# Patient Record
Sex: FEMALE | Race: WHITE | NOT HISPANIC OR LATINO
[De-identification: names, ages, dates, MRNs, and addresses within clinical notes are randomized per-mention and may not be internally consistent; named-entity substitution may affect disease eponyms.]

---

## 2017-07-24 ENCOUNTER — APPOINTMENT (OUTPATIENT)
Dept: ULTRASOUND IMAGING | Facility: IMAGING CENTER | Age: 67
End: 2017-07-24

## 2017-07-24 ENCOUNTER — OUTPATIENT (OUTPATIENT)
Dept: OUTPATIENT SERVICES | Facility: HOSPITAL | Age: 67
LOS: 1 days | End: 2017-07-24
Payer: MEDICARE

## 2017-07-24 ENCOUNTER — APPOINTMENT (OUTPATIENT)
Dept: MAMMOGRAPHY | Facility: IMAGING CENTER | Age: 67
End: 2017-07-24

## 2017-07-24 DIAGNOSIS — C50.919 MALIGNANT NEOPLASM OF UNSPECIFIED SITE OF UNSPECIFIED FEMALE BREAST: ICD-10-CM

## 2017-07-24 PROCEDURE — 77066 DX MAMMO INCL CAD BI: CPT

## 2017-07-24 PROCEDURE — G0279: CPT

## 2017-08-16 ENCOUNTER — APPOINTMENT (OUTPATIENT)
Dept: SURGICAL ONCOLOGY | Facility: CLINIC | Age: 67
End: 2017-08-16
Payer: MEDICARE

## 2017-08-16 VITALS
SYSTOLIC BLOOD PRESSURE: 143 MMHG | DIASTOLIC BLOOD PRESSURE: 88 MMHG | RESPIRATION RATE: 16 BRPM | HEART RATE: 106 BPM | HEIGHT: 67 IN | OXYGEN SATURATION: 96 %

## 2017-08-16 PROCEDURE — 99214 OFFICE O/P EST MOD 30 MIN: CPT

## 2017-08-16 RX ORDER — ACYCLOVIR 50 MG/G
5 CREAM TOPICAL
Qty: 5 | Refills: 0 | Status: ACTIVE | COMMUNITY
Start: 2016-12-15

## 2018-03-07 ENCOUNTER — RX RENEWAL (OUTPATIENT)
Age: 68
End: 2018-03-07

## 2018-03-11 ENCOUNTER — RX RENEWAL (OUTPATIENT)
Age: 68
End: 2018-03-11

## 2018-04-02 ENCOUNTER — APPOINTMENT (OUTPATIENT)
Dept: SURGICAL ONCOLOGY | Facility: CLINIC | Age: 68
End: 2018-04-02
Payer: MEDICARE

## 2018-04-02 ENCOUNTER — RX RENEWAL (OUTPATIENT)
Age: 68
End: 2018-04-02

## 2018-05-07 ENCOUNTER — APPOINTMENT (OUTPATIENT)
Dept: SURGICAL ONCOLOGY | Facility: CLINIC | Age: 68
End: 2018-05-07
Payer: MEDICARE

## 2018-05-07 VITALS
WEIGHT: 155 LBS | DIASTOLIC BLOOD PRESSURE: 95 MMHG | HEIGHT: 67 IN | BODY MASS INDEX: 24.33 KG/M2 | HEART RATE: 104 BPM | RESPIRATION RATE: 16 BRPM | SYSTOLIC BLOOD PRESSURE: 167 MMHG

## 2018-05-07 PROCEDURE — 99214 OFFICE O/P EST MOD 30 MIN: CPT

## 2018-05-08 ENCOUNTER — RX RENEWAL (OUTPATIENT)
Age: 68
End: 2018-05-08

## 2018-07-03 ENCOUNTER — APPOINTMENT (OUTPATIENT)
Dept: ORTHOPEDIC SURGERY | Facility: CLINIC | Age: 68
End: 2018-07-03
Payer: MEDICARE

## 2018-07-03 VITALS
HEIGHT: 67 IN | WEIGHT: 152 LBS | HEART RATE: 103 BPM | SYSTOLIC BLOOD PRESSURE: 152 MMHG | RESPIRATION RATE: 16 BRPM | BODY MASS INDEX: 23.86 KG/M2 | DIASTOLIC BLOOD PRESSURE: 119 MMHG

## 2018-07-03 DIAGNOSIS — M47.812 SPONDYLOSIS W/OUT MYELOPATHY OR RADICULOPATHY, CERVICAL REGION: ICD-10-CM

## 2018-07-03 PROCEDURE — 72040 X-RAY EXAM NECK SPINE 2-3 VW: CPT

## 2018-07-03 PROCEDURE — 99203 OFFICE O/P NEW LOW 30 MIN: CPT

## 2018-07-03 PROCEDURE — 73000 X-RAY EXAM OF COLLAR BONE: CPT | Mod: LT

## 2018-07-09 ENCOUNTER — RX RENEWAL (OUTPATIENT)
Age: 68
End: 2018-07-09

## 2018-07-30 ENCOUNTER — APPOINTMENT (OUTPATIENT)
Dept: ORTHOPEDIC SURGERY | Facility: CLINIC | Age: 68
End: 2018-07-30
Payer: MEDICARE

## 2018-07-30 VITALS — HEIGHT: 67 IN | WEIGHT: 152 LBS | BODY MASS INDEX: 23.86 KG/M2

## 2018-07-30 PROCEDURE — 99214 OFFICE O/P EST MOD 30 MIN: CPT

## 2018-07-30 PROCEDURE — 73000 X-RAY EXAM OF COLLAR BONE: CPT | Mod: LT

## 2018-08-31 ENCOUNTER — APPOINTMENT (OUTPATIENT)
Dept: MAMMOGRAPHY | Facility: CLINIC | Age: 68
End: 2018-08-31
Payer: MEDICARE

## 2018-08-31 ENCOUNTER — OUTPATIENT (OUTPATIENT)
Dept: OUTPATIENT SERVICES | Facility: HOSPITAL | Age: 68
LOS: 1 days | End: 2018-08-31
Payer: MEDICARE

## 2018-08-31 ENCOUNTER — APPOINTMENT (OUTPATIENT)
Dept: ULTRASOUND IMAGING | Facility: CLINIC | Age: 68
End: 2018-08-31
Payer: MEDICARE

## 2018-08-31 DIAGNOSIS — Z00.8 ENCOUNTER FOR OTHER GENERAL EXAMINATION: ICD-10-CM

## 2018-08-31 DIAGNOSIS — Z08 ENCOUNTER FOR FOLLOW-UP EXAMINATION AFTER COMPLETED TREATMENT FOR MALIGNANT NEOPLASM: ICD-10-CM

## 2018-08-31 PROCEDURE — G0279: CPT

## 2018-08-31 PROCEDURE — 76641 ULTRASOUND BREAST COMPLETE: CPT | Mod: 26,50

## 2018-08-31 PROCEDURE — G0279: CPT | Mod: 26

## 2018-08-31 PROCEDURE — 77066 DX MAMMO INCL CAD BI: CPT | Mod: 26

## 2018-08-31 PROCEDURE — 76641 ULTRASOUND BREAST COMPLETE: CPT

## 2018-08-31 PROCEDURE — 77066 DX MAMMO INCL CAD BI: CPT

## 2018-09-06 ENCOUNTER — APPOINTMENT (OUTPATIENT)
Dept: ORTHOPEDIC SURGERY | Facility: CLINIC | Age: 68
End: 2018-09-06
Payer: MEDICARE

## 2018-09-06 PROCEDURE — 73000 X-RAY EXAM OF COLLAR BONE: CPT | Mod: LT

## 2018-09-06 PROCEDURE — 99214 OFFICE O/P EST MOD 30 MIN: CPT

## 2018-09-12 ENCOUNTER — RX RENEWAL (OUTPATIENT)
Age: 68
End: 2018-09-12

## 2018-09-20 ENCOUNTER — EMERGENCY (EMERGENCY)
Facility: HOSPITAL | Age: 68
LOS: 1 days | Discharge: HOME | End: 2018-09-20
Attending: EMERGENCY MEDICINE | Admitting: EMERGENCY MEDICINE
Payer: MEDICARE

## 2018-09-20 ENCOUNTER — RX CHANGE (OUTPATIENT)
Age: 68
End: 2018-09-20

## 2018-09-20 VITALS
WEIGHT: 151.9 LBS | RESPIRATION RATE: 16 BRPM | SYSTOLIC BLOOD PRESSURE: 160 MMHG | HEART RATE: 109 BPM | TEMPERATURE: 98 F | HEIGHT: 67 IN | OXYGEN SATURATION: 95 % | DIASTOLIC BLOOD PRESSURE: 91 MMHG

## 2018-09-20 DIAGNOSIS — Z98.890 OTHER SPECIFIED POSTPROCEDURAL STATES: Chronic | ICD-10-CM

## 2018-09-20 DIAGNOSIS — Z90.49 ACQUIRED ABSENCE OF OTHER SPECIFIED PARTS OF DIGESTIVE TRACT: Chronic | ICD-10-CM

## 2018-09-20 DIAGNOSIS — M89.9 DISORDER OF BONE, UNSPECIFIED: ICD-10-CM

## 2018-09-20 PROCEDURE — 99283 EMERGENCY DEPT VISIT LOW MDM: CPT

## 2018-09-20 PROCEDURE — 73502 X-RAY EXAM HIP UNI 2-3 VIEWS: CPT | Mod: 26,RT

## 2018-09-20 PROCEDURE — 73502 X-RAY EXAM HIP UNI 2-3 VIEWS: CPT

## 2018-09-20 RX ORDER — ACETAMINOPHEN 500 MG
650 TABLET ORAL ONCE
Qty: 0 | Refills: 0 | Status: COMPLETED | OUTPATIENT
Start: 2018-09-20 | End: 2018-09-20

## 2018-09-20 RX ADMIN — Medication 650 MILLIGRAM(S): at 14:30

## 2018-09-20 RX ADMIN — Medication 650 MILLIGRAM(S): at 15:30

## 2018-09-20 NOTE — ED PROVIDER NOTE - ATTENDING CONTRIBUTION TO CARE
[place]icruz with ROLY Silva. Patient with tenderness to the right inner groin. Bearing weight but with pain. Sent by Dr Walker for xray and assessment. Xray neg. Stable for d/c with OTC pain control. No fracture. May f/u outpt. I performed a face to face bedside interview with patient regarding history of present illness, review of symptoms and past medical history. I completed an independent physical exam.  I have discussed the patient's plan of care with Physician Assistant (PA). I agree with note as stated above, having amended the EMR as needed to reflect my findings.   This includes History of Present Illness, HIV, Past Medical/Surgical/Family/Social History, Allergies and Home Medications, Review of Systems, Physical Exam, and any Progress Notes during the time I functioned as the attending physician for this patient.

## 2018-09-20 NOTE — ED PROVIDER NOTE - PROGRESS NOTE DETAILS
ROLY Villaseñor:  Dr Babcock evaluated pt,  pt can d/c archana, no hip fx, pt and pt's  informed of results, pt has ortho md to f/u,    will refer to neuro for chronic  numbness of extremity either Dr Davis or Dr Kuldip Mendoza, copy of results given, d/c instructions given to pt,   pain improved with tylenol

## 2018-09-20 NOTE — ED PROVIDER NOTE - MEDICAL DECISION MAKING DETAILS
67 yo F with  h/o  cancer,  "brittle bones"  with rt groin pain worse with movement,   + FROM of rt hip, pt able to stand and bear weight, ambulate but with pain, concern for hip fx  NV intact of lower extremities,  ordered for tylenol for pain and  rt hip xray/pelvis  will re-eval,  d/w Dr Babcock , who spoke with  ortho md.

## 2018-09-20 NOTE — ED ADULT NURSE NOTE - NSIMPLEMENTINTERV_GEN_ALL_ED
Implemented All Universal Safety Interventions:  [place] to call system. Call bell, personal items and telephone within reach. Instruct patient to call for assistance. Room bathroom lighting operational. Non-slip footwear when patient is off stretcher. Physically safe environment: no spills, clutter or unnecessary equipment. Stretcher in lowest position, wheels locked, appropriate side rails in place.

## 2018-09-20 NOTE — ED PROVIDER NOTE - PHYSICAL EXAMINATION
head : atraumatic  neck:  supple, no c-spine tenderness  chest:  S1,S2  CTAB  back: no midline tenderness  lower extremities: + distal pulses,   no calf tenderness,  FROM ,   rt inner thigh: groin:  + tenderness, no swelling,   neuro: motor and sensory intact,  ambulatory

## 2018-09-20 NOTE — ED ADULT NURSE NOTE - OBJECTIVE STATEMENT
Patient presents to ED s/p fall from home complaining of right sided groin pain only when walking. Patient reports falling in her kitchen yesterday morning. Patient denies LOC/vision changes/head trauma. Spouse at bedside.

## 2018-09-20 NOTE — ED PROVIDER NOTE - OBJECTIVE STATEMENT
67 yo F from community accompanied her  presents with rt groin pain s/p minor mechanical fall yesterday  while in her kitchen.  tripped over carpet and  fell backward,   no LOC, no  head injury, no neck pain,  no back pain,  no urinary or bowel incontinence  no  SOB or CP  pt took  ibuprofen 800 mg today about 8 am for  pain with some relief,    pt spoke with orthopedic doctor that she called and recommend  rt hip xray.  pt has left collar fx from 2 months that still not healed and being f/u with her orthopedic doctor    pt's  requesting neuro referral for  left arm numbness and imbalance X 2 months s/p fall , + left collar fx  pt doesn't have a pcp, as well.

## 2018-09-27 ENCOUNTER — RX RENEWAL (OUTPATIENT)
Age: 68
End: 2018-09-27

## 2018-10-18 LAB
ALBUMIN SERPL ELPH-MCNC: 4.6 G/DL
ALP BLD-CCNC: 74 U/L
ALT SERPL-CCNC: 19 U/L
AST SERPL-CCNC: 19 U/L
BILIRUB DIRECT SERPL-MCNC: 0.2 MG/DL
BILIRUB INDIRECT SERPL-MCNC: 0.8 MG/DL
BILIRUB SERPL-MCNC: 1 MG/DL
CANCER AG27-29 SERPL-ACNC: 20.3 U/ML
CEA SERPL-MCNC: 4.9 NG/ML
PROT SERPL-MCNC: 7.1 G/DL

## 2018-10-31 ENCOUNTER — MOBILE ON CALL (OUTPATIENT)
Age: 68
End: 2018-10-31

## 2018-11-01 ENCOUNTER — RX CHANGE (OUTPATIENT)
Age: 68
End: 2018-11-01

## 2018-11-02 ENCOUNTER — APPOINTMENT (OUTPATIENT)
Dept: ORTHOPEDIC SURGERY | Facility: CLINIC | Age: 68
End: 2018-11-02
Payer: MEDICARE

## 2018-11-02 VITALS
WEIGHT: 145 LBS | BODY MASS INDEX: 22.76 KG/M2 | DIASTOLIC BLOOD PRESSURE: 98 MMHG | SYSTOLIC BLOOD PRESSURE: 154 MMHG | HEART RATE: 101 BPM | HEIGHT: 67 IN

## 2018-11-02 PROCEDURE — 99214 OFFICE O/P EST MOD 30 MIN: CPT

## 2018-11-02 PROCEDURE — 99204 OFFICE O/P NEW MOD 45 MIN: CPT

## 2018-11-07 ENCOUNTER — APPOINTMENT (OUTPATIENT)
Dept: SURGICAL ONCOLOGY | Facility: CLINIC | Age: 68
End: 2018-11-07
Payer: MEDICARE

## 2018-11-07 VITALS
OXYGEN SATURATION: 96 % | HEIGHT: 67 IN | WEIGHT: 145 LBS | DIASTOLIC BLOOD PRESSURE: 128 MMHG | SYSTOLIC BLOOD PRESSURE: 183 MMHG | HEART RATE: 118 BPM | BODY MASS INDEX: 22.76 KG/M2

## 2018-11-07 PROCEDURE — 99214 OFFICE O/P EST MOD 30 MIN: CPT

## 2018-11-08 ENCOUNTER — LABORATORY RESULT (OUTPATIENT)
Age: 68
End: 2018-11-08

## 2018-11-09 ENCOUNTER — APPOINTMENT (OUTPATIENT)
Dept: ORTHOPEDIC SURGERY | Facility: CLINIC | Age: 68
End: 2018-11-09

## 2018-11-16 ENCOUNTER — APPOINTMENT (OUTPATIENT)
Dept: ORTHOPEDIC SURGERY | Facility: CLINIC | Age: 68
End: 2018-11-16
Payer: MEDICARE

## 2018-11-16 VITALS
BODY MASS INDEX: 22.76 KG/M2 | DIASTOLIC BLOOD PRESSURE: 80 MMHG | SYSTOLIC BLOOD PRESSURE: 128 MMHG | HEART RATE: 101 BPM | WEIGHT: 145 LBS | HEIGHT: 67 IN

## 2018-11-16 PROCEDURE — 99214 OFFICE O/P EST MOD 30 MIN: CPT

## 2018-11-18 ENCOUNTER — RX CHANGE (OUTPATIENT)
Age: 68
End: 2018-11-18

## 2018-11-18 ENCOUNTER — INPATIENT (INPATIENT)
Facility: HOSPITAL | Age: 68
LOS: 1 days | Discharge: HOME | End: 2018-11-20
Attending: HOSPITALIST | Admitting: HOSPITALIST
Payer: MEDICARE

## 2018-11-18 VITALS
DIASTOLIC BLOOD PRESSURE: 95 MMHG | OXYGEN SATURATION: 97 % | HEART RATE: 110 BPM | RESPIRATION RATE: 18 BRPM | SYSTOLIC BLOOD PRESSURE: 148 MMHG | TEMPERATURE: 98 F

## 2018-11-18 DIAGNOSIS — Z90.49 ACQUIRED ABSENCE OF OTHER SPECIFIED PARTS OF DIGESTIVE TRACT: Chronic | ICD-10-CM

## 2018-11-18 DIAGNOSIS — G95.9 DISEASE OF SPINAL CORD, UNSPECIFIED: ICD-10-CM

## 2018-11-18 DIAGNOSIS — Z98.890 OTHER SPECIFIED POSTPROCEDURAL STATES: Chronic | ICD-10-CM

## 2018-11-18 LAB
ALBUMIN SERPL ELPH-MCNC: 4.3 G/DL — SIGNIFICANT CHANGE UP (ref 3.3–5)
ALP SERPL-CCNC: 84 U/L — SIGNIFICANT CHANGE UP (ref 40–120)
ALT FLD-CCNC: 13 U/L — SIGNIFICANT CHANGE UP (ref 4–33)
APTT BLD: 26.5 SEC — LOW (ref 27.5–36.3)
AST SERPL-CCNC: 21 U/L — SIGNIFICANT CHANGE UP (ref 4–32)
BILIRUB SERPL-MCNC: 0.8 MG/DL — SIGNIFICANT CHANGE UP (ref 0.2–1.2)
BLD GP AB SCN SERPL QL: NEGATIVE — SIGNIFICANT CHANGE UP
BUN SERPL-MCNC: 18 MG/DL — SIGNIFICANT CHANGE UP (ref 7–23)
CALCIUM SERPL-MCNC: 9.3 MG/DL — SIGNIFICANT CHANGE UP (ref 8.4–10.5)
CHLORIDE SERPL-SCNC: 103 MMOL/L — SIGNIFICANT CHANGE UP (ref 98–107)
CO2 SERPL-SCNC: 21 MMOL/L — LOW (ref 22–31)
CREAT SERPL-MCNC: 0.53 MG/DL — SIGNIFICANT CHANGE UP (ref 0.5–1.3)
GLUCOSE SERPL-MCNC: 105 MG/DL — HIGH (ref 70–99)
HCT VFR BLD CALC: 37.6 % — SIGNIFICANT CHANGE UP (ref 34.5–45)
HGB BLD-MCNC: 12.1 G/DL — SIGNIFICANT CHANGE UP (ref 11.5–15.5)
INR BLD: 0.96 — SIGNIFICANT CHANGE UP (ref 0.88–1.17)
MCHC RBC-ENTMCNC: 30.9 PG — SIGNIFICANT CHANGE UP (ref 27–34)
MCHC RBC-ENTMCNC: 32.2 % — SIGNIFICANT CHANGE UP (ref 32–36)
MCV RBC AUTO: 96.2 FL — SIGNIFICANT CHANGE UP (ref 80–100)
NRBC # FLD: 0 — SIGNIFICANT CHANGE UP
PLATELET # BLD AUTO: 325 K/UL — SIGNIFICANT CHANGE UP (ref 150–400)
PMV BLD: 9.9 FL — SIGNIFICANT CHANGE UP (ref 7–13)
POTASSIUM SERPL-MCNC: 4.1 MMOL/L — SIGNIFICANT CHANGE UP (ref 3.5–5.3)
POTASSIUM SERPL-SCNC: 4.1 MMOL/L — SIGNIFICANT CHANGE UP (ref 3.5–5.3)
PROT SERPL-MCNC: 7.1 G/DL — SIGNIFICANT CHANGE UP (ref 6–8.3)
PROTHROM AB SERPL-ACNC: 10.6 SEC — SIGNIFICANT CHANGE UP (ref 9.8–13.1)
RBC # BLD: 3.91 M/UL — SIGNIFICANT CHANGE UP (ref 3.8–5.2)
RBC # FLD: 13.5 % — SIGNIFICANT CHANGE UP (ref 10.3–14.5)
RH IG SCN BLD-IMP: POSITIVE — SIGNIFICANT CHANGE UP
SODIUM SERPL-SCNC: 139 MMOL/L — SIGNIFICANT CHANGE UP (ref 135–145)
WBC # BLD: 9.66 K/UL — SIGNIFICANT CHANGE UP (ref 3.8–10.5)
WBC # FLD AUTO: 9.66 K/UL — SIGNIFICANT CHANGE UP (ref 3.8–10.5)

## 2018-11-18 PROCEDURE — 72170 X-RAY EXAM OF PELVIS: CPT | Mod: 26

## 2018-11-18 PROCEDURE — 72040 X-RAY EXAM NECK SPINE 2-3 VW: CPT | Mod: 26

## 2018-11-18 RX ORDER — DEXAMETHASONE 0.5 MG/5ML
10 ELIXIR ORAL ONCE
Qty: 0 | Refills: 0 | Status: COMPLETED | OUTPATIENT
Start: 2018-11-18 | End: 2018-11-18

## 2018-11-18 RX ORDER — MORPHINE SULFATE 50 MG/1
4 CAPSULE, EXTENDED RELEASE ORAL ONCE
Qty: 0 | Refills: 0 | Status: DISCONTINUED | OUTPATIENT
Start: 2018-11-18 | End: 2018-11-18

## 2018-11-18 RX ORDER — DIAZEPAM 5 MG
2 TABLET ORAL ONCE
Qty: 0 | Refills: 0 | Status: DISCONTINUED | OUTPATIENT
Start: 2018-11-18 | End: 2018-11-18

## 2018-11-18 RX ADMIN — MORPHINE SULFATE 4 MILLIGRAM(S): 50 CAPSULE, EXTENDED RELEASE ORAL at 23:40

## 2018-11-18 RX ADMIN — MORPHINE SULFATE 4 MILLIGRAM(S): 50 CAPSULE, EXTENDED RELEASE ORAL at 21:50

## 2018-11-18 RX ADMIN — Medication 2 MILLIGRAM(S): at 22:15

## 2018-11-18 RX ADMIN — Medication 102 MILLIGRAM(S): at 21:55

## 2018-11-18 NOTE — H&P ADULT - NSHPLABSRESULTS_GEN_ALL_CORE
LABS:                        12.1   9.66  )-----------( 325      ( 18 Nov 2018 21:50 )             37.6     18 Nov 2018 21:50    139    |  103    |  18     ----------------------------<  105    4.1     |  21     |  0.53     Ca    9.3        18 Nov 2018 21:50    TPro  7.1    /  Alb  4.3    /  TBili  0.8    /  DBili  x      /  AST  21     /  ALT  13     /  AlkPhos  84     18 Nov 2018 21:50    PT/INR - ( 18 Nov 2018 21:50 )   PT: 10.6 SEC;   INR: 0.96          PTT - ( 18 Nov 2018 21:50 )  PTT:26.5 SEC  CAPILLARY BLOOD GLUCOSE        BLOOD CULTURE    RADIOLOGY & ADDITIONAL TESTS:    Imaging Personally Reviewed:  [ ] YES LABS:                        12.1   9.66  )-----------( 325      ( 18 Nov 2018 21:50 )             37.6     18 Nov 2018 21:50    139    |  103    |  18     ----------------------------<  105    4.1     |  21     |  0.53     Ca    9.3        18 Nov 2018 21:50    TPro  7.1    /  Alb  4.3    /  TBili  0.8    /  DBili  x      /  AST  21     /  ALT  13     /  AlkPhos  84     18 Nov 2018 21:50    PT/INR - ( 18 Nov 2018 21:50 )   PT: 10.6 SEC;   INR: 0.96          PTT - ( 18 Nov 2018 21:50 )  PTT:26.5 SEC  CAPILLARY BLOOD GLUCOSE        BLOOD CULTURE    RADIOLOGY & ADDITIONAL TESTS:    Imaging Personally Reviewed:  [X] YES XRay C spine - degenerative changes   X ray pelvis - no masses or lesions 11-18    139  |  103  |  18  ----------------------------<  105<H>  4.1   |  21<L>  |  0.53    Ca    9.3      18 Nov 2018 21:50    TPro  7.1  /  Alb  4.3  /  TBili  0.8  /  DBili  x   /  AST  21  /  ALT  13  /  AlkPhos  84  11-18                        12.1   9.66  )-----------( 325      ( 18 Nov 2018 21:50 )             37.6     PT/INR - ( 18 Nov 2018 21:50 )   PT: 10.6 SEC;   INR: 0.96     PTT - ( 18 Nov 2018 21:50 )  PTT:26.5 SEC    RADIOLOGY & ADDITIONAL TESTS:    < from: Xray Pelvis AP only (11.18.18 @ 23:04) >  ******PRELIMINARY REPORT******  INTERPRETATION:  No discrete sclerotic or lytic lesions are visualized.  < end of copied text >    < from: Xray Hip w/ Pelvis 2 or 3 Views, Right (09.20.18 @ 15:11) >  The hips appear free of degeneration and are symmetric. Dystrophic soft tissue calcification is seen superior to the right greater trochanter.  No bone destruction or fracture is evident.   IMPRESSION: No acute finding.  < end of copied text >

## 2018-11-18 NOTE — H&P ADULT - ASSESSMENT
68F PM[facility] Breast cancer, Hodgkin's lymphoma, cervical spinal stenosis with C4-C5 disk herniation and cord compression, L sided carpal tunnel syndrome s/p surgery, presented with 68F [facility] Breast cancer, Hodgkin's lymphoma, cervical spinal stenosis with C4-C5 disk herniation and cord compression, L sided carpal tunnel syndrome s/p surgery, presents with bilateral upper extremity and neck pain likely 2/2 C4-C5 disk herniation.

## 2018-11-18 NOTE — H&P ADULT - PROBLEM SELECTOR PLAN 4
- c/w anastrazole 1 mg daily - patient w/ chronic gait instability, has had extensive outpatient workup done per patient including MRI brain, MR spine    - no cerebellar abnormalities on neuro exam   - f/u with outpatient neurologist (Dr. Bell) regarding w/u that has been done, no need for additional imaging while inpatient   - PT   - given decreased sensation of feet will check B12, A1c - patient w/ chronic gait instability, has had extensive outpatient workup done per patient including MRI brain, MR spine    - no cerebellar abnormalities on neuro exam   - f/u with outpatient neurologist (Dr. Bell) regarding w/u that has been done  - PT   - given decreased sensation of feet will check B12, A1c

## 2018-11-18 NOTE — H&P ADULT - PROBLEM SELECTOR PLAN 3
- patient w/ recent bone scan w/ R acetabular lesion and L clavicular/scapular lesion   - plan for IR guided biopsy of left hip, IR consult in the AM

## 2018-11-18 NOTE — H&P ADULT - NSHPPHYSICALEXAM_GEN_ALL_CORE
Vital Signs Last 24 Hrs  T(C): 36.7 (18 Nov 2018 21:50), Max: 36.7 (18 Nov 2018 21:50)  T(F): 98.1 (18 Nov 2018 21:50), Max: 98.1 (18 Nov 2018 21:50)  HR: 96 (18 Nov 2018 21:50) (96 - 110)  BP: 142/90 (18 Nov 2018 21:50) (142/90 - 148/95)  BP(mean): --  RR: 16 (18 Nov 2018 21:50) (16 - 18)  SpO2: 97% (18 Nov 2018 21:50) (97% - 97%)    PHYSICAL EXAM:  GENERAL: NAD, well-groomed, well-developed  HEAD:  Atraumatic, Normocephalic  EYES: EOMI, PERRLA, conjunctiva and sclera clear  ENMT: No tonsillar erythema, exudates, or enlargement; Moist mucous membranes, Good dentition  NECK: Supple, No JVD  NERVOUS SYSTEM: AOX3, motor and sensation grossly intact in b/l UE and b/l LE  PSYCHIATRIC: Appropriate affect and mood  CHEST/LUNG: Clear to auscultation bilaterally; No rales, rhonchi, wheezing, or rubs  HEART: Regular rate and rhythm; No murmurs, rubs, or gallops. No LE edema  ABDOMEN: Soft, Nontender, Nondistended; Bowel sounds present  EXTREMITIES:  2+ Peripheral Pulses, No clubbing, cyanosis  SKIN: No rashes or lesions Vital Signs Last 24 Hrs  T(C): 36.7 (18 Nov 2018 21:50), Max: 36.7 (18 Nov 2018 21:50)  T(F): 98.1 (18 Nov 2018 21:50), Max: 98.1 (18 Nov 2018 21:50)  HR: 96 (18 Nov 2018 21:50) (96 - 110)  BP: 142/90 (18 Nov 2018 21:50) (142/90 - 148/95)  BP(mean): --  RR: 16 (18 Nov 2018 21:50) (16 - 18)  SpO2: 97% (18 Nov 2018 21:50) (97% - 97%)    PHYSICAL EXAM:  GENERAL: NAD  HEAD:  Atraumatic, Normocephalic  EYES: EOMI, PERRLA, conjunctiva and sclera clear  ENMT: No tonsillar erythema, exudates, or enlargement; Moist mucous membranes, Good dentition  NECK: Supple, No JVD  NERVOUS SYSTEM: AOX3, motor and sensation grossly intact in b/l UE and b/l LE  PSYCHIATRIC: Appropriate affect and mood  CHEST/LUNG: Clear to auscultation bilaterally; No rales, rhonchi, wheezing, or rubs  HEART: Regular rate and rhythm; No murmurs, rubs, or gallops. No LE edema  ABDOMEN: Soft, Nontender, Nondistended; Bowel sounds present  EXTREMITIES:  2+ Peripheral Pulses, No clubbing, cyanosis  SKIN: No rashes or lesions Vital Signs Last 24 Hrs  T(C): 36.7 (18 Nov 2018 21:50), Max: 36.7 (18 Nov 2018 21:50)  T(F): 98.1 (18 Nov 2018 21:50), Max: 98.1 (18 Nov 2018 21:50)  HR: 96 (18 Nov 2018 21:50) (96 - 110)  BP: 142/90 (18 Nov 2018 21:50) (142/90 - 148/95)  BP(mean): --  RR: 16 (18 Nov 2018 21:50) (16 - 18)  SpO2: 97% (18 Nov 2018 21:50) (97% - 97%)    PHYSICAL EXAM:  GENERAL: NAD  HEAD:  Atraumatic, Normocephalic  EYES: EOMI, PERRLA, conjunctiva and sclera clear  ENMT: No tonsillar erythema, exudates, or enlargement; Moist mucous membranes, Good dentition  NECK: Supple, No JVD  NERVOUS SYSTEM: AOX3, 5/5 strength in bilateral lower extremities.  +5/5 strength in RUE.  +3/5 strength in LUE.  Decreased sensation in bilateral feet.    PSYCHIATRIC: Appropriate affect and mood  CHEST/LUNG: Clear to auscultation bilaterally; No rales, rhonchi, wheezing, or rubs  HEART: Regular rate and rhythm; No murmurs, rubs, or gallops. No LE edema  ABDOMEN: Soft, Nontender, Nondistended; Bowel sounds present  EXTREMITIES:  2+ Peripheral Pulses, No clubbing, cyanosis  SKIN: No rashes or lesions Vital Signs Last 24 Hrs  T(C): 36.7 (18 Nov 2018 21:50), Max: 36.7 (18 Nov 2018 21:50)  T(F): 98.1 (18 Nov 2018 21:50), Max: 98.1 (18 Nov 2018 21:50)  HR: 96 (18 Nov 2018 21:50) (96 - 110)  BP: 142/90 (18 Nov 2018 21:50) (142/90 - 148/95)  BP(mean): --  RR: 16 (18 Nov 2018 21:50) (16 - 18)  SpO2: 97% (18 Nov 2018 21:50) (97% - 97%)    PHYSICAL EXAM:  GENERAL: NAD  HEAD:  Atraumatic, Normocephalic  EYES: EOMI, PERRLA, conjunctiva and sclera clear  ENMT: No tonsillar erythema, exudates, or enlargement; Moist mucous membranes, Good dentition  NECK: Supple, No JVD  NERVOUS SYSTEM: AOX3, 5/5 strength in bilateral lower extremities.  +5/5 strength in RUE.  +3/5 strength in LUE.  Decreased sensation in bilateral soles of feet.    PSYCHIATRIC: Appropriate affect and mood  CHEST/LUNG: Clear to auscultation bilaterally; No rales, rhonchi, wheezing, or rubs  HEART: Regular rate and rhythm; No murmurs, rubs, or gallops. No LE edema  ABDOMEN: Soft, Nontender, Nondistended; Bowel sounds present  EXTREMITIES:  2+ Peripheral Pulses, No clubbing, cyanosis  SKIN: No rashes or lesions

## 2018-11-18 NOTE — H&P ADULT - PROBLEM SELECTOR PLAN 1
- appreciate ortho recs - c/w steroids (dexamethasone 10mg q 6 hours), NSAIDs, muscle relaxants, PT/OT, no urgent orthopedic intervention, will likely eventually need surgery however holding off until hip lesion evaluated    - Will monitor glucose while on steroids - herniated disk with cord compression - appreciate ortho recs - c/w steroids (dexamethasone 10mg q 6 hours), NSAIDs, muscle relaxants if needed, PT/OT, no urgent orthopedic intervention, will likely eventually need cervical spine surgery however holding off until hip lesion evaluated    - Will monitor glucose while on steroids - herniated disk with cord compression - appreciate ortho recs - c/w steroids (dexamethasone 10mg q 6 hours), NSAIDs, muscle relaxants if needed, PT/OT, no urgent orthopedic intervention, will likely eventually need cervical spine surgery however holding off until hip lesion evaluated    - Will monitor glucose while on steroids, protonix while on NSAIDs - herniated disk with cord compression - appreciate ortho recs - c/w steroids (dexamethasone 10mg q 6 hours), NSAIDs, muscle relaxants if needed, PT/OT, no urgent orthopedic intervention, will likely eventually need cervical spine surgery however holding off until hip lesion evaluated    - Will monitor glucose while on steroids, protonix while on high dose steroids

## 2018-11-18 NOTE — H&P ADULT - HISTORY OF PRESENT ILLNESS
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  She is on anastrazole 1 mg daily.  She also received adjuvant XRT.  Had normal mammogram 8/2018.  She had persistent pain, underwent bone scan 11/2/18 which showed abnormal radiotracer accumulation in the pelvis and L scapula.    She had colonoscopy 2 years ago which was normal, although bloodwork did show and elevated CEA 4.7, repeat 3.9 this month. 68F PMH Breast cancer (dx 2013, s/p R lumpectomy and XRT, on anastrazole), Hodgkin's lymphoma (s/p XRT 1981), cervical spinal stenosis with C4-C5 disk herniation and cord compression, L sided carpal tunnel syndrome s/p surgery, presented with bilateral upper extremity radicular pain, difficulty with coordination of her arms, and frequent falls.  Her family notes that she has had issues dropping things and gait instability for years, but they believe it is getting worse.  Patient fell in 6/18 and had a left clavicle fracture that was treated non-operatively, she was also found to incidentally have a R acetabular lesion that she has been planning on getting a CT guided biopsy of, however has not been able to get it yet as an outpatient.  She also had a recent bone scan that showed increased uptake in her R acetabulum and L clavicle/scapula.  She had EMGs done on her LUE that showed significant pathology and she underwent L carpal tunnel release and ulnar nerve transposition with Dr. Reddy approximately 1 month ago, these operations however did not help with her LUE symptoms. Denies fevers, chills, bowel/bladder incontinence, weight loss.  She has a known history of C4-C5 disk herneation with cord compression, was offered surgery by her orthopedist but only after she gets her biopsy of the acetabular lesion.  She denies other recent trauma.      In regards to her breast cancer, she was diagnosed in 2013, s/p R lumpectomy and sentinel LN biopsy, pathology w/ moderately differentiated ductal carcinoma ER/CA +, HER2 -.  She is on anastrazole 1 mg daily.  She also received adjuvant XRT.  Had normal mammogram 8/2018.  She has not noticed any new breast lumps.  She had colonoscopy 2 years ago which was normal, although bloodwork did show and elevated CEA 4.7, however repeat was 3.9 this month.      In the ED, given morphine 4 mg, dexamethasone 10 mg, and diazepam 2 mg. 68F [facility] Breast cancer (dx 2013, s/p R lumpectomy and XRT, on anastrazole), Hodgkin's lymphoma (s/p XRT 1981), cervical spinal stenosis with C5-C6 disk herniation and cord compression, L sided carpal tunnel syndrome s/p surgery, presented with severe bilateral upper extremity radicular pain, difficulty with coordination of her arms, and unsteady gait.  Patient has had issues dropping things and had gait instability for years, but it is getting worse, today had severe neck pain so came to the ED.  Patient fell in 6/18 and had a left clavicle fracture that was treated non-operatively, she was also found to incidentally have a R acetabular lesion that she has been planning on getting a CT guided biopsy of, however has not been able to get it yet as an outpatient.  She also had a recent bone scan that showed increased uptake in her R acetabulum and L clavicle/scapula.  She had EMGs done on her LUE that showed significant pathology and she underwent L carpal tunnel release and ulnar nerve transposition with Dr. Reddy approximately 1 month ago, these operations however did not help with her LUE symptoms. Denies fevers, chills, bowel/bladder incontinence, melena, hematochezia.  She does have intentional weight loss of a few pounds after joining [employer].  She has a known history of C4-C5 disk herneation with cord compression, was offered surgery by her orthopedist but only after she gets her biopsy of the acetabular lesion.  She did have a recent fall where she slipped in the kitchen, hit the right side of her hip.  She has been ambulating with a walker since the fall.    In regards to her breast cancer, she was diagnosed in 2013, s/p R lumpectomy and sentinel LN biopsy, pathology w/ moderately differentiated ductal carcinoma ER/DC +, HER2 -.  She is on anastrazole 1 mg daily.  She also received adjuvant XRT.  Had normal mammogram 8/2018.  She has not noticed any new breast lumps.  She had colonoscopy 2 years ago which was normal, although bloodwork did show and elevated CEA 4.7, however repeat was 3.9 this month.  She has had regular pap smears and they have always been normal per patient.    In the ED, given morphine 4 mg, dexamethasone 10 mg, and diazepam 2 mg. 68F [facility] Breast cancer (dx 2013, s/p R lumpectomy and XRT, on anastrazole), Hodgkin's lymphoma (s/p XRT 1981), cervical spinal stenosis with C5-C6 disk herniation and cord compression, L sided carpal tunnel syndrome s/p surgery, presented with severe bilateral upper extremity radicular pain, difficulty with coordination of her arms, and unsteady gait.  Patient has had issues dropping things and had gait instability for years, but it is getting worse, today had severe neck pain so came to the ED.  Patient fell in 6/18 and had a left clavicle fracture that was treated non-operatively, she was also found to incidentally have a R acetabular lesion that she has been planning on getting a CT guided biopsy of, however has not been able to get it yet as an outpatient.  She also had a recent bone scan that showed increased uptake in her R acetabulum and L clavicle/scapula.  She had EMGs done on her LUE that showed significant pathology and she underwent L carpal tunnel release and ulnar nerve transposition with Dr. Reddy approximately 1 month ago, these operations however did not help with her LUE symptoms. Denies fevers, chills, bowel/bladder incontinence, melena, hematochezia.  She does have intentional weight loss of a few pounds after joining [employer].  She has a known history of C4-C5 disk herneation with cord compression, was offered surgery by her orthopedist but only after she gets her biopsy of the acetabular lesion.  She did have a recent fall where she slipped in the kitchen, hit the right side of her hip.  She has been ambulating with a walker since the fall.    In regards to her breast cancer, she was diagnosed in 2013, s/p R lumpectomy and sentinel LN biopsy, pathology w/ moderately differentiated ductal carcinoma ER/W[place] +, HER2 -.  She is on anastrazole 1 mg daily.  She also received adjuvant XRT.  Had normal mammogram 8/2018.  She has not noticed any new breast lumps.  She had colonoscopy 2 years ago which was normal, although bloodwork did show and elevated CEA 4.7, however repeat was 3.9 this month.  She has had regular pap smears and they have always been normal per patient.    In the ED, given morphine 4 mg, dexamethasone 10 mg, and diazepam 2 mg 68F [facility] Breast cancer (dx 2013, s/p R lumpectomy and XRT, on anastrazole), Hodgkin's lymphoma (s/p XRT 1981), cervical spinal stenosis with C5-C6 disk herniation and cord compression, L sided carpal tunnel syndrome s/p surgery, presented with severe bilateral upper extremity radicular pain, difficulty with coordination of her arms, and unsteady gait.  Patient has had issues dropping things and had gait instability for years, but it is getting worse, today had severe neck pain so came to the ED.  Patient fell in 6/18 and had a left clavicle fracture that was treated non-operatively, she was also found to incidentally have a R acetabular lesion that she has been planning on getting a CT guided biopsy of, however has not been able to get it yet as an outpatient.  She also had a recent bone scan that showed increased uptake in her R acetabulum and L clavicle/scapula.  She had EMGs done on her LUE that showed significant pathology and she underwent L carpal tunnel release and ulnar nerve transposition with Dr. Reddy approximately 1 month ago, these operations however did not help with her LUE symptoms. Denies fevers, chills, bowel/bladder incontinence, saddle anesthesia, melena, hematochezia.  She does have intentional weight loss of a few pounds after joining [employer].  She has a known history of C4-C5 disk herniation with cord compression, was offered surgery by her orthopedist but only after she gets her biopsy of the acetabular lesion.  She did have a recent fall where she slipped in the kitchen, hit the right side of her hip.  She has been ambulating with a walker since the fall.    In regards to her breast cancer, she was diagnosed in 2013, s/p R lumpectomy and sentinel LN biopsy, pathology w/ moderately differentiated ductal carcinoma ER/GA +, HER2 -.  She is on anastrazole 1 mg daily.  She also received adjuvant XRT.  Had normal mammogram 8/2018.  She has not noticed any new breast lumps.  She had colonoscopy 2 years ago which was normal, although bloodwork did show and elevated CEA 4.7, however repeat was 3.9 this month.  She has had regular pap smears and they have always been normal per patient.    In the ED, given morphine 4 mg, dexamethasone 10 mg, and diazepam 2 mg

## 2018-11-18 NOTE — H&P ADULT - NSHPSOCIALHISTORY_GEN_ALL_CORE
lives with , smoked occasionally in college, occasional etoh use, no drug use.  Retired teacher.  Ambulates w/ walker.

## 2018-11-18 NOTE — ED ADULT TRIAGE NOTE - CHIEF COMPLAINT QUOTE
Pt arrives with c/o body pain that has been present for some time, progressing over the last few months. Pt states, "I'm just a mess and have stiffness all over, pt appears in mendoza cute distress and is ambulatory in triage. Pt vs as noted, will monitor.

## 2018-11-18 NOTE — H&P ADULT - NSHPREVIEWOFSYSTEMS_GEN_ALL_CORE
CONSTITUTIONAL: No fever or fatigue. +intentional weight loss   EYES: No eye pain, visual disturbances, or discharge  ENMT:  No difficulty hearing, tinnitus, vertigo; No sinus or throat pain  RESPIRATORY: No cough, wheezing, chills or hemoptysis; No shortness of breath  CARDIOVASCULAR: No chest pain, palpitations, dizziness, or leg swelling  GASTROINTESTINAL: No abdominal or epigastric pain. No nausea, vomiting, or hematemesis; No diarrhea or constipation. No melena or hematochezia.  GENITOURINARY: No dysuria, frequency, hematuria, or incontinence  NEUROLOGICAL: No headaches. +neck pain, +bilateral upper extremity pain. +numbness of feet.  +LUE weakness.  SKIN: No itching, burning, rashes, or lesions   MUSCULOSKELETAL: +neck pain. +bilateral arm pain.   PSYCHIATRIC: +anxiety CONSTITUTIONAL: No fever or fatigue. +intentional weight loss   EYES: No eye pain, visual disturbances, or discharge  ENMT:  No difficulty hearing, tinnitus, vertigo; No sinus or throat pain  RESPIRATORY: No cough, wheezing, chills or hemoptysis; No shortness of breath  CARDIOVASCULAR: No chest pain, palpitations, dizziness, or leg swelling  GASTROINTESTINAL: No abdominal or epigastric pain. No nausea, vomiting, or hematemesis; No diarrhea or constipation. No melena or hematochezia.  GENITOURINARY: No dysuria, frequency, hematuria, or incontinence  NEUROLOGICAL: No headaches. +neck pain, +bilateral upper extremity pain w/paresthesias in hands R>L. +numbness of feet.  +LUE weakness.  SKIN: No itching, burning, rashes, or lesions   MUSCULOSKELETAL: +neck pain. +bilateral arm pain.   PSYCHIATRIC: +anxiety

## 2018-11-18 NOTE — H&P ADULT - PROBLEM SELECTOR PLAN 6
- ISTOP reviewed reference # [id number], patient rx'd diazepam 5 mg 6 tablets 11/2  - hold off on further diazepam given not on chronically at home - c/w metoprolol, rosuvastatin Essential hypertension  - c/w metoprolol, rosuvastatin Essential hypertension  - c/w metoprolol

## 2018-11-18 NOTE — H&P ADULT - PROBLEM SELECTOR PLAN 7
DVT ppx: will need lovenox but will hold off until after speaking with IR for biopsy as may go for IR guided biopsy later today - ISTOP reviewed reference # [id number], patient rx'd diazepam 5 mg 6 tablets 11/2  - hold off on further diazepam given not on chronically at home, patient currently not asking for it  - likely in the setting of pain, suspect will improve once pain better controlled.

## 2018-11-18 NOTE — ED ADULT NURSE NOTE - NSIMPLEMENTINTERV_GEN_ALL_ED
Implemented All Fall Risk Interventions:  [place] to call system. Call bell, personal items and telephone within reach. Instruct patient to call for assistance. Room bathroom lighting operational. Non-slip footwear when patient is off stretcher. Physically safe environment: no spills, clutter or unnecessary equipment. Stretcher in lowest position, wheels locked, appropriate side rails in place. Provide visual cue, wrist band, yellow gown, etc. Monitor gait and stability. Monitor for mental status changes and reorient to person, place, and time. Review medications for side effects contributing to fall risk. Reinforce activity limits and safety measures with patient and family.

## 2018-11-18 NOTE — H&P ADULT - PROBLEM SELECTOR PLAN 8
DVT ppx: will need lovenox but will hold off until after speaking with IR for biopsy as may go for IR guided biopsy later today

## 2018-11-18 NOTE — H&P ADULT - PMH
Breast cancer  right side  Hodgkin disease Breast cancer  right side  Carpal tunnel syndrome    Cervical disc disease    Hodgkin disease

## 2018-11-18 NOTE — ED PROVIDER NOTE - MEDICAL DECISION MAKING DETAILS
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 Admit to Med for onc w/u of PET scan lesions; Ortho to follow (recommended muscle relaxants and steroids).

## 2018-11-18 NOTE — ED ADULT NURSE NOTE - OBJECTIVE STATEMENT
pt a&ox4, amb, arrived to ED room 3. Pt states arrives to ED c/o progressively worsening pain. Pt states pain in e pt a&ox4, amb, arrived to ED room 3. Pt states arrives to ED c/o progressively worsening pain. Pt states pain in hands and neck, history of herniated disks and previous breast ca and hodgkin's lymphoma. VSS. Pt in NAD. Pt states 2 falls in last 6 months, difficulty walking long distances, numbness to bilateral feet for several weeks, and difficulty sleeping. Pt is being worked up for possible bone ca, no biopsy done. MD evaluated patient. IV placed. Labs drawn and sent to lab. Safety and comfort maintained.

## 2018-11-18 NOTE — ED PROVIDER NOTE - OBJECTIVE STATEMENT
Suraj: 68 F, remote breast cancer and Hodgkin's lymphoma, has MR-proven C4-5 and C5-6 disc herniation w/ cord compression. Now w/ severe pain in arms and legs. Weakness L hand. Unsteady gait. Had recent PET scan showing lesions in R clavicle and hip bones and was informed those should be evaluated prior to neck surgery.

## 2018-11-18 NOTE — H&P ADULT - PROBLEM SELECTOR PLAN 2
- patient w/ recent bone scan w/ R acetabular lesion and L clavicular/scapular lesion   - f/u CT of hip to further evaluate lesion   - plan for IR guided biopsy, IR consult in the AM (after CT hip)  - possible metastatic breast cancer, had mammogram in August which was normal, last colonsocopy 2 years ago normal per patient - R acetabular lesion found incidentally on MRI, bone scan w/ increased uptake at R acetabulum and L clavicle  - ?reactive from recent fall vs. metastatic cancer given history of malignancy   - cancer screening up to date, had recent mammogram, no unintentional weight loss, night sweats, fevers   - f/u CT of hip to further evaluate lesion   - plan for IR guided biopsy, IR consult in the AM (after CT hip)  - possible metastatic breast cancer, had mammogram in August which was normal, last colonsocopy 2 years ago normal per patient - R acetabular lesion found incidentally on MRI, bone scan w/ increased uptake at R acetabulum and L clavicle  - ?reactive from recent fall vs. metastatic cancer given history of malignancy   - cancer screening up to date, had recent mammogram, no unintentional weight loss, night sweats, fevers   - f/u CT of hip to further evaluate lesion   - plan for IR guided biopsy, IR consult in the AM (after CT hip)  - possible metastatic breast cancer, had mammogram in August which was normal, last colonoscopy 2 years ago normal per patient

## 2018-11-19 DIAGNOSIS — F41.9 ANXIETY DISORDER, UNSPECIFIED: ICD-10-CM

## 2018-11-19 DIAGNOSIS — C50.919 MALIGNANT NEOPLASM OF UNSPECIFIED SITE OF UNSPECIFIED FEMALE BREAST: ICD-10-CM

## 2018-11-19 DIAGNOSIS — R22.41 LOCALIZED SWELLING, MASS AND LUMP, RIGHT LOWER LIMB: ICD-10-CM

## 2018-11-19 DIAGNOSIS — Z29.9 ENCOUNTER FOR PROPHYLACTIC MEASURES, UNSPECIFIED: ICD-10-CM

## 2018-11-19 DIAGNOSIS — M25.812 OTHER SPECIFIED JOINT DISORDERS, LEFT SHOULDER: ICD-10-CM

## 2018-11-19 DIAGNOSIS — R26.81 UNSTEADINESS ON FEET: ICD-10-CM

## 2018-11-19 DIAGNOSIS — I10 ESSENTIAL (PRIMARY) HYPERTENSION: ICD-10-CM

## 2018-11-19 DIAGNOSIS — M50.20 OTHER CERVICAL DISC DISPLACEMENT, UNSPECIFIED CERVICAL REGION: ICD-10-CM

## 2018-11-19 LAB
ALBUMIN SERPL ELPH-MCNC: 4.6 G/DL — SIGNIFICANT CHANGE UP (ref 3.3–5)
ALP SERPL-CCNC: 84 U/L — SIGNIFICANT CHANGE UP (ref 40–120)
ALT FLD-CCNC: 11 U/L — SIGNIFICANT CHANGE UP (ref 4–33)
APTT BLD: 25.2 SEC — LOW (ref 27.5–36.3)
AST SERPL-CCNC: 16 U/L — SIGNIFICANT CHANGE UP (ref 4–32)
BILIRUB SERPL-MCNC: 0.9 MG/DL — SIGNIFICANT CHANGE UP (ref 0.2–1.2)
BUN SERPL-MCNC: 15 MG/DL — SIGNIFICANT CHANGE UP (ref 7–23)
CALCIUM SERPL-MCNC: 10.1 MG/DL — SIGNIFICANT CHANGE UP (ref 8.4–10.5)
CHLORIDE SERPL-SCNC: 103 MMOL/L — SIGNIFICANT CHANGE UP (ref 98–107)
CO2 SERPL-SCNC: 22 MMOL/L — SIGNIFICANT CHANGE UP (ref 22–31)
CREAT SERPL-MCNC: 0.49 MG/DL — LOW (ref 0.5–1.3)
GLUCOSE SERPL-MCNC: 142 MG/DL — HIGH (ref 70–99)
HBA1C BLD-MCNC: 5 % — SIGNIFICANT CHANGE UP (ref 4–5.6)
INR BLD: 0.96 — SIGNIFICANT CHANGE UP (ref 0.88–1.17)
MAGNESIUM SERPL-MCNC: 2.5 MG/DL — SIGNIFICANT CHANGE UP (ref 1.6–2.6)
PHOSPHATE SERPL-MCNC: 3.9 MG/DL — SIGNIFICANT CHANGE UP (ref 2.5–4.5)
POTASSIUM SERPL-MCNC: 4.3 MMOL/L — SIGNIFICANT CHANGE UP (ref 3.5–5.3)
POTASSIUM SERPL-SCNC: 4.3 MMOL/L — SIGNIFICANT CHANGE UP (ref 3.5–5.3)
PROT SERPL-MCNC: 7.6 G/DL — SIGNIFICANT CHANGE UP (ref 6–8.3)
PROTHROM AB SERPL-ACNC: 10.9 SEC — SIGNIFICANT CHANGE UP (ref 9.8–13.1)
SODIUM SERPL-SCNC: 138 MMOL/L — SIGNIFICANT CHANGE UP (ref 135–145)
VIT B12 SERPL-MCNC: 393 PG/ML — SIGNIFICANT CHANGE UP (ref 200–900)

## 2018-11-19 PROCEDURE — 76377 3D RENDER W/INTRP POSTPROCES: CPT | Mod: 26

## 2018-11-19 PROCEDURE — 99223 1ST HOSP IP/OBS HIGH 75: CPT | Mod: GC

## 2018-11-19 PROCEDURE — 72192 CT PELVIS W/O DYE: CPT | Mod: 26

## 2018-11-19 PROCEDURE — 12345: CPT | Mod: NC,GC

## 2018-11-19 RX ORDER — IBUPROFEN 200 MG
400 TABLET ORAL EVERY 6 HOURS
Qty: 0 | Refills: 0 | Status: DISCONTINUED | OUTPATIENT
Start: 2018-11-19 | End: 2018-11-20

## 2018-11-19 RX ORDER — ATORVASTATIN CALCIUM 80 MG/1
40 TABLET, FILM COATED ORAL AT BEDTIME
Qty: 0 | Refills: 0 | Status: DISCONTINUED | OUTPATIENT
Start: 2018-11-19 | End: 2018-11-20

## 2018-11-19 RX ORDER — METOPROLOL TARTRATE 50 MG
25 TABLET ORAL DAILY
Qty: 0 | Refills: 0 | Status: DISCONTINUED | OUTPATIENT
Start: 2018-11-19 | End: 2018-11-20

## 2018-11-19 RX ORDER — IBUPROFEN 200 MG
400 TABLET ORAL EVERY 6 HOURS
Qty: 0 | Refills: 0 | Status: DISCONTINUED | OUTPATIENT
Start: 2018-11-19 | End: 2018-11-19

## 2018-11-19 RX ORDER — OXYCODONE HYDROCHLORIDE 5 MG/1
5 TABLET ORAL EVERY 6 HOURS
Qty: 0 | Refills: 0 | Status: DISCONTINUED | OUTPATIENT
Start: 2018-11-19 | End: 2018-11-20

## 2018-11-19 RX ORDER — INFLUENZA VIRUS VACCINE 15; 15; 15; 15 UG/.5ML; UG/.5ML; UG/.5ML; UG/.5ML
0.5 SUSPENSION INTRAMUSCULAR ONCE
Qty: 0 | Refills: 0 | Status: DISCONTINUED | OUTPATIENT
Start: 2018-11-19 | End: 2018-11-20

## 2018-11-19 RX ORDER — ANASTROZOLE 1 MG/1
1 TABLET ORAL DAILY
Qty: 0 | Refills: 0 | Status: DISCONTINUED | OUTPATIENT
Start: 2018-11-19 | End: 2018-11-20

## 2018-11-19 RX ORDER — DEXAMETHASONE 0.5 MG/5ML
10 ELIXIR ORAL EVERY 6 HOURS
Qty: 0 | Refills: 0 | Status: DISCONTINUED | OUTPATIENT
Start: 2018-11-19 | End: 2018-11-20

## 2018-11-19 RX ORDER — PANTOPRAZOLE SODIUM 20 MG/1
40 TABLET, DELAYED RELEASE ORAL
Qty: 0 | Refills: 0 | Status: DISCONTINUED | OUTPATIENT
Start: 2018-11-19 | End: 2018-11-20

## 2018-11-19 RX ADMIN — Medication 102 MILLIGRAM(S): at 06:16

## 2018-11-19 RX ADMIN — ATORVASTATIN CALCIUM 40 MILLIGRAM(S): 80 TABLET, FILM COATED ORAL at 21:39

## 2018-11-19 RX ADMIN — ANASTROZOLE 1 MILLIGRAM(S): 1 TABLET ORAL at 12:11

## 2018-11-19 RX ADMIN — Medication 25 MILLIGRAM(S): at 06:16

## 2018-11-19 RX ADMIN — Medication 102 MILLIGRAM(S): at 17:35

## 2018-11-19 RX ADMIN — Medication 102 MILLIGRAM(S): at 12:11

## 2018-11-19 RX ADMIN — PANTOPRAZOLE SODIUM 40 MILLIGRAM(S): 20 TABLET, DELAYED RELEASE ORAL at 08:08

## 2018-11-19 NOTE — PROGRESS NOTE ADULT - SUBJECTIVE AND OBJECTIVE BOX
S: Patient seen at bedside. No new complaints. Continues to have LUE weakness, unchanged from prior. Patient is aware of plan to further image pelvic lesion.      Spine PE:  No gross deformity  No midline TTP C/T/L/ spine  Kyphotic C spine  No paraspinal muscle ttp      Motor:                   C5                C6              C7               C8           T1   R            5/5                5/5            5/5             5/5          5/5  L             3/5               3/5             3/5             5/5          3/5                L2             L3             L4               L5            S1  R         5/5           5/5          5/5             5/5           5/5  L          5/5          5/5           5/5             5/5           5/5    Sensory:            C5         C6         C7      C8       T1        (0=absent, 1=impaired, 2=normal, NT=not testable)  R         2            2           2        2         2  L          2            2           2        2         2               L2          L3         L4      L5       S1         (0=absent, 1=impaired, 2=normal, NT=not testable)  R         2            2            2        2        2  L          2            2           2        2         2      Vitals 24hrs  Vital Signs Last 24 Hrs  T(C): 36.6 (19 Nov 2018 01:00), Max: 36.7 (18 Nov 2018 21:50)  T(F): 97.8 (19 Nov 2018 01:00), Max: 98.1 (18 Nov 2018 21:50)  HR: 93 (19 Nov 2018 01:00) (93 - 110)  BP: 142/76 (19 Nov 2018 01:00) (142/76 - 148/95)  BP(mean): --  RR: 17 (19 Nov 2018 01:00) (16 - 18)  SpO2: 97% (19 Nov 2018 01:00) (97% - 97%)      Lab Results 24hrs:                        12.1   9.66  )-----------( 325      ( 18 Nov 2018 21:50 )             37.6     11-19    138  |  103  |  15  ----------------------------<  142<H>  4.3   |  22  |  0.49<L>    Ca    10.1      19 Nov 2018 04:10  Phos  3.9     11-19  Mg     2.5     11-19    TPro  7.6  /  Alb  4.6  /  TBili  0.9  /  DBili  x   /  AST  16  /  ALT  11  /  AlkPhos  84  11-19

## 2018-11-19 NOTE — PROGRESS NOTE ADULT - PROBLEM SELECTOR PLAN 4
- patient w/ chronic gait instability, has had extensive outpatient workup done per patient including MRI brain, MR spine    - no cerebellar abnormalities on neuro exam   - f/u with outpatient neurologist (Dr. Bell) regarding w/u that has been done  - PT   - given decreased sensation of feet will check B12, A1c - c/w anastrazole 1 mg daily

## 2018-11-19 NOTE — PROGRESS NOTE ADULT - PROBLEM SELECTOR PLAN 7
- ISTOP reviewed reference # [id number], patient rx'd diazepam 5 mg 6 tablets 11/2  - hold off on further diazepam given not on chronically at home, patient currently not asking for it  - likely in the setting of pain, suspect will improve once pain better controlled. DVT ppx: SCDs

## 2018-11-19 NOTE — PROGRESS NOTE ADULT - PROBLEM SELECTOR PLAN 6
Essential hypertension  - c/w metoprolol - ISTOP reviewed reference # [id number], patient rx'd diazepam 5 mg 6 tablets 11/2  - hold off on further diazepam given not on chronically at home, patient currently not asking for it

## 2018-11-19 NOTE — PROGRESS NOTE ADULT - PROBLEM SELECTOR PLAN 2
- R acetabular lesion found incidentally on MRI, bone scan w/ increased uptake at R acetabulum and L clavicle  - ?reactive from recent fall vs. metastatic cancer given history of malignancy   - cancer screening up to date, had recent mammogram, no unintentional weight loss, night sweats, fevers   - f/u CT of hip to further evaluate lesion   - plan for IR guided biopsy, IR consult in the AM (after CT hip)  - possible metastatic breast cancer, had mammogram in August which was normal, last colonoscopy 2 years ago normal per patient Hx R acetabular lesion found incidentally on NM bone scan in setting of recent fall. CT pelvis showed  subacute nondisplaced fractures of the right superior and inferior pubic rami, subacute fracture the right sacral ala. Fat-containing mass within the right gluteus medius muscle.    - no acetabular lesion visualized on CT pelvis today, no indication for IR procedure  - cancer screening up to date, had recent mammogram, no unintentional weight loss, night sweats, fevers

## 2018-11-19 NOTE — CHART NOTE - NSCHARTNOTEFT_GEN_A_CORE
Orthopedics PA Addendum    Spoke with Dr Alvarez- at this time, recommending conservative management with decadron for cervical spine pain.  Patient has underlying osteoporosis with poor bone quality and would require an anterior and posterior approach for her cervical pathology.  At this time, patient not a surgical candidate for immediate intervention.  Dr Alvarez will speak with patient and family members tomorrow, 11/20, to discuss both surgical and non-surgical intervention options, as previously discussed at recent office visit on 11/16.  In addition, Dr Alvarez recommends a CT of the c-spine to evaluate further.  Spoke with patient,  and daughter at length bedside today to relay conversation I had with Dr Alvarez and they had all of their questions answered.     Please notify ortho with any further questions or concerns. Orthopedics PA Addendum    Spoke with Dr Alvarez- at this time, recommending conservative management with decadron for cervical spine pain.  Patient has underlying osteoporosis with poor bone quality and would require an anterior and posterior approach for her cervical pathology.  At this time, patient not a surgical candidate for immediate intervention.  Dr Alvarez will speak with patient and family members tomorrow, 11/20, to discuss both surgical and non-surgical intervention options, as previously discussed at recent office visit on 11/16.  In addition, Dr Alvarez recommends a CT of the c-spine to evaluate further.  Spoke with patient,  and daughter at length bedside today to relay conversation I had with Dr Alvarez and they had all of their questions answered.     Please notify ortho with any further questions or concerns.  I spoke to the patient on November 20 at 1:30 PM. She stated she was feeling better. She was very relieved that the lesions in her pelvis were diagnosed as insufficiency fractures. She stated that her hands and feet were feeling better with medication. I discussed her cervical spine MRI in her symptoms and surgical and nonsurgical intervention. All the risks and benefits were discussed including anterior and posterior procedures which he may need to do her osteopenia. At this point she realizes that she does have spinal cord compression in her cervical spine and may have a weak left shoulder whether she undergo surgery or not. She also has carpal tunnel syndrome and her neurologist feels that she need surgery in her right hand and that's why the right hand has been hurting. She also just underwent carpal tunnel surgery for the left hand and is recovering. At this point after all risks and benefits of surgery and no surgery options were discussed she would like to hold off on surgical intervention and she states that she can live with her left shoulder weakness. She will follow up with me in one week and she is in complete agreement with the plan and completely understood the conversation. Again we did discuss anterior and posterior surgical risks. Surgery will involve an anterior cervical discectomy and fusion using an interbody device local autograft bone and demineralized bone matrix. Spinal cord monitoring will be utilized.  Risks of surgery include infection, dural tear, nerve root injury, spinal cord injury, adjacent level breakdown, future arm pain, future neck pain, difficulty swallowing, difficulties breathing, recurrent laryngeal nerve injury, esophageal in the injury, tracheal injury, hematoma, hardware failure, anesthetic risk, positioning pain, blood transfusion risk, nonunion requiring additional surgery, deep vein thrombosis, pulmonary embolus, and death. Somatosensory evoke potentials and motor evoked potentials and EMG monitoring will be used. Patient will need spinal orthosis pre and post operatively. All risks were explained not exclusive to the ones mentioned alternatives were discussed and all questions were answered the patient agrees and understands the above and is in complete agreement with the plan. Posterior cervical fusion risks.  Dr. Braulio Alvarez.

## 2018-11-19 NOTE — OCCUPATIONAL THERAPY INITIAL EVALUATION ADULT - PERTINENT HX OF CURRENT PROBLEM, REHAB EVAL
68F PMH Breast cancer, Hodgkin's lymphoma, cervical spinal stenosis with C4-C5 disk herniation and cord compression, L sided carpal tunnel syndrome s/p surgery, presents with bilateral upper extremity and neck pain likely 2/2 C4-C5 disk herniation.

## 2018-11-19 NOTE — PROGRESS NOTE ADULT - ASSESSMENT
A/P  68y Female with C4-6 HNP with associated LUE>RUE radiculopathy, decreased LUE sensation, and years of frequent falls and dropping objects. Patient known to Dr. Alvarez, most recently seen in office 11/16/18 where surgery was tentatively offered pending completion of workup for R acetabular mass found incidentally on imaging for a prior fall.    WBAT with assistive devices as needed  Will plan for further imaging of R pelvic lesion, IR biopsy to be scheduled by primary team  Recommend spine dosed steroids, muscle relaxers, NSAIDs  PT/OT/OOB  No urgent orthopaedic intervention at this time A/P  68y Female with C4-6 HNP with associated LUE>RUE radiculopathy, decreased LUE sensation, and years of frequent falls and dropping objects. Patient known to Dr. Alvarez, most recently seen in office 11/16/18 where surgery was tentatively offered pending completion of workup for R acetabular mass found incidentally on imaging for a prior fall.    WBAT with assistive devices as needed  Will plan for further CT imaging of R pelvic lesion, IR biopsy to be scheduled by primary team  Recommend spine dosed steroids, muscle relaxers, NSAIDs  PT/OT/OOB  No urgent orthopaedic intervention at this time

## 2018-11-19 NOTE — OCCUPATIONAL THERAPY INITIAL EVALUATION ADULT - PLANNED THERAPY INTERVENTIONS, OT EVAL
joint mobilization/neuromuscular re-education/ROM/ADL retraining/fine motor coordination training/strengthening

## 2018-11-19 NOTE — PROGRESS NOTE ADULT - ASSESSMENT
68F PMH Breast cancer, Hodgkin's lymphoma, cervical spinal stenosis with C4-C5 disk herniation and cord compression, L sided carpal tunnel syndrome s/p surgery, presents with bilateral upper extremity and neck pain likely 2/2 C4-C5 disk herniation. 68F PMH Breast cancer, Hodgkin's lymphoma, cervical spinal stenosis with C4-C5 disk herniation and cord compression, L sided carpal tunnel syndrome s/p surgery, presents with bilateral upper extremity and neck pain likely 2/2 C4-C5 disk herniation. Pt also concerned about R-sided lesion visualized 11/2 on NM bone scan.

## 2018-11-19 NOTE — OCCUPATIONAL THERAPY INITIAL EVALUATION ADULT - RANGE OF MOTION EXAMINATION, UPPER EXTREMITY
Right UE Active ROM was WFL (within functional limits)/left sh 0-80 flexion passive and 0-10 flex active in sitting and WFL distally

## 2018-11-19 NOTE — PROGRESS NOTE ADULT - PROBLEM SELECTOR PLAN 3
- patient w/ recent bone scan w/ R acetabular lesion and L clavicular/scapular lesion   - plan for IR guided biopsy of left hip, IR consult in the AM - patient w/ chronic gait instability, has had extensive outpatient workup done per patient including MRI brain, MR spine    - no cerebellar abnormalities on neuro exam   - f/u with outpatient neurologist (Dr. Bell) regarding w/u that has been done  - PT   - given decreased sensation of feet will check B12, A1c

## 2018-11-19 NOTE — PROGRESS NOTE ADULT - PROBLEM SELECTOR PLAN 1
- herniated disk with cord compression - appreciate ortho recs - c/w steroids (dexamethasone 10mg q 6 hours), NSAIDs, muscle relaxants if needed, PT/OT, no urgent orthopedic intervention, will likely eventually need cervical spine surgery however holding off until hip lesion evaluated    - Will monitor glucose while on steroids, protonix while on high dose steroids Herniated disk with cord compression   - f/u ortho recs. per ortho, no indication for urgent procedure, c-spine disease will be addressed as outpatient   - c/w steroids (dexamethasone 10mg q 6 hours), will switch to po tomorrow and d/c on taper per ortho recs  - c/w NSAIDs, muscle relaxants if needed  - Will monitor glucose while on steroids, protonix while on high dose steroids

## 2018-11-19 NOTE — PROGRESS NOTE ADULT - SUBJECTIVE AND OBJECTIVE BOX
HILARIA KUMAR  68y  Female    Complaints:  Subjective:                 T(C): 36.6 (11-19-18 @ 13:25), Max: 36.9 (11-19-18 @ 10:12)  HR: 98 (11-19-18 @ 13:25) (93 - 110)  BP: 141/81 (11-19-18 @ 13:25) (133/79 - 148/95)  RR: 16 (11-19-18 @ 13:25) (16 - 18)  SpO2: 95% (11-19-18 @ 13:25) (95% - 97%)       PHYSICAL EXAM:       Consultant(s) Notes Reviewed:  [x ] YES  [ ] NO  Care Discussed with Consultants/Other Providers [ x] YES  [ ] NO    LABS:          RADIOLOGY & ADDITIONAL TESTS:    Imaging Personally Reviewed:  [ ] YES  [ ] NO  MedsMEDICATIONS  (STANDING):  anastrozole 1 milliGRAM(s) Oral daily  atorvastatin 40 milliGRAM(s) Oral at bedtime  dexamethasone  IVPB 10 milliGRAM(s) IV Intermittent every 6 hours  influenza   Vaccine 0.5 milliLiter(s) IntraMuscular once  metoprolol succinate ER 25 milliGRAM(s) Oral daily  pantoprazole    Tablet 40 milliGRAM(s) Oral before breakfast    MEDICATIONS  (PRN):  ibuprofen  Tablet. 400 milliGRAM(s) Oral every 6 hours PRN Mild Pain (1 - 3), Moderate Pain (4 - 6)  oxyCODONE    IR 5 milliGRAM(s) Oral every 6 hours PRN Severe Pain (7 - 10)      HEALTH ISSUES - PROBLEM Dx:  Gait instability: Gait instability  Preventive measure: Preventive measure  Anxiety: Anxiety  Hypertension: Hypertension  Breast cancer: Breast cancer  Mass of joint of left shoulder: Mass of joint of left shoulder  Mass of hip region, right: Mass of hip region, right  Herniated disc, cervical: Herniated disc, cervical          PLAN:      DISPOSITION: HILARIA KUMAR  68y  Female    Subjective:         T(C): 36.6 (11-19-18 @ 13:25), Max: 36.9 (11-19-18 @ 10:12)  HR: 98 (11-19-18 @ 13:25) (93 - 110)  BP: 141/81 (11-19-18 @ 13:25) (133/79 - 148/95)  RR: 16 (11-19-18 @ 13:25) (16 - 18)  SpO2: 95% (11-19-18 @ 13:25) (95% - 97%)       PHYSICAL EXAM:  GENERAL: NAD  HEAD:  Atraumatic, Normocephalic  EYES: EOMI, PERRLA, conjunctiva and sclera clear  ENMT: No tonsillar erythema, exudates, or enlargement; Moist mucous membranes, Good dentition  NECK: Supple, No JVD  NERVOUS SYSTEM: AOX3, 5/5 strength in bilateral lower extremities.  +5/5 strength in RUE.  +3/5 strength in LUE.  Decreased sensation in bilateral soles of feet.    PSYCHIATRIC: Appropriate affect and mood  CHEST/LUNG: Clear to auscultation bilaterally; No rales, rhonchi, wheezing, or rubs  HEART: Regular rate and rhythm; No murmurs, rubs, or gallops. No LE edema  ABDOMEN: Soft, Nontender, Nondistended; Bowel sounds present  EXTREMITIES:  2+ Peripheral Pulses, No clubbing, cyanosis  SKIN: No rashes or lesions    Consultant(s) Notes Reviewed:  [x ] YES  [ ] NO  Care Discussed with Consultants/Other Providers [ x] YES  [ ] NO    LABS:                        12.1   9.66  )-----------( 325      ( 18 Nov 2018 21:50 )             37.6     11-19    138  |  103  |  15  ----------------------------<  142<H>  4.3   |  22  |  0.49<L>    Ca    10.1      19 Nov 2018 04:10  Phos  3.9     11-19  Mg     2.5     11-19    TPro  7.6  /  Alb  4.6  /  TBili  0.9  /  DBili  x   /  AST  16  /  ALT  11  /  AlkPhos  84  11-19    PT/INR - ( 19 Nov 2018 04:10 )   PT: 10.9 SEC;   INR: 0.96          PTT - ( 19 Nov 2018 04:10 )  PTT:25.2 SEC          RADIOLOGY, EKG & ADDITIONAL TESTS: Reviewed.         RADIOLOGY & ADDITIONAL TESTS:    Imaging Personally Reviewed:  [ ] YES  [ ] NO  MedsMEDICATIONS  (STANDING):  anastrozole 1 milliGRAM(s) Oral daily  atorvastatin 40 milliGRAM(s) Oral at bedtime  dexamethasone  IVPB 10 milliGRAM(s) IV Intermittent every 6 hours  influenza   Vaccine 0.5 milliLiter(s) IntraMuscular once  metoprolol succinate ER 25 milliGRAM(s) Oral daily  pantoprazole    Tablet 40 milliGRAM(s) Oral before breakfast    MEDICATIONS  (PRN):  ibuprofen  Tablet. 400 milliGRAM(s) Oral every 6 hours PRN Mild Pain (1 - 3), Moderate Pain (4 - 6)  oxyCODONE    IR 5 milliGRAM(s) Oral every 6 hours PRN Severe Pain (7 - 10) HILARIA KUMAR  68y  Female    Subjective: No events overnight. Pt endorsing weakness in her L arm and bilateral numbness of the soles of her feet. Expresses concern over lesion that was identified on NM bone scan recently.     T(C): 36.6 (11-19-18 @ 13:25), Max: 36.9 (11-19-18 @ 10:12)  HR: 98 (11-19-18 @ 13:25) (93 - 110)  BP: 141/81 (11-19-18 @ 13:25) (133/79 - 148/95)  RR: 16 (11-19-18 @ 13:25) (16 - 18)  SpO2: 95% (11-19-18 @ 13:25) (95% - 97%)     PHYSICAL EXAM:  GENERAL: NAD  HEAD:  Atraumatic, Normocephalic  EYES: EOMI, PERRLA, conjunctiva and sclera clear  ENMT: No tonsillar erythema, exudates, or enlargement; Moist mucous membranes, Good dentition  NECK: Supple, No JVD  NERVOUS SYSTEM: AOX3, 5/5 strength in bilateral lower extremities.  +5/5 strength in RUE.  +3/5 strength in LUE.  Decreased sensation in bilateral soles of feet.    PSYCHIATRIC: Appropriate affect and mood  CHEST/LUNG: Clear to auscultation bilaterally; No rales, rhonchi, wheezing, or rubs  HEART: Regular rate and rhythm; No murmurs, rubs, or gallops. No LE edema  ABDOMEN: Soft, Nontender, Nondistended; Bowel sounds present  EXTREMITIES:  2+ Peripheral Pulses, No clubbing, cyanosis  SKIN: No rashes or lesions    Consultant(s) Notes Reviewed:  [x ] YES  [ ] NO  Care Discussed with Consultants/Other Providers [ x] YES  [ ] NO    LABS:                        12.1   9.66  )-----------( 325      ( 18 Nov 2018 21:50 )             37.6     11-19    138  |  103  |  15  ----------------------------<  142<H>  4.3   |  22  |  0.49<L>    Ca    10.1      19 Nov 2018 04:10  Phos  3.9     11-19  Mg     2.5     11-19    TPro  7.6  /  Alb  4.6  /  TBili  0.9  /  DBili  x   /  AST  16  /  ALT  11  /  AlkPhos  84  11-19    PT/INR - ( 19 Nov 2018 04:10 )   PT: 10.9 SEC;   INR: 0.96          PTT - ( 19 Nov 2018 04:10 )  PTT:25.2 SEC          RADIOLOGY, EKG & ADDITIONAL TESTS: Reviewed.         RADIOLOGY & ADDITIONAL TESTS:    Imaging Personally Reviewed:  [ ] YES  [ ] NO  MedsMEDICATIONS  (STANDING):  anastrozole 1 milliGRAM(s) Oral daily  atorvastatin 40 milliGRAM(s) Oral at bedtime  dexamethasone  IVPB 10 milliGRAM(s) IV Intermittent every 6 hours  influenza   Vaccine 0.5 milliLiter(s) IntraMuscular once  metoprolol succinate ER 25 milliGRAM(s) Oral daily  pantoprazole    Tablet 40 milliGRAM(s) Oral before breakfast    MEDICATIONS  (PRN):  ibuprofen  Tablet. 400 milliGRAM(s) Oral every 6 hours PRN Mild Pain (1 - 3), Moderate Pain (4 - 6)  oxyCODONE    IR 5 milliGRAM(s) Oral every 6 hours PRN Severe Pain (7 - 10)

## 2018-11-20 ENCOUNTER — TRANSCRIPTION ENCOUNTER (OUTPATIENT)
Age: 68
End: 2018-11-20

## 2018-11-20 VITALS
DIASTOLIC BLOOD PRESSURE: 51 MMHG | SYSTOLIC BLOOD PRESSURE: 106 MMHG | HEART RATE: 80 BPM | OXYGEN SATURATION: 99 % | TEMPERATURE: 98 F | RESPIRATION RATE: 17 BRPM

## 2018-11-20 PROCEDURE — 99239 HOSP IP/OBS DSCHRG MGMT >30: CPT

## 2018-11-20 PROCEDURE — 72125 CT NECK SPINE W/O DYE: CPT | Mod: 26

## 2018-11-20 RX ORDER — METOPROLOL TARTRATE 50 MG
1 TABLET ORAL
Qty: 0 | Refills: 0 | DISCHARGE
Start: 2018-11-20

## 2018-11-20 RX ORDER — CALCIUM CARBONATE 500(1250)
1 TABLET ORAL
Qty: 30 | Refills: 0 | OUTPATIENT
Start: 2018-11-20 | End: 2018-12-19

## 2018-11-20 RX ORDER — DEXAMETHASONE 0.5 MG/5ML
1 ELIXIR ORAL
Qty: 30 | Refills: 0
Start: 2018-11-20 | End: 2018-12-01

## 2018-11-20 RX ORDER — DEXAMETHASONE 0.5 MG/5ML
10 ELIXIR ORAL EVERY 6 HOURS
Qty: 0 | Refills: 0 | Status: DISCONTINUED | OUTPATIENT
Start: 2018-11-20 | End: 2018-11-20

## 2018-11-20 RX ORDER — METOPROLOL TARTRATE 50 MG
1 TABLET ORAL
Qty: 0 | Refills: 0 | COMMUNITY

## 2018-11-20 RX ORDER — CHOLECALCIFEROL (VITAMIN D3) 125 MCG
2 CAPSULE ORAL
Qty: 60 | Refills: 0
Start: 2018-11-20 | End: 2018-12-19

## 2018-11-20 RX ORDER — METOPROLOL TARTRATE 50 MG
1 TABLET ORAL
Qty: 0 | Refills: 0 | COMMUNITY
Start: 2018-11-20

## 2018-11-20 RX ORDER — CALCIUM CARBONATE 500(1250)
1 TABLET ORAL
Qty: 30 | Refills: 0
Start: 2018-11-20 | End: 2018-12-19

## 2018-11-20 RX ORDER — CHOLECALCIFEROL (VITAMIN D3) 125 MCG
2 CAPSULE ORAL
Qty: 60 | Refills: 0 | OUTPATIENT
Start: 2018-11-20 | End: 2018-12-19

## 2018-11-20 RX ORDER — PANTOPRAZOLE SODIUM 20 MG/1
1 TABLET, DELAYED RELEASE ORAL
Qty: 30 | Refills: 0
Start: 2018-11-20 | End: 2018-12-19

## 2018-11-20 RX ORDER — PANTOPRAZOLE SODIUM 20 MG/1
1 TABLET, DELAYED RELEASE ORAL
Qty: 30 | Refills: 0 | OUTPATIENT
Start: 2018-11-20 | End: 2018-12-19

## 2018-11-20 RX ADMIN — Medication 10 MILLIGRAM(S): at 17:28

## 2018-11-20 RX ADMIN — ANASTROZOLE 1 MILLIGRAM(S): 1 TABLET ORAL at 12:20

## 2018-11-20 RX ADMIN — Medication 102 MILLIGRAM(S): at 00:55

## 2018-11-20 RX ADMIN — Medication 10 MILLIGRAM(S): at 12:20

## 2018-11-20 RX ADMIN — Medication 25 MILLIGRAM(S): at 06:23

## 2018-11-20 NOTE — PROGRESS NOTE ADULT - PROBLEM SELECTOR PLAN 1
Herniated disk with cord compression   - f/u ortho recs. per ortho, no indication for urgent procedure, c-spine disease will be addressed as outpatient   - c/w steroids (dexamethasone 10mg q 6 hours), will switch to po tomorrow and d/c on taper per ortho recs  - c/w NSAIDs, muscle relaxants if needed  - Will monitor glucose while on steroids, protonix while on high dose steroids

## 2018-11-20 NOTE — PROGRESS NOTE ADULT - PROBLEM SELECTOR PLAN 6
- ISTOP reviewed reference # [id number], patient rx'd diazepam 5 mg 6 tablets 11/2  - hold off on further diazepam given not on chronically at home, patient currently not asking for it

## 2018-11-20 NOTE — DISCHARGE NOTE ADULT - PATIENT PORTAL LINK FT
You can access the [facility] Patient Portal, offered by [facility], by registering with the following website: [email or web address]

## 2018-11-20 NOTE — PROGRESS NOTE ADULT - ATTENDING COMMENTS
Patient seen and examined.  Case discussed with house staff.  Agree with above as edited.  Patient with cervical disc herniation with pain and weakness of UEs and h/o abnormal bone scan.  Patient had been told that she required CT-guided biopsy of hip.  CT results reviewed; bone scan uploaded. CT neck now reviewed. No mass found on any imaging.  Pelvic insufficiency fracture - underlying osteopenia - d/w patient - will require outpatient f/u with PMD for possible bisphosphonates. team d/w ortho - conservative measures given subacute presentation and non-displace fracture.  Cervical disc herniation - team d/w ortho - plan for decadron taper and outpatient eval for potential surgical interventions if symptoms not alleviated with non-surgical interventions.  T1 Compression Fracture - team d/w ortho - TLSO brace. Non-operative management.  D/C time: 40 minutes
Patient seen and examined.  Case discussed with house staff.  Agree with above as edited.  Patient with cervical disc herniation with pain and weakness of UEs and h/o abnormal bone scan.  Patient had been told that she required CT-guided biopsy of hip.  CT results reviewed; bone scan uploaded. Team d/w nuclear medicine. Imaging more consistent with pelvic insufficiency fracture.  Pelvic insufficiency fracture - underlying osteopenia - d/w patient - will require outpatient f/u with PMD for possible bisphosphonates. d/w ortho - conservative measures given subacute presentation and non-displace fracture.  Cervical disc herniation - d/w ortho - plan for IV decadron today and change to PO taper tomorrow.  Plan discussed with patient and family.

## 2018-11-20 NOTE — DISCHARGE NOTE ADULT - CARE PROVIDERS DIRECT ADDRESSES
[email or web address]

## 2018-11-20 NOTE — PROVIDER CONTACT NOTE (OTHER) - ACTION/TREATMENT ORDERED:
MD made aware, MD ok'ed no iv access, pt to be bridged to po steroids today, okay to hold am decadron dose as per MD, RN to continue to monitor.

## 2018-11-20 NOTE — PROGRESS NOTE ADULT - PROBLEM SELECTOR PLAN 3
- patient w/ chronic gait instability, has had extensive outpatient workup done per patient including MRI brain, MR spine    - no cerebellar abnormalities on neuro exam   - f/u with outpatient neurologist (Dr. Bell) regarding w/u that has been done  - PT   - given decreased sensation of feet will check B12, A1c

## 2018-11-20 NOTE — DISCHARGE NOTE ADULT - ADDITIONAL INSTRUCTIONS
Please follow up with your orthopedic surgeon within a year of discharge.  Please follow up with your primary care doctor after discharge.

## 2018-11-20 NOTE — DISCHARGE NOTE ADULT - MEDICATION SUMMARY - MEDICATIONS TO TAKE
I will START or STAY ON the medications listed below when I get home from the hospital:    dexamethasone 4 mg oral tablet  -- 1  tab 4  times a day x 3 days  1  tab 3  times a day x 3 days  1  tab 2  times a day x 3 days  1  tab daily x 3 days  -- It is very important that you take or use this exactly as directed.  Do not skip doses or discontinue unless directed by your doctor.  Obtain medical advice before taking any non-prescription drugs as some may affect the action of this medication.  Take with food or milk.    -- Indication: For Cervical disc disease    calcium carbonate 400 mg oral tablet, chewable  -- 1 tab(s) chewed once a day   -- Chew tablets before swallowing    -- Indication: For Nutritional supplement    rosuvastatin 10 mg oral tablet  -- 1 tab(s) by mouth once a day (at bedtime)  -- Indication: For Hyperlipidemia    anastrozole 1 mg oral tablet  -- 1 tab(s) by mouth once a day  -- Indication: For Breast cancer    metoprolol succinate 25 mg oral tablet, extended release  -- 1 tab(s) by mouth once a day  -- Indication: For Hypertension    pantoprazole 40 mg oral delayed release tablet  -- 1 tab(s) by mouth once a day (before a meal)  -- Indication: For GI prophylaxis    cholecalciferol 400 intl units oral capsule  -- 2 cap(s) by mouth once a day   -- Take with food.    -- Indication: For Nutritional supplement

## 2018-11-20 NOTE — PROGRESS NOTE ADULT - ASSESSMENT
68F PMH Breast cancer, Hodgkin's lymphoma, cervical spinal stenosis with C4-C5 disk herniation and cord compression, L sided carpal tunnel syndrome s/p surgery, presents with bilateral upper extremity and neck pain likely 2/2 C4-C5 disk herniation. Pt also concerned about R-sided lesion visualized 11/2 on NM bone scan.

## 2018-11-20 NOTE — DISCHARGE NOTE ADULT - HOSPITAL COURSE
68F PMH Breast cancer (dx 2013, s/p R lumpectomy and XRT, on anastrazole), Hodgkin's lymphoma (s/p XRT 1981), cervical spinal stenosis with C5-C6 disk herniation and cord compression presented with severe bilateral upper extremity radicular pain and concern for a R acetabular lesion seen on NM bones scan on 11/2/18. CT of the pelvis showed a subacute nondisplaced fractures of the right superior and inferior pubic rami, subacute fracture the right sacral ala, a fat-containing mass within the right gluteus medius muscle, and degenerative changes. No lesion was identified for potential biopsy. Medical team discussed with nuclear medicine and found that NM bone scan findings likely indicate increased radionuclide uptake in setting of fracture. Pt also underwent CT spine which demonstrated a compression fracture involving the T1 vertebral body, as well as a large left parasagittal disc herniation at the C5-6 level. During the hospitalization, the patient was treated with 10 mg decadron q6 with some relief in radicular symptoms. Pt discharged on decadron taper and TLSO brace. Pt hemodynamically stable and medically clear for discharge.

## 2018-11-20 NOTE — PROGRESS NOTE ADULT - SUBJECTIVE AND OBJECTIVE BOX
HILARIA KUMAR  68y  Female    Complaints:  Subjective:                 T(C): 36.7 (11-20-18 @ 06:22), Max: 36.9 (11-19-18 @ 10:12)  HR: 87 (11-20-18 @ 06:22) (87 - 99)  BP: 126/70 (11-20-18 @ 06:22) (108/63 - 144/81)  RR: 17 (11-20-18 @ 06:22) (16 - 18)  SpO2: 96% (11-20-18 @ 06:22) (95% - 99%)       PHYSICAL EXAM:  GENERAL: NAD  HEAD:  Atraumatic, Normocephalic  EYES: EOMI, PERRLA, conjunctiva and sclera clear  ENMT: No tonsillar erythema, exudates, or enlargement; Moist mucous membranes   NECK: Supple, No JVD  NERVOUS SYSTEM: AOX3, 5/5 strength in bilateral lower extremities. +5/5 strength in RUE.  +3/5 strength in LUE.  Decreased sensation in bilateral soles of feet.    PSYCHIATRIC: Appropriate affect and mood  CHEST/LUNG: Clear to auscultation bilaterally; No rales, rhonchi, wheezing, or rubs  HEART: Regular rate and rhythm; No murmurs, rubs, or gallops. No LE edema  ABDOMEN: Soft, Nontender, Nondistended; Bowel sounds present  EXTREMITIES:  2+ Peripheral Pulses, No clubbing, cyanosis  SKIN: No rashes or lesions    Consultant(s) Notes Reviewed:  [x ] YES  [ ] NO  Care Discussed with Consultants/Other Providers [ x] YES  [ ] NO    LABS:                        12.1   9.66  )-----------( 325      ( 18 Nov 2018 21:50 )             37.6     11-19    138  |  103  |  15  ----------------------------<  142<H>  4.3   |  22  |  0.49<L>    Ca    10.1      19 Nov 2018 04:10  Phos  3.9     11-19  Mg     2.5     11-19    TPro  7.6  /  Alb  4.6  /  TBili  0.9  /  DBili  x   /  AST  16  /  ALT  11  /  AlkPhos  84  11-19    PT/INR - ( 19 Nov 2018 04:10 )   PT: 10.9 SEC;   INR: 0.96          PTT - ( 19 Nov 2018 04:10 )  PTT:25.2 SEC          RADIOLOGY, EKG & ADDITIONAL TESTS: Reviewed.       RADIOLOGY & ADDITIONAL TESTS:    Imaging Personally Reviewed:  [ ] YES  [ ] NO  MedsMEDICATIONS  (STANDING):  anastrozole 1 milliGRAM(s) Oral daily  atorvastatin 40 milliGRAM(s) Oral at bedtime  dexamethasone  IVPB 10 milliGRAM(s) IV Intermittent every 6 hours  influenza   Vaccine 0.5 milliLiter(s) IntraMuscular once  metoprolol succinate ER 25 milliGRAM(s) Oral daily  pantoprazole    Tablet 40 milliGRAM(s) Oral before breakfast    MEDICATIONS  (PRN):  ibuprofen  Tablet. 400 milliGRAM(s) Oral every 6 hours PRN Mild Pain (1 - 3), Moderate Pain (4 - 6)  oxyCODONE    IR 5 milliGRAM(s) Oral every 6 hours PRN Severe Pain (7 - 10) HILARIA KUMAR  68y  Female    Complaints:  Subjective:         T(C): 36.7 (11-20-18 @ 06:22), Max: 36.9 (11-19-18 @ 10:12)  HR: 87 (11-20-18 @ 06:22) (87 - 99)  BP: 126/70 (11-20-18 @ 06:22) (108/63 - 144/81)  RR: 17 (11-20-18 @ 06:22) (16 - 18)  SpO2: 96% (11-20-18 @ 06:22) (95% - 99%)       PHYSICAL EXAM:  GENERAL: NAD  HEAD:  Atraumatic, Normocephalic  EYES: EOMI, PERRLA, conjunctiva and sclera clear  ENMT: No tonsillar erythema, exudates, or enlargement; Moist mucous membranes   NECK: Supple, No JVD  NERVOUS SYSTEM: AOX3, 5/5 strength in bilateral lower extremities. +5/5 strength in RUE.  +3/5 strength in LUE.  Decreased sensation in bilateral soles of feet.    PSYCHIATRIC: Appropriate affect and mood  CHEST/LUNG: Clear to auscultation bilaterally; No rales, rhonchi, wheezing, or rubs  HEART: Regular rate and rhythm; No murmurs, rubs, or gallops. No LE edema  ABDOMEN: Soft, Nontender, Nondistended; Bowel sounds present  EXTREMITIES:  2+ Peripheral Pulses, No clubbing, cyanosis  SKIN: No rashes or lesions    Consultant(s) Notes Reviewed:  [x ] YES  [ ] NO  Care Discussed with Consultants/Other Providers [ x] YES  [ ] NO    LABS:                        12.1   9.66  )-----------( 325      ( 18 Nov 2018 21:50 )             37.6     11-19    138  |  103  |  15  ----------------------------<  142<H>  4.3   |  22  |  0.49<L>    Ca    10.1      19 Nov 2018 04:10  Phos  3.9     11-19  Mg     2.5     11-19    TPro  7.6  /  Alb  4.6  /  TBili  0.9  /  DBili  x   /  AST  16  /  ALT  11  /  AlkPhos  84  11-19    PT/INR - ( 19 Nov 2018 04:10 )   PT: 10.9 SEC;   INR: 0.96          PTT - ( 19 Nov 2018 04:10 )  PTT:25.2 SEC          RADIOLOGY, EKG & ADDITIONAL TESTS: Reviewed.       RADIOLOGY & ADDITIONAL TESTS:    Imaging Personally Reviewed:  [ ] YES  [ ] NO  MedsMEDICATIONS  (STANDING):  anastrozole 1 milliGRAM(s) Oral daily  atorvastatin 40 milliGRAM(s) Oral at bedtime  dexamethasone  IVPB 10 milliGRAM(s) IV Intermittent every 6 hours  influenza   Vaccine 0.5 milliLiter(s) IntraMuscular once  metoprolol succinate ER 25 milliGRAM(s) Oral daily  pantoprazole    Tablet 40 milliGRAM(s) Oral before breakfast    MEDICATIONS  (PRN):  ibuprofen  Tablet. 400 milliGRAM(s) Oral every 6 hours PRN Mild Pain (1 - 3), Moderate Pain (4 - 6)  oxyCODONE    IR 5 milliGRAM(s) Oral every 6 hours PRN Severe Pain (7 - 10) HILARIA KUMAR  68y  Female    Complaints:  UE pain and and weakness  Subjective:   Pain slightly improved. Relieved that no biopsy is needed and happy that the testing has been done.      T(C): 36.7 (11-20-18 @ 06:22), Max: 36.9 (11-19-18 @ 10:12)  HR: 87 (11-20-18 @ 06:22) (87 - 99)  BP: 126/70 (11-20-18 @ 06:22) (108/63 - 144/81)  RR: 17 (11-20-18 @ 06:22) (16 - 18)  SpO2: 96% (11-20-18 @ 06:22) (95% - 99%)       PHYSICAL EXAM:  GENERAL: NAD  HEAD:  Atraumatic, Normocephalic  EYES: EOMI, PERRLA, conjunctiva and sclera clear  ENMT: No tonsillar erythema, exudates, or enlargement; Moist mucous membranes   NECK: Supple, No JVD  NERVOUS SYSTEM: AOX3, 5/5 strength in bilateral lower extremities. +5/5 strength in RUE.  +3/5 strength in LUE.  Decreased sensation in bilateral soles of feet.    PSYCHIATRIC: Appropriate affect and mood  CHEST/LUNG: Clear to auscultation bilaterally; No rales, rhonchi, wheezing, or rubs  HEART: Regular rate and rhythm; No murmurs, rubs, or gallops. No LE edema  ABDOMEN: Soft, Nontender, Nondistended; Bowel sounds present  EXTREMITIES:  2+ Peripheral Pulses, No clubbing, cyanosis  SKIN: No rashes or lesions    Consultant(s) Notes Reviewed:  [x ] YES  [ ] NO  Care Discussed with Consultants/Other Providers [ x] YES  [ ] NO    LABS:                        12.1   9.66  )-----------( 325      ( 18 Nov 2018 21:50 )             37.6     11-19    138  |  103  |  15  ----------------------------<  142<H>  4.3   |  22  |  0.49<L>    Ca    10.1      19 Nov 2018 04:10  Phos  3.9     11-19  Mg     2.5     11-19    TPro  7.6  /  Alb  4.6  /  TBili  0.9  /  DBili  x   /  AST  16  /  ALT  11  /  AlkPhos  84  11-19    PT/INR - ( 19 Nov 2018 04:10 )   PT: 10.9 SEC;   INR: 0.96          PTT - ( 19 Nov 2018 04:10 )  PTT:25.2 SEC          RADIOLOGY, EKG & ADDITIONAL TESTS: Reviewed.       RADIOLOGY & ADDITIONAL TESTS:    Imaging Personally Reviewed:  [ ] YES  [ ] NO  MedsMEDICATIONS  (STANDING):  anastrozole 1 milliGRAM(s) Oral daily  atorvastatin 40 milliGRAM(s) Oral at bedtime  dexamethasone  IVPB 10 milliGRAM(s) IV Intermittent every 6 hours  influenza   Vaccine 0.5 milliLiter(s) IntraMuscular once  metoprolol succinate ER 25 milliGRAM(s) Oral daily  pantoprazole    Tablet 40 milliGRAM(s) Oral before breakfast    MEDICATIONS  (PRN):  ibuprofen  Tablet. 400 milliGRAM(s) Oral every 6 hours PRN Mild Pain (1 - 3), Moderate Pain (4 - 6)  oxyCODONE    IR 5 milliGRAM(s) Oral every 6 hours PRN Severe Pain (7 - 10)

## 2018-11-20 NOTE — DISCHARGE NOTE ADULT - CARE PLAN
Principal Discharge DX:	Pelvic fracture  Goal:	Medical management  Assessment and plan of treatment:	You came in with concern for a right hip lesion based on a nuclear medicine bone scan from 11/2/18. You had a CT performed, which did not redemonstrate this lesion. However, you were found to have nondisplaced fractures of the right superior and inferior pubic rami and a fracture the right sacral ala.  You also have a fat-containing mass within the right gluteus medius muscle. The fractures could be responsible for the increased radiouptake on the bone scan. Please take vitamin D and Calcium on discharge, and follow up with your primary medical doctor to discuss initiation of treatment to improve your bone health.  Secondary Diagnosis:	Cervical disc disease  Assessment and plan of treatment:	You have disc herniation at the C5-C6 level in your cervical spine, as visualized on CT imaging. You were treated with steroids. Please continue on a steroid taper after going home. You will also need an MRI after discharge. Please follow up with your orthopedic surgeon regarding a potential surgical intervention.

## 2018-11-20 NOTE — PROVIDER CONTACT NOTE (OTHER) - ASSESSMENT
Patient VSS, no s/s acute distress, due for AM dose decadron, plan to bridge to po steroids today with possible d/c today.

## 2018-11-20 NOTE — DISCHARGE NOTE ADULT - PLAN OF CARE
Medical management You came in with concern for a right hip lesion based on a nuclear medicine bone scan from 11/2/18. You had a CT performed, which did not redemonstrate this lesion. However, you were found to have nondisplaced fractures of the right superior and inferior pubic rami and a fracture the right sacral ala.  You also have a fat-containing mass within the right gluteus medius muscle. The fractures could be responsible for the increased radiouptake on the bone scan. Please take vitamin D and Calcium on discharge, and follow up with your primary medical doctor to discuss initiation of treatment to improve your bone health. You have disc herniation at the C5-C6 level in your cervical spine, as visualized on CT imaging. You were treated with steroids. Please continue on a steroid taper after going home. You will also need an MRI after discharge. Please follow up with your orthopedic surgeon regarding a potential surgical intervention.

## 2018-11-20 NOTE — DISCHARGE NOTE ADULT - INSTRUCTIONS
Eat well balanced diet ,drink 8-10 glasses of water each day .IF  excruciating pain, nausea vomiting notify MD

## 2018-11-20 NOTE — DISCHARGE NOTE ADULT - CARE PROVIDER_API CALL
Braulio Alvarez (MD; DC), Orthopaedic Surgery  [address] [place] [address]  Phone: [phone]  Fax: [phone] Braulio Alvarez (MD; DC), Orthopaedic Surgery  [address]  [address]  [place] [address]  Phone: [phone]  Fax: [phone]    Radu Rhodes (MD), Cardiovascular Disease; Internal Medicine  [address] [place] [address]  Phone: [phone]  Fax: [phone]

## 2018-11-29 LAB
CANCER AG27-29 SERPL-ACNC: 19 U/ML
CEA SERPL-MCNC: 3.9 NG/ML

## 2018-12-03 ENCOUNTER — FORM ENCOUNTER (OUTPATIENT)
Age: 68
End: 2018-12-03

## 2018-12-04 ENCOUNTER — OUTPATIENT (OUTPATIENT)
Dept: OUTPATIENT SERVICES | Facility: HOSPITAL | Age: 68
LOS: 1 days | End: 2018-12-04
Payer: MEDICARE

## 2018-12-04 ENCOUNTER — TRANSCRIPTION ENCOUNTER (OUTPATIENT)
Age: 68
End: 2018-12-04

## 2018-12-04 ENCOUNTER — APPOINTMENT (OUTPATIENT)
Dept: RADIOLOGY | Facility: CLINIC | Age: 68
End: 2018-12-04

## 2018-12-04 ENCOUNTER — APPOINTMENT (OUTPATIENT)
Dept: NEUROSURGERY | Facility: CLINIC | Age: 68
End: 2018-12-04
Payer: MEDICARE

## 2018-12-04 ENCOUNTER — INPATIENT (INPATIENT)
Facility: HOSPITAL | Age: 68
LOS: 7 days | Discharge: HOME | DRG: 472 | End: 2018-12-12
Attending: STUDENT IN AN ORGANIZED HEALTH CARE EDUCATION/TRAINING PROGRAM | Admitting: STUDENT IN AN ORGANIZED HEALTH CARE EDUCATION/TRAINING PROGRAM
Payer: MEDICARE

## 2018-12-04 VITALS
HEIGHT: 67 IN | SYSTOLIC BLOOD PRESSURE: 173 MMHG | BODY MASS INDEX: 23.54 KG/M2 | WEIGHT: 150 LBS | DIASTOLIC BLOOD PRESSURE: 99 MMHG | HEART RATE: 85 BPM

## 2018-12-04 VITALS
SYSTOLIC BLOOD PRESSURE: 126 MMHG | HEIGHT: 67 IN | HEART RATE: 82 BPM | OXYGEN SATURATION: 95 % | WEIGHT: 149.91 LBS | TEMPERATURE: 98 F | RESPIRATION RATE: 16 BRPM | DIASTOLIC BLOOD PRESSURE: 68 MMHG

## 2018-12-04 DIAGNOSIS — Z00.8 ENCOUNTER FOR OTHER GENERAL EXAMINATION: ICD-10-CM

## 2018-12-04 DIAGNOSIS — M50.20 OTHER CERVICAL DISC DISPLACEMENT, UNSPECIFIED CERVICAL REGION: ICD-10-CM

## 2018-12-04 DIAGNOSIS — Z90.49 ACQUIRED ABSENCE OF OTHER SPECIFIED PARTS OF DIGESTIVE TRACT: Chronic | ICD-10-CM

## 2018-12-04 DIAGNOSIS — Z98.890 OTHER SPECIFIED POSTPROCEDURAL STATES: Chronic | ICD-10-CM

## 2018-12-04 PROBLEM — M50.90 CERVICAL DISC DISORDER, UNSPECIFIED, UNSPECIFIED CERVICAL REGION: Chronic | Status: ACTIVE | Noted: 2018-11-19

## 2018-12-04 LAB
ANION GAP SERPL CALC-SCNC: 13 MMOL/L — SIGNIFICANT CHANGE UP (ref 5–17)
APTT BLD: 27.1 SEC — LOW (ref 27.5–36.3)
BLD GP AB SCN SERPL QL: NEGATIVE — SIGNIFICANT CHANGE UP
BUN SERPL-MCNC: 23 MG/DL — SIGNIFICANT CHANGE UP (ref 7–23)
CALCIUM SERPL-MCNC: 9.3 MG/DL — SIGNIFICANT CHANGE UP (ref 8.4–10.5)
CHLORIDE SERPL-SCNC: 100 MMOL/L — SIGNIFICANT CHANGE UP (ref 96–108)
CO2 SERPL-SCNC: 24 MMOL/L — SIGNIFICANT CHANGE UP (ref 22–31)
CREAT SERPL-MCNC: 0.38 MG/DL — LOW (ref 0.5–1.3)
GLUCOSE SERPL-MCNC: 111 MG/DL — HIGH (ref 70–99)
HCT VFR BLD CALC: 38.9 % — SIGNIFICANT CHANGE UP (ref 34.5–45)
HGB BLD-MCNC: 13.2 G/DL — SIGNIFICANT CHANGE UP (ref 11.5–15.5)
INR BLD: 0.96 RATIO — SIGNIFICANT CHANGE UP (ref 0.88–1.16)
MCHC RBC-ENTMCNC: 32.2 PG — SIGNIFICANT CHANGE UP (ref 27–34)
MCHC RBC-ENTMCNC: 34.1 GM/DL — SIGNIFICANT CHANGE UP (ref 32–36)
MCV RBC AUTO: 94.4 FL — SIGNIFICANT CHANGE UP (ref 80–100)
PLATELET # BLD AUTO: 266 K/UL — SIGNIFICANT CHANGE UP (ref 150–400)
POTASSIUM SERPL-MCNC: 4.2 MMOL/L — SIGNIFICANT CHANGE UP (ref 3.5–5.3)
POTASSIUM SERPL-SCNC: 4.2 MMOL/L — SIGNIFICANT CHANGE UP (ref 3.5–5.3)
PROTHROM AB SERPL-ACNC: 11 SEC — SIGNIFICANT CHANGE UP (ref 10–12.9)
RBC # BLD: 4.12 M/UL — SIGNIFICANT CHANGE UP (ref 3.8–5.2)
RBC # FLD: 12.8 % — SIGNIFICANT CHANGE UP (ref 10.3–14.5)
RH IG SCN BLD-IMP: POSITIVE — SIGNIFICANT CHANGE UP
RH IG SCN BLD-IMP: POSITIVE — SIGNIFICANT CHANGE UP
SODIUM SERPL-SCNC: 137 MMOL/L — SIGNIFICANT CHANGE UP (ref 135–145)
WBC # BLD: 14.8 K/UL — HIGH (ref 3.8–10.5)
WBC # FLD AUTO: 14.8 K/UL — HIGH (ref 3.8–10.5)

## 2018-12-04 PROCEDURE — 99205 OFFICE O/P NEW HI 60 MIN: CPT

## 2018-12-04 PROCEDURE — 72040 X-RAY EXAM NECK SPINE 2-3 VW: CPT | Mod: 26

## 2018-12-04 PROCEDURE — 99285 EMERGENCY DEPT VISIT HI MDM: CPT

## 2018-12-04 RX ORDER — DEXTROSE MONOHYDRATE, SODIUM CHLORIDE, AND POTASSIUM CHLORIDE 50; .745; 4.5 G/1000ML; G/1000ML; G/1000ML
1000 INJECTION, SOLUTION INTRAVENOUS
Qty: 0 | Refills: 0 | Status: DISCONTINUED | OUTPATIENT
Start: 2018-12-04 | End: 2018-12-06

## 2018-12-04 RX ORDER — SENNA PLUS 8.6 MG/1
2 TABLET ORAL AT BEDTIME
Qty: 0 | Refills: 0 | Status: DISCONTINUED | OUTPATIENT
Start: 2018-12-04 | End: 2018-12-07

## 2018-12-04 RX ORDER — DIPHENHYDRAMINE HCL 50 MG
25 CAPSULE ORAL EVERY 4 HOURS
Qty: 0 | Refills: 0 | Status: DISCONTINUED | OUTPATIENT
Start: 2018-12-04 | End: 2018-12-12

## 2018-12-04 RX ORDER — GABAPENTIN 400 MG/1
600 CAPSULE ORAL
Qty: 0 | Refills: 0 | Status: DISCONTINUED | OUTPATIENT
Start: 2018-12-04 | End: 2018-12-12

## 2018-12-04 RX ORDER — INFLUENZA VIRUS VACCINE 15; 15; 15; 15 UG/.5ML; UG/.5ML; UG/.5ML; UG/.5ML
0.5 SUSPENSION INTRAMUSCULAR ONCE
Qty: 0 | Refills: 0 | Status: COMPLETED | OUTPATIENT
Start: 2018-12-04 | End: 2018-12-04

## 2018-12-04 RX ORDER — OXYCODONE HYDROCHLORIDE 5 MG/1
10 TABLET ORAL EVERY 4 HOURS
Qty: 0 | Refills: 0 | Status: DISCONTINUED | OUTPATIENT
Start: 2018-12-04 | End: 2018-12-04

## 2018-12-04 RX ORDER — DOCUSATE SODIUM 100 MG
100 CAPSULE ORAL THREE TIMES A DAY
Qty: 0 | Refills: 0 | Status: DISCONTINUED | OUTPATIENT
Start: 2018-12-04 | End: 2018-12-07

## 2018-12-04 RX ORDER — ATORVASTATIN CALCIUM 80 MG/1
40 TABLET, FILM COATED ORAL AT BEDTIME
Qty: 0 | Refills: 0 | Status: DISCONTINUED | OUTPATIENT
Start: 2018-12-04 | End: 2018-12-12

## 2018-12-04 RX ORDER — METOPROLOL TARTRATE 50 MG
25 TABLET ORAL DAILY
Qty: 0 | Refills: 0 | Status: DISCONTINUED | OUTPATIENT
Start: 2018-12-04 | End: 2018-12-12

## 2018-12-04 RX ORDER — OXYCODONE HYDROCHLORIDE 5 MG/1
5 TABLET ORAL EVERY 4 HOURS
Qty: 0 | Refills: 0 | Status: DISCONTINUED | OUTPATIENT
Start: 2018-12-04 | End: 2018-12-04

## 2018-12-04 RX ORDER — ACETAMINOPHEN 500 MG
650 TABLET ORAL EVERY 6 HOURS
Qty: 0 | Refills: 0 | Status: DISCONTINUED | OUTPATIENT
Start: 2018-12-04 | End: 2018-12-07

## 2018-12-04 RX ORDER — ERGOCALCIFEROL 1.25 MG/1
50000 CAPSULE ORAL
Qty: 0 | Refills: 0 | Status: DISCONTINUED | OUTPATIENT
Start: 2018-12-04 | End: 2018-12-12

## 2018-12-04 RX ORDER — ONDANSETRON 8 MG/1
4 TABLET, FILM COATED ORAL EVERY 6 HOURS
Qty: 0 | Refills: 0 | Status: DISCONTINUED | OUTPATIENT
Start: 2018-12-04 | End: 2018-12-12

## 2018-12-04 RX ORDER — TRAMADOL HYDROCHLORIDE 50 MG/1
50 TABLET ORAL
Qty: 0 | Refills: 0 | Status: DISCONTINUED | OUTPATIENT
Start: 2018-12-04 | End: 2018-12-11

## 2018-12-04 RX ADMIN — SENNA PLUS 2 TABLET(S): 8.6 TABLET ORAL at 23:58

## 2018-12-04 RX ADMIN — Medication 25 MILLIGRAM(S): at 23:58

## 2018-12-04 RX ADMIN — Medication 100 MILLIGRAM(S): at 23:59

## 2018-12-04 RX ADMIN — ATORVASTATIN CALCIUM 40 MILLIGRAM(S): 80 TABLET, FILM COATED ORAL at 23:59

## 2018-12-04 RX ADMIN — DEXTROSE MONOHYDRATE, SODIUM CHLORIDE, AND POTASSIUM CHLORIDE 75 MILLILITER(S): 50; .745; 4.5 INJECTION, SOLUTION INTRAVENOUS at 19:59

## 2018-12-04 NOTE — ED ADULT NURSE NOTE - OBJECTIVE STATEMENT
69 y/o female sent in for surgery for herniated cervical disks with Dr. Daley.  Pt reports she has weakness to bilateral upper extremities.   Pt denies chest pain, shortness of breath, n/v/d,  fever, chills.  Pt with good strength to upper and lower extremities.  Respiration easy and non labored.  No acute respiratory distress noted. 67 y/o female sent in for surgery for herniated cervical disks with Dr. Daley.  Pt reports she has weakness to bilateral upper extremities.   Pt denies chest pain, shortness of breath, n/v/d,  fever, chills.  Pt with good strength to lower extremities and good  strength to upper extremities.  Respiration easy and non labored.  No acute respiratory distress noted.

## 2018-12-04 NOTE — H&P ADULT - ASSESSMENT
68F preop for C3-6 ACDF possible posterior lami fusion with Dr. Daley tomorrow for cervical stenosis  - Preop for tomorrow  - Admit to 7T  - ENT contact for approach

## 2018-12-04 NOTE — ED PROVIDER NOTE - PMH
Breast CA  Rt Sp lumpectomy and RT  Cervical disc herniation  Planning surg  HLD (hyperlipidemia)    Hodgkin disease  1981  HTN (hypertension)

## 2018-12-04 NOTE — H&P ADULT - HISTORY OF PRESENT ILLNESS
68F pmh breast ca, hodgkins lymphoma was sent in for surgery for herniated cervical disks with Dr. Daley.  Pt has had weakness of b/l upper extremities and was found the have herniated disks on recent MRI.  Pt denies chest pain, shortness of breath, nausea/vomiting/diarrhea, fever, chills

## 2018-12-04 NOTE — ED PROVIDER NOTE - OBJECTIVE STATEMENT
68F pmh breast ca, hodgkins lymphoma was sent in for surgery for herniated cervical disks with Dr. Daley.  Pt has had weakness of b/l upper extremities and was found the have herniated disks on recent MRI.  Pt denies chest pain, shortness of breath, nausea/vomiting/diarrhea, fever, chills.

## 2018-12-04 NOTE — PATIENT PROFILE ADULT - NSPROMEDSBROUGHTTOHOSP_GEN_A_NUR
The pharmacist was contacted and given the directions below  Jami Cote LPN on 11/27/2018 at 11:08 AM     no

## 2018-12-04 NOTE — ED CLERICAL - NS ED CLERK NOTE PRE-ARRIVAL INFORMATION; ADDITIONAL PRE-ARRIVAL INFORMATION
CC/Reason For referral:  HX INVASIVE BREAST CA TREATED BY DR GRAJEDA.  NEW WEAKNESS R/O SPINAL LESION  Preferred Consultant(if applicable):  Who admits for you (if needed):  Do you have documents you would like to fax over? NO  Would you still like to speak to an ED attending? PLEASE CALL GAMALIEL NEURO FELLOW AFTER PATIENT IS SEEN

## 2018-12-04 NOTE — H&P ADULT - NSHPPHYSICALEXAM_GEN_ALL_CORE
Exam:  AOx3, Following Commands  Motor:          Upper extremity                         Delt      Bicep     Tricep     HG                                                 R         5/5 5/5 5/5 5/5                                               L          5/5       5/5          3/5        5/5          Lower extremity                        HF          KF         KE         DF        PF                                                  R        5/5 5/5 5/5 5/5 5/5                                               L         5/5 5/5 5/5 5/5 5/5  Sensation / Reflexes  [ ] intact to light touch  [ x] decreased: in b/l hands and feet  No clonus

## 2018-12-04 NOTE — CONSULT NOTE ADULT - ASSESSMENT
68 year old Female with pmhx of breast cancer s/p lumpectomy and RT on anastrozole Hodgkins lymphoma s/p RT, HTN, HLD presents for surgery for herniated cervical disks. Medicine consult was requested for medical clearance.     RCRI Score 0 points, Class I Risk  0.4 % Risk of Major Cardiac Event  METS 4    Patient is at low risk for surgery.   No further cardiac work up required.     Raquel Meyers PGY3   MAR [id number] 68 year old Female with pmhx of breast cancer s/p lumpectomy and RT on anastrozole Hodgkins lymphoma s/p RT, HTN, HLD presents for surgery for herniated cervical disks. Medicine consult was requested for medical clearance.     RCRI Score 0 points, Class I Risk  0.4 % Risk of Major Cardiac Event  METS > 4 (goes to the gym every day)     Surgery is intermediate risk   No further cardiac work up required.     Raquel Meyers PGY3   MAR [id number] 68 year old Female with pmhx of breast cancer s/p lumpectomy and RT on anastrozole Hodgkins lymphoma s/p RT, HTN, HLD presents for surgery for herniated cervical disks. Medicine consult was requested for medical clearance.     RCRI Score 0 points, Class I Risk  0.4 % Risk of Major Cardiac Event  METS > 4 (goes to the gym every day)     Patient is a low cardiopulmonary risk for intermediate risk procedure and medically optimized.  No further cardiac work up required.  Will proceed with planned procedure.    Raqeul Meyers PGY3   MAR [id number]

## 2018-12-04 NOTE — ED ADULT NURSE NOTE - NSIMPLEMENTINTERV_GEN_ALL_ED
Implemented All Fall Risk Interventions:  [employer] to call system. Call bell, personal items and telephone within reach. Instruct patient to call for assistance. Room bathroom lighting operational. Non-slip footwear when patient is off stretcher. Physically safe environment: no spills, clutter or unnecessary equipment. Stretcher in lowest position, wheels locked, appropriate side rails in place. Provide visual cue, wrist band, yellow gown, etc. Monitor gait and stability. Monitor for mental status changes and reorient to person, place, and time. Review medications for side effects contributing to fall risk. Reinforce activity limits and safety measures with patient and family.

## 2018-12-04 NOTE — CONSULT NOTE ADULT - SUBJECTIVE AND OBJECTIVE BOX
HILARIA KUMAR  68y  Female      Patient is a 68y old  Female who presents with a chief complaint of b/l hand numbness and gait instability (04 Dec 2018 19:42)        PAST MEDICAL/SURGICAL HISTORY  PAST MEDICAL & SURGICAL HISTORY:  Hodgkin disease: 1981  HLD (hyperlipidemia)  HTN (hypertension)  Breast CA: Rt Sp lumpectomy and RT  Cervical disc herniation: Planning surg  No significant past surgical history      REVIEW OF SYSTEMS:  CONSTITUTIONAL: No fever, weight loss, or fatigue  EYES: No eye pain, visual disturbances, or discharge  ENMT:  No difficulty hearing, tinnitus, vertigo; No sinus or throat pain  NECK: No pain or stiffness  BREASTS: No pain, masses, or nipple discharge  RESPIRATORY: No cough, wheezing, chills or hemoptysis; No shortness of breath  CARDIOVASCULAR: No chest pain, palpitations, dizziness, or leg swelling  GASTROINTESTINAL: No abdominal or epigastric pain. No nausea, vomiting, or hematemesis; No diarrhea or constipation. No melena or hematochezia.  GENITOURINARY: No dysuria, frequency, hematuria, or incontinence  NEUROLOGICAL: No headaches, memory loss, loss of strength, numbness, or tremors  SKIN: No itching, burning, rashes, or lesions   LYMPH NODES: No enlarged glands  ENDOCRINE: No heat or cold intolerance; No hair loss  MUSCULOSKELETAL: No joint pain or swelling; No muscle, back, or extremity pain  PSYCHIATRIC: No depression, anxiety, mood swings, or difficulty sleeping  HEME/LYMPH: No easy bruising, or bleeding gums  ALLERY AND IMMUNOLOGIC: No hives or eczema    T(C): 36.6 (12-04-18 @ 23:00), Max: 36.9 (12-04-18 @ 17:20)  HR: 85 (12-04-18 @ 23:00) (78 - 85)  BP: 144/86 (12-04-18 @ 23:00) (126/68 - 146/75)  RR: 18 (12-04-18 @ 23:00) (16 - 18)  SpO2: 98% (12-04-18 @ 23:00) (95% - 98%)  Wt(kg): --Vital Signs Last 24 Hrs  T(C): 36.6 (04 Dec 2018 23:00), Max: 36.9 (04 Dec 2018 17:20)  T(F): 97.9 (04 Dec 2018 23:00), Max: 98.5 (04 Dec 2018 17:20)  HR: 85 (04 Dec 2018 23:00) (78 - 85)  BP: 144/86 (04 Dec 2018 23:00) (126/68 - 146/75)  BP(mean): --  RR: 18 (04 Dec 2018 23:00) (16 - 18)  SpO2: 98% (04 Dec 2018 23:00) (95% - 98%)    PHYSICAL EXAM:  GENERAL: NAD, well-groomed, well-developed  HEAD:  Atraumatic, Normocephalic  EYES: EOMI, PERRLA, conjunctiva and sclera clear  ENMT: No tonsillar erythema, exudates, or enlargement; Moist mucous membranes, Good dentition, No lesions  NECK: Supple, No JVD, Normal thyroid  NERVOUS SYSTEM:  Alert & Oriented X3, Good concentration; Motor Strength 5/5 B/L upper and lower extremities; DTRs 2+ intact and symmetric  CHEST/LUNG: Clear to percussion bilaterally; No rales, rhonchi, wheezing, or rubs  HEART: Regular rate and rhythm; No murmurs, rubs, or gallops  ABDOMEN: Soft, Nontender, Nondistended; Bowel sounds present  EXTREMITIES:  2+ Peripheral Pulses, No clubbing, cyanosis, or edema  LYMPH: No lymphadenopathy noted  SKIN: No rashes or lesions    Consultant(s) Notes Reviewed:  [x ] YES  [ ] NO  Care Discussed with Consultants/Other Providers [ x] YES  [ ] NO    LABS:      The Labs were reviewed by me   The Radiology was reviewed by me    EKG tracing reviewed by me    12-04    137  |  100  |  23  ----------------------------<  111<H>  4.2   |  24  |  0.38<L>    Ca    9.3      04 Dec 2018 18:36            PT/INR - ( 04 Dec 2018 18:36 )   PT: 11.0 sec;   INR: 0.96 ratio         PTT - ( 04 Dec 2018 18:36 )  PTT:27.1 sec                                        13.2   14.8  )-----------( 266      ( 04 Dec 2018 18:36 )             38.9     CAPILLARY BLOOD GLUCOSE                RADIOLOGY & ADDITIONAL TESTS:    Imaging Personally Reviewed:  [ ] YES  [ ] NO Medicine Consult Note     68 year old Female with pmhx of breast cancer s/p lumpectomy and RT on anastrozole Hodgkins lymphoma s/p RT, HTN, HLD presents for surgery for herniated cervical disks with Dr. Daley.  Patient reports progressive bilateral weakness and numbness and tingling of the upper extremities. She was found to have herniated disks on recent MRI.  Patient denies any fevers, chills, nausea, vomiting, URI symptoms, chest pain, SOB, abdominal pain, diarrhea, urinary symptoms. Has constipation for which she is taking laxatives. No known cardiac, pulmonary, or renal disease.       PAST MEDICAL/SURGICAL HISTORY  PAST MEDICAL & SURGICAL HISTORY:  Hodgkin disease: 1981  HLD (hyperlipidemia)  HTN (hypertension)  Breast CA: Rt Sp lumpectomy and RT  Cervical disc herniation: Planning surg  No significant past surgical history        REVIEW OF SYSTEMS:  CONSTITUTIONAL: No fever, chills   ENMT: No sinus or throat pain  NECK: No pain or stiffness  RESPIRATORY: No cough, wheezing, chills or hemoptysis; No shortness of breath  CARDIOVASCULAR: No chest pain, palpitations  GASTROINTESTINAL: No abdominal or epigastric pain. No nausea, vomiting  GENITOURINARY: No dysuria, frequency, hematuria, or incontinence  NEUROLOGICAL: No headaches  HEME/LYMPH: No easy bruising, or bleeding gums    T(C): 36.6 (12-04-18 @ 23:00), Max: 36.9 (12-04-18 @ 17:20)  HR: 85 (12-04-18 @ 23:00) (78 - 85)  BP: 144/86 (12-04-18 @ 23:00) (126/68 - 146/75)  RR: 18 (12-04-18 @ 23:00) (16 - 18)  SpO2: 98% (12-04-18 @ 23:00) (95% - 98%)  Wt(kg): --Vital Signs Last 24 Hrs  T(C): 36.6 (04 Dec 2018 23:00), Max: 36.9 (04 Dec 2018 17:20)  T(F): 97.9 (04 Dec 2018 23:00), Max: 98.5 (04 Dec 2018 17:20)  HR: 85 (04 Dec 2018 23:00) (78 - 85)  BP: 144/86 (04 Dec 2018 23:00) (126/68 - 146/75)  BP(mean): --  RR: 18 (04 Dec 2018 23:00) (16 - 18)  SpO2: 98% (04 Dec 2018 23:00) (95% - 98%)    PHYSICAL EXAM:  GENERAL: NAD, well-groomed, well-developed  HEAD:  Atraumatic, Normocephalic  ENMT: dry MM  CHEST/LUNG: decreased breath sounds on the left field   HEART: Regular rate and rhythm; No murmurs, rubs, or gallops  ABDOMEN: Soft, Nontender, Nondistended; Bowel sounds present  EXTREMITIES:  2+ Peripheral Pulses, No clubbing, cyanosis, or edema  SKIN: No rashes or lesions  NERVOUS SYSTEM:  Alert & Oriented X3, Good concentration; Motor Strength 4/5 B/L upper extremities    Consultant(s) Notes Reviewed:  [x ] YES  [ ] NO  Care Discussed with Consultants/Other Providers [ x] YES  [ ] NO    LABS:    12-04    137  |  100  |  23  ----------------------------<  111<H>  4.2   |  24  |  0.38<L>    Ca    9.3      04 Dec 2018 18:36      PT/INR - ( 04 Dec 2018 18:36 )   PT: 11.0 sec;   INR: 0.96 ratio         PTT - ( 04 Dec 2018 18:36 )  PTT:27.1 sec                                        13.2   14.8  )-----------( 266      ( 04 Dec 2018 18:36 )             38.9     EKG: normal sinus rhythm 80, no ST elevation or depression or T wave inversions Medicine Consult Note     68 year old Female with pmhx of breast cancer s/p lumpectomy and RT on anastrozole Hodgkins lymphoma s/p RT, HTN, HLD presents for surgery for herniated cervical disks with Dr. Daley.  Patient reports progressive bilateral weakness and numbness and tingling of the upper extremities. She was found to have herniated disks on recent MRI.  Patient denies any fevers, chills, nausea, vomiting, URI symptoms, chest pain, SOB, abdominal pain, diarrhea, urinary symptoms. Has constipation for which she is taking laxatives. No known cardiac, pulmonary, or renal disease.     PMD:  Radu Rhodes    PAST MEDICAL/SURGICAL HISTORY  PAST MEDICAL & SURGICAL HISTORY:  Hodgkin disease: 1981  HLD (hyperlipidemia)  HTN (hypertension)  Breast CA: Rt Sp lumpectomy and RT  Cervical disc herniation: Planning surg  No significant past surgical history        REVIEW OF SYSTEMS:  CONSTITUTIONAL: No fever, chills   ENMT: No sinus or throat pain  NECK: No pain or stiffness  RESPIRATORY: No cough, wheezing, chills or hemoptysis; No shortness of breath  CARDIOVASCULAR: No chest pain, palpitations  GASTROINTESTINAL: No abdominal or epigastric pain. No nausea, vomiting  GENITOURINARY: No dysuria, frequency, hematuria, or incontinence  NEUROLOGICAL: No headaches  HEME/LYMPH: No easy bruising, or bleeding gums  ALLERGY:  No medication reaction    T(C): 36.6 (12-04-18 @ 23:00), Max: 36.9 (12-04-18 @ 17:20)  HR: 85 (12-04-18 @ 23:00) (78 - 85)  BP: 144/86 (12-04-18 @ 23:00) (126/68 - 146/75)  RR: 18 (12-04-18 @ 23:00) (16 - 18)  SpO2: 98% (12-04-18 @ 23:00) (95% - 98%)  Wt(kg): --Vital Signs Last 24 Hrs  T(C): 36.6 (04 Dec 2018 23:00), Max: 36.9 (04 Dec 2018 17:20)  T(F): 97.9 (04 Dec 2018 23:00), Max: 98.5 (04 Dec 2018 17:20)  HR: 85 (04 Dec 2018 23:00) (78 - 85)  BP: 144/86 (04 Dec 2018 23:00) (126/68 - 146/75)  BP(mean): --  RR: 18 (04 Dec 2018 23:00) (16 - 18)  SpO2: 98% (04 Dec 2018 23:00) (95% - 98%)    PHYSICAL EXAM:  GENERAL: NAD, well-groomed, well-developed  HEAD:  Atraumatic, Normocephalic  ENMT: dry MM  CHEST/LUNG: decreased breath sounds on the left field   HEART: Regular rate and rhythm; No murmurs, rubs, or gallops  ABDOMEN: Soft, Nontender, Nondistended; Bowel sounds present  EXTREMITIES:  2+ Peripheral Pulses, No clubbing, cyanosis, or edema  SKIN: No rashes or lesions  NERVOUS SYSTEM:  Alert & Oriented X3, Good concentration; Motor Strength 4/5 B/L upper extremities  PSYCH:  normal    Consultant(s) Notes Reviewed:  [x ] YES  [ ] NO  Care Discussed with Consultants/Other Providers [ x] YES  [ ] NO    Labs, imaging and EKG tracing personally reviewed:    12-04    137  |  100  |  23  ----------------------------<  111<H>  4.2   |  24  |  0.38<L>    Ca    9.3      04 Dec 2018 18:36      PT/INR - ( 04 Dec 2018 18:36 )   PT: 11.0 sec;   INR: 0.96 ratio         PTT - ( 04 Dec 2018 18:36 )  PTT:27.1 sec                                        13.2   14.8  )-----------( 266      ( 04 Dec 2018 18:36 )             38.9     EKG: normal sinus rhythm 80, no ST elevation or depression or T wave inversions

## 2018-12-04 NOTE — ED PROVIDER NOTE - ATTENDING CONTRIBUTION TO CARE
Private Physician Norbert Daley Neurosurg, Radu Rhodes PCP  68y female pmh Breast ca, Hodgkins dz, HTN,HLD, Cervical herniated disc planning resection. No dm,habits,cva,mi,travel, PT comes to ed complains of Planning surg tomrrow. No fever chills. shortness of breath,cp,cough,dysuria,abd pain. +constipation. Pt complains of weakness/numbness elvi arms. MRI 4d ago. PE WDWN female awake alert NCat chest clear anterior & posterior abd soft +bs no mass guarding.cv no rmg, neuro gcs 15 speech fluent power 5l5 al extr pain light touch intact  Nic Huerta MD, Facep

## 2018-12-04 NOTE — PATIENT PROFILE ADULT - NSPROGENOTHERPROVIDER_GEN_A_NUR
Chief Complaint   Patient presents with     RECHECK     unna boot change       There were no vitals filed for this visit.    There is no height or weight on file to calculate BMI.    Regis ENAMORADO               
none

## 2018-12-04 NOTE — ED PROVIDER NOTE - PHYSICAL EXAMINATION
Gen: NAD, AOx3, frail appearing  Head: NCAT  HEENT: PERRL, oral mucosa moist, normal conjunctiva  Lung: CTAB, no respiratory distress  CV: rrr, no murmurs, Normal perfusion  Abd: soft, NTND, no CVA tenderness  MSK: No edema, old deformity of left clavicle.   Neuro: 5/5 strength bilateral lower ext, 5/5  strength   Skin: No rash

## 2018-12-05 ENCOUNTER — TRANSCRIPTION ENCOUNTER (OUTPATIENT)
Age: 68
End: 2018-12-05

## 2018-12-05 ENCOUNTER — APPOINTMENT (OUTPATIENT)
Dept: NEUROSURGERY | Facility: HOSPITAL | Age: 68
End: 2018-12-05

## 2018-12-05 DIAGNOSIS — Z98.890 OTHER SPECIFIED POSTPROCEDURAL STATES: Chronic | ICD-10-CM

## 2018-12-05 PROCEDURE — 22853 INSJ BIOMECHANICAL DEVICE: CPT | Mod: 59

## 2018-12-05 PROCEDURE — 22552 ARTHRD ANT NTRBD CERVICAL EA: CPT | Mod: 62

## 2018-12-05 PROCEDURE — 93010 ELECTROCARDIOGRAM REPORT: CPT

## 2018-12-05 PROCEDURE — 72040 X-RAY EXAM NECK SPINE 2-3 VW: CPT

## 2018-12-05 PROCEDURE — 22845 INSERT SPINE FIXATION DEVICE: CPT | Mod: 59

## 2018-12-05 PROCEDURE — 76000 FLUOROSCOPY <1 HR PHYS/QHP: CPT

## 2018-12-05 PROCEDURE — 99223 1ST HOSP IP/OBS HIGH 75: CPT | Mod: GC

## 2018-12-05 PROCEDURE — 22551 ARTHRD ANT NTRBDY CERVICAL: CPT | Mod: 62

## 2018-12-05 RX ORDER — DEXAMETHASONE 0.5 MG/5ML
4 ELIXIR ORAL EVERY 6 HOURS
Qty: 0 | Refills: 0 | Status: COMPLETED | OUTPATIENT
Start: 2018-12-06 | End: 2018-12-07

## 2018-12-05 RX ORDER — FAMOTIDINE 10 MG/ML
20 INJECTION INTRAVENOUS EVERY 12 HOURS
Qty: 0 | Refills: 0 | Status: DISCONTINUED | OUTPATIENT
Start: 2018-12-06 | End: 2018-12-12

## 2018-12-05 RX ORDER — OXYCODONE AND ACETAMINOPHEN 5; 325 MG/1; MG/1
1 TABLET ORAL EVERY 4 HOURS
Qty: 0 | Refills: 0 | Status: DISCONTINUED | OUTPATIENT
Start: 2018-12-06 | End: 2018-12-11

## 2018-12-05 RX ORDER — DOCUSATE SODIUM 100 MG
100 CAPSULE ORAL THREE TIMES A DAY
Qty: 0 | Refills: 0 | Status: DISCONTINUED | OUTPATIENT
Start: 2018-12-06 | End: 2018-12-12

## 2018-12-05 RX ORDER — ONDANSETRON 8 MG/1
4 TABLET, FILM COATED ORAL ONCE
Qty: 0 | Refills: 0 | Status: DISCONTINUED | OUTPATIENT
Start: 2018-12-06 | End: 2018-12-06

## 2018-12-05 RX ORDER — MORPHINE SULFATE 50 MG/1
1 CAPSULE, EXTENDED RELEASE ORAL
Qty: 0 | Refills: 0 | Status: DISCONTINUED | OUTPATIENT
Start: 2018-12-06 | End: 2018-12-11

## 2018-12-05 RX ORDER — DEXTROSE MONOHYDRATE, SODIUM CHLORIDE, AND POTASSIUM CHLORIDE 50; .745; 4.5 G/1000ML; G/1000ML; G/1000ML
1000 INJECTION, SOLUTION INTRAVENOUS
Qty: 0 | Refills: 0 | Status: DISCONTINUED | OUTPATIENT
Start: 2018-12-06 | End: 2018-12-06

## 2018-12-05 RX ORDER — ONDANSETRON 8 MG/1
4 TABLET, FILM COATED ORAL EVERY 4 HOURS
Qty: 0 | Refills: 0 | Status: DISCONTINUED | OUTPATIENT
Start: 2018-12-06 | End: 2018-12-07

## 2018-12-05 RX ORDER — HYDROMORPHONE HYDROCHLORIDE 2 MG/ML
0.5 INJECTION INTRAMUSCULAR; INTRAVENOUS; SUBCUTANEOUS
Qty: 0 | Refills: 0 | Status: DISCONTINUED | OUTPATIENT
Start: 2018-12-06 | End: 2018-12-06

## 2018-12-05 RX ORDER — ACETAMINOPHEN 500 MG
650 TABLET ORAL EVERY 6 HOURS
Qty: 0 | Refills: 0 | Status: DISCONTINUED | OUTPATIENT
Start: 2018-12-06 | End: 2018-12-12

## 2018-12-05 RX ORDER — SENNA PLUS 8.6 MG/1
2 TABLET ORAL AT BEDTIME
Qty: 0 | Refills: 0 | Status: DISCONTINUED | OUTPATIENT
Start: 2018-12-06 | End: 2018-12-12

## 2018-12-05 RX ORDER — ANASTROZOLE 1 MG/1
1 TABLET ORAL DAILY
Qty: 0 | Refills: 0 | Status: DISCONTINUED | OUTPATIENT
Start: 2018-12-05 | End: 2018-12-12

## 2018-12-05 RX ADMIN — TRAMADOL HYDROCHLORIDE 50 MILLIGRAM(S): 50 TABLET ORAL at 01:30

## 2018-12-05 RX ADMIN — ANASTROZOLE 1 MILLIGRAM(S): 1 TABLET ORAL at 13:26

## 2018-12-05 RX ADMIN — TRAMADOL HYDROCHLORIDE 50 MILLIGRAM(S): 50 TABLET ORAL at 12:28

## 2018-12-05 RX ADMIN — Medication 100 MILLIGRAM(S): at 05:30

## 2018-12-05 RX ADMIN — ERGOCALCIFEROL 50000 UNIT(S): 1.25 CAPSULE ORAL at 12:26

## 2018-12-05 RX ADMIN — GABAPENTIN 600 MILLIGRAM(S): 400 CAPSULE ORAL at 00:44

## 2018-12-05 RX ADMIN — Medication 25 MILLIGRAM(S): at 05:30

## 2018-12-05 RX ADMIN — TRAMADOL HYDROCHLORIDE 50 MILLIGRAM(S): 50 TABLET ORAL at 13:09

## 2018-12-05 RX ADMIN — TRAMADOL HYDROCHLORIDE 50 MILLIGRAM(S): 50 TABLET ORAL at 00:43

## 2018-12-05 NOTE — DISCHARGE NOTE ADULT - PATIENT PORTAL LINK FT
You can access the [facility] Patient Portal, offered by [facility], by registering with the following website: [email or web address]

## 2018-12-05 NOTE — DISCHARGE NOTE ADULT - NS AS ACTIVITY OBS
Do not drive or operate machinery/Walking-Outdoors allowed/Stairs allowed/Showering allowed/No Heavy lifting/straining/Keep wound dry/Bathing allowed/Walking-Indoors allowed

## 2018-12-05 NOTE — DISCHARGE NOTE ADULT - MEDICATION SUMMARY - MEDICATIONS TO TAKE
I will START or STAY ON the medications listed below when I get home from the hospital:    acetaminophen 325 mg oral tablet  -- 2 tab(s) by mouth every 6 hours, As needed, Temp greater or equal to 38C (100.4F), Mild Pain (1 - 3)  -- Indication: For Fever or mild pain     traMADol 50 mg oral tablet  -- 1 tab(s) by mouth 2 times a day MDD:2  -- Indication: For Moderate or severe pain    gabapentin 600 mg oral tablet  -- 1 tab(s) by mouth 2 times a day  -- Indication: For neuropathic pain    metoprolol tartrate 25 mg oral tablet  -- 1 tab(s) by mouth once a day  -- Indication: For HTN (hypertension)    famotidine 20 mg oral tablet  -- 1 tab(s) by mouth every 12 hours, As needed, Dyspepsia  -- Indication: For GERD    senna oral tablet  -- 2 tab(s) by mouth once a day (at bedtime)  -- Indication: For Stool softener    docusate sodium 100 mg oral capsule  -- 1 cap(s) by mouth 3 times a day  -- Indication: For Stool softener    Cepacol Sore Throat 15 mg-3.6 mg mucous membrane lozenge  --  mucous membrane   -- Indication: For Sorethroat

## 2018-12-05 NOTE — DISCHARGE NOTE ADULT - CARE PROVIDER_API CALL
Norbert Daley (MD), Neurosurgery  General  [address] [address]  Phone: [phone]  Fax: [phone] Norbert Daley), Neurosurgery  General  [address] [facility]  [address]  [place] [address]  Phone: [phone]  Fax: [phone]    Keron Jovel), Clinical Neurophysiology; Neurology  [address] [place] [address] [address]  [place] [address]  Phone: [phone]  Fax: [phone]

## 2018-12-05 NOTE — CONSULT NOTE ADULT - SUBJECTIVE AND OBJECTIVE BOX
Admitting Diagnosis:  Other herniation of intervertebral disc of cervical spine (M50.20): OTHER CERVICAL DISC DISPLACEMENT, UNSP CERVICAL REGION      HPI:  This is a 68y year old Female with the below past medical history who presents with the chief complaint of numbness.  PMHx:  breast ca, hodgkins lymphoma who was radiated many decades.    She says approx 2 months ago noted left hand numbness, went to see neurologist had workup that led to cts release.  Then one month ago, sx in the right hand.  In june 2019, had fall, broke collarbone left with dec range of motion of t  the left arm at the shoulder.  Noted inc issues with legs, feel like "stuck in cement" but no weakenss.  No bowel or bladder changes.  She had MRI cspine with disc herniation, images not available  sent in for adcf    Past Medical History:  Hodgkin disease (C81.90): 1981  HLD (hyperlipidemia) (E78.5)  HTN (hypertension) (I10)  Breast CA (C50.919): Rt Sp lumpectomy and RT  Cervical disc herniation (M50.20): Planning surg      Past Surgical History:  H/O lumpectomy (Z98.890)  No significant past surgical history (998681254)      Social History:  No toxic habits    Family History:  FAMILY HISTORY:  No pertinent family history in first degree relatives      Allergies:  penicillin (Unknown)      ROS:  Constitutional: Patient offers no complaints of fevers or significant weight loss  Ears, Nose, Mouth and Throat: The patient presents with no abnormalities of the head, ears, eyes, nose or throat  Skin: Patient offers no concerns of new rashes or lesions  Respiratory: The patient presents with no abnormalities of the respiratory tract  Cardiovascular: The patient presents with no cardiac abnormalities  Gastrointestinal: The patient presents with no abnormalities of the GI system  Genitourinary: The patient presents with no dysuria, hematuria or frequent urination  Neurological: See HPI  Endocrine: Patient offers no complaints of excessive thirst, urination, or heat/cold intolerance    Advanced care planning reviewed and noted in the chart.    Medications:  acetaminophen   Tablet .. 650 milliGRAM(s) Oral every 6 hours PRN  atorvastatin 40 milliGRAM(s) Oral at bedtime  diphenhydrAMINE 25 milliGRAM(s) Oral every 4 hours PRN  docusate sodium 100 milliGRAM(s) Oral three times a day  ergocalciferol 64234 Unit(s) Oral every week  gabapentin 600 milliGRAM(s) Oral two times a day  metoprolol tartrate 25 milliGRAM(s) Oral daily  ondansetron   Disintegrating Tablet 4 milliGRAM(s) Oral every 6 hours PRN  oxyCODONE    IR 5 milliGRAM(s) Oral every 4 hours PRN  oxyCODONE    IR 10 milliGRAM(s) Oral every 4 hours PRN  senna 2 Tablet(s) Oral at bedtime PRN  sodium chloride 0.9% with potassium chloride 20 mEq/L 1000 milliLiter(s) IV Continuous <Continuous>  traMADol 50 milliGRAM(s) Oral two times a day      Labs:  CBC Full  -  ( 04 Dec 2018 18:36 )  WBC Count : 14.8 K/uL  Hemoglobin : 13.2 g/dL  Hematocrit : 38.9 %  Platelet Count - Automated : 266 K/uL  Mean Cell Volume : 94.4 fl  Mean Cell Hemoglobin : 32.2 pg  Mean Cell Hemoglobin Concentration : 34.1 gm/dL  Auto Neutrophil # : x  Auto Lymphocyte # : x  Auto Monocyte # : x  Auto Eosinophil # : x  Auto Basophil # : x  Auto Neutrophil % : x  Auto Lymphocyte % : x  Auto Monocyte % : x  Auto Eosinophil % : x  Auto Basophil % : x    12-04    137  |  100  |  23  ----------------------------<  111<H>  4.2   |  24  |  0.38<L>    Ca    9.3      04 Dec 2018 18:36      CAPILLARY BLOOD GLUCOSE          PT/INR - ( 04 Dec 2018 18:36 )   PT: 11.0 sec;   INR: 0.96 ratio         PTT - ( 04 Dec 2018 18:36 )  PTT:27.1 sec    Female    Vitals:  Vital Signs Last 24 Hrs  T(C): 36.8 (05 Dec 2018 08:35), Max: 36.9 (04 Dec 2018 17:20)  T(F): 98.3 (05 Dec 2018 08:35), Max: 98.5 (04 Dec 2018 17:20)  HR: 63 (05 Dec 2018 08:35) (63 - 85)  BP: 114/76 (05 Dec 2018 08:35) (114/76 - 146/88)  BP(mean): --  RR: 18 (05 Dec 2018 08:35) (16 - 18)  SpO2: 95% (05 Dec 2018 08:35) (95% - 98%)    NEUROLOGICAL EXAM:    Mental status: Awake, alert, and in no apparent distress. Oriented to person, place and time. Language function is normal. Recent memory, digit span and concentration were normal.     Cranial Nerves: Pupils were equal, round, reactive to light. Extraocular movements were intact. Visual field were full. Fundoscopic exam was deferred. Facial sensation was intact to light touch. There was no facial asymmetry. The palate was upgoing symmetrically and tongue was midline. Hearing acuity was intact to finger rub AU. Shoulder shrug was full bilaterally    Motor exam: Bulk and tone were normal. Strength was 5/5 in all four extremities. Fine finger movements were symmetric and but slow. There was no pronator drift    Reflexes: 2+ in the bilateral upper extremities. 2+ in the bilateral lower extremities. Toes were downgoing bilaterally.     Sensation: Intact to light touch, temperature, vibration and proprioception.     Coordination: Finger-nose-finger without dysmetria.     Gait: walks without assistance

## 2018-12-05 NOTE — DISCHARGE NOTE ADULT - HOSPITAL COURSE
68 Female with past medical h/o breast ca, hodgkins lymphoma was sent in for surgery for herniated cervical disks with Dr. Daley.  Pt has had weakness of b/l upper extremities and was found the have herniated disks on recent MRI. Patient had C4-6 ACDF on 12/5 there was no post-op complication. Patient was started on steroids for radiculopathy and weakness of upper extremities which slightly improves. Patient was evaluated by PT and Home with home PT was recommended

## 2018-12-05 NOTE — DISCHARGE NOTE ADULT - CARE PROVIDERS DIRECT ADDRESSES
,[email or web address]_Unknown

## 2018-12-05 NOTE — PROGRESS NOTE ADULT - ASSESSMENT
HPI:  68F pmh breast ca, hodgkins lymphoma was sent in for surgery for herniated cervical disks with Dr. Daley.  Pt has had weakness of b/l upper extremities and was found the have herniated disks on recent MRI.  Pt denies chest pain, shortness of breath, nausea/vomiting/diarrhea, fever, chills (04 Dec 2018 19:42)      PLAN:  Neuro:   -Herniated disc seen on outpt MRI; Pre-op for C4-C6 ACDF today  -Continue neurontin for b/l hand parasthesias  -Oxy and tramadol for pain  -Patient familiar to neurology as outpt- they will continue to follow    Respiratory:   -IS encouraged    CV:  - Continue low dose beta blocker and statin   -Cleared for surgery by house medicine    Endocrine:   -No active issues    Heme/Onc:               -DVT PPX: Chemical ppx held for now given OR today. Continue SCD'S  -Continue home anastrazole    Renal:   -NS + K while NPO  -Voiding spontaneously    ID:   -Afebrile, no leukocytosis    GI:   -NPO pending OR  -Colace and senna    Magdalene Pena PA-C  [id number] HPI:  68F pmh breast ca, hodgkins lymphoma was sent in for surgery for herniated cervical disks with Dr. Daley.  Pt has had weakness of b/l upper extremities and was found the have herniated disks on recent MRI.  Pt denies chest pain, shortness of breath, nausea/vomiting/diarrhea, fever, chills (04 Dec 2018 19:42)      PLAN:  Neuro:   -Herniated disc seen on outpt MRI; Pre-op for C4-C6 ACDF today  -Continue neurontin for b/l hand parasthesias  -Oxy and tramadol for pain  -Patient familiar to neurology as outpt- they will continue to follow    Respiratory:   -IS encouraged    CV:  -Continue low dose beta blocker and statin   -Cleared for surgery by house medicine  -EKG in chart    Endocrine:   -No active issues    Heme/Onc:               -DVT PPX: Chemical ppx held for now given OR today. Continue SCD'S  -Continue home anastrazole    Renal:   -NS + K while NPO  -Voiding spontaneously    ID:   -Afebrile, no leukocytosis    GI:   -NPO pending OR  -Colace and senna    Magdalene Pena PA-C  [id number]

## 2018-12-05 NOTE — DISCHARGE NOTE ADULT - NS AS DC STROKE ED MATERIALS
Call 911 for Stroke/Risk Factors for Stroke/Need for Followup After Discharge/Stroke Warning Signs and Symptoms/Prescribed Medications/Stroke Education Booklet Stroke Warning Signs and Symptoms/Call 911 for Stroke/Risk Factors for Stroke/Prescribed Medications/Need for Followup After Discharge/Stroke Education Booklet

## 2018-12-05 NOTE — DISCHARGE NOTE ADULT - CARE PLAN
Principal Discharge DX:	Cervical disc herniation  Goal:	Anterior cervical fusion  Assessment and plan of treatment:	Home with home PT  Secondary Diagnosis:	HLD (hyperlipidemia)  Goal:	Continue Rosuvastatin  Secondary Diagnosis:	Hodgkin disease  Goal:	Continue Anastrozole  Secondary Diagnosis:	HTN (hypertension)

## 2018-12-05 NOTE — CONSULT NOTE ADULT - ASSESSMENT
67 YO F px to my associate Dr Aburto with compliants of hand numbness, had workup with another neurologist prior to that, found  cts, had release of left hand  Inc sx in right hand, says feet feel heavy  However exam does not find clear evidence of myelopathy  seen by dr jones as per pt yesterday and sent for adcf today  will follow post op

## 2018-12-05 NOTE — PROGRESS NOTE ADULT - SUBJECTIVE AND OBJECTIVE BOX
SUBJECTIVE:   No acute events overnight. Awaiting OR today.    Vital Signs Last 24 Hrs  T(C): 36.8 (12-05-18 @ 08:35), Max: 36.9 (12-04-18 @ 17:20)  T(F): 98.3 (12-05-18 @ 08:35), Max: 98.5 (12-04-18 @ 17:20)  HR: 63 (12-05-18 @ 08:35) (63 - 85)  BP: 114/76 (12-05-18 @ 08:35) (114/76 - 146/88)  BP(mean): --  RR: 18 (12-05-18 @ 08:35) (16 - 18)  SpO2: 95% (12-05-18 @ 08:35) (95% - 98%)    PHYSICAL EXAM:    Constitutional: No Acute Distress     Neurological:   A&OX3, FC briskly, PERRL, EOMI, Face symmetric  5/5 strength throughout, strong hand , strong shoulder shrug  SILT  B/L UE reflexes 2+; no hoffmans   Gait not assessed.    Pulmonary: Clear to Auscultation, No rales, No rhonchi, No wheezes   Cardiovascular: S1, S2, Regular rate and rhythm   Gastrointestinal: Soft, Non-tender, Non-distended, +bowel sounds x 4  Extremities: No calf tenderness bilaterally, no cyanosis, clubbing or edema          LABS:                          13.2   14.8  )-----------( 266      ( 04 Dec 2018 18:36 )             38.9    12-04    137  |  100  |  23  ----------------------------<  111<H>  4.2   |  24  |  0.38<L>    Ca    9.3      04 Dec 2018 18:36    PT/INR - ( 04 Dec 2018 18:36 )   PT: 11.0 sec;   INR: 0.96 ratio         PTT - ( 04 Dec 2018 18:36 )  PTT:27.1 sec    12-04 @ 07:01  -  12-05 @ 07:00  --------------------------------------------------------  IN: 625 mL / OUT: 0 mL / NET: 625 mL        IMAGING:     MEDICATIONS:  Antibiotics:    Neuro:  acetaminophen   Tablet .. 650 milliGRAM(s) Oral every 6 hours PRN Temp greater or equal to 38C (100.4F), Mild Pain (1 - 3)  diphenhydrAMINE 25 milliGRAM(s) Oral every 4 hours PRN Insomnia  gabapentin 600 milliGRAM(s) Oral two times a day  ondansetron   Disintegrating Tablet 4 milliGRAM(s) Oral every 6 hours PRN Nausea  oxyCODONE    IR 5 milliGRAM(s) Oral every 4 hours PRN Moderate Pain (4 - 6)  oxyCODONE    IR 10 milliGRAM(s) Oral every 4 hours PRN Severe Pain (7 - 10)  traMADol 50 milliGRAM(s) Oral two times a day    Cardiac:  metoprolol tartrate 25 milliGRAM(s) Oral daily    Pulm:    GI/:  docusate sodium 100 milliGRAM(s) Oral three times a day  senna 2 Tablet(s) Oral at bedtime PRN Constipation    Other:   anastrozole 1 milliGRAM(s) Oral daily  atorvastatin 40 milliGRAM(s) Oral at bedtime  ergocalciferol 32437 Unit(s) Oral every week  sodium chloride 0.9% with potassium chloride 20 mEq/L 1000 milliLiter(s) IV Continuous <Continuous>        DIET:

## 2018-12-05 NOTE — PRE-ANESTHESIA EVALUATION ADULT - NSANTHAIRWAYFT_ENT_ALL_CORE
GOOD RANGE OF MOTION OF NECK, NEUROLOGIC SYMPTOMS NOT EXACERBATED BY NECK MOVEMENT. HOARSENES, AND TROUBLE SWALLOWING

## 2018-12-06 DIAGNOSIS — M50.20 OTHER CERVICAL DISC DISPLACEMENT, UNSPECIFIED CERVICAL REGION: ICD-10-CM

## 2018-12-06 LAB
ANION GAP SERPL CALC-SCNC: 15 MMOL/L — SIGNIFICANT CHANGE UP (ref 5–17)
BASOPHILS # BLD AUTO: 0 K/UL — SIGNIFICANT CHANGE UP (ref 0–0.2)
BUN SERPL-MCNC: 7 MG/DL — SIGNIFICANT CHANGE UP (ref 7–23)
CALCIUM SERPL-MCNC: 8.3 MG/DL — LOW (ref 8.4–10.5)
CHLORIDE SERPL-SCNC: 102 MMOL/L — SIGNIFICANT CHANGE UP (ref 96–108)
CO2 SERPL-SCNC: 23 MMOL/L — SIGNIFICANT CHANGE UP (ref 22–31)
CREAT SERPL-MCNC: 0.37 MG/DL — LOW (ref 0.5–1.3)
EOSINOPHIL # BLD AUTO: 0 K/UL — SIGNIFICANT CHANGE UP (ref 0–0.5)
EOSINOPHIL NFR BLD AUTO: 1 % — SIGNIFICANT CHANGE UP (ref 0–6)
GLUCOSE SERPL-MCNC: 95 MG/DL — SIGNIFICANT CHANGE UP (ref 70–99)
HCT VFR BLD CALC: 36.2 % — SIGNIFICANT CHANGE UP (ref 34.5–45)
HGB BLD-MCNC: 12.5 G/DL — SIGNIFICANT CHANGE UP (ref 11.5–15.5)
LYMPHOCYTES # BLD AUTO: 1.2 K/UL — SIGNIFICANT CHANGE UP (ref 1–3.3)
LYMPHOCYTES # BLD AUTO: 12 % — LOW (ref 13–44)
MCHC RBC-ENTMCNC: 32.4 PG — SIGNIFICANT CHANGE UP (ref 27–34)
MCHC RBC-ENTMCNC: 34.6 GM/DL — SIGNIFICANT CHANGE UP (ref 32–36)
MCV RBC AUTO: 93.6 FL — SIGNIFICANT CHANGE UP (ref 80–100)
MONOCYTES # BLD AUTO: 0.9 K/UL — SIGNIFICANT CHANGE UP (ref 0–0.9)
MONOCYTES NFR BLD AUTO: 7 % — SIGNIFICANT CHANGE UP (ref 2–14)
NEUTROPHILS # BLD AUTO: 11 K/UL — HIGH (ref 1.8–7.4)
NEUTROPHILS NFR BLD AUTO: 79 % — HIGH (ref 43–77)
PLATELET # BLD AUTO: 223 K/UL — SIGNIFICANT CHANGE UP (ref 150–400)
POTASSIUM SERPL-MCNC: 3.6 MMOL/L — SIGNIFICANT CHANGE UP (ref 3.5–5.3)
POTASSIUM SERPL-SCNC: 3.6 MMOL/L — SIGNIFICANT CHANGE UP (ref 3.5–5.3)
RBC # BLD: 3.87 M/UL — SIGNIFICANT CHANGE UP (ref 3.8–5.2)
RBC # FLD: 12.6 % — SIGNIFICANT CHANGE UP (ref 10.3–14.5)
SODIUM SERPL-SCNC: 140 MMOL/L — SIGNIFICANT CHANGE UP (ref 135–145)
WBC # BLD: 13.1 K/UL — HIGH (ref 3.8–10.5)
WBC # FLD AUTO: 13.1 K/UL — HIGH (ref 3.8–10.5)

## 2018-12-06 PROCEDURE — 99233 SBSQ HOSP IP/OBS HIGH 50: CPT

## 2018-12-06 RX ORDER — ENOXAPARIN SODIUM 100 MG/ML
40 INJECTION SUBCUTANEOUS AT BEDTIME
Qty: 0 | Refills: 0 | Status: DISCONTINUED | OUTPATIENT
Start: 2018-12-06 | End: 2018-12-12

## 2018-12-06 RX ORDER — BENZOCAINE AND MENTHOL 5; 1 G/100ML; G/100ML
1 LIQUID ORAL
Qty: 0 | Refills: 0 | Status: DISCONTINUED | OUTPATIENT
Start: 2018-12-06 | End: 2018-12-12

## 2018-12-06 RX ORDER — ACETAMINOPHEN 500 MG
1000 TABLET ORAL ONCE
Qty: 0 | Refills: 0 | Status: COMPLETED | OUTPATIENT
Start: 2018-12-06 | End: 2018-12-06

## 2018-12-06 RX ADMIN — ANASTROZOLE 1 MILLIGRAM(S): 1 TABLET ORAL at 13:00

## 2018-12-06 RX ADMIN — HYDROMORPHONE HYDROCHLORIDE 0.5 MILLIGRAM(S): 2 INJECTION INTRAMUSCULAR; INTRAVENOUS; SUBCUTANEOUS at 01:15

## 2018-12-06 RX ADMIN — Medication 4 MILLIGRAM(S): at 05:49

## 2018-12-06 RX ADMIN — SENNA PLUS 2 TABLET(S): 8.6 TABLET ORAL at 17:57

## 2018-12-06 RX ADMIN — GABAPENTIN 600 MILLIGRAM(S): 400 CAPSULE ORAL at 17:56

## 2018-12-06 RX ADMIN — Medication 100 MILLIGRAM(S): at 13:00

## 2018-12-06 RX ADMIN — BENZOCAINE AND MENTHOL 1 LOZENGE: 5; 1 LIQUID ORAL at 09:01

## 2018-12-06 RX ADMIN — GABAPENTIN 600 MILLIGRAM(S): 400 CAPSULE ORAL at 05:50

## 2018-12-06 RX ADMIN — ATORVASTATIN CALCIUM 40 MILLIGRAM(S): 80 TABLET, FILM COATED ORAL at 21:19

## 2018-12-06 RX ADMIN — SENNA PLUS 2 TABLET(S): 8.6 TABLET ORAL at 21:19

## 2018-12-06 RX ADMIN — Medication 1000 MILLIGRAM(S): at 01:55

## 2018-12-06 RX ADMIN — Medication 25 MILLIGRAM(S): at 05:49

## 2018-12-06 RX ADMIN — Medication 4 MILLIGRAM(S): at 13:00

## 2018-12-06 RX ADMIN — ENOXAPARIN SODIUM 40 MILLIGRAM(S): 100 INJECTION SUBCUTANEOUS at 21:18

## 2018-12-06 RX ADMIN — Medication 400 MILLIGRAM(S): at 01:30

## 2018-12-06 RX ADMIN — Medication 4 MILLIGRAM(S): at 17:56

## 2018-12-06 RX ADMIN — TRAMADOL HYDROCHLORIDE 50 MILLIGRAM(S): 50 TABLET ORAL at 02:19

## 2018-12-06 RX ADMIN — HYDROMORPHONE HYDROCHLORIDE 0.5 MILLIGRAM(S): 2 INJECTION INTRAMUSCULAR; INTRAVENOUS; SUBCUTANEOUS at 00:58

## 2018-12-06 RX ADMIN — Medication 100 MILLIGRAM(S): at 21:19

## 2018-12-06 RX ADMIN — DEXTROSE MONOHYDRATE, SODIUM CHLORIDE, AND POTASSIUM CHLORIDE 80 MILLILITER(S): 50; .745; 4.5 INJECTION, SOLUTION INTRAVENOUS at 00:52

## 2018-12-06 RX ADMIN — TRAMADOL HYDROCHLORIDE 50 MILLIGRAM(S): 50 TABLET ORAL at 12:59

## 2018-12-06 RX ADMIN — TRAMADOL HYDROCHLORIDE 50 MILLIGRAM(S): 50 TABLET ORAL at 02:50

## 2018-12-06 RX ADMIN — DEXTROSE MONOHYDRATE, SODIUM CHLORIDE, AND POTASSIUM CHLORIDE 80 MILLILITER(S): 50; .745; 4.5 INJECTION, SOLUTION INTRAVENOUS at 07:46

## 2018-12-06 NOTE — PHYSICAL THERAPY INITIAL EVALUATION ADULT - LEVEL OF INDEPENDENCE: GAIT, REHAB EVAL
contact guard/CG A x1 progress to supervision, 1 instance of loss of balance requiring CG A  x1 to correct./supervision

## 2018-12-06 NOTE — PROGRESS NOTE ADULT - ASSESSMENT
68y POD #1 from ACDF, doing well, tolerating regular diet. suhail hoarse, however, hoarse since prior to sx, where she was scoped without any anomalies.

## 2018-12-06 NOTE — PROGRESS NOTE ADULT - SUBJECTIVE AND OBJECTIVE BOX
SUMMARY:   68 year-old woman with history of breast cancer status post lumpectomy presented with neck pain radiating to both arms for which she underwent a C4-6 ACDF on 12/5/18.    24 HOUR EVENTS:  Observed in PACU.    REVIEW OF SYSTEMS:  Mild neck pain managed with pain beds, sore throat from intubation, no shortness of breath or trouble swallowing    VITALS/DATA/ORDERS: [x] Reviewed    EXAMINATION:  General: No acute distress  HEENT: Anicteric sclerae, in c-collar, no neck hematoma  Cardiac: [id number]  Lungs: Clear  Abdomen: Soft, non-tender, +BS  Extremities: No c/c/e  Skin/Incision Site: Clean, dry and intact  Neurologic: Awake, alert, fully oriented, follows commands, PERRL, EOMI, face symmetric, tongue midline, LUE 4/5, LLE 4/5

## 2018-12-06 NOTE — PHYSICAL THERAPY INITIAL EVALUATION ADULT - GENERAL OBSERVATIONS, REHAB EVAL
Pt tolerated 45min PT initial evaluation well. Pt POD1 s/p C4-C6 ACDF. Pt rec'd in chair in NAD, +HVAC, C-collar.

## 2018-12-06 NOTE — PROGRESS NOTE ADULT - SUBJECTIVE AND OBJECTIVE BOX
ENT ISSUE/POD: S/P ACDF POD 1     HPI: 69 y/o female with PMHx of cervical herniated disks, presented with b/l hand numbness and gait instability s/p ACDF, POD 1. Patient seen and examined this morning, c/o hoarseness and dry mouth. Patient states the hoarseness is not new from prior to surgery, however, slightly worse today. Patient denies SOB, throat pain, cough, fever, chills.     PAST MEDICAL & SURGICAL HISTORY:  Hodgkin disease: 1981  HLD (hyperlipidemia)  HTN (hypertension)  Breast CA: Rt Sp lumpectomy and RT  Cervical disc herniation: Planning surg  H/O lumpectomy    Allergies: penicillin (Unknown)    MEDICATIONS  (STANDING):  anastrozole 1 milliGRAM(s) Oral daily  atorvastatin 40 milliGRAM(s) Oral at bedtime  dexamethasone  Injectable 4 milliGRAM(s) IV Push every 6 hours  docusate sodium 100 milliGRAM(s) Oral three times a day  docusate sodium 100 milliGRAM(s) Oral three times a day  enoxaparin Injectable 40 milliGRAM(s) SubCutaneous at bedtime  ergocalciferol 68012 Unit(s) Oral every week  gabapentin 600 milliGRAM(s) Oral two times a day  metoprolol tartrate 25 milliGRAM(s) Oral daily  senna 2 Tablet(s) Oral at bedtime  sodium chloride 0.9% with potassium chloride 20 mEq/L 1000 milliLiter(s) (80 mL/Hr) IV Continuous <Continuous>  traMADol 50 milliGRAM(s) Oral two times a day    MEDICATIONS  (PRN):  acetaminophen   Tablet .. 650 milliGRAM(s) Oral every 6 hours PRN Temp greater or equal to 38C (100.4F), Mild Pain (1 - 3)  acetaminophen   Tablet .. 650 milliGRAM(s) Oral every 6 hours PRN Temp greater or equal to 38C (100.4F), Mild Pain (1 - 3)  benzocaine 15 mG/menthol 3.6 mG Lozenge 1 Lozenge Oral every 3 hours PRN Sore Throat  diphenhydrAMINE 25 milliGRAM(s) Oral every 4 hours PRN Insomnia  famotidine    Tablet 20 milliGRAM(s) Oral every 12 hours PRN Dyspepsia  HYDROmorphone  Injectable 0.5 milliGRAM(s) IV Push every 10 minutes PRN Moderate Pain (4 - 6)  morphine  - Injectable 1 milliGRAM(s) IV Push every 3 hours PRN Severe Pain (7 - 10)  ondansetron   Disintegrating Tablet 4 milliGRAM(s) Oral every 4 hours PRN Nausea  ondansetron   Disintegrating Tablet 4 milliGRAM(s) Oral every 6 hours PRN Nausea  ondansetron Injectable 4 milliGRAM(s) IV Push once PRN Nausea and/or Vomiting  oxyCODONE    5 mG/acetaminophen 325 mG 1 Tablet(s) Oral every 4 hours PRN Moderate Pain (4 - 6)  oxyCODONE    IR 5 milliGRAM(s) Oral every 4 hours PRN Moderate Pain (4 - 6)  oxyCODONE    IR 10 milliGRAM(s) Oral every 4 hours PRN Severe Pain (7 - 10)  senna 2 Tablet(s) Oral at bedtime PRN Constipation      Social History: See Consult note     Family history: See consult note     ROS:   ENT: all negative except as noted in HPI   Pulm: denies SOB, cough, hemoptysis  Neuro: denies numbness/tingling, loss of sensation  Endo: denies heat/cold intolerance, excessive sweating      Vital Signs Last 24 Hrs  T(C): 36.8 (06 Dec 2018 09:00), Max: 36.9 (05 Dec 2018 18:21)  T(F): 98.2 (06 Dec 2018 09:00), Max: 98.4 (05 Dec 2018 18:21)  HR: 80 (06 Dec 2018 09:00) (71 - 100)  BP: 143/67 (06 Dec 2018 09:00) (111/58 - 177/79)  BP(mean): 97 (06 Dec 2018 09:00) (81 - 117)  RR: 16 (06 Dec 2018 09:00) (15 - 18)  SpO2: 100% (06 Dec 2018 09:00) (93% - 100%)                          12.5   13.1  )-----------( 223      ( 06 Dec 2018 01:17 )             36.2    12-06    140  |  102  |  7   ----------------------------<  95  3.6   |  23  |  0.37<L>    Ca    8.3<L>      06 Dec 2018 01:17  Phos  2.8     12-06  Mg     2.2     12-06     PT/INR - ( 04 Dec 2018 18:36 )   PT: 11.0 sec;   INR: 0.96 ratio         PTT - ( 04 Dec 2018 18:36 )  PTT:27.1 sec    PHYSICAL EXAM:  Gen: NAD  Skin: No rashes, bruises, or lesions  Head: Normocephalic, Atraumatic  Face: no edema, erythema, or fluctuance. Parotid glands soft without mass  Eyes: no scleral injection  Nose: Nares bilaterally patent, no discharge  Mouth: dry oral mucosa. No Stridor / Drooling / Trismus. Tongue/uvula midline, oropharynx clear  Neck: Flat, no hematoma or crepitus noted, dressing C/D/I, no lymphadenopathy, trachea midline, no masses. Drain output 40cc since surgery.   Lymphatic: No lymphadenopathy  Resp: breathing easily, no stridor  Neuro: facial nerve intact, no facial droop ENT ISSUE/POD: S/P ACDF POD 1     HPI: 69 y/o female with PMHx of cervical herniated disks, presented with b/l hand numbness and gait instability s/p ACDF, POD 1. Patient seen and examined this morning, c/o hoarseness and dry mouth. Patient states the hoarseness is not new, symptomatic prior to surgery, however, slightly worse today, possibly due to not eating or drinking. Patient denies SOB, throat pain, cough, fever, chills. Pt given regular diet to eat while being seen at bedside     PAST MEDICAL & SURGICAL HISTORY:  Hodgkin disease: 1981  HLD (hyperlipidemia)  HTN (hypertension)  Breast CA: Rt Sp lumpectomy and RT  Cervical disc herniation: Planning surg  H/O lumpectomy    Allergies: penicillin (Unknown)    MEDICATIONS  (STANDING):  anastrozole 1 milliGRAM(s) Oral daily  atorvastatin 40 milliGRAM(s) Oral at bedtime  dexamethasone  Injectable 4 milliGRAM(s) IV Push every 6 hours  docusate sodium 100 milliGRAM(s) Oral three times a day  docusate sodium 100 milliGRAM(s) Oral three times a day  enoxaparin Injectable 40 milliGRAM(s) SubCutaneous at bedtime  ergocalciferol 15709 Unit(s) Oral every week  gabapentin 600 milliGRAM(s) Oral two times a day  metoprolol tartrate 25 milliGRAM(s) Oral daily  senna 2 Tablet(s) Oral at bedtime  sodium chloride 0.9% with potassium chloride 20 mEq/L 1000 milliLiter(s) (80 mL/Hr) IV Continuous <Continuous>  traMADol 50 milliGRAM(s) Oral two times a day    MEDICATIONS  (PRN):  acetaminophen   Tablet .. 650 milliGRAM(s) Oral every 6 hours PRN Temp greater or equal to 38C (100.4F), Mild Pain (1 - 3)  acetaminophen   Tablet .. 650 milliGRAM(s) Oral every 6 hours PRN Temp greater or equal to 38C (100.4F), Mild Pain (1 - 3)  benzocaine 15 mG/menthol 3.6 mG Lozenge 1 Lozenge Oral every 3 hours PRN Sore Throat  diphenhydrAMINE 25 milliGRAM(s) Oral every 4 hours PRN Insomnia  famotidine    Tablet 20 milliGRAM(s) Oral every 12 hours PRN Dyspepsia  HYDROmorphone  Injectable 0.5 milliGRAM(s) IV Push every 10 minutes PRN Moderate Pain (4 - 6)  morphine  - Injectable 1 milliGRAM(s) IV Push every 3 hours PRN Severe Pain (7 - 10)  ondansetron   Disintegrating Tablet 4 milliGRAM(s) Oral every 4 hours PRN Nausea  ondansetron   Disintegrating Tablet 4 milliGRAM(s) Oral every 6 hours PRN Nausea  ondansetron Injectable 4 milliGRAM(s) IV Push once PRN Nausea and/or Vomiting  oxyCODONE    5 mG/acetaminophen 325 mG 1 Tablet(s) Oral every 4 hours PRN Moderate Pain (4 - 6)  oxyCODONE    IR 5 milliGRAM(s) Oral every 4 hours PRN Moderate Pain (4 - 6)  oxyCODONE    IR 10 milliGRAM(s) Oral every 4 hours PRN Severe Pain (7 - 10)  senna 2 Tablet(s) Oral at bedtime PRN Constipation      Social History: See Consult note     Family history: See consult note     ROS:   ENT: all negative except as noted in HPI   Pulm: denies SOB, cough, hemoptysis  Neuro: denies numbness/tingling, loss of sensation  Endo: denies heat/cold intolerance, excessive sweating      Vital Signs Last 24 Hrs  T(C): 36.8 (06 Dec 2018 09:00), Max: 36.9 (05 Dec 2018 18:21)  T(F): 98.2 (06 Dec 2018 09:00), Max: 98.4 (05 Dec 2018 18:21)  HR: 80 (06 Dec 2018 09:00) (71 - 100)  BP: 143/67 (06 Dec 2018 09:00) (111/58 - 177/79)  BP(mean): 97 (06 Dec 2018 09:00) (81 - 117)  RR: 16 (06 Dec 2018 09:00) (15 - 18)  SpO2: 100% (06 Dec 2018 09:00) (93% - 100%)                          12.5   13.1  )-----------( 223      ( 06 Dec 2018 01:17 )             36.2    12-06    140  |  102  |  7   ----------------------------<  95  3.6   |  23  |  0.37<L>    Ca    8.3<L>      06 Dec 2018 01:17  Phos  2.8     12-06  Mg     2.2     12-06     PT/INR - ( 04 Dec 2018 18:36 )   PT: 11.0 sec;   INR: 0.96 ratio         PTT - ( 04 Dec 2018 18:36 )  PTT:27.1 sec    PHYSICAL EXAM:  Gen: NAD  Skin: No rashes, bruises, or lesions  Head: Normocephalic, Atraumatic  Face: no edema, erythema, or fluctuance. Parotid glands soft without mass  Eyes: no scleral injection  Nose: Nares bilaterally patent, no discharge  Mouth: dry oral mucosa. No Stridor / Drooling / Trismus. Tongue/uvula midline, oropharynx clear  Neck: Flat, no hematoma or crepitus noted, dressing C/D/I, no lymphadenopathy, trachea midline, no masses. Drain output 40cc since surgery.   Lymphatic: No lymphadenopathy  Resp: breathing easily, no stridor  Neuro: facial nerve intact, no facial droop

## 2018-12-06 NOTE — PROGRESS NOTE ADULT - SUBJECTIVE AND OBJECTIVE BOX
68 year old Female with pmhx of breast cancer s/p lumpectomy and RT on anastrozole Hodgkins lymphoma s/p RT, HTN, HLD presents for surgery for herniated cervical disks with Dr. Daley.  Patient reports progressive bilateral weakness and numbness and tingling of the upper extremities. She was found to have herniated disks on recent MRI. No known cardiac, pulmonary, or renal disease.  Underwent Cervical spine surgery  12/06/2018.    VITALS:  T(C): , Max: 36.9 (12-05-18 @ 05:16)  HR:  (63 - 100)  BP:  (111/58 - 177/79)  ABP:  (108/58 - 180/90)  RR:  (16 - 18)  SpO2:  (93% - 100%)      12-04-18 @ 07:01  -  12-05-18 @ 07:00  --------------------------------------------------------  IN: 625 mL / OUT: 0 mL / NET: 625 mL    12-05-18 @ 07:01  -  12-06-18 @ 04:04  --------------------------------------------------------  IN: 320 mL / OUT: 745 mL / NET: -425 mL      LABS:  Na: 140 (12-06 @ 01:17), 137 (12-04 @ 18:36)  K: 3.6 (12-06 @ 01:17), 4.2 (12-04 @ 18:36)  Cl: 102 (12-06 @ 01:17), 100 (12-04 @ 18:36)  CO2: 23 (12-06 @ 01:17), 24 (12-04 @ 18:36)  BUN: 7 (12-06 @ 01:17), 23 (12-04 @ 18:36)  Cr: 0.37 (12-06 @ 01:17), 0.38 (12-04 @ 18:36)  Glu: 95(12-06 @ 01:17), 111(12-04 @ 18:36)    Hgb: 12.5 (12-06 @ 01:17), 13.2 (12-04 @ 18:36)  Hct: 36.2 (12-06 @ 01:17), 38.9 (12-04 @ 18:36)  WBC: 13.1 (12-06 @ 01:17), 14.8 (12-04 @ 18:36)  Plt: 223 (12-06 @ 01:17), 266 (12-04 @ 18:36)      IMAGING:   Recent imaging studies were reviewed.    MEDICATIONS: reviewed.    EXAMINATION:  General:  calm, cooperative.  Neuro: Awake, alert, and in no apparent distress. Oriented to person, place and time. Language function is normal.   Cranial Nerves: Pupils were equal, round, reactive to light. Extraocular movements were intact. Shoulder shrug full bilaterally  Motor exam: Strength was 5/5 in all four extremities except for LUE, which is 3/5 proximally (reports it as an issue prior to surgery).  Cards:  RRR  Respiratory:  no respiratory distress  Adomen:  soft  Extremities:  no edema  Skin:  warm/dry

## 2018-12-06 NOTE — PHYSICAL THERAPY INITIAL EVALUATION ADULT - PERTINENT HX OF CURRENT PROBLEM, REHAB EVAL
68y F PMHx breast ca, hodgkins lymphoma who was radiated many decades.presents with numbness in BUE. Pt with carpal tunnel syndrome release. June 2018, fell +collarbone fx L with dec range of motion of left arm at the shoulder. She had MRI cspine with disc herniation, images not available sent in for ACDF C4-C6.

## 2018-12-06 NOTE — PHYSICAL THERAPY INITIAL EVALUATION ADULT - GAIT DEVIATIONS NOTED, PT EVAL
decreased velocity of limb motion/decreased step length/decreased stride length/decreased weight-shifting ability/decreased swing-to-stance ratio/increased B/L sway

## 2018-12-06 NOTE — PHYSICAL THERAPY INITIAL EVALUATION ADULT - ADDITIONAL COMMENTS
Pt reports she lives with her  in a private home with 8 steps to enter from the garage +rail and 6 steps to the 2nd floor. Pt reports she was independent prior to admit. Pt owns single axis cane and RW.

## 2018-12-06 NOTE — PROGRESS NOTE ADULT - SUBJECTIVE AND OBJECTIVE BOX
Vital Signs Last 24 Hrs  T(C): 36.4 (06 Dec 2018 00:25), Max: 36.9 (05 Dec 2018 05:16)  T(F): 97.5 (06 Dec 2018 00:25), Max: 98.5 (05 Dec 2018 05:16)  HR: 88 (06 Dec 2018 02:45) (63 - 100)  BP: 136/63 (06 Dec 2018 02:00) (111/58 - 177/79)  BP(mean): 90 (06 Dec 2018 02:00) (81 - 117)  RR: 16 (06 Dec 2018 02:45) (16 - 18)  SpO2: 98% (06 Dec 2018 02:45) (93% - 100%)    EXAM  decreased sensationBUE (preop)  otherwise DOZIER strong

## 2018-12-06 NOTE — PROGRESS NOTE ADULT - ASSESSMENT
A&P:  68 year old Female with pmhx of breast cancer s/p lumpectomy and RT on anastrozole Hodgkins lymphoma s/p RT, HTN, HLD, s/p Cervical spine surgery for spinal stenosis/disk herniation on 12/06/2018.    Plan:  -neurochecks q1hr  -cervical CT  -pain control  -150  advance diet as tolerated  -PaCU o/n, transfer in am to the floor if stable

## 2018-12-06 NOTE — BRIEF OPERATIVE NOTE - PROCEDURE
<<-----Click on this checkbox to enter Procedure Cervical spine surgery  12/06/2018    Active  RENZO

## 2018-12-06 NOTE — PROGRESS NOTE ADULT - ASSESSMENT
ASSESSMENT/PLAN: cervical ASSESSMENT/PLAN: cervical stenosis status post C4-6 ACDF  - Obtain better fitting c-collar  - Pain control  - PT/OT/OOB  - Surgical drain per NSGY  - Metoprolol for HTN  - Statin for dyslipidemia  - Anastrozole for history of breast cancer

## 2018-12-06 NOTE — PHYSICAL THERAPY INITIAL EVALUATION ADULT - PLANNED THERAPY INTERVENTIONS, PT EVAL
gait training/bed mobility training/Stair training... GOAL: In 2 weeks pt will negotiate 1 flight of stairs independently with least restrictive device./strengthening/transfer training/balance training

## 2018-12-06 NOTE — CHART NOTE - NSCHARTNOTEFT_GEN_A_CORE
Per Neurosurgery request cervical collar evaluation performed for comfo t and fit. Replaced with  modified [place] LINDA short with replacement interface. Reviewed application skin precautions and care. Written instructions and contact information given. To notify office with any issues questions or concerns.  Arturo ULLOA  [facility]  [phone]

## 2018-12-07 PROCEDURE — 99024 POSTOP FOLLOW-UP VISIT: CPT

## 2018-12-07 RX ORDER — DEXAMETHASONE 0.5 MG/5ML
4 ELIXIR ORAL EVERY 8 HOURS
Qty: 0 | Refills: 0 | Status: DISCONTINUED | OUTPATIENT
Start: 2018-12-07 | End: 2018-12-10

## 2018-12-07 RX ADMIN — TRAMADOL HYDROCHLORIDE 50 MILLIGRAM(S): 50 TABLET ORAL at 00:20

## 2018-12-07 RX ADMIN — Medication 100 MILLIGRAM(S): at 21:10

## 2018-12-07 RX ADMIN — Medication 100 MILLIGRAM(S): at 05:28

## 2018-12-07 RX ADMIN — TRAMADOL HYDROCHLORIDE 50 MILLIGRAM(S): 50 TABLET ORAL at 11:34

## 2018-12-07 RX ADMIN — ATORVASTATIN CALCIUM 40 MILLIGRAM(S): 80 TABLET, FILM COATED ORAL at 21:10

## 2018-12-07 RX ADMIN — ENOXAPARIN SODIUM 40 MILLIGRAM(S): 100 INJECTION SUBCUTANEOUS at 21:11

## 2018-12-07 RX ADMIN — TRAMADOL HYDROCHLORIDE 50 MILLIGRAM(S): 50 TABLET ORAL at 12:00

## 2018-12-07 RX ADMIN — TRAMADOL HYDROCHLORIDE 50 MILLIGRAM(S): 50 TABLET ORAL at 02:19

## 2018-12-07 RX ADMIN — GABAPENTIN 600 MILLIGRAM(S): 400 CAPSULE ORAL at 05:27

## 2018-12-07 RX ADMIN — Medication 100 MILLIGRAM(S): at 05:27

## 2018-12-07 RX ADMIN — Medication 100 MILLIGRAM(S): at 14:39

## 2018-12-07 RX ADMIN — ANASTROZOLE 1 MILLIGRAM(S): 1 TABLET ORAL at 11:34

## 2018-12-07 RX ADMIN — Medication 25 MILLIGRAM(S): at 05:28

## 2018-12-07 RX ADMIN — GABAPENTIN 600 MILLIGRAM(S): 400 CAPSULE ORAL at 18:23

## 2018-12-07 RX ADMIN — Medication 4 MILLIGRAM(S): at 00:20

## 2018-12-07 RX ADMIN — Medication 4 MILLIGRAM(S): at 18:28

## 2018-12-07 RX ADMIN — SENNA PLUS 2 TABLET(S): 8.6 TABLET ORAL at 21:11

## 2018-12-07 NOTE — PROGRESS NOTE ADULT - SUBJECTIVE AND OBJECTIVE BOX
SUBJECTIVE: Pt seen and examined, sitting up in bed this morning with C collar in place, anxious about eventually being discharged to home    OVERNIGHT EVENTS: none    Vital Signs Last 24 Hrs  T(C): 36.6 (07 Dec 2018 08:35), Max: 37.1 (06 Dec 2018 21:38)  T(F): 97.8 (07 Dec 2018 08:35), Max: 98.7 (06 Dec 2018 21:38)  HR: 92 (07 Dec 2018 12:41) (67 - 98)  BP: 129/62 (07 Dec 2018 12:41) (95/62 - 147/81)  BP(mean): --  RR: 18 (07 Dec 2018 08:35) (18 - 18)  SpO2: 97% (07 Dec 2018 08:35) (94% - 97%)    PHYSICAL EXAM:    General: No Acute Distress     Neurological: Awake, alert oriented to person, place and time, Following Commands, PERRL, EOMI, Face Symmetrical, Speech Fluent, Moving all extremities, Muscle Strength normal in all four extremities, No Drift, Sensation to Light Touch Intact    Pulmonary: Clear to Auscultation, No Rales, No Rhonchi, No Wheezes     Cardiovascular: S1, S2, Regular Rate and Rhythm     Gastrointestinal: Soft, Nontender, Nondistended     Incision: ant neck steri-strips c/d/i    LABS:                        12.5   13.1  )-----------( 223      ( 06 Dec 2018 01:17 )             36.2    12-06    140  |  102  |  7   ----------------------------<  95  3.6   |  23  |  0.37<L>    Ca    8.3<L>      06 Dec 2018 01:17  Phos  2.8     12-06  Mg     2.2     12-06 12-06 @ 07:01  -  12-07 @ 07:00  --------------------------------------------------------  IN: 160 mL / OUT: 715 mL / NET: -555 mL    12-07 @ 07:01  -  12-07 @ 15:35  --------------------------------------------------------  IN: 600 mL / OUT: 0 mL / NET: 600 mL      DRAINS: none    MEDICATIONS:  Antibiotics:    Neuro:  acetaminophen   Tablet .. 650 milliGRAM(s) Oral every 6 hours PRN Temp greater or equal to 38C (100.4F), Mild Pain (1 - 3)  acetaminophen   Tablet .. 650 milliGRAM(s) Oral every 6 hours PRN Temp greater or equal to 38C (100.4F), Mild Pain (1 - 3)  diphenhydrAMINE 25 milliGRAM(s) Oral every 4 hours PRN Insomnia  gabapentin 600 milliGRAM(s) Oral two times a day  morphine  - Injectable 1 milliGRAM(s) IV Push every 3 hours PRN Severe Pain (7 - 10)  ondansetron   Disintegrating Tablet 4 milliGRAM(s) Oral every 4 hours PRN Nausea  ondansetron   Disintegrating Tablet 4 milliGRAM(s) Oral every 6 hours PRN Nausea  oxyCODONE    5 mG/acetaminophen 325 mG 1 Tablet(s) Oral every 4 hours PRN Moderate Pain (4 - 6)  oxyCODONE    IR 5 milliGRAM(s) Oral every 4 hours PRN Moderate Pain (4 - 6)  oxyCODONE    IR 10 milliGRAM(s) Oral every 4 hours PRN Severe Pain (7 - 10)  traMADol 50 milliGRAM(s) Oral two times a day    Cardiac:  metoprolol tartrate 25 milliGRAM(s) Oral daily    Pulm:  guaiFENesin    Syrup 100 milliGRAM(s) Oral every 6 hours PRN sore throat/cough    GI/:  docusate sodium 100 milliGRAM(s) Oral three times a day  docusate sodium 100 milliGRAM(s) Oral three times a day  famotidine    Tablet 20 milliGRAM(s) Oral every 12 hours PRN Dyspepsia  senna 2 Tablet(s) Oral at bedtime PRN Constipation  senna 2 Tablet(s) Oral at bedtime    Other:   anastrozole 1 milliGRAM(s) Oral daily  atorvastatin 40 milliGRAM(s) Oral at bedtime  benzocaine 15 mG/menthol 3.6 mG Lozenge 1 Lozenge Oral every 3 hours PRN Sore Throat  enoxaparin Injectable 40 milliGRAM(s) SubCutaneous at bedtime  ergocalciferol 60235 Unit(s) Oral every week    DIET: [x] Regular [] CCD [] Renal [] Puree [] Dysphagia [] Tube Feeds:     IMAGING: SUBJECTIVE: Pt seen and examined, sitting up in bed this morning with C collar in place, reports throat soreness, anxious about eventually being discharged to home    OVERNIGHT EVENTS: none    Vital Signs Last 24 Hrs  T(C): 36.6 (07 Dec 2018 08:35), Max: 37.1 (06 Dec 2018 21:38)  T(F): 97.8 (07 Dec 2018 08:35), Max: 98.7 (06 Dec 2018 21:38)  HR: 92 (07 Dec 2018 12:41) (67 - 98)  BP: 129/62 (07 Dec 2018 12:41) (95/62 - 147/81)  BP(mean): --  RR: 18 (07 Dec 2018 08:35) (18 - 18)  SpO2: 97% (07 Dec 2018 08:35) (94% - 97%)    PHYSICAL EXAM:    General: No Acute Distress     Neurological: Awake, alert oriented to person, place and time, Following Commands, PERRL, EOMI, Face Symmetrical, Speech Fluent, Moving all extremities, LUE 4/5,  No Drift, Sensation to Light Touch Intact    Pulmonary: Clear to Auscultation, No Rales, No Rhonchi, No Wheezes     Cardiovascular: S1, S2, Regular Rate and Rhythm     Gastrointestinal: Soft, Nontender, Nondistended     Incision: ant neck dressing c/d/i    LABS:                        12.5   13.1  )-----------( 223      ( 06 Dec 2018 01:17 )             36.2    12-06    140  |  102  |  7   ----------------------------<  95  3.6   |  23  |  0.37<L>    Ca    8.3<L>      06 Dec 2018 01:17  Phos  2.8     12-06  Mg     2.2     12-06 12-06 @ 07:01  -  12-07 @ 07:00  --------------------------------------------------------  IN: 160 mL / OUT: 715 mL / NET: -555 mL    12-07 @ 07:01  -  12-07 @ 15:35  --------------------------------------------------------  IN: 600 mL / OUT: 0 mL / NET: 600 mL      DRAINS: HMV 40cc x 24 hrs    MEDICATIONS:  Antibiotics:    Neuro:  acetaminophen   Tablet .. 650 milliGRAM(s) Oral every 6 hours PRN Temp greater or equal to 38C (100.4F), Mild Pain (1 - 3)  acetaminophen   Tablet .. 650 milliGRAM(s) Oral every 6 hours PRN Temp greater or equal to 38C (100.4F), Mild Pain (1 - 3)  diphenhydrAMINE 25 milliGRAM(s) Oral every 4 hours PRN Insomnia  gabapentin 600 milliGRAM(s) Oral two times a day  morphine  - Injectable 1 milliGRAM(s) IV Push every 3 hours PRN Severe Pain (7 - 10)  ondansetron   Disintegrating Tablet 4 milliGRAM(s) Oral every 4 hours PRN Nausea  ondansetron   Disintegrating Tablet 4 milliGRAM(s) Oral every 6 hours PRN Nausea  oxyCODONE    5 mG/acetaminophen 325 mG 1 Tablet(s) Oral every 4 hours PRN Moderate Pain (4 - 6)  oxyCODONE    IR 5 milliGRAM(s) Oral every 4 hours PRN Moderate Pain (4 - 6)  oxyCODONE    IR 10 milliGRAM(s) Oral every 4 hours PRN Severe Pain (7 - 10)  traMADol 50 milliGRAM(s) Oral two times a day    Cardiac:  metoprolol tartrate 25 milliGRAM(s) Oral daily    Pulm:  guaiFENesin    Syrup 100 milliGRAM(s) Oral every 6 hours PRN sore throat/cough    GI/:  docusate sodium 100 milliGRAM(s) Oral three times a day  docusate sodium 100 milliGRAM(s) Oral three times a day  famotidine    Tablet 20 milliGRAM(s) Oral every 12 hours PRN Dyspepsia  senna 2 Tablet(s) Oral at bedtime PRN Constipation  senna 2 Tablet(s) Oral at bedtime    Other:   anastrozole 1 milliGRAM(s) Oral daily  atorvastatin 40 milliGRAM(s) Oral at bedtime  benzocaine 15 mG/menthol 3.6 mG Lozenge 1 Lozenge Oral every 3 hours PRN Sore Throat  enoxaparin Injectable 40 milliGRAM(s) SubCutaneous at bedtime  ergocalciferol 39104 Unit(s) Oral every week    DIET: [x] Regular [] CCD [] Renal [] Puree [] Dysphagia [] Tube Feeds:     IMAGING:

## 2018-12-07 NOTE — PROGRESS NOTE ADULT - ASSESSMENT
67 YO F px to my associate Dr Aburto with compliants of hand numbness, had workup with another neurologist prior to that, found  cts, had release of left hand  Inc sx in right hand, says feet feel heavy  However exam does not find clear evidence of myelopathy      Post op doing well.  states paresthesias improved  power 5/5  continue PT  d/c planning

## 2018-12-07 NOTE — PROGRESS NOTE ADULT - ASSESSMENT
68y POD #2 from ACDF, doing well, tolerating regular diet. suhail hoarse, however, hoarse since prior to sx, where she was scoped without any anomalies.

## 2018-12-07 NOTE — PROGRESS NOTE ADULT - SUBJECTIVE AND OBJECTIVE BOX
SUBJECTIVE: feels less numbness post op.  walked with PT      Medications:  MEDICATIONS  (STANDING):  anastrozole 1 milliGRAM(s) Oral daily  atorvastatin 40 milliGRAM(s) Oral at bedtime  docusate sodium 100 milliGRAM(s) Oral three times a day  docusate sodium 100 milliGRAM(s) Oral three times a day  enoxaparin Injectable 40 milliGRAM(s) SubCutaneous at bedtime  ergocalciferol 14164 Unit(s) Oral every week  gabapentin 600 milliGRAM(s) Oral two times a day  metoprolol tartrate 25 milliGRAM(s) Oral daily  senna 2 Tablet(s) Oral at bedtime  traMADol 50 milliGRAM(s) Oral two times a day    MEDICATIONS  (PRN):  acetaminophen   Tablet .. 650 milliGRAM(s) Oral every 6 hours PRN Temp greater or equal to 38C (100.4F), Mild Pain (1 - 3)  acetaminophen   Tablet .. 650 milliGRAM(s) Oral every 6 hours PRN Temp greater or equal to 38C (100.4F), Mild Pain (1 - 3)  benzocaine 15 mG/menthol 3.6 mG Lozenge 1 Lozenge Oral every 3 hours PRN Sore Throat  diphenhydrAMINE 25 milliGRAM(s) Oral every 4 hours PRN Insomnia  famotidine    Tablet 20 milliGRAM(s) Oral every 12 hours PRN Dyspepsia  guaiFENesin    Syrup 100 milliGRAM(s) Oral every 6 hours PRN sore throat/cough  morphine  - Injectable 1 milliGRAM(s) IV Push every 3 hours PRN Severe Pain (7 - 10)  ondansetron   Disintegrating Tablet 4 milliGRAM(s) Oral every 4 hours PRN Nausea  ondansetron   Disintegrating Tablet 4 milliGRAM(s) Oral every 6 hours PRN Nausea  oxyCODONE    5 mG/acetaminophen 325 mG 1 Tablet(s) Oral every 4 hours PRN Moderate Pain (4 - 6)  oxyCODONE    IR 5 milliGRAM(s) Oral every 4 hours PRN Moderate Pain (4 - 6)  oxyCODONE    IR 10 milliGRAM(s) Oral every 4 hours PRN Severe Pain (7 - 10)  senna 2 Tablet(s) Oral at bedtime PRN Constipation      Labs:  CBC Full  -  ( 06 Dec 2018 01:17 )  WBC Count : 13.1 K/uL  Hemoglobin : 12.5 g/dL  Hematocrit : 36.2 %  Platelet Count - Automated : 223 K/uL  Mean Cell Volume : 93.6 fl  Mean Cell Hemoglobin : 32.4 pg  Mean Cell Hemoglobin Concentration : 34.6 gm/dL  Auto Neutrophil # : 11.0 K/uL  Auto Lymphocyte # : 1.2 K/uL  Auto Monocyte # : 0.9 K/uL  Auto Eosinophil # : 0.0 K/uL  Auto Basophil # : 0.0 K/uL  Auto Neutrophil % : 79.0 %  Auto Lymphocyte % : 12.0 %  Auto Monocyte % : 7.0 %  Auto Eosinophil % : 1.0 %  Auto Basophil % : x    12-06    140  |  102  |  7   ----------------------------<  95  3.6   |  23  |  0.37<L>    Ca    8.3<L>      06 Dec 2018 01:17  Phos  2.8     12-06  Mg     2.2     12-06      CAPILLARY BLOOD GLUCOSE                Vitals:  Vital Signs Last 24 Hrs  T(C): 36.6 (07 Dec 2018 08:35), Max: 37.1 (06 Dec 2018 21:38)  T(F): 97.8 (07 Dec 2018 08:35), Max: 98.7 (06 Dec 2018 21:38)  HR: 67 (07 Dec 2018 08:35) (67 - 98)  BP: 113/72 (07 Dec 2018 08:35) (95/62 - 147/81)  BP(mean): --  RR: 18 (07 Dec 2018 08:35) (18 - 18)  SpO2: 97% (07 Dec 2018 08:35) (94% - 97%)        NEUROLOGICAL EXAM:    Mental status: Awake, alert, and in no apparent distress. Oriented to person, place and time. Language function is normal. Recent memory, digit span and concentration were normal.     Cranial Nerves: Pupils were equal, round, reactive to light. Extraocular movements were intact. Visual field were full. Fundoscopic exam was deferred. Facial sensation was intact to light touch. There was no facial asymmetry. The palate was upgoing symmetrically and tongue was midline. Hearing acuity was intact to finger rub AU. Shoulder shrug was full bilaterally    Motor exam: Bulk and tone were normal. Strength was 5/5 in all four extremities. Fine finger movements were symmetric and but slow. There was no pronator drift    Reflexes: 2+ in the bilateral upper extremities. 2+ in the bilateral lower extremities. Toes were downgoing bilaterally.     Sensation: Intact to light touch, temperature, vibration and proprioception.     Coordination: Finger-nose-finger without dysmetria.     Gait: walks without assistance

## 2018-12-07 NOTE — PROGRESS NOTE ADULT - SUBJECTIVE AND OBJECTIVE BOX
ENT ISSUE/POD: S/P ACDF POD 2    HPI: 69 y/o female with PMHx of cervical herniated disks, presented with b/l hand numbness and gait instability s/p ACDF, POD 2. Patient seen and examined this morning, hoarseness and dry mouth improved significantly. Patient denies SOB, throat pain, cough, fever, chills. Pt is tolerating a soft diet.      PAST MEDICAL & SURGICAL HISTORY:  Hodgkin disease: 1981  HLD (hyperlipidemia)  HTN (hypertension)  Breast CA: Rt Sp lumpectomy and RT  Cervical disc herniation: Planning surg  H/O lumpectomy    Allergies    penicillin (Unknown)    Intolerances      MEDICATIONS  (STANDING):  anastrozole 1 milliGRAM(s) Oral daily  atorvastatin 40 milliGRAM(s) Oral at bedtime  docusate sodium 100 milliGRAM(s) Oral three times a day  docusate sodium 100 milliGRAM(s) Oral three times a day  enoxaparin Injectable 40 milliGRAM(s) SubCutaneous at bedtime  ergocalciferol 47000 Unit(s) Oral every week  gabapentin 600 milliGRAM(s) Oral two times a day  metoprolol tartrate 25 milliGRAM(s) Oral daily  senna 2 Tablet(s) Oral at bedtime  traMADol 50 milliGRAM(s) Oral two times a day    MEDICATIONS  (PRN):  acetaminophen   Tablet .. 650 milliGRAM(s) Oral every 6 hours PRN Temp greater or equal to 38C (100.4F), Mild Pain (1 - 3)  acetaminophen   Tablet .. 650 milliGRAM(s) Oral every 6 hours PRN Temp greater or equal to 38C (100.4F), Mild Pain (1 - 3)  benzocaine 15 mG/menthol 3.6 mG Lozenge 1 Lozenge Oral every 3 hours PRN Sore Throat  diphenhydrAMINE 25 milliGRAM(s) Oral every 4 hours PRN Insomnia  famotidine    Tablet 20 milliGRAM(s) Oral every 12 hours PRN Dyspepsia  guaiFENesin    Syrup 100 milliGRAM(s) Oral every 6 hours PRN sore throat/cough  morphine  - Injectable 1 milliGRAM(s) IV Push every 3 hours PRN Severe Pain (7 - 10)  ondansetron   Disintegrating Tablet 4 milliGRAM(s) Oral every 4 hours PRN Nausea  ondansetron   Disintegrating Tablet 4 milliGRAM(s) Oral every 6 hours PRN Nausea  oxyCODONE    5 mG/acetaminophen 325 mG 1 Tablet(s) Oral every 4 hours PRN Moderate Pain (4 - 6)  oxyCODONE    IR 5 milliGRAM(s) Oral every 4 hours PRN Moderate Pain (4 - 6)  oxyCODONE    IR 10 milliGRAM(s) Oral every 4 hours PRN Severe Pain (7 - 10)  senna 2 Tablet(s) Oral at bedtime PRN Constipation      Social History: see consult    Family history: see consult    ROS:   ENT: all negative except as noted in HPI   Pulm: denies SOB, cough, hemoptysis  Neuro: denies numbness/tingling, loss of sensation  Endo: denies heat/cold intolerance, excessive sweating      Vital Signs Last 24 Hrs  T(C): 36.7 (07 Dec 2018 04:40), Max: 37.1 (06 Dec 2018 21:38)  T(F): 98 (07 Dec 2018 04:40), Max: 98.7 (06 Dec 2018 21:38)  HR: 85 (07 Dec 2018 04:40) (80 - 98)  BP: 132/88 (07 Dec 2018 04:40) (95/62 - 147/81)  BP(mean): 97 (06 Dec 2018 09:00) (97 - 97)  RR: 18 (07 Dec 2018 04:40) (16 - 18)  SpO2: 95% (07 Dec 2018 04:40) (94% - 100%)                          12.5   13.1  )-----------( 223      ( 06 Dec 2018 01:17 )             36.2    12-06    140  |  102  |  7   ----------------------------<  95  3.6   |  23  |  0.37<L>    Ca    8.3<L>      06 Dec 2018 01:17  Phos  2.8     12-06  Mg     2.2     12-06         PHYSICAL EXAM:  Gen: NAD  Skin: No rashes, bruises, or lesions  Head: Normocephalic, Atraumatic  Face: no edema, erythema, or fluctuance. Parotid glands soft without mass  Eyes: no scleral injection  Nose: Nares bilaterally patent, no discharge  Mouth: dry oral mucosa. No Stridor / Drooling / Trismus. Tongue/uvula midline, oropharynx clear  Neck: Flat, no hematoma or crepitus noted, dressing C/D/I, no lymphadenopathy, trachea midline, no masses. Drain with serosang. contents, C-collar in place.   Lymphatic: No lymphadenopathy  Resp: breathing easily, no stridor  Neuro: facial nerve intact, no facial droop

## 2018-12-07 NOTE — PROGRESS NOTE ADULT - ASSESSMENT
68F pmh breast ca, hodgkins lymphoma was sent in for surgery for herniated cervical disks with Dr. Daley.  Pt has had weakness of b/l upper extremities and was found the have herniated disks on recent MRI.  Pt denies chest pain, shortness of breath, nausea/vomiting/diarrhea, fever, chills     PROCEDURE: 12/5 s/p C4-6 ACDF     POD#2    PLAN:  Neuro:   Respiratory:   CV:  Endocrine:   Heme/Onc:             DVT ppx:   Renal:   ID:   GI:    PT/OT:         Spectralink # [id number] 68F pmh breast ca, hodgkins lymphoma was sent in for surgery for herniated cervical disks with Dr. Daley.  Pt has had weakness of b/l upper extremities and was found the have herniated disks on recent MRI.  Pt denies chest pain, shortness of breath, nausea/vomiting/diarrhea, fever, chills     PROCEDURE: 12/5 s/p C4-6 ACDF     POD#2    PLAN:  Neuro:   - [place] collar at all times  - maintain HMV  - standing xrays ordered  - pain control- continue oxycodone prn, gabapentin 600 bid,   - hx of breast cancer- continue arimidex  Respiratory:   - encouraged incentive spirometry  CV:  - continue metoprolol  DVT ppx:   - venodynes, sq lovenox  PT/OT:   - outpatient PT, family is requesting inpatient rehab        [facility] # [id number]

## 2018-12-08 RX ADMIN — TRAMADOL HYDROCHLORIDE 50 MILLIGRAM(S): 50 TABLET ORAL at 12:25

## 2018-12-08 RX ADMIN — Medication 4 MILLIGRAM(S): at 00:33

## 2018-12-08 RX ADMIN — Medication 100 MILLIGRAM(S): at 05:46

## 2018-12-08 RX ADMIN — TRAMADOL HYDROCHLORIDE 50 MILLIGRAM(S): 50 TABLET ORAL at 04:00

## 2018-12-08 RX ADMIN — ANASTROZOLE 1 MILLIGRAM(S): 1 TABLET ORAL at 12:25

## 2018-12-08 RX ADMIN — SENNA PLUS 2 TABLET(S): 8.6 TABLET ORAL at 21:37

## 2018-12-08 RX ADMIN — Medication 100 MILLIGRAM(S): at 21:37

## 2018-12-08 RX ADMIN — GABAPENTIN 600 MILLIGRAM(S): 400 CAPSULE ORAL at 17:41

## 2018-12-08 RX ADMIN — ENOXAPARIN SODIUM 40 MILLIGRAM(S): 100 INJECTION SUBCUTANEOUS at 21:36

## 2018-12-08 RX ADMIN — TRAMADOL HYDROCHLORIDE 50 MILLIGRAM(S): 50 TABLET ORAL at 00:33

## 2018-12-08 RX ADMIN — Medication 4 MILLIGRAM(S): at 14:41

## 2018-12-08 RX ADMIN — Medication 100 MILLIGRAM(S): at 14:41

## 2018-12-08 RX ADMIN — ATORVASTATIN CALCIUM 40 MILLIGRAM(S): 80 TABLET, FILM COATED ORAL at 21:37

## 2018-12-08 RX ADMIN — GABAPENTIN 600 MILLIGRAM(S): 400 CAPSULE ORAL at 05:47

## 2018-12-08 RX ADMIN — Medication 4 MILLIGRAM(S): at 21:36

## 2018-12-08 RX ADMIN — Medication 25 MILLIGRAM(S): at 05:47

## 2018-12-08 RX ADMIN — Medication 4 MILLIGRAM(S): at 05:47

## 2018-12-08 NOTE — PROGRESS NOTE ADULT - ASSESSMENT
68F POD#3 s/p C4-6 ACDF. Patient is neurologically stable w/ minimal pain.    q4 neuro checks  pain control  C collar at all times  PMR/OT/PT eval

## 2018-12-08 NOTE — PROGRESS NOTE ADULT - SUBJECTIVE AND OBJECTIVE BOX
ENT ISSUE/POD:S/P ACDF POD 3    HPI: 67 y/o female with PMHx of cervical herniated disks, presented with b/l hand numbness and gait instability s/p ACDF, POD 3. Patient seen and examined this morning, hoarseness and dry mouth improved significantly. Patient denies SOB, cough, fever, chills. Pt is tolerating a soft diet. C/o minimal throat pain, c-collar in place          PAST MEDICAL & SURGICAL HISTORY:  Hodgkin disease: 1981  HLD (hyperlipidemia)  HTN (hypertension)  Breast CA: Rt Sp lumpectomy and RT  Cervical disc herniation: Planning surg  H/O lumpectomy    Allergies    penicillin (Unknown)    Intolerances      MEDICATIONS  (STANDING):  anastrozole 1 milliGRAM(s) Oral daily  atorvastatin 40 milliGRAM(s) Oral at bedtime  dexamethasone  Injectable 4 milliGRAM(s) IV Push every 8 hours  docusate sodium 100 milliGRAM(s) Oral three times a day  enoxaparin Injectable 40 milliGRAM(s) SubCutaneous at bedtime  ergocalciferol 22935 Unit(s) Oral every week  gabapentin 600 milliGRAM(s) Oral two times a day  metoprolol tartrate 25 milliGRAM(s) Oral daily  senna 2 Tablet(s) Oral at bedtime  traMADol 50 milliGRAM(s) Oral two times a day    MEDICATIONS  (PRN):  acetaminophen   Tablet .. 650 milliGRAM(s) Oral every 6 hours PRN Temp greater or equal to 38C (100.4F), Mild Pain (1 - 3)  benzocaine 15 mG/menthol 3.6 mG Lozenge 1 Lozenge Oral every 3 hours PRN Sore Throat  diphenhydrAMINE 25 milliGRAM(s) Oral every 4 hours PRN Insomnia  famotidine    Tablet 20 milliGRAM(s) Oral every 12 hours PRN Dyspepsia  guaiFENesin    Syrup 100 milliGRAM(s) Oral every 6 hours PRN sore throat/cough  morphine  - Injectable 1 milliGRAM(s) IV Push every 3 hours PRN Severe Pain (7 - 10)  ondansetron   Disintegrating Tablet 4 milliGRAM(s) Oral every 6 hours PRN Nausea  oxyCODONE    5 mG/acetaminophen 325 mG 1 Tablet(s) Oral every 4 hours PRN Moderate Pain (4 - 6)  oxyCODONE    IR 5 milliGRAM(s) Oral every 4 hours PRN Moderate Pain (4 - 6)  oxyCODONE    IR 10 milliGRAM(s) Oral every 4 hours PRN Severe Pain (7 - 10)          ROS:   ENT: all negative except as noted in HPI   Pulm: denies SOB, cough, hemoptysis  Neuro: denies numbness/tingling, loss of sensation  Endo: denies heat/cold intolerance, excessive sweating      Vital Signs Last 24 Hrs  T(C): 36.9 (09 Dec 2018 05:17), Max: 36.9 (09 Dec 2018 05:17)  T(F): 98.4 (09 Dec 2018 05:17), Max: 98.4 (09 Dec 2018 05:17)  HR: 75 (09 Dec 2018 05:17) (65 - 85)  BP: 126/75 (09 Dec 2018 05:17) (95/62 - 133/78)  RR: 18 (09 Dec 2018 05:17) (18 - 18)  SpO2: 98% (09 Dec 2018 05:17) (94% - 98%)         PHYSICAL EXAM:  Gen: NAD ventilating phonating at baseline no stridor or wheezing  Skin: No rashes, bruises, or lesions  Head: Normocephalic, Atraumatic  Face: no edema, erythema, or fluctuance. Parotid glands soft without mass  Eyes: no scleral injection  Nose: Nares bilaterally patent, no discharge  Mouth: No Stridor / Drooling / Trismus.  Mucosa moist, tongue/uvula midline, oropharynx clear  Neck: C-Collar in place neck Flat, surgical site c/d/i no obvious edema or palpable crepitus otherwise supple, no lymphadenopathy, trachea midline, no masses Drain 40cc  Lymphatic: No lymphadenopathy  Resp: breathing easily, no stridor  Neuro: facial nerve intact, no facial droop

## 2018-12-08 NOTE — PROGRESS NOTE ADULT - SUBJECTIVE AND OBJECTIVE BOX
Visit Summary: Patient seen and evaluated at bedside. She was cleared by PT for home w/ home PT, but patient and  do not feel comfortable going straight home from hospital due to difficulty of her stairs at home. We will call PMR, OT, and PT reeval for additional recommendations. HMV put out 30 and was d/c'd at bedside this am.    Overnight Events: no acute events o/n    Exam:  T(C): 36.7 (12-08-18 @ 14:15), Max: 36.8 (12-08-18 @ 04:35)  HR: 85 (12-08-18 @ 14:15) (65 - 85)  BP: 95/62 (12-08-18 @ 14:15) (95/62 - 133/78)  RR: 18 (12-08-18 @ 14:15) (18 - 18)  SpO2: 95% (12-08-18 @ 14:15) (94% - 96%)  Wt(kg): --    AAOx3, EOS, FC  PERRL, EOMI  DOZIER 5/5, no drift  incision c/d/i

## 2018-12-08 NOTE — PROGRESS NOTE ADULT - ASSESSMENT
68y POD # 3 s/p ACDF, doing well, tolerating  PO soft/regular diet, with minimal throat pain, hoarse  prior to sx, where laryngoscopy revealed no anomalies.

## 2018-12-09 PROCEDURE — 72040 X-RAY EXAM NECK SPINE 2-3 VW: CPT | Mod: 26

## 2018-12-09 RX ADMIN — GABAPENTIN 600 MILLIGRAM(S): 400 CAPSULE ORAL at 05:15

## 2018-12-09 RX ADMIN — Medication 100 MILLIGRAM(S): at 13:19

## 2018-12-09 RX ADMIN — TRAMADOL HYDROCHLORIDE 50 MILLIGRAM(S): 50 TABLET ORAL at 00:04

## 2018-12-09 RX ADMIN — Medication 100 MILLIGRAM(S): at 05:15

## 2018-12-09 RX ADMIN — TRAMADOL HYDROCHLORIDE 50 MILLIGRAM(S): 50 TABLET ORAL at 13:19

## 2018-12-09 RX ADMIN — ATORVASTATIN CALCIUM 40 MILLIGRAM(S): 80 TABLET, FILM COATED ORAL at 21:38

## 2018-12-09 RX ADMIN — Medication 4 MILLIGRAM(S): at 05:15

## 2018-12-09 RX ADMIN — Medication 4 MILLIGRAM(S): at 21:38

## 2018-12-09 RX ADMIN — GABAPENTIN 600 MILLIGRAM(S): 400 CAPSULE ORAL at 17:34

## 2018-12-09 RX ADMIN — Medication 25 MILLIGRAM(S): at 05:15

## 2018-12-09 RX ADMIN — TRAMADOL HYDROCHLORIDE 50 MILLIGRAM(S): 50 TABLET ORAL at 01:00

## 2018-12-09 RX ADMIN — Medication 4 MILLIGRAM(S): at 13:19

## 2018-12-09 RX ADMIN — SENNA PLUS 2 TABLET(S): 8.6 TABLET ORAL at 21:39

## 2018-12-09 RX ADMIN — ANASTROZOLE 1 MILLIGRAM(S): 1 TABLET ORAL at 13:19

## 2018-12-09 RX ADMIN — Medication 100 MILLIGRAM(S): at 21:38

## 2018-12-09 RX ADMIN — TRAMADOL HYDROCHLORIDE 50 MILLIGRAM(S): 50 TABLET ORAL at 14:00

## 2018-12-09 RX ADMIN — ENOXAPARIN SODIUM 40 MILLIGRAM(S): 100 INJECTION SUBCUTANEOUS at 21:38

## 2018-12-09 NOTE — PROGRESS NOTE ADULT - SUBJECTIVE AND OBJECTIVE BOX
Patient seen and examined at bedside.    T(C): 36.6 (12-09-18 @ 08:15), Max: 36.9 (12-09-18 @ 05:17)  HR: 67 (12-09-18 @ 08:15) (67 - 85)  BP: 136/81 (12-09-18 @ 08:15) (95/62 - 136/81)  RR: 18 (12-09-18 @ 08:15) (18 - 18)  SpO2: 96% (12-09-18 @ 08:15) (95% - 98%)  Wt(kg): --    EXAM:  AOx3, Following Commands  CN: PERRL, EOMI, no facial droop  Motor: RUE/RLE 5/5, LUE delt 4/5, LLE 4+/5  Sensation intact to light touch  Reflexes: no clonus     MEDICATIONS:  acetaminophen   Tablet .. 650 milliGRAM(s) Oral every 6 hours PRN  anastrozole 1 milliGRAM(s) Oral daily  atorvastatin 40 milliGRAM(s) Oral at bedtime  benzocaine 15 mG/menthol 3.6 mG Lozenge 1 Lozenge Oral every 3 hours PRN  dexamethasone  Injectable 4 milliGRAM(s) IV Push every 8 hours  diphenhydrAMINE 25 milliGRAM(s) Oral every 4 hours PRN  docusate sodium 100 milliGRAM(s) Oral three times a day  enoxaparin Injectable 40 milliGRAM(s) SubCutaneous at bedtime  ergocalciferol 28164 Unit(s) Oral every week  famotidine    Tablet 20 milliGRAM(s) Oral every 12 hours PRN  gabapentin 600 milliGRAM(s) Oral two times a day  guaiFENesin    Syrup 100 milliGRAM(s) Oral every 6 hours PRN  metoprolol tartrate 25 milliGRAM(s) Oral daily  morphine  - Injectable 1 milliGRAM(s) IV Push every 3 hours PRN  ondansetron   Disintegrating Tablet 4 milliGRAM(s) Oral every 6 hours PRN  oxyCODONE    5 mG/acetaminophen 325 mG 1 Tablet(s) Oral every 4 hours PRN  oxyCODONE    IR 5 milliGRAM(s) Oral every 4 hours PRN  oxyCODONE    IR 10 milliGRAM(s) Oral every 4 hours PRN  senna 2 Tablet(s) Oral at bedtime  traMADol 50 milliGRAM(s) Oral two times a day

## 2018-12-09 NOTE — PROGRESS NOTE ADULT - SUBJECTIVE AND OBJECTIVE BOX
ENT ISSUE/POD:S/P ACDF POD 4    HPI: 67 y/o female with PMHx of cervical herniated disks, presented with b/l hand numbness and gait instability s/p ACDF, POD 4. Patient seen and examined this morning, hoarseness and dry mouth improved significantly. Patient denies SOB, cough, fever, chills. Pt is tolerating a soft diet. C/o minimal throat pain, c-collar in place Drain:30ml x24h        PAST MEDICAL & SURGICAL HISTORY:  Hodgkin disease: 1981  HLD (hyperlipidemia)  HTN (hypertension)  Breast CA: Rt Sp lumpectomy and RT  Cervical disc herniation: Planning surg  H/O lumpectomy    Allergies    penicillin (Unknown)    Intolerances      MEDICATIONS  (STANDING):  anastrozole 1 milliGRAM(s) Oral daily  atorvastatin 40 milliGRAM(s) Oral at bedtime  dexamethasone  Injectable 4 milliGRAM(s) IV Push every 8 hours  docusate sodium 100 milliGRAM(s) Oral three times a day  enoxaparin Injectable 40 milliGRAM(s) SubCutaneous at bedtime  ergocalciferol 16938 Unit(s) Oral every week  gabapentin 600 milliGRAM(s) Oral two times a day  metoprolol tartrate 25 milliGRAM(s) Oral daily  senna 2 Tablet(s) Oral at bedtime  traMADol 50 milliGRAM(s) Oral two times a day    MEDICATIONS  (PRN):  acetaminophen   Tablet .. 650 milliGRAM(s) Oral every 6 hours PRN Temp greater or equal to 38C (100.4F), Mild Pain (1 - 3)  benzocaine 15 mG/menthol 3.6 mG Lozenge 1 Lozenge Oral every 3 hours PRN Sore Throat  diphenhydrAMINE 25 milliGRAM(s) Oral every 4 hours PRN Insomnia  famotidine    Tablet 20 milliGRAM(s) Oral every 12 hours PRN Dyspepsia  guaiFENesin    Syrup 100 milliGRAM(s) Oral every 6 hours PRN sore throat/cough  morphine  - Injectable 1 milliGRAM(s) IV Push every 3 hours PRN Severe Pain (7 - 10)  ondansetron   Disintegrating Tablet 4 milliGRAM(s) Oral every 6 hours PRN Nausea  oxyCODONE    5 mG/acetaminophen 325 mG 1 Tablet(s) Oral every 4 hours PRN Moderate Pain (4 - 6)  oxyCODONE    IR 5 milliGRAM(s) Oral every 4 hours PRN Moderate Pain (4 - 6)  oxyCODONE    IR 10 milliGRAM(s) Oral every 4 hours PRN Severe Pain (7 - 10)          ROS:   ENT: all negative except as noted in HPI   Pulm: denies SOB, cough, hemoptysis  Neuro: denies numbness/tingling, loss of sensation  Endo: denies heat/cold intolerance, excessive sweating      Vital Signs Last 24 Hrs  T(C): 36.9 (09 Dec 2018 05:17), Max: 36.9 (09 Dec 2018 05:17)  T(F): 98.4 (09 Dec 2018 05:17), Max: 98.4 (09 Dec 2018 05:17)  HR: 75 (09 Dec 2018 05:17) (65 - 85)  BP: 126/75 (09 Dec 2018 05:17) (95/62 - 133/78)  BP(mean): --  RR: 18 (09 Dec 2018 05:17) (18 - 18)  SpO2: 98% (09 Dec 2018 05:17) (94% - 98%)       PHYSICAL EXAM:  Gen: NAD ventilating phonating at baseline no wheezing or stridor  Skin: No rashes, bruises, or lesions  Head: Normocephalic, Atraumatic  Face: no edema, erythema, or fluctuance. Parotid glands soft without mass  Eyes: no scleral injection  Nose: Nares bilaterally patent, no discharge  Mouth: No Stridor / Drooling / Trismus.  Mucosa moist, tongue/uvula midline, oropharynx clear  Neck: C-Collar in place neck Flat, surgical site c/d/i no obvious edema or palpable crepitus otherwise   Flat, supple, no lymphadenopathy, trachea midline, no masses Drain: 30cc x 24h  Lymphatic: No lymphadenopathy  Resp: breathing easily, no stridor  Neuro: facial nerve intact, no facial droop

## 2018-12-09 NOTE — PROGRESS NOTE ADULT - ASSESSMENT
68y POD # 4 s/p ACDF, doing well, tolerating  PO soft/regular diet, with minimal throat pain, hoarse  prior to sx, where laryngoscopy revealed no anomalies.

## 2018-12-09 NOTE — PROGRESS NOTE ADULT - ASSESSMENT
68F POD#4 s/p C4-6 ACDF. Patient is neurologically stable w/ minimal pain.    q4 neuro checks  pain control  C collar at all times  PMR eval

## 2018-12-10 PROCEDURE — 99221 1ST HOSP IP/OBS SF/LOW 40: CPT

## 2018-12-10 PROCEDURE — 99024 POSTOP FOLLOW-UP VISIT: CPT

## 2018-12-10 RX ORDER — DEXAMETHASONE 0.5 MG/5ML
4 ELIXIR ORAL EVERY 12 HOURS
Qty: 0 | Refills: 0 | Status: DISCONTINUED | OUTPATIENT
Start: 2018-12-10 | End: 2018-12-12

## 2018-12-10 RX ADMIN — TRAMADOL HYDROCHLORIDE 50 MILLIGRAM(S): 50 TABLET ORAL at 11:15

## 2018-12-10 RX ADMIN — TRAMADOL HYDROCHLORIDE 50 MILLIGRAM(S): 50 TABLET ORAL at 23:30

## 2018-12-10 RX ADMIN — ENOXAPARIN SODIUM 40 MILLIGRAM(S): 100 INJECTION SUBCUTANEOUS at 21:42

## 2018-12-10 RX ADMIN — TRAMADOL HYDROCHLORIDE 50 MILLIGRAM(S): 50 TABLET ORAL at 23:00

## 2018-12-10 RX ADMIN — Medication 25 MILLIGRAM(S): at 04:50

## 2018-12-10 RX ADMIN — ANASTROZOLE 1 MILLIGRAM(S): 1 TABLET ORAL at 11:15

## 2018-12-10 RX ADMIN — ATORVASTATIN CALCIUM 40 MILLIGRAM(S): 80 TABLET, FILM COATED ORAL at 21:42

## 2018-12-10 RX ADMIN — Medication 100 MILLIGRAM(S): at 04:50

## 2018-12-10 RX ADMIN — GABAPENTIN 600 MILLIGRAM(S): 400 CAPSULE ORAL at 04:49

## 2018-12-10 RX ADMIN — GABAPENTIN 600 MILLIGRAM(S): 400 CAPSULE ORAL at 17:32

## 2018-12-10 RX ADMIN — Medication 4 MILLIGRAM(S): at 04:50

## 2018-12-10 RX ADMIN — Medication 4 MILLIGRAM(S): at 17:32

## 2018-12-10 NOTE — PROGRESS NOTE ADULT - ASSESSMENT
HPI:  68F pmh breast ca, hodgkins lymphoma was sent in for surgery for herniated cervical disks with Dr. Daley.  Pt has had weakness of b/l upper extremities and was found the have herniated disks on recent MRI.  Pt denies chest pain, shortness of breath, nausea/vomiting/diarrhea, fever, chills (04 Dec 2018 19:42). Patient is now s/p C4-6 ACDF on 12/5/18, POD#5.     PLAN:  - Pain control as needed (Tylenol, Oxy IR, Morphine PRN)  - Gabapentin 600 mg BID  - Tramadol 50 mg BID  - Continue Metoprolol 25 mg daily, Atorvastatin 40 mg at bedtime and other home meds   - Dex tapered to 4 mg q12hr today   - C.collar at all times   - ENT and neurology recs appreciated   - Soft diet  - Bowel regimen   - SQL/Venodynes  - PT/OT recommends home with outpatient PT/OT but patient is requesting inpatient rehab  - PMR consult called, will follow up recs     Will discuss with attending  Jessee Vazquez PA-C # [id number]

## 2018-12-10 NOTE — PROGRESS NOTE ADULT - ASSESSMENT
69 YO F px to my associate Dr Aburto with compliants of hand numbness, had workup with another neurologist prior to that, found  cts, had release of left hand  Inc sx in right hand, says feet feel heavy  However exam does not find clear evidence of myelopathy      Post op doing well.  states paresthesias improved  power 5/5 except left arm which is preexisting  continue PT  await ot  d/c planning

## 2018-12-10 NOTE — CONSULT NOTE ADULT - REASON FOR ADMISSION
b/l hand numbness and gait instability

## 2018-12-10 NOTE — CONSULT NOTE ADULT - ATTENDING COMMENTS
Seen and examined with resident. Agree with note.   Patient with gait dysfunction.  Patient will need home rehabilitation when stable.

## 2018-12-10 NOTE — OCCUPATIONAL THERAPY INITIAL EVALUATION ADULT - PERTINENT HX OF CURRENT PROBLEM, REHAB EVAL
68F pmh breast ca, hodgkins lymphoma was sent in for surgery for herniated cervical disks with Dr. Daley.  Pt has had weakness of b/l upper extremities and was found the have herniated disks on recent MRI.  s/p C4-C6 acdf

## 2018-12-10 NOTE — OCCUPATIONAL THERAPY INITIAL EVALUATION ADULT - ANTICIPATED DISCHARGE DISPOSITION, OT EVAL
Home with outpatient OT for fine motor coodination, ADLs and balance. Home with supervision/assist for ADLs and functional activities as prior. Pt requesting inpatient rehab

## 2018-12-10 NOTE — PROGRESS NOTE ADULT - ASSESSMENT
68y POD # 5 s/p ACDF, doing well, tolerating  PO soft/regular diet. Marked improvement in hoarseness

## 2018-12-10 NOTE — PROGRESS NOTE ADULT - SUBJECTIVE AND OBJECTIVE BOX
SUBJECTIVE: Patient seen and examined at bedside, no acute events overnight, no new complaints.       Vital Signs Last 24 Hrs  T(C): 36.7 (10 Dec 2018 08:38), Max: 36.8 (09 Dec 2018 23:34)  T(F): 98.1 (10 Dec 2018 08:38), Max: 98.2 (09 Dec 2018 23:34)  HR: 66 (10 Dec 2018 08:38) (66 - 86)  BP: 131/82 (10 Dec 2018 08:38) (95/61 - 131/82)  BP(mean): --  RR: 16 (10 Dec 2018 08:38) (16 - 18)  SpO2: 95% (10 Dec 2018 08:38) (95% - 98%)    PHYSICAL EXAM:    General: No Acute Distress     Neurological: Awake, alert oriented to person, place and time, Following Commands, PERRL, EOMI, Face Symmetrical, Speech Fluent, Moving all extremities.  RUE/RLE 5/5, LUE 4/5, LLE 4+/5. Sensation to Light Touch Intact    AOx3, Following Commands  CN: PERRL, EOMI, no facial droop  Motor: RUE/RLE 5/5, LUE delt 4/5, LLE 4+/5  Sensation intact to light touch  Reflexes: no clonus     Pulmonary: Clear to Auscultation, No Rales, No Rhonchi, No Wheezes     Cardiovascular: S1, S2, Regular Rate and Rhythm     Gastrointestinal: Soft, Nontender, Nondistended     Incision: Clean, dry and intact     LABS:             12-09 @ 07:01  -  12-10 @ 07:00  --------------------------------------------------------  IN: 1310 mL / OUT: 0 mL / NET: 1310 mL      DRAINS: none    MEDICATIONS:  Antibiotics:    Neuro:  acetaminophen   Tablet .. 650 milliGRAM(s) Oral every 6 hours PRN Temp greater or equal to 38C (100.4F), Mild Pain (1 - 3)  diphenhydrAMINE 25 milliGRAM(s) Oral every 4 hours PRN Insomnia  gabapentin 600 milliGRAM(s) Oral two times a day  morphine  - Injectable 1 milliGRAM(s) IV Push every 3 hours PRN Severe Pain (7 - 10)  ondansetron   Disintegrating Tablet 4 milliGRAM(s) Oral every 6 hours PRN Nausea  oxyCODONE    5 mG/acetaminophen 325 mG 1 Tablet(s) Oral every 4 hours PRN Moderate Pain (4 - 6)  oxyCODONE    IR 5 milliGRAM(s) Oral every 4 hours PRN Moderate Pain (4 - 6)  oxyCODONE    IR 10 milliGRAM(s) Oral every 4 hours PRN Severe Pain (7 - 10)  traMADol 50 milliGRAM(s) Oral two times a day    Cardiac:  metoprolol tartrate 25 milliGRAM(s) Oral daily    Pulm:  guaiFENesin    Syrup 100 milliGRAM(s) Oral every 6 hours PRN sore throat/cough    GI/:  docusate sodium 100 milliGRAM(s) Oral three times a day  famotidine    Tablet 20 milliGRAM(s) Oral every 12 hours PRN Dyspepsia  senna 2 Tablet(s) Oral at bedtime    Other:   anastrozole 1 milliGRAM(s) Oral daily  atorvastatin 40 milliGRAM(s) Oral at bedtime  benzocaine 15 mG/menthol 3.6 mG Lozenge 1 Lozenge Oral every 3 hours PRN Sore Throat  dexamethasone  Injectable 4 milliGRAM(s) IV Push every 12 hours  enoxaparin Injectable 40 milliGRAM(s) SubCutaneous at bedtime  ergocalciferol 13672 Unit(s) Oral every week    DIET: [x] Regular [] CCD [] Renal [] Puree [] Dysphagia [] Tube Feeds:     IMAGING:

## 2018-12-10 NOTE — OCCUPATIONAL THERAPY INITIAL EVALUATION ADULT - LIVES WITH, PROFILE
pt lives with spouse in a plit level home +8 steps to main level, independent with ADLs and ambulation

## 2018-12-10 NOTE — CONSULT NOTE ADULT - ASSESSMENT
------------------------------------------------------------------------------------------------  ASSESSMENT/PLAN  69yo Female with functional deficits after C4-6 ACDF for BUE weakness and paresthesias.    #PT - strengthening, transfers, gait training  #OT - ADLs  #Pain - Tylenol prn, Percocet prn  #DVT PPX - Lovenox  #Dispo - ------------------------------------------------------------------------------------------------  ASSESSMENT/PLAN  69yo Female with functional deficits after C4-6 ACDF for BUE weakness and paresthesias.    #PT - strengthening, transfers, gait training  #OT - ADLs  #Pain - Tylenol prn, Percocet prn  #DVT PPX - Lovenox  #Dispo - recommend HOME with HOME PT to continue working on functional mobility when medically stable for discharge.

## 2018-12-10 NOTE — PROGRESS NOTE ADULT - PROBLEM SELECTOR PROBLEM 1
Cervical disc herniation

## 2018-12-10 NOTE — PROGRESS NOTE ADULT - SUBJECTIVE AND OBJECTIVE BOX
ENT ISSUE/POD:S/P ACDF POD 5    HPI: 69 y/o female with PMHx of cervical herniated disks, presented with b/l hand numbness and gait instability s/p ACDF, POD 5. Patient seen and examined this morning, hoarseness and dry mouth improved significantly. Patient denies SOB, cough, fever, chills. Pt is tolerating a soft diet. c-collar in place        PAST MEDICAL & SURGICAL HISTORY:  Hodgkin disease: 1981  HLD (hyperlipidemia)  HTN (hypertension)  Breast CA: Rt Sp lumpectomy and RT  Cervical disc herniation: Planning surg  H/O lumpectomy    Allergies    penicillin (Unknown)    Intolerances      MEDICATIONS  (STANDING):  anastrozole 1 milliGRAM(s) Oral daily  atorvastatin 40 milliGRAM(s) Oral at bedtime  dexamethasone  Injectable 4 milliGRAM(s) IV Push every 8 hours  docusate sodium 100 milliGRAM(s) Oral three times a day  enoxaparin Injectable 40 milliGRAM(s) SubCutaneous at bedtime  ergocalciferol 12144 Unit(s) Oral every week  gabapentin 600 milliGRAM(s) Oral two times a day  metoprolol tartrate 25 milliGRAM(s) Oral daily  senna 2 Tablet(s) Oral at bedtime  traMADol 50 milliGRAM(s) Oral two times a day    MEDICATIONS  (PRN):  acetaminophen   Tablet .. 650 milliGRAM(s) Oral every 6 hours PRN Temp greater or equal to 38C (100.4F), Mild Pain (1 - 3)  benzocaine 15 mG/menthol 3.6 mG Lozenge 1 Lozenge Oral every 3 hours PRN Sore Throat  diphenhydrAMINE 25 milliGRAM(s) Oral every 4 hours PRN Insomnia  famotidine    Tablet 20 milliGRAM(s) Oral every 12 hours PRN Dyspepsia  guaiFENesin    Syrup 100 milliGRAM(s) Oral every 6 hours PRN sore throat/cough  morphine  - Injectable 1 milliGRAM(s) IV Push every 3 hours PRN Severe Pain (7 - 10)  ondansetron   Disintegrating Tablet 4 milliGRAM(s) Oral every 6 hours PRN Nausea  oxyCODONE    5 mG/acetaminophen 325 mG 1 Tablet(s) Oral every 4 hours PRN Moderate Pain (4 - 6)  oxyCODONE    IR 5 milliGRAM(s) Oral every 4 hours PRN Moderate Pain (4 - 6)  oxyCODONE    IR 10 milliGRAM(s) Oral every 4 hours PRN Severe Pain (7 - 10)          ROS:   ENT: all negative except as noted in HPI   Pulm: denies SOB, cough, hemoptysis  Neuro: denies numbness/tingling, loss of sensation  Endo: denies heat/cold intolerance, excessive sweating      Vital Signs Last 24 Hrs  T(C): 36.7 (10 Dec 2018 08:38), Max: 36.8 (09 Dec 2018 23:34)  T(F): 98.1 (10 Dec 2018 08:38), Max: 98.2 (09 Dec 2018 23:34)  HR: 66 (10 Dec 2018 08:38) (66 - 86)  BP: 131/82 (10 Dec 2018 08:38) (95/61 - 131/82)  BP(mean): --  RR: 16 (10 Dec 2018 08:38) (16 - 18)  SpO2: 95% (10 Dec 2018 08:38) (95% - 98%)              PHYSICAL EXAM:  Gen: NAD ventilating phonating well no stridor, marked improvement in hoarseness,   Skin: No rashes, bruises, or lesions  Head: Normocephalic, Atraumatic  Face: no edema, erythema, or fluctuance. Parotid glands soft without mass  Eyes: no scleral injection  Nose: Nares bilaterally patent, no discharge  Mouth: No Stridor / Drooling / Trismus.  Mucosa moist, tongue/uvula midline, oropharynx clear  Neck:C-Collar in place neck Flat, surgical site c/d/i no obvious edema or palpable crepitus  Flat, supple, no lymphadenopathy, trachea midline, no masses  Lymphatic: No lymphadenopathy  Resp: breathing easily, no stridor  Neuro: facial nerve intact, no facial droop

## 2018-12-10 NOTE — PROGRESS NOTE ADULT - SUBJECTIVE AND OBJECTIVE BOX
SUBJECTIVE: feels less numbness post op.  walked with PT    says still weak in left arm    Medications:  acetaminophen   Tablet .. 650 milliGRAM(s) Oral every 6 hours PRN  anastrozole 1 milliGRAM(s) Oral daily  atorvastatin 40 milliGRAM(s) Oral at bedtime  benzocaine 15 mG/menthol 3.6 mG Lozenge 1 Lozenge Oral every 3 hours PRN  dexamethasone  Injectable 4 milliGRAM(s) IV Push every 8 hours  diphenhydrAMINE 25 milliGRAM(s) Oral every 4 hours PRN  docusate sodium 100 milliGRAM(s) Oral three times a day  enoxaparin Injectable 40 milliGRAM(s) SubCutaneous at bedtime  ergocalciferol 60972 Unit(s) Oral every week  famotidine    Tablet 20 milliGRAM(s) Oral every 12 hours PRN  gabapentin 600 milliGRAM(s) Oral two times a day  guaiFENesin    Syrup 100 milliGRAM(s) Oral every 6 hours PRN  metoprolol tartrate 25 milliGRAM(s) Oral daily  morphine  - Injectable 1 milliGRAM(s) IV Push every 3 hours PRN  ondansetron   Disintegrating Tablet 4 milliGRAM(s) Oral every 6 hours PRN  oxyCODONE    5 mG/acetaminophen 325 mG 1 Tablet(s) Oral every 4 hours PRN  oxyCODONE    IR 5 milliGRAM(s) Oral every 4 hours PRN  oxyCODONE    IR 10 milliGRAM(s) Oral every 4 hours PRN  senna 2 Tablet(s) Oral at bedtime  traMADol 50 milliGRAM(s) Oral two times a day      Labs:          CAPILLARY BLOOD GLUCOSE                  Vitals:  Vital Signs Last 24 Hrs  T(C): 36.6 (10 Dec 2018 04:16), Max: 36.8 (09 Dec 2018 23:34)  T(F): 97.9 (10 Dec 2018 04:16), Max: 98.2 (09 Dec 2018 23:34)  HR: 66 (10 Dec 2018 04:16) (66 - 86)  BP: 123/82 (10 Dec 2018 04:16) (95/61 - 123/82)  BP(mean): --  RR: 18 (10 Dec 2018 04:16) (16 - 18)  SpO2: 96% (10 Dec 2018 04:16) (95% - 98%)      NEUROLOGICAL EXAM:    Mental status: Awake, alert, and in no apparent distress. Oriented to person, place and time. Language function is normal. Recent memory, digit span and concentration were normal.     Cranial Nerves: Pupils were equal, round, reactive to light. Extraocular movements were intact. Visual field were full. Fundoscopic exam was deferred. Facial sensation was intact to light touch. There was no facial asymmetry. The palate was upgoing symmetrically and tongue was midline. Hearing acuity was intact to finger rub AU. Shoulder shrug was full bilaterally    Motor exam: Bulk and tone were normal except atrophy of hand muscles in left Strength was 5/5 in all four extremities except lefh hand . Fine finger movements were symmetric and but slow. There was no pronator drift    Reflexes: 2+ in the bilateral upper extremities. 2+ in the bilateral lower extremities. Toes were downgoing bilaterally.     Sensation: Intact to light touch, temperature, vibration and proprioception.     Coordination: Finger-nose-finger without dysmetria.     Gait: walks without assistance

## 2018-12-10 NOTE — CONSULT NOTE ADULT - SUBJECTIVE AND OBJECTIVE BOX
Patient is a 68y old  Female who presents with a chief complaint of b/l hand numbness and gait instability (10 Dec 2018 08:56)      HPI:  69yo F with PMH of breast cancer s/p lumpectomy, XRT and on anastrozole, Hodgkins lymphoma s/p XRT, HTN, HLD who presented to [facility] on 12/4/18 for planned C4-6 ACDF on 12/6/18 with Dr. Daley for bilateral upper extremity weakness; outpatient MRI revealed herniated cervical disks. Post-op with minimal throat pain and hoarse voice but no difficulty swallowing; laryngoscope performed by ENT revealed no abnormalities.       Previously had seen neurologist for left hand paresthesias and was diagnosed with CTS with left hand carpal tunnel release.       REVIEW OF SYSTEMS: No fevers, chills, headache, dizziness, blurry vision, palpitations, chest pain, shortness of breath, nausea, vomiting, constipation or diarrhea.        PAST MEDICAL & SURGICAL HISTORY  Hodgkin disease  HLD (hyperlipidemia)  HTN (hypertension)  Breast CA  Cervical disc herniation  H/O lumpectomy  No significant past surgical history      SOCIAL HISTORY  Smoking - Denied  EtOH - Denied   Drugs - Denied    FUNCTIONAL HISTORY  Lives with her  in a  with 8 BEN from the garage, 6 steps inside.  Independent with ADLs and functional mobility prior to admission.    CURRENT FUNCTIONAL STATUS  Transfers supervision  Ambulated 150ft with RW independent. 10 steps with +HR independent.    FAMILY HISTORY   No pertinent family history in first degree relatives      RECENT LABS/IMAGING  Complete Blood Count + Automated Diff (12.06.18 @ 01:17)    WBC Count: 13.1 K/uL    RBC Count: 3.87 M/uL    Hemoglobin: 12.5 g/dL    Hematocrit: 36.2 %    Mean Cell Volume: 93.6 fl    Mean Cell Hemoglobin: 32.4 pg    Mean Cell Hemoglobin Conc: 34.6 gm/dL    Red Cell Distrib Width: 12.6 %    Platelet Count - Automated: 223 K/uL    Basic Metabolic Panel (12.06.18 @ 01:17)    Sodium, Serum: 140 mmol/L    Potassium, Serum: 3.6 mmol/L    Chloride, Serum: 102 mmol/L    Carbon Dioxide, Serum: 23 mmol/L    Anion Gap, Serum: 15 mmol/L    Blood Urea Nitrogen, Serum: 7 mg/dL    Creatinine, Serum: 0.37 mg/dL    Glucose, Serum: 95 mg/dL    Calcium, Total Serum: 8.3 mg/dL      ALLERGIES  penicillin (Unknown)      MEDICATIONS   acetaminophen   Tablet .. 650 milliGRAM(s) Oral every 6 hours PRN  anastrozole 1 milliGRAM(s) Oral daily  atorvastatin 40 milliGRAM(s) Oral at bedtime  benzocaine 15 mG/menthol 3.6 mG Lozenge 1 Lozenge Oral every 3 hours PRN  dexamethasone  Injectable 4 milliGRAM(s) IV Push every 8 hours  diphenhydrAMINE 25 milliGRAM(s) Oral every 4 hours PRN  docusate sodium 100 milliGRAM(s) Oral three times a day  enoxaparin Injectable 40 milliGRAM(s) SubCutaneous at bedtime  ergocalciferol 94248 Unit(s) Oral every week  famotidine    Tablet 20 milliGRAM(s) Oral every 12 hours PRN  gabapentin 600 milliGRAM(s) Oral two times a day  guaiFENesin    Syrup 100 milliGRAM(s) Oral every 6 hours PRN  metoprolol tartrate 25 milliGRAM(s) Oral daily  morphine  - Injectable 1 milliGRAM(s) IV Push every 3 hours PRN  ondansetron   Disintegrating Tablet 4 milliGRAM(s) Oral every 6 hours PRN  oxyCODONE    5 mG/acetaminophen 325 mG 1 Tablet(s) Oral every 4 hours PRN  oxyCODONE    IR 5 milliGRAM(s) Oral every 4 hours PRN  oxyCODONE    IR 10 milliGRAM(s) Oral every 4 hours PRN  senna 2 Tablet(s) Oral at bedtime  traMADol 50 milliGRAM(s) Oral two times a day Patient is a 68y old  Female who presents with a chief complaint of b/l hand numbness and gait instability (10 Dec 2018 08:56)      HPI:  69yo right hand dominant F with PMH of breast cancer s/p lumpectomy, XRT and on anastrozole, Hodgkins lymphoma s/p XRT, HTN, HLD who presented to [facility] on 12/4/18 for planned C4-6 ACDF on 12/6/18 with Dr. Daley for bilateral upper extremity weakness; outpatient MRI revealed herniated cervical disks. Post-op with minimal throat pain and hoarse voice but no difficulty swallowing; laryngoscope performed by ENT revealed no abnormalities.       Previously had seen neurologist for left hand paresthesias and was diagnosed with CTS with left hand carpal tunnel release with no relief of paresthesias (Sept/Oct 2018).       REVIEW OF SYSTEMS: +BUE numbness, left > right; +BUE weakness; No fevers, chills, headache, dizziness, blurry vision, palpitations, chest pain, shortness of breath, nausea, vomiting, constipation or diarrhea.        PAST MEDICAL & SURGICAL HISTORY  Hodgkin disease  HLD (hyperlipidemia)  HTN (hypertension)  Breast CA  Cervical disc herniation  H/O lumpectomy  No significant past surgical history      SOCIAL HISTORY  Smoking - Denied  EtOH - Denied   Drugs - Denied    FUNCTIONAL HISTORY  Lives with her  in a PH with 6 BEN from the garage, 7-8 steps inside.  Independent with ADLs and functional mobility prior to admission.    CURRENT FUNCTIONAL STATUS  Transfers supervision  Ambulated 150ft with RW independent. 10 steps with +HR independent.    FAMILY HISTORY   No pertinent family history in first degree relatives      RECENT LABS/IMAGING  Complete Blood Count + Automated Diff (12.06.18 @ 01:17)    WBC Count: 13.1 K/uL    RBC Count: 3.87 M/uL    Hemoglobin: 12.5 g/dL    Hematocrit: 36.2 %    Mean Cell Volume: 93.6 fl    Mean Cell Hemoglobin: 32.4 pg    Mean Cell Hemoglobin Conc: 34.6 gm/dL    Red Cell Distrib Width: 12.6 %    Platelet Count - Automated: 223 K/uL    Basic Metabolic Panel (12.06.18 @ 01:17)    Sodium, Serum: 140 mmol/L    Potassium, Serum: 3.6 mmol/L    Chloride, Serum: 102 mmol/L    Carbon Dioxide, Serum: 23 mmol/L    Anion Gap, Serum: 15 mmol/L    Blood Urea Nitrogen, Serum: 7 mg/dL    Creatinine, Serum: 0.37 mg/dL    Glucose, Serum: 95 mg/dL    Calcium, Total Serum: 8.3 mg/dL      ALLERGIES  penicillin (Unknown)      MEDICATIONS   acetaminophen   Tablet .. 650 milliGRAM(s) Oral every 6 hours PRN  anastrozole 1 milliGRAM(s) Oral daily  atorvastatin 40 milliGRAM(s) Oral at bedtime  benzocaine 15 mG/menthol 3.6 mG Lozenge 1 Lozenge Oral every 3 hours PRN  dexamethasone  Injectable 4 milliGRAM(s) IV Push every 8 hours  diphenhydrAMINE 25 milliGRAM(s) Oral every 4 hours PRN  docusate sodium 100 milliGRAM(s) Oral three times a day  enoxaparin Injectable 40 milliGRAM(s) SubCutaneous at bedtime  ergocalciferol 30185 Unit(s) Oral every week  famotidine    Tablet 20 milliGRAM(s) Oral every 12 hours PRN  gabapentin 600 milliGRAM(s) Oral two times a day  guaiFENesin    Syrup 100 milliGRAM(s) Oral every 6 hours PRN  metoprolol tartrate 25 milliGRAM(s) Oral daily  morphine  - Injectable 1 milliGRAM(s) IV Push every 3 hours PRN  ondansetron   Disintegrating Tablet 4 milliGRAM(s) Oral every 6 hours PRN  oxyCODONE    5 mG/acetaminophen 325 mG 1 Tablet(s) Oral every 4 hours PRN  oxyCODONE    IR 5 milliGRAM(s) Oral every 4 hours PRN  oxyCODONE    IR 10 milliGRAM(s) Oral every 4 hours PRN  senna 2 Tablet(s) Oral at bedtime  traMADol 50 milliGRAM(s) Oral two times a day    ----------------------------------------------------------------------------------------  T(C): 36.7 (12-10-18 @ 08:38), Max: 36.8 (12-09-18 @ 23:34)  HR: 66 (12-10-18 @ 08:38) (66 - 86)  BP: 131/82 (12-10-18 @ 08:38) (95/61 - 131/82)  RR: 16 (12-10-18 @ 08:38) (16 - 18)  SpO2: 95% (12-10-18 @ 08:38) (95% - 98%)    PHYSICAL EXAMINATION  Constitutional - NAD, Comfortable  HEENT - NCAT  Neck - ant cervical incision with overlying dressing; c-collar in place  Chest - Breathing comfortably, No wheezing  Cardiovascular - S1S2   Abdomen - Soft   Extremities - No C/C/E, No calf tenderness   Neurologic Exam -                    Cognitive - Awake, Alert, Oriented to self, place, date     Tone - Normal     Motor -                     LEFT    UE - ShAB 3/5, EF 4/5, EE 4-/5, WE 3+/5,  3+/5                    LEFT    LE - HF 5/5, KE 5/5, DF 5/5, PF 5/5                    RIGHT UE - ShAB 4/5, EF 4/5, EE 4-/5, WE 4-/5,  4-/5                    RIGHT LE - HF 5/5, KE 5/5, DF 5/5, PF 5/5        Sensory - Intact to LT B/L     Reflexes - Hypopreflexive in biceps and brachioradialis bilaterally but symmetric  Psychiatric - Mood stable, Affect WNL

## 2018-12-10 NOTE — OCCUPATIONAL THERAPY INITIAL EVALUATION ADULT - MANUAL MUSCLE TESTING RESULTS, REHAB EVAL
grossly assessed due to/RUE at least 3/5, LUE 3-/5 baseline to due carpel tunnel sx, BLE at least 3/5

## 2018-12-10 NOTE — OCCUPATIONAL THERAPY INITIAL EVALUATION ADULT - DIAGNOSIS, OT EVAL
Pt demonstrated decreased strength, ROM, balance and fine motor coordination impacting pt's ability to participate in functional mobility and ADLs.

## 2018-12-11 RX ORDER — OXYCODONE AND ACETAMINOPHEN 5; 325 MG/1; MG/1
1 TABLET ORAL EVERY 4 HOURS
Qty: 0 | Refills: 0 | Status: DISCONTINUED | OUTPATIENT
Start: 2018-12-11 | End: 2018-12-11

## 2018-12-11 RX ADMIN — Medication 4 MILLIGRAM(S): at 05:00

## 2018-12-11 RX ADMIN — ENOXAPARIN SODIUM 40 MILLIGRAM(S): 100 INJECTION SUBCUTANEOUS at 22:00

## 2018-12-11 RX ADMIN — Medication 100 MILLIGRAM(S): at 12:37

## 2018-12-11 RX ADMIN — ATORVASTATIN CALCIUM 40 MILLIGRAM(S): 80 TABLET, FILM COATED ORAL at 22:00

## 2018-12-11 RX ADMIN — TRAMADOL HYDROCHLORIDE 50 MILLIGRAM(S): 50 TABLET ORAL at 12:38

## 2018-12-11 RX ADMIN — Medication 4 MILLIGRAM(S): at 17:59

## 2018-12-11 RX ADMIN — Medication 25 MILLIGRAM(S): at 05:00

## 2018-12-11 RX ADMIN — ANASTROZOLE 1 MILLIGRAM(S): 1 TABLET ORAL at 12:37

## 2018-12-11 RX ADMIN — GABAPENTIN 600 MILLIGRAM(S): 400 CAPSULE ORAL at 17:59

## 2018-12-11 RX ADMIN — Medication 650 MILLIGRAM(S): at 22:03

## 2018-12-11 RX ADMIN — Medication 650 MILLIGRAM(S): at 22:30

## 2018-12-11 RX ADMIN — GABAPENTIN 600 MILLIGRAM(S): 400 CAPSULE ORAL at 05:00

## 2018-12-11 NOTE — PROGRESS NOTE ADULT - ASSESSMENT
67 YO F px to my associate Dr Aburto with compliants of hand numbness, had workup with another neurologist prior to that, found  cts, had release of left hand  Inc sx in right hand, says feet feel heavy  However exam does not find clear evidence of myelopathy  Post op doing well.  states paresthesias improving  power 5/5 except left arm which is preexisting    Neuro stable  Natalia  1. continue PT/OT  2. Continue neuropathic pain medications  3. d/c planning  4. Follow up with Dr. Jovel as outpatient  Reviewed plan with pt and family at bedside.

## 2018-12-11 NOTE — PROGRESS NOTE ADULT - SUBJECTIVE AND OBJECTIVE BOX
HPI:  Patient is a 68 year old female with b/l UE weakness.  Found to have cervical disc herniations on MRI.  Now presented for surgery.    OVERNIGHT EVENTS:  No acute events overnight.  Patient notes slight improvement in symptoms.  Tolerating diet (solids and liquids) with no difficulties.  Has been out of bed.  Cervical collar in place.      Vital Signs Last 24 Hrs  T(C): 36.8 (11 Dec 2018 12:43), Max: 36.8 (11 Dec 2018 12:43)  T(F): 98.3 (11 Dec 2018 12:43), Max: 98.3 (11 Dec 2018 12:43)  HR: 73 (11 Dec 2018 12:43) (63 - 79)  BP: 102/65 (11 Dec 2018 12:43) (102/65 - 143/83)  BP(mean): --  RR: 20 (11 Dec 2018 12:43) (16 - 20)  SpO2: 95% (11 Dec 2018 12:43) (94% - 96%)        PHYSICAL EXAM:  Neurological: awake, alert, oriented x3, follows commands, speech clear and fluent, LUE handgrip 4+/5 otherwise moves all extremities x4 w/ 5/5 strength throughout, sensation present, intact, equal throughout    General:  +cervical collar in place  Cardiovascular: +s1, s2  Respiratory: clear to auscultation b/l  Gastrointestinal: soft, non-distended, non-tender  Genitourinary: +voiding  Extremities: +radial/ulnar pulses palpable b/l  Incision/Wound: left anterior neck incision c/d/i w/ dermabond/prineo    TUBES/LINES:  [x] none    DIET:  [x] regular    LABS:  no new labwork    Allergies    penicillin (Unknown)        MEDICATIONS:  Antibiotics:  none    Neuro:  acetaminophen   Tablet .. 650 milliGRAM(s) Oral every 6 hours PRN  diphenhydrAMINE 25 milliGRAM(s) Oral every 4 hours PRN  gabapentin 600 milliGRAM(s) Oral two times a day  morphine  - Injectable 1 milliGRAM(s) IV Push every 3 hours PRN  ondansetron   Disintegrating Tablet 4 milliGRAM(s) Oral every 6 hours PRN  oxyCODONE    5 mG/acetaminophen 325 mG 1 Tablet(s) Oral every 4 hours PRN  oxyCODONE    IR 5 milliGRAM(s) Oral every 4 hours PRN  oxyCODONE    IR 10 milliGRAM(s) Oral every 4 hours PRN    Anticoagulation:  enoxaparin Injectable 40 milliGRAM(s) SubCutaneous at bedtime    OTHER:  anastrozole 1 milliGRAM(s) Oral daily  atorvastatin 40 milliGRAM(s) Oral at bedtime  benzocaine 15 mG/menthol 3.6 mG Lozenge 1 Lozenge Oral every 3 hours PRN  dexamethasone  Injectable 4 milliGRAM(s) IV Push every 12 hours  docusate sodium 100 milliGRAM(s) Oral three times a day  famotidine    Tablet 20 milliGRAM(s) Oral every 12 hours PRN  guaiFENesin    Syrup 100 milliGRAM(s) Oral every 6 hours PRN  metoprolol tartrate 25 milliGRAM(s) Oral daily  senna 2 Tablet(s) Oral at bedtime    IVF:  ergocalciferol 85357 Unit(s) Oral every week    CULTURES:  none    RADIOLOGY & ADDITIONAL TESTS:  no new imaging

## 2018-12-11 NOTE — PROGRESS NOTE ADULT - SUBJECTIVE AND OBJECTIVE BOX
Neurology  SUBJECTIVE:  No new complaints  Feels right hand paresthesias mildly improved  Some lightheadedness    Medications:  acetaminophen   Tablet .. 650 milliGRAM(s) Oral every 6 hours PRN  anastrozole 1 milliGRAM(s) Oral daily  atorvastatin 40 milliGRAM(s) Oral at bedtime  benzocaine 15 mG/menthol 3.6 mG Lozenge 1 Lozenge Oral every 3 hours PRN  dexamethasone  Injectable 4 milliGRAM(s) IV Push every 12 hours  diphenhydrAMINE 25 milliGRAM(s) Oral every 4 hours PRN  docusate sodium 100 milliGRAM(s) Oral three times a day  enoxaparin Injectable 40 milliGRAM(s) SubCutaneous at bedtime  ergocalciferol 25424 Unit(s) Oral every week  famotidine    Tablet 20 milliGRAM(s) Oral every 12 hours PRN  gabapentin 600 milliGRAM(s) Oral two times a day  guaiFENesin    Syrup 100 milliGRAM(s) Oral every 6 hours PRN  metoprolol tartrate 25 milliGRAM(s) Oral daily  morphine  - Injectable 1 milliGRAM(s) IV Push every 3 hours PRN  ondansetron   Disintegrating Tablet 4 milliGRAM(s) Oral every 6 hours PRN  oxyCODONE    5 mG/acetaminophen 325 mG 1 Tablet(s) Oral every 4 hours PRN  oxyCODONE    IR 5 milliGRAM(s) Oral every 4 hours PRN  oxyCODONE    IR 10 milliGRAM(s) Oral every 4 hours PRN  senna 2 Tablet(s) Oral at bedtime    Vitals:  Vital Signs Last 24 Hrs  T(C): 36.8 (11 Dec 2018 12:43), Max: 36.8 (11 Dec 2018 12:43)  T(F): 98.3 (11 Dec 2018 12:43), Max: 98.3 (11 Dec 2018 12:43)  HR: 73 (11 Dec 2018 12:43) (63 - 79)  BP: 102/65 (11 Dec 2018 12:43) (102/65 - 143/83)  BP(mean): --  RR: 20 (11 Dec 2018 12:43) (16 - 20)  SpO2: 95% (11 Dec 2018 12:43) (94% - 96%)    NEUROLOGICAL EXAM:    Mental status: Awake, alert, and in no apparent distress. Oriented to person, place and time. Language function is normal. Recent memory, digit span and concentration were normal.     Cranial Nerves: Pupils were equal, round, reactive to light. Extraocular movements were intact. Visual field were full. Fundoscopic exam was deferred. Facial sensation was intact to light touch. There was no facial asymmetry. The palate was upgoing symmetrically and tongue was midline. Hearing acuity was intact to finger rub AU. Shoulder shrug was full bilaterally    Motor exam: Bulk and tone were normal except atrophy of hand muscles in left Strength was 5/5 in all four extremities except lefh hand . Fine finger movements were symmetric and but slow. There was no pronator drift    Reflexes: 2+ in the bilateral upper extremities. 2+ in the bilateral lower extremities. Toes were downgoing bilaterally.     Sensation: Intact to light touch, temperature, vibration and proprioception.     Coordination: Finger-nose-finger without dysmetria.     Gait: walks without assistance

## 2018-12-11 NOTE — PROGRESS NOTE ADULT - ASSESSMENT
HPI:  Patient is a 68 year old female with b/l UE weakness.  Found to have cervical disc herniations on MRI.  Now presented for surgery.    PROCEDURE: s/p C4-C6 anterior cervical discectomy and fusion on 12/5/2018  POD#6    PLAN:  -oxycodone and gabapentin for pain control  -decadron taper  -lovenox and SCDs for DVT prophylaxis  -home medications  -senna and colace for bowel regimen  -cervical collar at all times  -regular diet  -out of bed with assistance  -incentive spirometer for lung expansion  -pt - home pt w/ rolling walker upon discharge  -dispo planning    Spectra #[id number]

## 2018-12-12 VITALS
OXYGEN SATURATION: 95 % | TEMPERATURE: 98 F | RESPIRATION RATE: 18 BRPM | SYSTOLIC BLOOD PRESSURE: 121 MMHG | DIASTOLIC BLOOD PRESSURE: 72 MMHG | HEART RATE: 75 BPM

## 2018-12-12 RX ORDER — GABAPENTIN 400 MG/1
2 CAPSULE ORAL
Qty: 0 | Refills: 0 | DISCHARGE
Start: 2018-12-12

## 2018-12-12 RX ORDER — TRAMADOL HYDROCHLORIDE 50 MG/1
1 TABLET ORAL
Qty: 14 | Refills: 0 | OUTPATIENT
Start: 2018-12-12 | End: 2018-12-18

## 2018-12-12 RX ORDER — GABAPENTIN 400 MG/1
1 CAPSULE ORAL
Qty: 0 | Refills: 0 | COMMUNITY
Start: 2018-12-12

## 2018-12-12 RX ORDER — DEXAMETHASONE 0.5 MG/5ML
4 ELIXIR ORAL EVERY 12 HOURS
Qty: 0 | Refills: 0 | Status: DISCONTINUED | OUTPATIENT
Start: 2018-12-12 | End: 2018-12-12

## 2018-12-12 RX ORDER — GABAPENTIN 400 MG/1
2 CAPSULE ORAL
Qty: 0 | Refills: 0 | COMMUNITY
Start: 2018-12-12

## 2018-12-12 RX ORDER — METOPROLOL TARTRATE 50 MG
1 TABLET ORAL
Qty: 30 | Refills: 0 | OUTPATIENT
Start: 2018-12-12 | End: 2019-01-10

## 2018-12-12 RX ORDER — TRAMADOL HYDROCHLORIDE 50 MG/1
50 TABLET ORAL
Qty: 0 | Refills: 0 | Status: DISCONTINUED | OUTPATIENT
Start: 2018-12-12 | End: 2018-12-12

## 2018-12-12 RX ORDER — TRAMADOL HYDROCHLORIDE 50 MG/1
1 TABLET ORAL
Qty: 14 | Refills: 0
Start: 2018-12-12 | End: 2018-12-18

## 2018-12-12 RX ORDER — FAMOTIDINE 10 MG/ML
1 INJECTION INTRAVENOUS
Qty: 60 | Refills: 0 | OUTPATIENT
Start: 2018-12-12 | End: 2019-01-10

## 2018-12-12 RX ORDER — ACETAMINOPHEN 500 MG
2 TABLET ORAL
Qty: 0 | Refills: 0 | DISCHARGE
Start: 2018-12-12

## 2018-12-12 RX ORDER — GABAPENTIN 400 MG/1
1 CAPSULE ORAL
Qty: 60 | Refills: 2 | OUTPATIENT
Start: 2018-12-12 | End: 2019-03-11

## 2018-12-12 RX ORDER — METOPROLOL TARTRATE 50 MG
1 TABLET ORAL
Qty: 0 | Refills: 0 | DISCHARGE
Start: 2018-12-12

## 2018-12-12 RX ORDER — BENZOCAINE AND MENTHOL 5; 1 G/100ML; G/100ML
0 LIQUID ORAL
Qty: 0 | Refills: 0 | DISCHARGE
Start: 2018-12-12

## 2018-12-12 RX ORDER — SENNA PLUS 8.6 MG/1
2 TABLET ORAL
Qty: 0 | Refills: 0 | DISCHARGE
Start: 2018-12-12

## 2018-12-12 RX ORDER — FAMOTIDINE 10 MG/ML
1 INJECTION INTRAVENOUS
Qty: 0 | Refills: 0 | DISCHARGE
Start: 2018-12-12

## 2018-12-12 RX ORDER — DOCUSATE SODIUM 100 MG
1 CAPSULE ORAL
Qty: 0 | Refills: 0 | DISCHARGE
Start: 2018-12-12

## 2018-12-12 RX ADMIN — Medication 650 MILLIGRAM(S): at 15:30

## 2018-12-12 RX ADMIN — Medication 100 MILLIGRAM(S): at 05:03

## 2018-12-12 RX ADMIN — Medication 25 MILLIGRAM(S): at 05:02

## 2018-12-12 RX ADMIN — Medication 650 MILLIGRAM(S): at 14:53

## 2018-12-12 RX ADMIN — ERGOCALCIFEROL 50000 UNIT(S): 1.25 CAPSULE ORAL at 12:07

## 2018-12-12 RX ADMIN — GABAPENTIN 600 MILLIGRAM(S): 400 CAPSULE ORAL at 05:02

## 2018-12-12 RX ADMIN — Medication 4 MILLIGRAM(S): at 05:02

## 2018-12-12 RX ADMIN — ANASTROZOLE 1 MILLIGRAM(S): 1 TABLET ORAL at 12:07

## 2018-12-12 NOTE — PROGRESS NOTE ADULT - SUBJECTIVE AND OBJECTIVE BOX
Neurology  SUBJECTIVE:  No new complaints  Feels right hand paresthesias mildly improved    Medications:  acetaminophen   Tablet .. 650 milliGRAM(s) Oral every 6 hours PRN  anastrozole 1 milliGRAM(s) Oral daily  atorvastatin 40 milliGRAM(s) Oral at bedtime  benzocaine 15 mG/menthol 3.6 mG Lozenge 1 Lozenge Oral every 3 hours PRN  dexamethasone     Tablet 4 milliGRAM(s) Oral every 12 hours  diphenhydrAMINE 25 milliGRAM(s) Oral every 4 hours PRN  docusate sodium 100 milliGRAM(s) Oral three times a day  enoxaparin Injectable 40 milliGRAM(s) SubCutaneous at bedtime  ergocalciferol 57349 Unit(s) Oral every week  famotidine    Tablet 20 milliGRAM(s) Oral every 12 hours PRN  gabapentin 600 milliGRAM(s) Oral two times a day  guaiFENesin    Syrup 100 milliGRAM(s) Oral every 6 hours PRN  metoprolol tartrate 25 milliGRAM(s) Oral daily  ondansetron   Disintegrating Tablet 4 milliGRAM(s) Oral every 6 hours PRN  senna 2 Tablet(s) Oral at bedtime  traMADol 50 milliGRAM(s) Oral two times a day      Labs:          CAPILLARY BLOOD GLUCOSE                  Vitals:  Vital Signs Last 24 Hrs  T(C): 36.7 (12 Dec 2018 08:22), Max: 37.1 (11 Dec 2018 15:39)  T(F): 98 (12 Dec 2018 08:22), Max: 98.8 (11 Dec 2018 15:39)  HR: 65 (12 Dec 2018 08:22) (65 - 85)  BP: 109/70 (12 Dec 2018 08:22) (96/59 - 171/96)  BP(mean): --  RR: 17 (12 Dec 2018 08:22) (16 - 20)  SpO2: 95% (12 Dec 2018 08:22) (95% - 98%)  NEUROLOGICAL EXAM:    Mental status: Awake, alert, and in no apparent distress. Oriented to person, place and time. Language function is normal. Recent memory, digit span and concentration were normal.     Cranial Nerves: Pupils were equal, round, reactive to light. Extraocular movements were intact. Visual field were full. Fundoscopic exam was deferred. Facial sensation was intact to light touch. There was no facial asymmetry. The palate was upgoing symmetrically and tongue was midline. Hearing acuity was intact to finger rub AU. Shoulder shrug was full bilaterally    Motor exam: Bulk and tone were normal except atrophy of hand muscles in left Strength was 5/5 in all four extremities except lefh hand . Fine finger movements were symmetric and but slow. There was no pronator drift    Reflexes: 2+ in the bilateral upper extremities. 2+ in the bilateral lower extremities. Toes were downgoing bilaterally.     Sensation: Intact to light touch, temperature, vibration and proprioception.     Coordination: Finger-nose-finger without dysmetria.     Gait: walks without assistance

## 2018-12-12 NOTE — PROGRESS NOTE ADULT - REASON FOR ADMISSION
b/l hand numbness and gait instability

## 2018-12-12 NOTE — PROGRESS NOTE ADULT - SUBJECTIVE AND OBJECTIVE BOX
Post-op day # 7 s/p C4-6 ACDF    OVERNIGHT EVENTS: None    Vital Signs Last 24 Hrs  T(C): 36.7 (12 Dec 2018 08:22), Max: 37.1 (11 Dec 2018 15:39)  T(F): 98 (12 Dec 2018 08:22), Max: 98.8 (11 Dec 2018 15:39)  HR: 65 (12 Dec 2018 08:22) (65 - 85)  BP: 109/70 (12 Dec 2018 08:22) (96/59 - 171/96)  BP(mean): --  RR: 17 (12 Dec 2018 08:22) (16 - 20)  SpO2: 95% (12 Dec 2018 08:22) (95% - 98%)    I&O's Detail    11 Dec 2018 07:01  -  12 Dec 2018 07:00  --------------------------------------------------------  IN:    Oral Fluid: 1190 mL  Total IN: 1190 mL    OUT:  Total OUT: 0 mL    Total NET: 1190 mL        I&O's Summary    11 Dec 2018 07:01  -  12 Dec 2018 07:00  --------------------------------------------------------  IN: 1190 mL / OUT: 0 mL / NET: 1190 mL        PHYSICAL EXAM:  Neurological:    Motor exam:         [x] Upper extremity                 D             B          T          WE       WF      HI                                               R         5/5        5/5        5/5       5/5     5/5       5/5                                               L        4/5      4/5   4/5      4/5    4/5       4/5         [x] Lower extremity                Ps          Ha        Quad    EHL        FHL                                               R        5/5        5/5        5/5       5/5         5/5                                               L         5/5        5/5       5/5       5/5          5/5                                                          Sensation: [x] intact to light touch  [] decreased:     Gait: intact    Cardiovascular: Regular  Respiratory: Clear bilaterally  Gastrointestinal: Soft + BS non tender  Genitourinary:  Extremities: -C/C/E  Incision/Wound: nicely healing       DIET: Regular  [] NPO  [] Mechanical  [] Tube feeds    LABS:      Allergies    penicillin (Unknown)    Intolerances      MEDICATIONS:  Antibiotics:    Neuro:  acetaminophen   Tablet .. 650 milliGRAM(s) Oral every 6 hours PRN  diphenhydrAMINE 25 milliGRAM(s) Oral every 4 hours PRN  gabapentin 600 milliGRAM(s) Oral two times a day  ondansetron   Disintegrating Tablet 4 milliGRAM(s) Oral every 6 hours PRN  traMADol 50 milliGRAM(s) Oral two times a day    Anticoagulation:  enoxaparin Injectable 40 milliGRAM(s) SubCutaneous at bedtime    OTHER:  anastrozole 1 milliGRAM(s) Oral daily  atorvastatin 40 milliGRAM(s) Oral at bedtime  benzocaine 15 mG/menthol 3.6 mG Lozenge 1 Lozenge Oral every 3 hours PRN  docusate sodium 100 milliGRAM(s) Oral three times a day  famotidine    Tablet 20 milliGRAM(s) Oral every 12 hours PRN  guaiFENesin    Syrup 100 milliGRAM(s) Oral every 6 hours PRN  metoprolol tartrate 25 milliGRAM(s) Oral daily  senna 2 Tablet(s) Oral at bedtime    IVF:  ergocalciferol 30585 Unit(s) Oral every week    CULTURES:    RADIOLOGY & ADDITIONAL TESTS:      ASSESSMENT:     68F pmh breast ca, hodgkins lymphoma was sent in for surgery for herniated cervical disks with Dr. Daley.  Pt has had weakness of b/l upper extremities and was found the have herniated disks on recent MRI.  Pt denies chest pain, shortness of breath, nausea/vomiting/diarrhea, fever, chills (04 Dec 2018 19:42)    68y Female Post-op day # 7 s/p C4-6 ACDF    Assessment:  Please Check When Present   [X]  GCS  E4   V5  M6    Heart Failure: []Acute, [] acute on chronic , []chronic  Heart Failure:  [] Diastolic (HFpEF), [] Systolic (HFrEF), []Combined (HFpEF and HFrEF), [] RHF, [] Pulm HTN, [] Other    [] SUSU, [] ATN, [] AIN, [] other  [] CKD1, [] CKD2, [] CKD 3, [] CKD 4, [] CKD 5, []ESRD    Encephalopathy: [] Metabolic, [] Hepatic, [] toxic, [] Neurological, [] Other    Abnormal Nurtitional Status: [] malnurtition (see nutrition note), [ ]underweight: BMI < 19, [] morbid obesity: BMI >40, [] Cachexia    [] Sepsis  [] hypovolemic shock,[] cardiogenic shock, [] hemorrhagic shock, [] neuogenic shock  [] Acute Respiratory Failure  []Cerebral edema, [] Brain compression/ herniation,   [] Functional quadriplegia  [] Acute blood loss anemia    PLAN:  NEURO: Stable will d/c home today  CARDIOVASCULAR: Stable on metoprolol  PULMONARY: Incentive spirometer  RENAL: BUN/Creat stable  GI: Tolerating Po   HEME: H/H stable  ID: Afebrile  ENDO: no issue    DVT PROPHYLAXIS:  [X] Venodynes                                [X] Heparin/Lovenox    FALL RISK:  [X] Low Risk                                    [] Impulsive

## 2018-12-12 NOTE — PROGRESS NOTE ADULT - ASSESSMENT
69 YO F px to my associate Dr Aburto with compliants of hand numbness, had workup with another neurologist prior to that, found  cts, had release of left hand  Inc sx in right hand, says feet feel heavy  However exam does not find clear evidence of myelopathy  Post op doing well.  states paresthesias improving  power 5/5 except left arm which is preexisting    Neuro stable  Natalia  1. continue PT/OT  2. Continue neuropathic pain medications  3. d/c planning  4. Follow up with Dr. Jovel as outpatient  pt told can restart meds by dr betancourt but at lower dose

## 2018-12-18 ENCOUNTER — APPOINTMENT (OUTPATIENT)
Dept: NEUROSURGERY | Facility: CLINIC | Age: 68
End: 2018-12-18
Payer: MEDICARE

## 2018-12-18 VITALS
HEART RATE: 104 BPM | SYSTOLIC BLOOD PRESSURE: 130 MMHG | WEIGHT: 140 LBS | HEIGHT: 67 IN | DIASTOLIC BLOOD PRESSURE: 87 MMHG | BODY MASS INDEX: 21.97 KG/M2

## 2018-12-18 PROCEDURE — 99024 POSTOP FOLLOW-UP VISIT: CPT

## 2018-12-19 ENCOUNTER — TRANSCRIPTION ENCOUNTER (OUTPATIENT)
Age: 68
End: 2018-12-19

## 2019-03-02 ENCOUNTER — TRANSCRIPTION ENCOUNTER (OUTPATIENT)
Age: 69
End: 2019-03-02

## 2019-03-21 PROBLEM — E78.5 HYPERLIPIDEMIA, UNSPECIFIED: Chronic | Status: ACTIVE | Noted: 2018-12-04

## 2019-03-21 PROBLEM — I10 ESSENTIAL (PRIMARY) HYPERTENSION: Chronic | Status: ACTIVE | Noted: 2018-12-04

## 2019-03-29 ENCOUNTER — OUTPATIENT (OUTPATIENT)
Dept: OUTPATIENT SERVICES | Facility: HOSPITAL | Age: 69
LOS: 1 days | End: 2019-03-29
Payer: MEDICARE

## 2019-03-29 VITALS
HEART RATE: 85 BPM | WEIGHT: 145.06 LBS | OXYGEN SATURATION: 96 % | RESPIRATION RATE: 16 BRPM | SYSTOLIC BLOOD PRESSURE: 116 MMHG | TEMPERATURE: 98 F | HEIGHT: 67 IN | DIASTOLIC BLOOD PRESSURE: 75 MMHG

## 2019-03-29 DIAGNOSIS — Z98.1 ARTHRODESIS STATUS: ICD-10-CM

## 2019-03-29 DIAGNOSIS — Z90.89 ACQUIRED ABSENCE OF OTHER ORGANS: Chronic | ICD-10-CM

## 2019-03-29 DIAGNOSIS — Z98.1 ARTHRODESIS STATUS: Chronic | ICD-10-CM

## 2019-03-29 DIAGNOSIS — Z29.9 ENCOUNTER FOR PROPHYLACTIC MEASURES, UNSPECIFIED: ICD-10-CM

## 2019-03-29 DIAGNOSIS — Z90.49 ACQUIRED ABSENCE OF OTHER SPECIFIED PARTS OF DIGESTIVE TRACT: Chronic | ICD-10-CM

## 2019-03-29 DIAGNOSIS — Z98.890 OTHER SPECIFIED POSTPROCEDURAL STATES: Chronic | ICD-10-CM

## 2019-03-29 DIAGNOSIS — M43.25 FUSION OF SPINE, THORACOLUMBAR REGION: ICD-10-CM

## 2019-03-29 DIAGNOSIS — Z85.828 PERSONAL HISTORY OF OTHER MALIGNANT NEOPLASM OF SKIN: Chronic | ICD-10-CM

## 2019-03-29 LAB
ANION GAP SERPL CALC-SCNC: 10 MMOL/L — SIGNIFICANT CHANGE UP (ref 5–17)
BLD GP AB SCN SERPL QL: NEGATIVE — SIGNIFICANT CHANGE UP
BUN SERPL-MCNC: 16 MG/DL — SIGNIFICANT CHANGE UP (ref 7–23)
CALCIUM SERPL-MCNC: 10.1 MG/DL — SIGNIFICANT CHANGE UP (ref 8.4–10.5)
CHLORIDE SERPL-SCNC: 106 MMOL/L — SIGNIFICANT CHANGE UP (ref 96–108)
CO2 SERPL-SCNC: 26 MMOL/L — SIGNIFICANT CHANGE UP (ref 22–31)
CREAT SERPL-MCNC: 0.44 MG/DL — LOW (ref 0.5–1.3)
GLUCOSE SERPL-MCNC: 92 MG/DL — SIGNIFICANT CHANGE UP (ref 70–99)
HCT VFR BLD CALC: 38.1 % — SIGNIFICANT CHANGE UP (ref 34.5–45)
HGB BLD-MCNC: 12 G/DL — SIGNIFICANT CHANGE UP (ref 11.5–15.5)
MCHC RBC-ENTMCNC: 29.6 PG — SIGNIFICANT CHANGE UP (ref 27–34)
MCHC RBC-ENTMCNC: 31.5 GM/DL — LOW (ref 32–36)
MCV RBC AUTO: 94.1 FL — SIGNIFICANT CHANGE UP (ref 80–100)
PLATELET # BLD AUTO: 308 K/UL — SIGNIFICANT CHANGE UP (ref 150–400)
POTASSIUM SERPL-MCNC: 4.1 MMOL/L — SIGNIFICANT CHANGE UP (ref 3.5–5.3)
POTASSIUM SERPL-SCNC: 4.1 MMOL/L — SIGNIFICANT CHANGE UP (ref 3.5–5.3)
RBC # BLD: 4.05 M/UL — SIGNIFICANT CHANGE UP (ref 3.8–5.2)
RBC # FLD: 14.7 % — HIGH (ref 10.3–14.5)
RH IG SCN BLD-IMP: POSITIVE — SIGNIFICANT CHANGE UP
SODIUM SERPL-SCNC: 142 MMOL/L — SIGNIFICANT CHANGE UP (ref 135–145)
WBC # BLD: 6.73 K/UL — SIGNIFICANT CHANGE UP (ref 3.8–10.5)
WBC # FLD AUTO: 6.73 K/UL — SIGNIFICANT CHANGE UP (ref 3.8–10.5)

## 2019-03-29 PROCEDURE — 86901 BLOOD TYPING SEROLOGIC RH(D): CPT

## 2019-03-29 PROCEDURE — 87641 MR-STAPH DNA AMP PROBE: CPT

## 2019-03-29 PROCEDURE — 86850 RBC ANTIBODY SCREEN: CPT

## 2019-03-29 PROCEDURE — 80048 BASIC METABOLIC PNL TOTAL CA: CPT

## 2019-03-29 PROCEDURE — 86900 BLOOD TYPING SEROLOGIC ABO: CPT

## 2019-03-29 PROCEDURE — G0463: CPT

## 2019-03-29 PROCEDURE — 87640 STAPH A DNA AMP PROBE: CPT

## 2019-03-29 PROCEDURE — 85027 COMPLETE CBC AUTOMATED: CPT

## 2019-03-29 NOTE — H&P PST ADULT - ASSESSMENT
ABIGAILI VTE 2.0 SCORE [CLOT updated 2019]    AGE RELATED RISK FACTORS                                                       MOBILITY RELATED FACTORS  [ ] Age 41-60 years                                            (1 Point)                    [ ] Bed rest                                                        (1 Point)  [x ] Age: 61-74 years                                           (2 Points)                  [ ] Plaster cast                                                   (2 Points)  [ ] Age= 75 years                                              (3 Points)                    [ ] Bed bound for more than 72 hours                 (2 Points)    DISEASE RELATED RISK FACTORS                                               GENDER SPECIFIC FACTORS  [ ] Edema in the lower extremities                       (1 Point)              [ ] Pregnancy                                                     (1 Point)  [ ] Varicose veins                                               (1 Point)                     [ ] Post-partum < 6 weeks                                   (1 Point)             [ ] BMI > 25 Kg/m2                                            (1 Point)                     [ ] Hormonal therapy  or oral contraception          (1 Point)                 [ ] Sepsis (in the previous month)                        (1 Point)               [ ] History of pregnancy complications                 (1 point)  [ ] Pneumonia or serious lung disease                                               [ ] Unexplained or recurrent                     (1 Point)           (in the previous month)                               (1 Point)  [ ] Abnormal pulmonary function test                     (1 Point)                 SURGERY RELATED RISK FACTORS  [ ] Acute myocardial infarction                              (1 Point)               [ ]  Section                                             (1 Point)  [ ] Congestive heart failure (in the previous month)  (1 Point)      [ ] Minor surgery                                                  (1 Point)   [ ] Inflammatory bowel disease                             (1 Point)               [ ] Arthroscopic surgery                                        (2 Points)  [ ] Central venous access                                      (2 Points)                [x ] General surgery lasting more than 45 minutes (2 points)  [ x] Malignancy- Present or previous                   (2 Points)                [ ] Elective arthroplasty                                         (5 points)    [ ] Stroke (in the previous month)                          (5 Points)                                                                                                                                                           HEMATOLOGY RELATED FACTORS                                                 TRAUMA RELATED RISK FACTORS  [ ] Prior episodes of VTE                                     (3 Points)                [ ] Fracture of the hip, pelvis, or leg                       (5 Points)  [ ] Positive family history for VTE                         (3 Points)             [ ] Acute spinal cord injury (in the previous month)  (5 Points)  [ ] Prothrombin 87933 A                                     (3 Points)               [ ] Paralysis  (less than 1 month)                             (5 Points)  [ ] Factor V Leiden                                             (3 Points)                  [ ] Multiple Trauma within 1 month                        (5 Points)  [ ] Lupus anticoagulants                                     (3 Points)                                                           [ ] Anticardiolipin antibodies                               (3 Points)                                                       [ ] High homocysteine in the blood                      (3 Points)                                             [ ] Other congenital or acquired thrombophilia      (3 Points)                                                [ ] Heparin induced thrombocytopenia                  (3 Points)                                     Total Score [      6    ]

## 2019-03-29 NOTE — H&P PST ADULT - SOURCE OF INFORMATION, PROFILE
patient [phone] ,  Xiang  cell # [phone]/patient patient/[phone] ,  Xiang  cell # [phone]/chart(s)

## 2019-03-29 NOTE — H&P PST ADULT - NSICDXPASTSURGICALHX_GEN_ALL_CORE_FT
PAST SURGICAL HISTORY:  H/O lumpectomy     History of lumpectomy     S/P appendectomy PAST SURGICAL HISTORY:  History of carpal tunnel surgery 10/2018 left hand    History of lumpectomy of right breast 2013    History of Mohs micrographic surgery for skin cancer     History of tonsillectomy     S/P cervical spinal fusion 12/15/2018 C4-C6 ACDF

## 2019-03-29 NOTE — H&P PST ADULT - HISTORY OF PRESENT ILLNESS
68 yr old female with HTN, HLD, breast ca, right lumpectomy, RT, Hodgkin's lymphoma, cervical spine disease, underwent C4-C6 ACDF on 12/15/18, patient wear neck collar support, reports cervical instability, " fragile bones" , presents to PST for scheduled C3 anterior cervical extension of fusion w/ENT exposure, C2-T2 posterior fusion, plastic closure on 4/5/19 @ [facility]. Patient denies fever, chills, ambulating without assistance.

## 2019-03-29 NOTE — H&P PST ADULT - NSICDXPASTMEDICALHX_GEN_ALL_CORE_FT
PAST MEDICAL HISTORY:  Breast CA Rt Sp lumpectomy and RT    Breast cancer right side    Carpal tunnel syndrome b/l , s/p surgery 10/2018 left hand    Cervical disc disease     Cervical disc herniation Planning surg    HLD (hyperlipidemia)     Hodgkin disease 1981    Hodgkin disease     HTN (hypertension) PAST MEDICAL HISTORY:  Anxiety     Carpal tunnel syndrome b/l    Cervical disc disease     Cervical disc herniation     History of breast cancer s/p right lumpectomy , RT    HLD (hyperlipidemia)     Hodgkin disease 1981, underwent radiation therapy    HTN (hypertension)

## 2019-03-29 NOTE — H&P PST ADULT - NSICDXFAMILYHX_GEN_ALL_CORE_FT
FAMILY HISTORY:  No pertinent family history in first degree relatives    Mother  Still living? Unknown  Family history of colon cancer in mother, Age at diagnosis: Age Unknown FAMILY HISTORY:  Mother  Still living? Unknown  Family history of colon cancer in mother, Age at diagnosis: Age Unknown

## 2019-03-29 NOTE — H&P PST ADULT - ALLERGIC/IMMUNOLOGIC
negative Scribe Attestation (For Scribes USE Only)... Attending Attestation (For Attendings USE Only).../Scribe Attestation (For Scribes USE Only)...

## 2019-03-29 NOTE — H&P PST ADULT - NSICDXPROBLEM_GEN_ALL_CORE_FT
PROBLEM DIAGNOSES  Problem: S/P cervical spinal fusion  Assessment and Plan: C3 anterior cervical extension of fusion w/ENT exposure, C2-T2 posterior fusion, plastic closure on 4/5/19 @ [facility].  PST instructions provided, surgical scrub given, patient verbalized understanding.  CBC, BMP, T&S, MRSA collected and sent.       Problem: Need for prophylactic measure  Assessment and Plan: The Caprini score indicates that this patient is at high risk for a VTE event (score 6 or greater). Surgical patients in this group will benefit from both pharmacologic prophylaxis and intermittent compression devices.  The surgical team will determine the balance between VTE risk and bleeding risk, and other clinical considerations PROBLEM DIAGNOSES  Problem: S/P cervical spinal fusion  Assessment and Plan: C3 anterior cervical extension of fusion w/ENT exposure, C2-T2 posterior fusion, plastic closure on 4/5/19 @ [facility].  PST instructions provided, surgical scrub given, patient verbalized understanding.  CBC, BMP, T&S, MRSA collected and sent.   Preop orders by [facility] team    Problem: Need for prophylactic measure  Assessment and Plan: The Caprini score indicates that this patient is at high risk for a VTE event (score 6 or greater). Surgical patients in this group will benefit from both pharmacologic prophylaxis and intermittent compression devices.  The surgical team will determine the balance between VTE risk and bleeding risk, and other clinical considerations

## 2019-03-30 PROBLEM — C50.919 MALIGNANT NEOPLASM OF UNSPECIFIED SITE OF UNSPECIFIED FEMALE BREAST: Chronic | Status: INACTIVE | Noted: 2018-12-04 | Resolved: 2019-03-29

## 2019-03-30 PROBLEM — C81.90 HODGKIN LYMPHOMA, UNSPECIFIED, UNSPECIFIED SITE: Chronic | Status: ACTIVE | Noted: 2018-12-04

## 2019-03-30 PROBLEM — M50.20 OTHER CERVICAL DISC DISPLACEMENT, UNSPECIFIED CERVICAL REGION: Chronic | Status: ACTIVE | Noted: 2018-12-04

## 2019-03-30 PROBLEM — C81.90 HODGKIN LYMPHOMA, UNSPECIFIED, UNSPECIFIED SITE: Chronic | Status: INACTIVE | Noted: 2018-09-20 | Resolved: 2019-03-29

## 2019-03-30 PROBLEM — G56.00 CARPAL TUNNEL SYNDROME, UNSPECIFIED UPPER LIMB: Chronic | Status: ACTIVE | Noted: 2018-11-19

## 2019-03-30 PROBLEM — C50.919 MALIGNANT NEOPLASM OF UNSPECIFIED SITE OF UNSPECIFIED FEMALE BREAST: Chronic | Status: INACTIVE | Noted: 2018-09-20 | Resolved: 2019-03-29

## 2019-03-30 PROBLEM — Z85.3 PERSONAL HISTORY OF MALIGNANT NEOPLASM OF BREAST: Chronic | Status: ACTIVE | Noted: 2019-03-29

## 2019-03-30 LAB
MRSA PCR RESULT.: SIGNIFICANT CHANGE UP
S AUREUS DNA NOSE QL NAA+PROBE: SIGNIFICANT CHANGE UP

## 2019-04-02 ENCOUNTER — APPOINTMENT (OUTPATIENT)
Dept: NEUROSURGERY | Facility: CLINIC | Age: 69
End: 2019-04-02
Payer: MEDICARE

## 2019-04-02 VITALS
HEIGHT: 67 IN | DIASTOLIC BLOOD PRESSURE: 86 MMHG | WEIGHT: 149 LBS | SYSTOLIC BLOOD PRESSURE: 156 MMHG | HEART RATE: 97 BPM | BODY MASS INDEX: 23.39 KG/M2

## 2019-04-02 DIAGNOSIS — M48.52XA: ICD-10-CM

## 2019-04-02 PROCEDURE — 99214 OFFICE O/P EST MOD 30 MIN: CPT

## 2019-04-03 PROBLEM — M48.52XA: Status: ACTIVE | Noted: 2019-04-03

## 2019-04-05 ENCOUNTER — APPOINTMENT (OUTPATIENT)
Dept: NEUROSURGERY | Facility: HOSPITAL | Age: 69
End: 2019-04-05

## 2019-04-05 ENCOUNTER — INPATIENT (INPATIENT)
Facility: HOSPITAL | Age: 69
LOS: 14 days | Discharge: NURSING, REHAB OR LONG-TERM CARE | DRG: 453 | End: 2019-04-20
Attending: STUDENT IN AN ORGANIZED HEALTH CARE EDUCATION/TRAINING PROGRAM | Admitting: STUDENT IN AN ORGANIZED HEALTH CARE EDUCATION/TRAINING PROGRAM
Payer: MEDICARE

## 2019-04-05 VITALS
WEIGHT: 150.58 LBS | SYSTOLIC BLOOD PRESSURE: 150 MMHG | OXYGEN SATURATION: 98 % | HEIGHT: 67 IN | TEMPERATURE: 98 F | HEART RATE: 85 BPM | DIASTOLIC BLOOD PRESSURE: 73 MMHG | RESPIRATION RATE: 18 BRPM

## 2019-04-05 DIAGNOSIS — Z98.890 OTHER SPECIFIED POSTPROCEDURAL STATES: Chronic | ICD-10-CM

## 2019-04-05 DIAGNOSIS — Z90.89 ACQUIRED ABSENCE OF OTHER ORGANS: Chronic | ICD-10-CM

## 2019-04-05 DIAGNOSIS — Z85.828 PERSONAL HISTORY OF OTHER MALIGNANT NEOPLASM OF SKIN: Chronic | ICD-10-CM

## 2019-04-05 DIAGNOSIS — Z98.1 ARTHRODESIS STATUS: Chronic | ICD-10-CM

## 2019-04-05 LAB
ANION GAP SERPL CALC-SCNC: 13 MMOL/L — SIGNIFICANT CHANGE UP (ref 5–17)
APTT BLD: 31 SEC — SIGNIFICANT CHANGE UP (ref 27.5–36.3)
BLD GP AB SCN SERPL QL: NEGATIVE — SIGNIFICANT CHANGE UP
BUN SERPL-MCNC: 12 MG/DL — SIGNIFICANT CHANGE UP (ref 7–23)
CALCIUM SERPL-MCNC: 9.3 MG/DL — SIGNIFICANT CHANGE UP (ref 8.4–10.5)
CHLORIDE SERPL-SCNC: 103 MMOL/L — SIGNIFICANT CHANGE UP (ref 96–108)
CO2 SERPL-SCNC: 22 MMOL/L — SIGNIFICANT CHANGE UP (ref 22–31)
CREAT SERPL-MCNC: 0.43 MG/DL — LOW (ref 0.5–1.3)
GLUCOSE BLDC GLUCOMTR-MCNC: 152 MG/DL — HIGH (ref 70–99)
GLUCOSE BLDC GLUCOMTR-MCNC: 164 MG/DL — HIGH (ref 70–99)
GLUCOSE SERPL-MCNC: 185 MG/DL — HIGH (ref 70–99)
HCT VFR BLD CALC: 35.8 % — SIGNIFICANT CHANGE UP (ref 34.5–45)
HGB BLD-MCNC: 11.6 G/DL — SIGNIFICANT CHANGE UP (ref 11.5–15.5)
INR BLD: 1.02 — SIGNIFICANT CHANGE UP (ref 0.88–1.16)
MAGNESIUM SERPL-MCNC: 2.1 MG/DL — SIGNIFICANT CHANGE UP (ref 1.6–2.6)
MCHC RBC-ENTMCNC: 30.1 PG — SIGNIFICANT CHANGE UP (ref 27–34)
MCHC RBC-ENTMCNC: 32.4 GM/DL — SIGNIFICANT CHANGE UP (ref 32–36)
MCV RBC AUTO: 92.7 FL — SIGNIFICANT CHANGE UP (ref 80–100)
NRBC # BLD: 0 /100 WBCS — SIGNIFICANT CHANGE UP (ref 0–0)
PHOSPHATE SERPL-MCNC: 5.1 MG/DL — HIGH (ref 2.5–4.5)
PLATELET # BLD AUTO: 245 K/UL — SIGNIFICANT CHANGE UP (ref 150–400)
POTASSIUM SERPL-MCNC: 3.8 MMOL/L — SIGNIFICANT CHANGE UP (ref 3.5–5.3)
POTASSIUM SERPL-SCNC: 3.8 MMOL/L — SIGNIFICANT CHANGE UP (ref 3.5–5.3)
PROTHROM AB SERPL-ACNC: 11.5 SEC — SIGNIFICANT CHANGE UP (ref 10–12.9)
RBC # BLD: 3.86 M/UL — SIGNIFICANT CHANGE UP (ref 3.8–5.2)
RBC # FLD: 14.2 % — SIGNIFICANT CHANGE UP (ref 10.3–14.5)
RH IG SCN BLD-IMP: POSITIVE — SIGNIFICANT CHANGE UP
SODIUM SERPL-SCNC: 138 MMOL/L — SIGNIFICANT CHANGE UP (ref 135–145)
WBC # BLD: 14.02 K/UL — HIGH (ref 3.8–10.5)
WBC # FLD AUTO: 14.02 K/UL — HIGH (ref 3.8–10.5)

## 2019-04-05 PROCEDURE — 22846 INSERT SPINE FIXATION DEVICE: CPT | Mod: 59

## 2019-04-05 PROCEDURE — 22554 ARTHRD ANT NTRBD MIN DSC CRV: CPT | Mod: 62

## 2019-04-05 PROCEDURE — 20937 SP BONE AGRFT MORSEL ADD-ON: CPT | Mod: 80

## 2019-04-05 PROCEDURE — 20937 SP BONE AGRFT MORSEL ADD-ON: CPT

## 2019-04-05 PROCEDURE — 22614 ARTHRD PST TQ 1NTRSPC EA ADD: CPT | Mod: 62

## 2019-04-05 PROCEDURE — 35701 EXPL N/FLWD SURG NECK ART: CPT | Mod: LT

## 2019-04-05 PROCEDURE — 22843 INSERT SPINE FIXATION DEVICE: CPT | Mod: 80

## 2019-04-05 PROCEDURE — 22843 INSERT SPINE FIXATION DEVICE: CPT

## 2019-04-05 PROCEDURE — 22600 ARTHRD PST TQ 1NTRSPC CRV: CPT | Mod: 62

## 2019-04-05 PROCEDURE — 22853 INSJ BIOMECHANICAL DEVICE: CPT

## 2019-04-05 PROCEDURE — 61783 SCAN PROC SPINAL: CPT

## 2019-04-05 PROCEDURE — 22853 INSJ BIOMECHANICAL DEVICE: CPT | Mod: 80

## 2019-04-05 PROCEDURE — 22846 INSERT SPINE FIXATION DEVICE: CPT | Mod: 80,59

## 2019-04-05 PROCEDURE — 61783 SCAN PROC SPINAL: CPT | Mod: 80,59

## 2019-04-05 RX ORDER — INSULIN LISPRO 100/ML
VIAL (ML) SUBCUTANEOUS
Qty: 0 | Refills: 0 | Status: DISCONTINUED | OUTPATIENT
Start: 2019-04-05 | End: 2019-04-15

## 2019-04-05 RX ORDER — DEXTROSE 50 % IN WATER 50 %
25 SYRINGE (ML) INTRAVENOUS ONCE
Qty: 0 | Refills: 0 | Status: DISCONTINUED | OUTPATIENT
Start: 2019-04-05 | End: 2019-04-15

## 2019-04-05 RX ORDER — METOPROLOL TARTRATE 50 MG
25 TABLET ORAL DAILY
Qty: 0 | Refills: 0 | Status: DISCONTINUED | OUTPATIENT
Start: 2019-04-05 | End: 2019-04-12

## 2019-04-05 RX ORDER — FAMOTIDINE 10 MG/ML
20 INJECTION INTRAVENOUS DAILY
Qty: 0 | Refills: 0 | Status: DISCONTINUED | OUTPATIENT
Start: 2019-04-05 | End: 2019-04-16

## 2019-04-05 RX ORDER — DEXTROSE 50 % IN WATER 50 %
15 SYRINGE (ML) INTRAVENOUS ONCE
Qty: 0 | Refills: 0 | Status: DISCONTINUED | OUTPATIENT
Start: 2019-04-05 | End: 2019-04-15

## 2019-04-05 RX ORDER — ANASTROZOLE 1 MG/1
1 TABLET ORAL DAILY
Qty: 0 | Refills: 0 | Status: DISCONTINUED | OUTPATIENT
Start: 2019-04-05 | End: 2019-04-16

## 2019-04-05 RX ORDER — DEXAMETHASONE 0.5 MG/5ML
6 ELIXIR ORAL EVERY 6 HOURS
Qty: 0 | Refills: 0 | Status: COMPLETED | OUTPATIENT
Start: 2019-04-05 | End: 2019-04-06

## 2019-04-05 RX ORDER — SENNA PLUS 8.6 MG/1
2 TABLET ORAL AT BEDTIME
Qty: 0 | Refills: 0 | Status: DISCONTINUED | OUTPATIENT
Start: 2019-04-05 | End: 2019-04-16

## 2019-04-05 RX ORDER — MAGNESIUM HYDROXIDE 400 MG/1
30 TABLET, CHEWABLE ORAL EVERY 12 HOURS
Qty: 0 | Refills: 0 | Status: DISCONTINUED | OUTPATIENT
Start: 2019-04-05 | End: 2019-04-16

## 2019-04-05 RX ORDER — CHOLECALCIFEROL (VITAMIN D3) 125 MCG
400 CAPSULE ORAL DAILY
Qty: 0 | Refills: 0 | Status: DISCONTINUED | OUTPATIENT
Start: 2019-04-05 | End: 2019-04-16

## 2019-04-05 RX ORDER — ONDANSETRON 8 MG/1
4 TABLET, FILM COATED ORAL EVERY 6 HOURS
Qty: 0 | Refills: 0 | Status: DISCONTINUED | OUTPATIENT
Start: 2019-04-05 | End: 2019-04-16

## 2019-04-05 RX ORDER — DEXTROSE 50 % IN WATER 50 %
12.5 SYRINGE (ML) INTRAVENOUS ONCE
Qty: 0 | Refills: 0 | Status: DISCONTINUED | OUTPATIENT
Start: 2019-04-05 | End: 2019-04-15

## 2019-04-05 RX ORDER — BENZOCAINE AND MENTHOL 5; 1 G/100ML; G/100ML
1 LIQUID ORAL
Qty: 0 | Refills: 0 | Status: DISCONTINUED | OUTPATIENT
Start: 2019-04-05 | End: 2019-04-06

## 2019-04-05 RX ORDER — OXYCODONE AND ACETAMINOPHEN 5; 325 MG/1; MG/1
2 TABLET ORAL EVERY 4 HOURS
Qty: 0 | Refills: 0 | Status: DISCONTINUED | OUTPATIENT
Start: 2019-04-05 | End: 2019-04-06

## 2019-04-05 RX ORDER — SODIUM CHLORIDE 9 MG/ML
1000 INJECTION INTRAMUSCULAR; INTRAVENOUS; SUBCUTANEOUS
Qty: 0 | Refills: 0 | Status: DISCONTINUED | OUTPATIENT
Start: 2019-04-05 | End: 2019-04-07

## 2019-04-05 RX ORDER — OXYCODONE AND ACETAMINOPHEN 5; 325 MG/1; MG/1
1 TABLET ORAL EVERY 4 HOURS
Qty: 0 | Refills: 0 | Status: DISCONTINUED | OUTPATIENT
Start: 2019-04-05 | End: 2019-04-06

## 2019-04-05 RX ORDER — DIAZEPAM 5 MG
5 TABLET ORAL EVERY 8 HOURS
Qty: 0 | Refills: 0 | Status: DISCONTINUED | OUTPATIENT
Start: 2019-04-05 | End: 2019-04-12

## 2019-04-05 RX ORDER — ATORVASTATIN CALCIUM 80 MG/1
40 TABLET, FILM COATED ORAL AT BEDTIME
Qty: 0 | Refills: 0 | Status: DISCONTINUED | OUTPATIENT
Start: 2019-04-05 | End: 2019-04-16

## 2019-04-05 RX ORDER — BUPIVACAINE 13.3 MG/ML
20 INJECTION, SUSPENSION, LIPOSOMAL INFILTRATION ONCE
Qty: 0 | Refills: 0 | Status: DISCONTINUED | OUTPATIENT
Start: 2019-04-05 | End: 2019-04-06

## 2019-04-05 RX ORDER — GABAPENTIN 400 MG/1
600 CAPSULE ORAL AT BEDTIME
Qty: 0 | Refills: 0 | Status: DISCONTINUED | OUTPATIENT
Start: 2019-04-05 | End: 2019-04-16

## 2019-04-05 RX ORDER — ACETAMINOPHEN 500 MG
650 TABLET ORAL EVERY 6 HOURS
Qty: 0 | Refills: 0 | Status: DISCONTINUED | OUTPATIENT
Start: 2019-04-05 | End: 2019-04-16

## 2019-04-05 RX ORDER — HYDROMORPHONE HYDROCHLORIDE 2 MG/ML
0.5 INJECTION INTRAMUSCULAR; INTRAVENOUS; SUBCUTANEOUS
Qty: 0 | Refills: 0 | Status: DISCONTINUED | OUTPATIENT
Start: 2019-04-05 | End: 2019-04-06

## 2019-04-05 RX ORDER — GLUCAGON INJECTION, SOLUTION 0.5 MG/.1ML
1 INJECTION, SOLUTION SUBCUTANEOUS ONCE
Qty: 0 | Refills: 0 | Status: DISCONTINUED | OUTPATIENT
Start: 2019-04-05 | End: 2019-04-15

## 2019-04-05 RX ORDER — SODIUM CHLORIDE 9 MG/ML
1000 INJECTION, SOLUTION INTRAVENOUS
Qty: 0 | Refills: 0 | Status: DISCONTINUED | OUTPATIENT
Start: 2019-04-05 | End: 2019-04-05

## 2019-04-05 RX ORDER — DOCUSATE SODIUM 100 MG
100 CAPSULE ORAL THREE TIMES A DAY
Qty: 0 | Refills: 0 | Status: DISCONTINUED | OUTPATIENT
Start: 2019-04-05 | End: 2019-04-13

## 2019-04-05 RX ORDER — CEFAZOLIN SODIUM 1 G
1000 VIAL (EA) INJECTION EVERY 8 HOURS
Qty: 0 | Refills: 0 | Status: COMPLETED | OUTPATIENT
Start: 2019-04-05 | End: 2019-04-06

## 2019-04-05 RX ADMIN — Medication 5 MILLIGRAM(S): at 22:35

## 2019-04-05 RX ADMIN — ATORVASTATIN CALCIUM 40 MILLIGRAM(S): 80 TABLET, FILM COATED ORAL at 22:16

## 2019-04-05 RX ADMIN — Medication 100 MILLIGRAM(S): at 22:16

## 2019-04-05 RX ADMIN — Medication 2: at 22:16

## 2019-04-05 RX ADMIN — Medication 100 MILLIGRAM(S): at 22:17

## 2019-04-05 RX ADMIN — Medication 101.2 MILLIGRAM(S): at 17:55

## 2019-04-05 RX ADMIN — SENNA PLUS 2 TABLET(S): 8.6 TABLET ORAL at 22:16

## 2019-04-05 NOTE — H&P ADULT - NSICDXPASTSURGICALHX_GEN_ALL_CORE_FT
PAST SURGICAL HISTORY:  History of carpal tunnel surgery 10/2018 left hand    History of lumpectomy of right breast 2013    History of Mohs micrographic surgery for skin cancer     History of tonsillectomy     S/P cervical spinal fusion 12/15/2018 C4-C6 ACDF

## 2019-04-05 NOTE — PROGRESS NOTE ADULT - SUBJECTIVE AND OBJECTIVE BOX
NEUROSURGERY POST OP NOTE:    POD# 0 S/P revision of anterior cervical discectomy with fusion of C4-C6 with extension to C3, as well as C2-T2 posterior fusion with right iliac crest autograft.     S: Pt seen and examined at bedside in PACU. Denies current pain. Reports continued LUE numbness / tingling, same as pre-op and mild weakness to LUE, same as pre-op. She denies weakness or tingling elsewhere. Denies nausea / vomiting. 3 drains in place with minimal output. Has been tolerating sips of water.       T(C): 36.6 (04-05-19 @ 20:35), Max: 36.6 (04-05-19 @ 06:32)  HR: 100 (04-05-19 @ 21:05) (85 - 106)  BP: 106/61 (04-05-19 @ 21:05) (106/61 - 150/84)  RR: 20 (04-05-19 @ 21:05) (14 - 20)  SpO2: 92% (04-05-19 @ 21:05) (92% - 98%)      04-05-19 @ 07:01  -  04-05-19 @ 21:16  --------------------------------------------------------  IN: 300 mL / OUT: 455 mL / NET: -155 mL        acetaminophen   Tablet .. 650 milliGRAM(s) Oral every 6 hours PRN  anastrozole 1 milliGRAM(s) Oral daily  atorvastatin 40 milliGRAM(s) Oral at bedtime  benzocaine 15 mG/menthol 3.6 mG (Sugar-Free) Lozenge 1 Lozenge Oral every 2 hours PRN  BUpivacaine liposome 1.3% Injectable (no eMAR) 20 milliLiter(s) Local Injection once  ceFAZolin   IVPB 1000 milliGRAM(s) IV Intermittent every 8 hours  cholecalciferol 400 Unit(s) Oral daily  dexamethasone  IVPB 6 milliGRAM(s) IV Intermittent every 6 hours  dextrose 40% Gel 15 Gram(s) Oral once PRN  dextrose 50% Injectable 12.5 Gram(s) IV Push once  dextrose 50% Injectable 25 Gram(s) IV Push once  dextrose 50% Injectable 25 Gram(s) IV Push once  diazepam    Tablet 5 milliGRAM(s) Oral every 8 hours PRN  docusate sodium 100 milliGRAM(s) Oral three times a day  famotidine    Tablet 20 milliGRAM(s) Oral daily  gabapentin 600 milliGRAM(s) Oral at bedtime  glucagon  Injectable 1 milliGRAM(s) IntraMuscular once PRN  HYDROmorphone  Injectable 0.5 milliGRAM(s) SubCutaneous every 3 hours PRN  HYDROmorphone  Injectable 0.5 milliGRAM(s) IV Push every 30 minutes PRN  insulin lispro (HumaLOG) corrective regimen sliding scale   SubCutaneous Before meals and at bedtime  magnesium hydroxide Suspension 30 milliLiter(s) Oral every 12 hours PRN  metoprolol succinate ER 25 milliGRAM(s) Oral daily  ondansetron Injectable 4 milliGRAM(s) IV Push every 6 hours PRN  oxyCODONE    5 mG/acetaminophen 325 mG 1 Tablet(s) Oral every 4 hours PRN  oxyCODONE    5 mG/acetaminophen 325 mG 2 Tablet(s) Oral every 4 hours PRN  pregabalin 100 milliGRAM(s) Oral two times a day  senna 2 Tablet(s) Oral at bedtime  sodium chloride 0.9%. 1000 milliLiter(s) IV Continuous <Continuous>      RADIOLOGY:     Exam:  AOx3, NAD, fluent speech   PERRL, EOMI, visual fields intact   CN II-XII intact   Face symmetric   No pronator drift   LUE decreased sensation subjectively and 4/5, o/w 5/5 and SILT throughout     WOUND/DRAINS:  Anterior HMV: minimal serosanguinous output - managed by neurosurgery  Posterior MASOOD (deep) and HMV (superficial): minimal serosanguinous output - managed by PRS (Dr. Brambila)   Anterior incision: c/d/i, no dressing in place, non-tender   Posterior incision: Prevena wound vac in place, suctioning well  +[place] J collar in place     Assessment: 68y Female hx breast CA, non-hodgkins lymphoma, osteoporosis, HTN and HLD, p/w cervical hardware failure and cervical instability, now POD# 0 S/P revision of anterior cervical discectomy with fusion of C4-C6 with extension to C3, as well as C2-T2 posterior fusion with right iliac crest autograft.       Plan:  - Neuro spine checks   - Cont Gilliam J at all times   - Monitor drain outputs - anterior per neurosurgery, posterior per PRS   - Continue prevena wound vac x7 days   - Decadron 6q6 x24 hours   - ISS and famotidine while on steroids   - Pain control: percocet, valium, dilaudid, tylenol, gabapentin, lyrica   - PT/OT eval   - D/c matos --> TOV   - Post-op ancef   - SCDs for now   - ADAT   - Dc IVF once tolerating PO diet   - RA, IS   - Cont home meds: atorvastatin, Vitamin D, metoprolol, anastrozole   - Transfer to [facility] bed   - CT and standing XR AP/lateral of c-spine prior to discharge   - D/w Dr. Daley

## 2019-04-05 NOTE — H&P ADULT - ASSESSMENT
69 y/o female p/w prior ACDF from C4-C6.  Follow up imaging indicates further surgical stabilization.    1)  Consent signed by patient and attending in chart  2)  Pre-op medical clearance in chart  3)  Home meds: continue meds for HTN, HLD, and antineoplastics  4)  Plan for telemetry post op  5)  Mechanical DVT prophylaxis  6)  Discussed with Dr. Daley

## 2019-04-05 NOTE — H&P ADULT - NSICDXPASTMEDICALHX_GEN_ALL_CORE_FT
PAST MEDICAL HISTORY:  Anxiety     Carpal tunnel syndrome b/l    Cervical disc disease     Cervical disc herniation     History of breast cancer s/p right lumpectomy , RT    HLD (hyperlipidemia)     Hodgkin disease 1981, underwent radiation therapy    HTN (hypertension)

## 2019-04-05 NOTE — CONSULT NOTE ADULT - SUBJECTIVE AND OBJECTIVE BOX
Patient is a 68y old  Female who presents with a chief complaint of Cervical hardware failure (05 Apr 2019 06:51)        HPI:  This is a 69 y/o female, well known to Dr. Daley with PMH of non-hodkins lymphoma (xrt in 1981) and breast ca (2000's), HTN, HLD, osteoporosis.  In 10/18, patient was evaluated for upper extremity weakness and underwent a carpal tunnel release and ulnar transposition without relief of symptoms.   in 11/18, patient was admitted to [facility] for b/l UE weakness and difficulty walking.  Her work up revealed cervical stenosis with compression.  However, at the time, her bone scan also was significant for uptake in her right acetabulum and at the time and the evaluating spine surgeon requested this lesion be addressed prior to any cervical surgery.  A CT of the region was negative and the patient was sent home for follow up. on 12/4/18, patient was seen in the [facility] ER again, this time with worsening symptoms and she was emergently taken to the OR for a three level ACDF C4-C6.  She was discharged after the surgery with an uncomplicated post op course.  Upon further imaging however, patient's hardware is found to have loosened.  She is here for definitive surgical evaluation. (05 Apr 2019 06:51)     neck pain and weakness in the LUE  Allergies  penicillin (Rash)      Health Issues  CERVICALSPINE M53.2X2  No pertinent family history in first degree relatives  Family history of colon cancer in mother (Mother)  Anxiety  History of breast cancer  Hodgkin disease  HLD (hyperlipidemia)  HTN (hypertension)  Breast CA  Cervical disc herniation  Carpal tunnel syndrome  Cervical disc disease  Hodgkin disease  Breast cancer  History of carpal tunnel surgery  History of tonsillectomy  S/P cervical spinal fusion  History of Mohs micrographic surgery for skin cancer  History of lumpectomy of right breast  H/O lumpectomy  No significant past surgical history  History of lumpectomy  S/P appendectomy        FAMILY HISTORY:  Family history of colon cancer in mother (Mother)      MEDICATIONS  (STANDING):    MEDICATIONS  (PRN):      PAST MEDICAL & SURGICAL HISTORY:  Anxiety  History of breast cancer: s/p right lumpectomy , RT  Hodgkin disease: 1981, underwent radiation therapy  HLD (hyperlipidemia)  HTN (hypertension)  Cervical disc herniation  Carpal tunnel syndrome: b/l  Cervical disc disease  History of carpal tunnel surgery: 10/2018 left hand  History of tonsillectomy  S/P cervical spinal fusion: 12/15/2018 C4-C6 ACDF  History of Mohs micrographic surgery for skin cancer  History of lumpectomy of right breast: 2013      Labs              Radiology:    Physical Exam    MENTAL STATUS  -Level of Consciousness- awake    Orientation- person, place time  Language- aphasia/ dysarthria- nl  Memory- recent and remote- nl      Cranial Nerve 1- 12  Pupils- equal and reactive  Eye movements- full  Facial - no asymmetry   Lower CN-nl    Gait and Station- n/a    MOTOR  Upper- weakness LUE  Lower- no foot drop    Reflexes- decreased    Sensation- no sensory level    Cerebellar- no tremors    vascular - no bruits    Assessment- Cervical radiculopathy - myelopathy    Plan  surgery as per Dr Daley

## 2019-04-05 NOTE — BRIEF OPERATIVE NOTE - BRIEF OP NOTE DRAINS
Hemovac anterior left superficial neck  Hemovac posterior cervical superficial  Russ Daniels posterior cervical deep  Provena dressing system

## 2019-04-05 NOTE — H&P ADULT - NSICDXFAMILYHX_GEN_ALL_CORE_FT
FAMILY HISTORY:  Mother  Still living? Unknown  Family history of colon cancer in mother, Age at diagnosis: Age Unknown

## 2019-04-05 NOTE — H&P ADULT - NSHPREVIEWOFSYSTEMS_GEN_ALL_CORE
REVIEW OF SYSTEMS      General:	no recent illnesses, no recent wt gain/loss    Skin/Breast:  intact  	  Ophthalmologic:  negative, glasses for ***  	  ENMT:	negative    Respiratory and Thorax: no coughing, wheezing, recent URI  	  Cardiovascular: no chest pain, FINNEGAN    Gastrointestinal:	soft, non tender    Genitourinary: no frequency, dysuria    Musculoskeletal:	negative    Neurological:	see HPI    Psychiatric:	negative    Hematology/Lymphatics:	negative    Endocrine:  	negative    Allergic/Immunologic:  Negative REVIEW OF SYSTEMS      General:	no recent illnesses, no recent wt gain/loss    Skin/Breast:  intact  	  Ophthalmologic:  negative,  	  ENMT:	negative    Respiratory and Thorax: no coughing, wheezing, recent URI  	  Cardiovascular: no chest pain, FINNEGAN    Gastrointestinal:	soft, non tender    Genitourinary: no frequency, dysuria    Musculoskeletal:	negative    Neurological:	see HPI    Psychiatric:	negative    Hematology/Lymphatics:	negative    Endocrine:  	negative    Allergic/Immunologic:  Negative

## 2019-04-05 NOTE — H&P ADULT - HISTORY OF PRESENT ILLNESS
This is a 67 y/o female, well known to Dr. Daley with PMH of non-hodkins lymphoma (xrt in 1981) and breast ca (2000's), HTN, HLD, osteoporosis.  In 10/18, patient was evaluated for upper extremity weakness and underwent a carpal tunnel release and ulnar transposition without relief of symptoms.   in 11/18, patient was admitted to [facility] for b/l UE weakness and difficulty walking.  Her work up revealed cervical stenosis with compression.  However, at the time, her bone scan also was significant for uptake in her right acetabulum and at the time and the evaluating spine surgeon requested this lesion be addressed prior to any cervical surgery.  A CT of the region was negative and the patient was sent home for follow up. on 12/4/18, patient was seen in the [facility] ER again, this time with worsening symptoms and she was emergently taken to the OR for a three level ACDF C4-C6.  She was discharged after the surgery with an uncomplicated post op course.  Upon further imaging however, patient's hardware is found to have loosened.  She is here for definitive surgical evaluation. This is a 67 y/o female, well known to Dr. Daley with PMH of non-hodkins lymphoma (xrt in 1981) and breast ca (2000's), HTN, HLD, osteoporosis.  In 10/18, patient was evaluated for upper extremity weakness and underwent a carpal tunnel release and ulnar transposition without relief of symptoms.   in 11/18, patient was admitted to [facility] for b/l UE weakness and difficulty walking.  Her work up revealed cervical stenosis with compression.  However, at the time, her bone scan also was significant for uptake in her right acetabulum and at the time and the evaluating spine surgeon requested this lesion be addressed prior to any cervical surgery.  A CT of the region was negative and the patient was sent home for follow up. on 12/4/18, patient was seen in the [facility] ER again, this time with worsening symptoms and she was emergently taken to the OR for a three level ACDF C4-C6.  She was discharged after the surgery with an uncomplicated post op course.     Since this surgery, patient has dramatic improvements in her walking, neuropathy and radicular pain.  Follow up imaging with X-ray lead to further evaluation with CT which revealed possible loosening of hardware.  Clinically, patient still has left UE weakness that she thinks may be from her history of broken clavicle. No bowel, bladder dysfunction     She is here for definitive surgical evaluation.

## 2019-04-05 NOTE — H&P ADULT - NSHPPHYSICALEXAM_GEN_ALL_CORE
Constitutional: NAD, well groomed, well nourished  Respiratory: breathing non-labored, symmetrical chest wall movement  Cardiovascuar: RRR, no murmurs  Gastrointestinal: abdomen soft, non tender  Genitourinary: exam deffered  Neurological:  AAOX3. Verbal function intact  Cranial Nerves: II-XII intact  Motor: 5/5 power in b/l UE and LE  Sensation: intact to light touch in all extremities  Pronator Drift: ***  Dysmetria: *** Constitutional: NAD, well groomed, well nourished  Respiratory: breathing non-labored, symmetrical chest wall movement  Cardiovascuar: RRR, no murmurs  Gastrointestinal: abdomen soft, non tender  Genitourinary: exam deffered  Neurological:  AAOX3. Verbal function intact  Cranial Nerves: II-XII intact  Motor: (+) 4/5 in LUE otherwise 5/5 power in b/l UE and LE  Sensation: intact to light touch in all extremities  Pronator Drift: Negative  Dysmetria: none

## 2019-04-06 LAB
ANION GAP SERPL CALC-SCNC: 10 MMOL/L — SIGNIFICANT CHANGE UP (ref 5–17)
BUN SERPL-MCNC: 10 MG/DL — SIGNIFICANT CHANGE UP (ref 7–23)
CALCIUM SERPL-MCNC: 9.3 MG/DL — SIGNIFICANT CHANGE UP (ref 8.4–10.5)
CHLORIDE SERPL-SCNC: 103 MMOL/L — SIGNIFICANT CHANGE UP (ref 96–108)
CO2 SERPL-SCNC: 24 MMOL/L — SIGNIFICANT CHANGE UP (ref 22–31)
CREAT SERPL-MCNC: 0.43 MG/DL — LOW (ref 0.5–1.3)
GLUCOSE BLDC GLUCOMTR-MCNC: 122 MG/DL — HIGH (ref 70–99)
GLUCOSE BLDC GLUCOMTR-MCNC: 133 MG/DL — HIGH (ref 70–99)
GLUCOSE BLDC GLUCOMTR-MCNC: 142 MG/DL — HIGH (ref 70–99)
GLUCOSE BLDC GLUCOMTR-MCNC: 159 MG/DL — HIGH (ref 70–99)
GLUCOSE SERPL-MCNC: 140 MG/DL — HIGH (ref 70–99)
HCT VFR BLD CALC: 33.1 % — LOW (ref 34.5–45)
HGB BLD-MCNC: 10.8 G/DL — LOW (ref 11.5–15.5)
MAGNESIUM SERPL-MCNC: 2.2 MG/DL — SIGNIFICANT CHANGE UP (ref 1.6–2.6)
MCHC RBC-ENTMCNC: 30.5 PG — SIGNIFICANT CHANGE UP (ref 27–34)
MCHC RBC-ENTMCNC: 32.6 GM/DL — SIGNIFICANT CHANGE UP (ref 32–36)
MCV RBC AUTO: 93.5 FL — SIGNIFICANT CHANGE UP (ref 80–100)
NRBC # BLD: 0 /100 WBCS — SIGNIFICANT CHANGE UP (ref 0–0)
PHOSPHATE SERPL-MCNC: 4.3 MG/DL — SIGNIFICANT CHANGE UP (ref 2.5–4.5)
PLATELET # BLD AUTO: 245 K/UL — SIGNIFICANT CHANGE UP (ref 150–400)
POTASSIUM SERPL-MCNC: 4.2 MMOL/L — SIGNIFICANT CHANGE UP (ref 3.5–5.3)
POTASSIUM SERPL-SCNC: 4.2 MMOL/L — SIGNIFICANT CHANGE UP (ref 3.5–5.3)
RBC # BLD: 3.54 M/UL — LOW (ref 3.8–5.2)
RBC # FLD: 14.5 % — SIGNIFICANT CHANGE UP (ref 10.3–14.5)
SODIUM SERPL-SCNC: 137 MMOL/L — SIGNIFICANT CHANGE UP (ref 135–145)
WBC # BLD: 13.08 K/UL — HIGH (ref 3.8–10.5)
WBC # FLD AUTO: 13.08 K/UL — HIGH (ref 3.8–10.5)

## 2019-04-06 PROCEDURE — 72125 CT NECK SPINE W/O DYE: CPT | Mod: 26

## 2019-04-06 PROCEDURE — 72040 X-RAY EXAM NECK SPINE 2-3 VW: CPT | Mod: 26

## 2019-04-06 PROCEDURE — 72128 CT CHEST SPINE W/O DYE: CPT | Mod: 26

## 2019-04-06 RX ORDER — SUCRALFATE 1 G
1 TABLET ORAL ONCE
Qty: 0 | Refills: 0 | Status: COMPLETED | OUTPATIENT
Start: 2019-04-06 | End: 2019-04-06

## 2019-04-06 RX ORDER — HYDROMORPHONE HYDROCHLORIDE 2 MG/ML
0.5 INJECTION INTRAMUSCULAR; INTRAVENOUS; SUBCUTANEOUS
Qty: 0 | Refills: 0 | Status: DISCONTINUED | OUTPATIENT
Start: 2019-04-06 | End: 2019-04-08

## 2019-04-06 RX ORDER — HYDROMORPHONE HYDROCHLORIDE 2 MG/ML
0.5 INJECTION INTRAMUSCULAR; INTRAVENOUS; SUBCUTANEOUS ONCE
Qty: 0 | Refills: 0 | Status: DISCONTINUED | OUTPATIENT
Start: 2019-04-06 | End: 2019-04-06

## 2019-04-06 RX ORDER — DEXAMETHASONE 0.5 MG/5ML
4 ELIXIR ORAL EVERY 6 HOURS
Qty: 0 | Refills: 0 | Status: DISCONTINUED | OUTPATIENT
Start: 2019-04-06 | End: 2019-04-07

## 2019-04-06 RX ORDER — TRAMADOL HYDROCHLORIDE 50 MG/1
50 TABLET ORAL EVERY 4 HOURS
Qty: 0 | Refills: 0 | Status: DISCONTINUED | OUTPATIENT
Start: 2019-04-06 | End: 2019-04-10

## 2019-04-06 RX ORDER — TRAMADOL HYDROCHLORIDE 50 MG/1
25 TABLET ORAL EVERY 4 HOURS
Qty: 0 | Refills: 0 | Status: DISCONTINUED | OUTPATIENT
Start: 2019-04-06 | End: 2019-04-06

## 2019-04-06 RX ORDER — ACETAMINOPHEN 500 MG
1000 TABLET ORAL ONCE
Qty: 0 | Refills: 0 | Status: COMPLETED | OUTPATIENT
Start: 2019-04-06 | End: 2019-04-06

## 2019-04-06 RX ORDER — DEXAMETHASONE 0.5 MG/5ML
10 ELIXIR ORAL ONCE
Qty: 0 | Refills: 0 | Status: COMPLETED | OUTPATIENT
Start: 2019-04-06 | End: 2019-04-06

## 2019-04-06 RX ORDER — BENZOCAINE AND MENTHOL 5; 1 G/100ML; G/100ML
1 LIQUID ORAL
Qty: 0 | Refills: 0 | Status: DISCONTINUED | OUTPATIENT
Start: 2019-04-06 | End: 2019-04-16

## 2019-04-06 RX ADMIN — TRAMADOL HYDROCHLORIDE 25 MILLIGRAM(S): 50 TABLET ORAL at 14:06

## 2019-04-06 RX ADMIN — SODIUM CHLORIDE 75 MILLILITER(S): 9 INJECTION INTRAMUSCULAR; INTRAVENOUS; SUBCUTANEOUS at 12:10

## 2019-04-06 RX ADMIN — SODIUM CHLORIDE 75 MILLILITER(S): 9 INJECTION INTRAMUSCULAR; INTRAVENOUS; SUBCUTANEOUS at 00:22

## 2019-04-06 RX ADMIN — Medication 100 MILLIGRAM(S): at 08:30

## 2019-04-06 RX ADMIN — FAMOTIDINE 20 MILLIGRAM(S): 10 INJECTION INTRAVENOUS at 11:48

## 2019-04-06 RX ADMIN — Medication 650 MILLIGRAM(S): at 05:40

## 2019-04-06 RX ADMIN — Medication 102 MILLIGRAM(S): at 21:50

## 2019-04-06 RX ADMIN — HYDROMORPHONE HYDROCHLORIDE 0.5 MILLIGRAM(S): 2 INJECTION INTRAMUSCULAR; INTRAVENOUS; SUBCUTANEOUS at 06:30

## 2019-04-06 RX ADMIN — ATORVASTATIN CALCIUM 40 MILLIGRAM(S): 80 TABLET, FILM COATED ORAL at 21:09

## 2019-04-06 RX ADMIN — Medication 100 MILLIGRAM(S): at 14:06

## 2019-04-06 RX ADMIN — Medication 400 UNIT(S): at 12:10

## 2019-04-06 RX ADMIN — Medication 100 MILLIGRAM(S): at 21:08

## 2019-04-06 RX ADMIN — HYDROMORPHONE HYDROCHLORIDE 0.5 MILLIGRAM(S): 2 INJECTION INTRAMUSCULAR; INTRAVENOUS; SUBCUTANEOUS at 06:01

## 2019-04-06 RX ADMIN — BENZOCAINE AND MENTHOL 1 LOZENGE: 5; 1 LIQUID ORAL at 09:48

## 2019-04-06 RX ADMIN — Medication 100 MILLIGRAM(S): at 19:31

## 2019-04-06 RX ADMIN — BENZOCAINE AND MENTHOL 1 LOZENGE: 5; 1 LIQUID ORAL at 07:00

## 2019-04-06 RX ADMIN — ANASTROZOLE 1 MILLIGRAM(S): 1 TABLET ORAL at 12:10

## 2019-04-06 RX ADMIN — Medication 650 MILLIGRAM(S): at 05:10

## 2019-04-06 RX ADMIN — Medication 100 MILLIGRAM(S): at 05:10

## 2019-04-06 RX ADMIN — HYDROMORPHONE HYDROCHLORIDE 0.5 MILLIGRAM(S): 2 INJECTION INTRAMUSCULAR; INTRAVENOUS; SUBCUTANEOUS at 18:00

## 2019-04-06 RX ADMIN — SENNA PLUS 2 TABLET(S): 8.6 TABLET ORAL at 21:09

## 2019-04-06 RX ADMIN — Medication 101.2 MILLIGRAM(S): at 00:57

## 2019-04-06 RX ADMIN — HYDROMORPHONE HYDROCHLORIDE 0.5 MILLIGRAM(S): 2 INJECTION INTRAMUSCULAR; INTRAVENOUS; SUBCUTANEOUS at 19:20

## 2019-04-06 RX ADMIN — TRAMADOL HYDROCHLORIDE 25 MILLIGRAM(S): 50 TABLET ORAL at 15:06

## 2019-04-06 RX ADMIN — Medication 2: at 22:45

## 2019-04-06 RX ADMIN — GABAPENTIN 600 MILLIGRAM(S): 400 CAPSULE ORAL at 21:08

## 2019-04-06 RX ADMIN — TRAMADOL HYDROCHLORIDE 25 MILLIGRAM(S): 50 TABLET ORAL at 09:57

## 2019-04-06 RX ADMIN — Medication 400 MILLIGRAM(S): at 03:10

## 2019-04-06 RX ADMIN — TRAMADOL HYDROCHLORIDE 25 MILLIGRAM(S): 50 TABLET ORAL at 10:57

## 2019-04-06 RX ADMIN — Medication 25 MILLIGRAM(S): at 05:10

## 2019-04-06 RX ADMIN — Medication 1000 MILLIGRAM(S): at 03:40

## 2019-04-06 RX ADMIN — Medication 101.2 MILLIGRAM(S): at 16:46

## 2019-04-06 RX ADMIN — Medication 101.2 MILLIGRAM(S): at 09:48

## 2019-04-06 NOTE — PHYSICAL THERAPY INITIAL EVALUATION ADULT - PERTINENT HX OF CURRENT PROBLEM, REHAB EVAL
Patient is a 68 year old F, well known to Dr. Daley with PMH of Non-Hodkins Lymphoma (XRT in 1981) and breast cx (2000's), HTN, HLD, osteoporosis.  In 10/18, patient was evaluated for upper extremity weakness and underwent a carpal tunnel release and ulnar transposition without relief of symptoms.

## 2019-04-06 NOTE — OCCUPATIONAL THERAPY INITIAL EVALUATION ADULT - GENERAL OBSERVATIONS, REHAB EVAL
Pt presented semi fowlers in bed, +tele, +IV, +hep, +MASOOD drain x 1; +accordion drain x 1, +HMV posterior cervical spine; +IV; +[place]-J collar; +spouse at bedside, in NAD. Pt's chief complaint is weakness and numbness in LUE.

## 2019-04-06 NOTE — PHYSICAL THERAPY INITIAL EVALUATION ADULT - PLANNED THERAPY INTERVENTIONS, PT EVAL
neuromuscular re-education/ROM/transfer training/balance training/bed mobility training/gait training/strengthening

## 2019-04-06 NOTE — OCCUPATIONAL THERAPY INITIAL EVALUATION ADULT - GROSSLY INTACT, SENSORY
pt reports numbness in bilateral feet (baseline) and left hadn numbness (baseline with surgical decompression of ulna and median nerves)/Right UE/Head/Neck/Trunk

## 2019-04-06 NOTE — OCCUPATIONAL THERAPY INITIAL EVALUATION ADULT - RANGE OF MOTION EXAMINATION
deficits as listed below/no AROM of cervical spine, LUE shoulder flexion approximately 30 degrees, elbow/forearm/wrist WFL AROM

## 2019-04-06 NOTE — OCCUPATIONAL THERAPY INITIAL EVALUATION ADULT - PLANNED THERAPY INTERVENTIONS, OT EVAL
bed mobility training/ROM/strengthening/stretching/neuromuscular re-education/transfer training/ADL retraining/IADL retraining/balance training/fine motor coordination training/parent/caregiver training.../motor coordination training

## 2019-04-06 NOTE — PHYSICAL THERAPY INITIAL EVALUATION ADULT - DID THE PATIENT HAVE SURGERY?
S/P revision of anterior cervical discectomy with fusion of C4-C6 with extension to C3, as well as C2-T2 posterior fusion with right iliac crest autograft/yes

## 2019-04-06 NOTE — PROGRESS NOTE ADULT - SUBJECTIVE AND OBJECTIVE BOX
HPI:  This is a 67 y/o female, well known to Dr. Daley with PMH of non-hodkins lymphoma (xrt in 1981) and breast ca (2000's), HTN, HLD, osteoporosis.  In 10/18, patient was evaluated for upper extremity weakness and underwent a carpal tunnel release and ulnar transposition without relief of symptoms.   in 11/18, patient was admitted to [facility] for b/l UE weakness and difficulty walking.  Her work up revealed cervical stenosis with compression.  However, at the time, her bone scan also was significant for uptake in her right acetabulum and at the time and the evaluating spine surgeon requested this lesion be addressed prior to any cervical surgery.  A CT of the region was negative and the patient was sent home for follow up. on 12/4/18, patient was seen in the [facility] ER again, this time with worsening symptoms and she was emergently taken to the OR for a three level ACDF C4-C6.  She was discharged after the surgery with an uncomplicated post op course.     Since this surgery, patient has dramatic improvements in her walking, neuropathy and radicular pain.  Follow up imaging with X-ray lead to further evaluation with CT which revealed possible loosening of hardware.  Clinically, patient still has left UE weakness that she thinks may be from her history of broken clavicle. No bowel, bladder dysfunction     She is here for definitive surgical evaluation. (05 Apr 2019 06:51)      Hospital course:     POD#0: S/P revision of anterior cervical discectomy with fusion of C4-C6 with extension to C3, as well as C2-T2 posterior fusion with right iliac crest autograft.   POD#1: CESARIO overnight. Drains x3 remain in place, [place] J collar and prevena dressing in place. Esposito removed this am, f/u TOV. Reports some throat tightness with mucous, but denies pain, no issues talking or swallowing (tolerating liquid diet)      Vital Signs Last 24 Hrs  T(C): 36.5 (06 Apr 2019 09:13), Max: 36.6 (05 Apr 2019 20:35)  T(F): 97.7 (06 Apr 2019 09:13), Max: 97.9 (06 Apr 2019 05:51)  HR: 82 (06 Apr 2019 09:13) (82 - 106)  BP: 110/57 (06 Apr 2019 09:13) (103/54 - 150/84)  BP(mean): 75 (05 Apr 2019 21:05) (75 - 107)  RR: 16 (06 Apr 2019 09:13) (14 - 20)  SpO2: 94% (06 Apr 2019 09:13) (92% - 99%)    I&O's Summary    05 Apr 2019 07:01  -  06 Apr 2019 07:00  --------------------------------------------------------  IN: 1625 mL / OUT: 1635 mL / NET: -10 mL    06 Apr 2019 07:01  -  06 Apr 2019 10:14  --------------------------------------------------------  IN: 0 mL / OUT: 627 mL / NET: -627 mL        PHYSICAL EXAM:  Neurological: AOx3, NAD, FC, speech coherent   CN II-XII grossly intact   Motor: MAEx4 5/5 UE and LE B/L, no drift   SILT throughout  Incision site: anterior and posterior cervical incisions clean, dry and intact  +[place] J collar in place  +posterior cervical prevena dressing in place  +HMVx2 and MASOOD x1      TUBES/LINES:  [] Esposito  [] A-line  [] Lumbar Drain  [] Wound Drains  [] NGT   [] EVD   [] CVC  [] Other      DIET:  [] NPO  [x] Mechanical  [] Tube feeds    LABS:                        10.8   13.08 )-----------( 245      ( 06 Apr 2019 07:32 )             33.1     04-06    137  |  103  |  10  ----------------------------<  140<H>  4.2   |  24  |  0.43<L>    Ca    9.3      06 Apr 2019 07:32  Phos  4.3     04-06  Mg     2.2     04-06      PT/INR - ( 05 Apr 2019 07:14 )   PT: 11.5 sec;   INR: 1.02          PTT - ( 05 Apr 2019 07:14 )  PTT:31.0 sec        CAPILLARY BLOOD GLUCOSE      POCT Blood Glucose.: 122 mg/dL (06 Apr 2019 07:11)  POCT Blood Glucose.: 164 mg/dL (05 Apr 2019 22:06)  POCT Blood Glucose.: 152 mg/dL (05 Apr 2019 16:56)      Drug Levels: [] N/A    CSF Analysis: [] N/A      Allergies    penicillin (Rash)    Intolerances        Home Medications:  anastrozole 1 mg oral tablet: 1 tab(s) orally once a day (05 Apr 2019 06:42)  gabapentin 600 mg oral tablet: 2 tab(s) orally once a day (at bedtime) (05 Apr 2019 06:42)  Lyrica 100 mg oral capsule: 1 cap(s) orally 2 times a day (05 Apr 2019 06:42)  metoprolol succinate 25 mg oral tablet, extended release: 1 tab(s) orally once a day (05 Apr 2019 06:42)  rosuvastatin 10 mg oral tablet: 1 tab(s) orally once a day (at bedtime) (05 Apr 2019 06:42)  Vitamin D3 400 intl units oral tablet: 2 tab(s) orally once a day (05 Apr 2019 06:42)      MEDICATIONS:  MEDICATIONS  (STANDING):  anastrozole 1 milliGRAM(s) Oral daily  atorvastatin 40 milliGRAM(s) Oral at bedtime  BUpivacaine liposome 1.3% Injectable (no eMAR) 20 milliLiter(s) Local Injection once  ceFAZolin   IVPB 1000 milliGRAM(s) IV Intermittent every 8 hours  cholecalciferol 400 Unit(s) Oral daily  dexamethasone  IVPB 6 milliGRAM(s) IV Intermittent every 6 hours  dextrose 50% Injectable 12.5 Gram(s) IV Push once  dextrose 50% Injectable 25 Gram(s) IV Push once  dextrose 50% Injectable 25 Gram(s) IV Push once  docusate sodium 100 milliGRAM(s) Oral three times a day  famotidine    Tablet 20 milliGRAM(s) Oral daily  gabapentin 600 milliGRAM(s) Oral at bedtime  insulin lispro (HumaLOG) corrective regimen sliding scale   SubCutaneous Before meals and at bedtime  metoprolol succinate ER 25 milliGRAM(s) Oral daily  pregabalin 100 milliGRAM(s) Oral two times a day  senna 2 Tablet(s) Oral at bedtime  sodium chloride 0.9%. 1000 milliLiter(s) (75 mL/Hr) IV Continuous <Continuous>    MEDICATIONS  (PRN):  acetaminophen   Tablet .. 650 milliGRAM(s) Oral every 6 hours PRN Temp greater or equal to 38C (100.4F), Mild Pain (1 - 3)  benzocaine 15 mG/menthol 3.6 mG (Sugar-Free) Lozenge 1 Lozenge Oral every 2 hours PRN Sore Throat  benzocaine 15 mG/menthol 3.6 mG Lozenge 1 Lozenge Oral every 1 hour PRN Sore Throat  dextrose 40% Gel 15 Gram(s) Oral once PRN Blood Glucose LESS THAN 70 milliGRAM(s)/deciliter  diazepam    Tablet 5 milliGRAM(s) Oral every 8 hours PRN muscle spasm  glucagon  Injectable 1 milliGRAM(s) IntraMuscular once PRN Glucose LESS THAN 70 milligrams/deciliter  HYDROmorphone  Injectable 0.5 milliGRAM(s) SubCutaneous every 3 hours PRN breakthrough pain  magnesium hydroxide Suspension 30 milliLiter(s) Oral every 12 hours PRN Constipation  ondansetron Injectable 4 milliGRAM(s) IV Push every 6 hours PRN Nausea  traMADol 25 milliGRAM(s) Oral every 4 hours PRN Moderate Pain (4 - 6)  traMADol 50 milliGRAM(s) Oral every 4 hours PRN Severe Pain (7 - 10)      CULTURES:      RADIOLOGY & ADDITIONAL TESTS:      ASSESSMENT:  68y Female hx breast CA, non-hodgkins lymphoma, osteoporosis, HTN and HLD, p/w cervical hardware failure and cervical instability, now POD# 0 S/P revision of anterior cervical discectomy with fusion of C4-C6 with extension to C3, as well as C2-T2 posterior fusion with right iliac crest autograft.       PLAN:  -neuro checks/spinal checks, vital checks  -pain control  -decadron 6q6 x 4 doses  -[place] J collar to be worn at all times x3 months  -Prevena dressing- to remain in place while inpatient  -Drains x3- anterior HMV (managed by Neurosurgery), posterior HMV and MASOOD (managed by Plastics- do not d/c until less than 20cc in 24 hours)  -cervical xrays AP/lateral prior to discharge  -SBP normotensive  -room air, monitor airway status  -advance diet as tolerated  -bowel regimen  -f/u TOV  -OOB/PT/OT      DISPOSITION:  - Telemetry status   - Full code   - Dispo: pending   - D/w Dr. Daley     Assessment: present when checked     [] GCS   E   V   M     Heart Failure: [] Acute, [] acute on chronic, [] chronic   Heart Failure: [] Diastolic (HFpEF), [] Systolic (HRrEF), [] Combined (HFpEF and HFrEF), [] RHF, [] Pulm HTN, [] Other     [] SUSU, [] ATN, [] AIN, [] other   [] CKD1, [] CKD2, [] CKD3, [] CKD4, [] CKD5, [] ESRD     Encephalopathy: [] Metabolic, [] Hepatic, [] Toxic, [] Neurological, [] Other     Abnormal Nutritional Status: [] malnutrition (see nutrition note), []underweight: BMI <19, [] morbid obesity: BMI >40, [] Cachexia     [] Sepsis   [] Hypovolemic shock, [] Cardiogenic shock, [] Hemorrhagic shock, [] Neurogenic shock   [] Acute respiratory failure   [] Cerebral edema, [] Brain compression / herniation   [] Functional quadriplegia   [] Acute blood loss anemia

## 2019-04-06 NOTE — OCCUPATIONAL THERAPY INITIAL EVALUATION ADULT - PERTINENT HX OF CURRENT PROBLEM, REHAB EVAL
68 year old female with PMH of non-hodkins lymphoma (xrt in 1981) and breast ca (2000's), HTN, HLD, osteoporosis.  In 10/18, patient was evaluated for upper extremity weakness and underwent a carpal tunnel release and ulnar transposition without relief of symptoms. In December 2018 pt presented to ED for ACDF C4-C6.Pt presented for elective revision of anterior cervical discectomy with fusion of C4-C6 with extension to C3, as well as C2-T2 posterior fusion with right iliac crest autograft

## 2019-04-06 NOTE — PHYSICAL THERAPY INITIAL EVALUATION ADULT - MANUAL MUSCLE TESTING RESULTS, REHAB EVAL
R sided strength 5/5 throughout; L shoulder flexion 2-/5; L elbow flexion/extension 4-/5;  strength 4+/5 bilaterally

## 2019-04-06 NOTE — PROGRESS NOTE ADULT - ASSESSMENT
A/P: Pt doing well POD#1.   1. Maintain Pravena until ready to go home  2. HV drain per spine service  3. MASOOD drain to stay in until less than 20mL/24 hours  4. Sutures to stay in for 4 weeks

## 2019-04-06 NOTE — PHYSICAL THERAPY INITIAL EVALUATION ADULT - LEVEL OF INDEPENDENCE: SIT/SUPINE, REHAB EVAL
not observed, patient left sitting in bedside chair with all lines intact, left as received, +call bell, in no apparent distress, RN (Darya) aware

## 2019-04-06 NOTE — PHYSICAL THERAPY INITIAL EVALUATION ADULT - ACTIVE RANGE OF MOTION EXAMINATION, REHAB EVAL
Right UE Active ROM was WFL (within functional limits)/bilateral  lower extremity Active ROM was WFL (within functional limits)/L shoulder flexion limited to 35 degrees

## 2019-04-06 NOTE — PHYSICAL THERAPY INITIAL EVALUATION ADULT - CRITERIA FOR SKILLED THERAPEUTIC INTERVENTIONS
impairments found/functional limitations in following categories/risk reduction/prevention/therapy frequency/anticipated discharge recommendation/rehab potential

## 2019-04-06 NOTE — PHYSICAL THERAPY INITIAL EVALUATION ADULT - IMPAIRMENTS FOUND, PT EVAL
ventilation and respiration/gas exchange/gait, locomotion, and balance/joint integrity and mobility/aerobic capacity/endurance/gross motor/muscle strength/ROM

## 2019-04-06 NOTE — PROGRESS NOTE ADULT - SUBJECTIVE AND OBJECTIVE BOX
Neurology Follow up note    Name  HILARIA KUMAR    HPI:  This is a 69 y/o female, well known to Dr. Daley with PMH of non-hodkins lymphoma (xrt in 1981) and breast ca (2000's), HTN, HLD, osteoporosis.  In 10/18, patient was evaluated for upper extremity weakness and underwent a carpal tunnel release and ulnar transposition without relief of symptoms.   in 11/18, patient was admitted to [facility] for b/l UE weakness and difficulty walking.  Her work up revealed cervical stenosis with compression.  However, at the time, her bone scan also was significant for uptake in her right acetabulum and at the time and the evaluating spine surgeon requested this lesion be addressed prior to any cervical surgery.  A CT of the region was negative and the patient was sent home for follow up. on 12/4/18, patient was seen in the [facility] ER again, this time with worsening symptoms and she was emergently taken to the OR for a three level ACDF C4-C6.  She was discharged after the surgery with an uncomplicated post op course.     Since this surgery, patient has dramatic improvements in her walking, neuropathy and radicular pain.  Follow up imaging with X-ray lead to further evaluation with CT which revealed possible loosening of hardware.  Clinically, patient still has left UE weakness that she thinks may be from her history of broken clavicle. No bowel, bladder dysfunction     She is here for definitive surgical evaluation. (05 Apr 2019 06:51)      Interval History - neck pain - no new radicular symptoms with LUE weakness        REVIEW OF SYSTEMS    Vital Signs Last 24 Hrs  T(C): 36.5 (06 Apr 2019 09:13), Max: 36.6 (05 Apr 2019 20:35)  T(F): 97.7 (06 Apr 2019 09:13), Max: 97.9 (06 Apr 2019 05:51)  HR: 82 (06 Apr 2019 09:13) (82 - 106)  BP: 110/57 (06 Apr 2019 09:13) (103/54 - 150/84)  BP(mean): 75 (05 Apr 2019 21:05) (75 - 107)  RR: 16 (06 Apr 2019 09:13) (14 - 20)  SpO2: 94% (06 Apr 2019 09:13) (92% - 99%)    Physical Exam-     Mental Status- awake and alert    Cranial Nerves- full EOM    Gait and station- n/a    Motor- LUE proximal weakness    Reflexes- decreased Left bicepts    Sensation- no sensory level    Coordination- no tremors    Vascular - no bruits    Medications  acetaminophen   Tablet .. 650 milliGRAM(s) Oral every 6 hours PRN  anastrozole 1 milliGRAM(s) Oral daily  atorvastatin 40 milliGRAM(s) Oral at bedtime  benzocaine 15 mG/menthol 3.6 mG (Sugar-Free) Lozenge 1 Lozenge Oral every 2 hours PRN  benzocaine 15 mG/menthol 3.6 mG Lozenge 1 Lozenge Oral every 1 hour PRN  BUpivacaine liposome 1.3% Injectable (no eMAR) 20 milliLiter(s) Local Injection once  ceFAZolin   IVPB 1000 milliGRAM(s) IV Intermittent every 8 hours  cholecalciferol 400 Unit(s) Oral daily  dexamethasone  IVPB 6 milliGRAM(s) IV Intermittent every 6 hours  dextrose 40% Gel 15 Gram(s) Oral once PRN  dextrose 50% Injectable 12.5 Gram(s) IV Push once  dextrose 50% Injectable 25 Gram(s) IV Push once  dextrose 50% Injectable 25 Gram(s) IV Push once  diazepam    Tablet 5 milliGRAM(s) Oral every 8 hours PRN  docusate sodium 100 milliGRAM(s) Oral three times a day  famotidine    Tablet 20 milliGRAM(s) Oral daily  gabapentin 600 milliGRAM(s) Oral at bedtime  glucagon  Injectable 1 milliGRAM(s) IntraMuscular once PRN  HYDROmorphone  Injectable 0.5 milliGRAM(s) SubCutaneous every 3 hours PRN  insulin lispro (HumaLOG) corrective regimen sliding scale   SubCutaneous Before meals and at bedtime  magnesium hydroxide Suspension 30 milliLiter(s) Oral every 12 hours PRN  metoprolol succinate ER 25 milliGRAM(s) Oral daily  ondansetron Injectable 4 milliGRAM(s) IV Push every 6 hours PRN  pregabalin 100 milliGRAM(s) Oral two times a day  senna 2 Tablet(s) Oral at bedtime  sodium chloride 0.9%. 1000 milliLiter(s) IV Continuous <Continuous>  traMADol 25 milliGRAM(s) Oral every 4 hours PRN  traMADol 50 milliGRAM(s) Oral every 4 hours PRN      Lab      Radiology    Assessment-  Cervical radiculopathy    Plan as per NS

## 2019-04-06 NOTE — PHYSICAL THERAPY INITIAL EVALUATION ADULT - GENERAL OBSERVATIONS, REHAB EVAL
Received semi-Benitez's position in bed with +tele, +pulse ox, +MASOOD drain x 1; +accordion drain x 1, +HMV posterior cervical spine; +IV; +[place]-J collar; on room air, friend present, in no apparent distress. Patient appears to be resting comfortably in bed

## 2019-04-06 NOTE — OCCUPATIONAL THERAPY INITIAL EVALUATION ADULT - MANUAL MUSCLE TESTING RESULTS, REHAB EVAL
Left shoulder grossly less than 3/5 MMT, RUE grossly 4/5 MMT, left elbow flexion/extension 3+/5MMT, left  strength 4/5 MMT

## 2019-04-06 NOTE — PHYSICAL THERAPY INITIAL EVALUATION ADULT - DIAGNOSIS, PT EVAL
Practice Pattern 4F: Impaired joint mobility, motor function, muscle performance and range of motion associated with spinal disorders

## 2019-04-07 LAB
ANION GAP SERPL CALC-SCNC: 11 MMOL/L — SIGNIFICANT CHANGE UP (ref 5–17)
BUN SERPL-MCNC: 13 MG/DL — SIGNIFICANT CHANGE UP (ref 7–23)
CALCIUM SERPL-MCNC: 9.6 MG/DL — SIGNIFICANT CHANGE UP (ref 8.4–10.5)
CHLORIDE SERPL-SCNC: 106 MMOL/L — SIGNIFICANT CHANGE UP (ref 96–108)
CO2 SERPL-SCNC: 25 MMOL/L — SIGNIFICANT CHANGE UP (ref 22–31)
CREAT SERPL-MCNC: 0.43 MG/DL — LOW (ref 0.5–1.3)
D DIMER BLD IA.RAPID-MCNC: 865 NG/ML DDU — HIGH
GLUCOSE BLDC GLUCOMTR-MCNC: 107 MG/DL — HIGH (ref 70–99)
GLUCOSE BLDC GLUCOMTR-MCNC: 127 MG/DL — HIGH (ref 70–99)
GLUCOSE BLDC GLUCOMTR-MCNC: 127 MG/DL — HIGH (ref 70–99)
GLUCOSE BLDC GLUCOMTR-MCNC: 141 MG/DL — HIGH (ref 70–99)
GLUCOSE SERPL-MCNC: 140 MG/DL — HIGH (ref 70–99)
HCT VFR BLD CALC: 34.5 % — SIGNIFICANT CHANGE UP (ref 34.5–45)
HGB BLD-MCNC: 10.9 G/DL — LOW (ref 11.5–15.5)
MAGNESIUM SERPL-MCNC: 2.4 MG/DL — SIGNIFICANT CHANGE UP (ref 1.6–2.6)
MCHC RBC-ENTMCNC: 29.8 PG — SIGNIFICANT CHANGE UP (ref 27–34)
MCHC RBC-ENTMCNC: 31.6 GM/DL — LOW (ref 32–36)
MCV RBC AUTO: 94.3 FL — SIGNIFICANT CHANGE UP (ref 80–100)
NRBC # BLD: 0 /100 WBCS — SIGNIFICANT CHANGE UP (ref 0–0)
PHOSPHATE SERPL-MCNC: 3.6 MG/DL — SIGNIFICANT CHANGE UP (ref 2.5–4.5)
PLATELET # BLD AUTO: 236 K/UL — SIGNIFICANT CHANGE UP (ref 150–400)
POTASSIUM SERPL-MCNC: 4.2 MMOL/L — SIGNIFICANT CHANGE UP (ref 3.5–5.3)
POTASSIUM SERPL-SCNC: 4.2 MMOL/L — SIGNIFICANT CHANGE UP (ref 3.5–5.3)
RBC # BLD: 3.66 M/UL — LOW (ref 3.8–5.2)
RBC # FLD: 14.8 % — HIGH (ref 10.3–14.5)
SODIUM SERPL-SCNC: 142 MMOL/L — SIGNIFICANT CHANGE UP (ref 135–145)
WBC # BLD: 12.64 K/UL — HIGH (ref 3.8–10.5)
WBC # FLD AUTO: 12.64 K/UL — HIGH (ref 3.8–10.5)

## 2019-04-07 RX ORDER — SODIUM CHLORIDE 9 MG/ML
1000 INJECTION INTRAMUSCULAR; INTRAVENOUS; SUBCUTANEOUS
Qty: 0 | Refills: 0 | Status: DISCONTINUED | OUTPATIENT
Start: 2019-04-07 | End: 2019-04-09

## 2019-04-07 RX ORDER — ENOXAPARIN SODIUM 100 MG/ML
40 INJECTION SUBCUTANEOUS AT BEDTIME
Qty: 0 | Refills: 0 | Status: DISCONTINUED | OUTPATIENT
Start: 2019-04-07 | End: 2019-04-15

## 2019-04-07 RX ADMIN — SENNA PLUS 2 TABLET(S): 8.6 TABLET ORAL at 21:13

## 2019-04-07 RX ADMIN — HYDROMORPHONE HYDROCHLORIDE 0.5 MILLIGRAM(S): 2 INJECTION INTRAMUSCULAR; INTRAVENOUS; SUBCUTANEOUS at 18:56

## 2019-04-07 RX ADMIN — SODIUM CHLORIDE 75 MILLILITER(S): 9 INJECTION INTRAMUSCULAR; INTRAVENOUS; SUBCUTANEOUS at 03:13

## 2019-04-07 RX ADMIN — Medication 100 MILLIGRAM(S): at 06:32

## 2019-04-07 RX ADMIN — Medication 100 MILLIGRAM(S): at 19:07

## 2019-04-07 RX ADMIN — Medication 100 MILLIGRAM(S): at 21:13

## 2019-04-07 RX ADMIN — ANASTROZOLE 1 MILLIGRAM(S): 1 TABLET ORAL at 10:13

## 2019-04-07 RX ADMIN — TRAMADOL HYDROCHLORIDE 50 MILLIGRAM(S): 50 TABLET ORAL at 11:13

## 2019-04-07 RX ADMIN — Medication 100 MILLIGRAM(S): at 10:13

## 2019-04-07 RX ADMIN — TRAMADOL HYDROCHLORIDE 50 MILLIGRAM(S): 50 TABLET ORAL at 10:13

## 2019-04-07 RX ADMIN — HYDROMORPHONE HYDROCHLORIDE 0.5 MILLIGRAM(S): 2 INJECTION INTRAMUSCULAR; INTRAVENOUS; SUBCUTANEOUS at 18:41

## 2019-04-07 RX ADMIN — GABAPENTIN 600 MILLIGRAM(S): 400 CAPSULE ORAL at 21:13

## 2019-04-07 RX ADMIN — Medication 400 UNIT(S): at 10:18

## 2019-04-07 RX ADMIN — FAMOTIDINE 20 MILLIGRAM(S): 10 INJECTION INTRAVENOUS at 10:17

## 2019-04-07 RX ADMIN — Medication 4 MILLIGRAM(S): at 03:10

## 2019-04-07 RX ADMIN — HYDROMORPHONE HYDROCHLORIDE 0.5 MILLIGRAM(S): 2 INJECTION INTRAMUSCULAR; INTRAVENOUS; SUBCUTANEOUS at 11:53

## 2019-04-07 RX ADMIN — ATORVASTATIN CALCIUM 40 MILLIGRAM(S): 80 TABLET, FILM COATED ORAL at 21:13

## 2019-04-07 RX ADMIN — ENOXAPARIN SODIUM 40 MILLIGRAM(S): 100 INJECTION SUBCUTANEOUS at 21:14

## 2019-04-07 RX ADMIN — Medication 25 MILLIGRAM(S): at 06:32

## 2019-04-07 RX ADMIN — HYDROMORPHONE HYDROCHLORIDE 0.5 MILLIGRAM(S): 2 INJECTION INTRAMUSCULAR; INTRAVENOUS; SUBCUTANEOUS at 12:08

## 2019-04-07 RX ADMIN — Medication 4 MILLIGRAM(S): at 09:12

## 2019-04-07 RX ADMIN — Medication 100 MILLIGRAM(S): at 06:38

## 2019-04-07 NOTE — SWALLOW BEDSIDE ASSESSMENT ADULT - ASR SWALLOW ASPIRATION MONITOR
change of breathing pattern/pneumonia/throat clearing/position upright (90Y)/cough/gurgly voice/oral hygiene/fever/upper respiratory infection

## 2019-04-07 NOTE — SWALLOW BEDSIDE ASSESSMENT ADULT - COMMENTS
As per pt, pt did not receive instrumental swallow assessment following primary ACDF sx in 12/2018. Pt reported she did not present w/ difficulty swallowing at that time. Currently, pt describes feeling of food and drink "stuck in throat", with intermittent burning sensation and intermittent coughing and oral expulsion of food/drink. Pt also reported hx of dysphagia, on-going for many years, c/b wet/gurgly vocal quality when eating and drinking. Pt denies history of hospitalization for PNA.

## 2019-04-07 NOTE — SWALLOW BEDSIDE ASSESSMENT ADULT - SWALLOW EVAL: DIAGNOSIS
Suspect pharyngeal dysphagia given s/sx aspiration and reported sensation of pharyngeal stasis s/p ACDF revision. [facility] as [facility]. Aspiration precautions in place. Recs for MBS for objective swallow assessment.

## 2019-04-07 NOTE — PROGRESS NOTE ADULT - SUBJECTIVE AND OBJECTIVE BOX
HPI:  This is a 67 y/o female, well known to Dr. Daley with PMH of non-hodkins lymphoma (xrt in 1981) and breast ca (2000's), HTN, HLD, osteoporosis.  In 10/18, patient was evaluated for upper extremity weakness and underwent a carpal tunnel release and ulnar transposition without relief of symptoms.   in 11/18, patient was admitted to [facility] for b/l UE weakness and difficulty walking.  Her work up revealed cervical stenosis with compression.  However, at the time, her bone scan also was significant for uptake in her right acetabulum and at the time and the evaluating spine surgeon requested this lesion be addressed prior to any cervical surgery.  A CT of the region was negative and the patient was sent home for follow up. on 12/4/18, patient was seen in the [facility] ER again, this time with worsening symptoms and she was emergently taken to the OR for a three level ACDF C4-C6.  She was discharged after the surgery with an uncomplicated post op course.     Since this surgery, patient has dramatic improvements in her walking, neuropathy and radicular pain.  Follow up imaging with X-ray lead to further evaluation with CT which revealed possible loosening of hardware.  Clinically, patient still has left UE weakness that she thinks may be from her history of broken clavicle. No bowel, bladder dysfunction     She is here for definitive surgical evaluation. (05 Apr 2019 06:51)    OVERNIGHT EVENTS:  Pt with alot of sputum secretions overnight. Pt c/o difficulty swallowing but able to swallow saliva.    Hospital course:     POD#0: S/P revision of anterior cervical discectomy with fusion of C4-C6 with extension to C3, as well as C2-T2 posterior fusion with right iliac crest autograft.   POD#1: CESARIO overnight. Drains x3 remain in place, [place] J collar and prevena dressing in place. Esposito removed this am, f/u TOV. Reports some throat tightness with mucous, but denies pain, no issues talking or swallowing (tolerating liquid diet)  POD#2 Pt with secrections and difficulty swallowing. continue decadron 4q6. Robitussin started.     Vital Signs Last 24 Hrs  T(C): 36.5 (07 Apr 2019 09:11), Max: 37.4 (06 Apr 2019 17:00)  T(F): 97.7 (07 Apr 2019 09:11), Max: 99.4 (06 Apr 2019 17:00)  HR: 90 (07 Apr 2019 09:11) (75 - 97)  BP: 140/67 (07 Apr 2019 09:11) (106/59 - 140/67)  BP(mean): --  RR: 18 (07 Apr 2019 09:11) (15 - 18)  SpO2: 96% (07 Apr 2019 09:11) (93% - 99%)    I&O's Summary    06 Apr 2019 07:01  -  07 Apr 2019 07:00  --------------------------------------------------------  IN: 2490 mL / OUT: 2012 mL / NET: 478 mL    07 Apr 2019 07:01  -  07 Apr 2019 10:35  --------------------------------------------------------  IN: 520 mL / OUT: 410 mL / NET: 110 mL        PHYSICAL EXAM:  Neurological:  AxOx 3   AOx3, NAD, FC, speech coherent   CN II-XII grossly intact   Motor: MAEx4 5/5 RUE and LE B/L, no drift LUE  5/5 L biceps/triceps/deltoid 3/5  SILT throughout  Incision site: anterior and posterior cervical incisions clean, dry and intact  +[place] J collar in place  +posterior cervical prevena dressing in place  +HMVx2 and MASOOD x1  Incision/Wound: no swelling.    TUBES/LINES:  [] Esposito  [] Lumbar Drain  [x] Wound Drains  [] Others      DIET:  [] NPO  [x] Mechanical  [] Tube feeds    LABS:                        10.9   12.64 )-----------( 236      ( 07 Apr 2019 06:43 )             34.5     04-07    142  |  106  |  13  ----------------------------<  140<H>  4.2   |  25  |  0.43<L>    Ca    9.6      07 Apr 2019 06:43  Phos  3.6     04-07  Mg     2.4     04-07              CAPILLARY BLOOD GLUCOSE      POCT Blood Glucose.: 127 mg/dL (07 Apr 2019 06:49)  POCT Blood Glucose.: 159 mg/dL (06 Apr 2019 22:11)  POCT Blood Glucose.: 133 mg/dL (06 Apr 2019 16:57)  POCT Blood Glucose.: 142 mg/dL (06 Apr 2019 12:09)      Drug Levels: [] N/A    CSF Analysis: [] N/A      Allergies    penicillin (Rash)    Intolerances      MEDICATIONS:  Antibiotics:    Neuro:  acetaminophen   Tablet .. 650 milliGRAM(s) Oral every 6 hours PRN  diazepam    Tablet 5 milliGRAM(s) Oral every 8 hours PRN  gabapentin 600 milliGRAM(s) Oral at bedtime  HYDROmorphone  Injectable 0.5 milliGRAM(s) IV Push every 3 hours PRN  ondansetron Injectable 4 milliGRAM(s) IV Push every 6 hours PRN  pregabalin 100 milliGRAM(s) Oral two times a day  traMADol 25 milliGRAM(s) Oral every 4 hours PRN  traMADol 50 milliGRAM(s) Oral every 4 hours PRN    Anticoagulation:    OTHER:  anastrozole 1 milliGRAM(s) Oral daily  atorvastatin 40 milliGRAM(s) Oral at bedtime  benzocaine 15 mG/menthol 3.6 mG Lozenge 1 Lozenge Oral every 1 hour PRN  dexamethasone  Injectable 4 milliGRAM(s) IV Push every 6 hours  dextrose 40% Gel 15 Gram(s) Oral once PRN  dextrose 50% Injectable 12.5 Gram(s) IV Push once  dextrose 50% Injectable 25 Gram(s) IV Push once  dextrose 50% Injectable 25 Gram(s) IV Push once  docusate sodium 100 milliGRAM(s) Oral three times a day  famotidine    Tablet 20 milliGRAM(s) Oral daily  glucagon  Injectable 1 milliGRAM(s) IntraMuscular once PRN  guaiFENesin    Syrup 100 milliGRAM(s) Oral every 6 hours PRN  insulin lispro (HumaLOG) corrective regimen sliding scale   SubCutaneous Before meals and at bedtime  magnesium hydroxide Suspension 30 milliLiter(s) Oral every 12 hours PRN  metoprolol succinate ER 25 milliGRAM(s) Oral daily  senna 2 Tablet(s) Oral at bedtime    IVF:  cholecalciferol 400 Unit(s) Oral daily  sodium chloride 0.9%. 1000 milliLiter(s) IV Continuous <Continuous>    CULTURES:    RADIOLOGY & ADDITIONAL TESTS:      ASSESSMENT:  68y Female s/p acdf C3-C4 extension to C6, PSF C2-T2, iliac crest bone graft POD#2     CERVICALSPINE M53.2X2  No pertinent family history in first degree relatives  Family history of colon cancer in mother (Mother)  Handoff  MEWS Score  Anxiety  History of breast cancer  Hodgkin disease  HLD (hyperlipidemia)  HTN (hypertension)  Breast CA  Cervical disc herniation  Carpal tunnel syndrome  Cervical disc disease  Hodgkin disease  Breast cancer  Cervical spine instability  Cervical spine instability  Degenerative cervical spinal stenosis  Graft, bone, iliac crest  Fusion of 7 to 12 spinal segments by posterior approach  Revision of anterior cervical discectomy with fusion (ACDF)  History of carpal tunnel surgery  History of tonsillectomy  S/P cervical spinal fusion  History of Mohs micrographic surgery for skin cancer  History of lumpectomy of right breast  H/O lumpectomy  No significant past surgical history  History of lumpectomy  S/P appendectomy      PLAN:  NEURO:  -pain management  -soft diet  -watch O2 sats  -robitussin for secrections  -[place] J collar  -post op CT xray  -posterior cervical Drains as per plastics  -possible remove anterior drain  -start lovenox for dvt prophylaxis  -D/w       DVT PROPHYLAXIS:  [x] Venodynes                                [x] Heparin/Lovenox    DISPOSITION: home HPI:  This is a 69 y/o female, well known to Dr. Daley with PMH of non-hodkins lymphoma (xrt in 1981) and breast ca (2000's), HTN, HLD, osteoporosis.  In 10/18, patient was evaluated for upper extremity weakness and underwent a carpal tunnel release and ulnar transposition without relief of symptoms.   in 11/18, patient was admitted to [facility] for b/l UE weakness and difficulty walking.  Her work up revealed cervical stenosis with compression.  However, at the time, her bone scan also was significant for uptake in her right acetabulum and at the time and the evaluating spine surgeon requested this lesion be addressed prior to any cervical surgery.  A CT of the region was negative and the patient was sent home for follow up. on 12/4/18, patient was seen in the [facility] ER again, this time with worsening symptoms and she was emergently taken to the OR for a three level ACDF C4-C6.  She was discharged after the surgery with an uncomplicated post op course.     Since this surgery, patient has dramatic improvements in her walking, neuropathy and radicular pain.  Follow up imaging with X-ray lead to further evaluation with CT which revealed possible loosening of hardware.  Clinically, patient still has left UE weakness that she thinks may be from her history of broken clavicle. No bowel, bladder dysfunction     She is here for definitive surgical evaluation. (05 Apr 2019 06:51)    OVERNIGHT EVENTS:  Pt with alot of sputum secretions overnight. Pt c/o difficulty swallowing but able to swallow saliva.    Hospital course:     POD#0: S/P revision of anterior cervical discectomy with fusion of C4-C6 with extension to C3, as well as C2-T2 posterior fusion with right iliac crest autograft.   POD#1: CESARIO overnight. Drains x3 remain in place, [place] J collar and prevena dressing in place. Esposito removed this am, f/u TOV. Reports some throat tightness with mucous, but denies pain, no issues talking or swallowing (tolerating liquid diet)  POD#2 Pt with secrections and difficulty swallowing. continue decadron 4q6. Robitussin started.     Vital Signs Last 24 Hrs  T(C): 36.5 (07 Apr 2019 09:11), Max: 37.4 (06 Apr 2019 17:00)  T(F): 97.7 (07 Apr 2019 09:11), Max: 99.4 (06 Apr 2019 17:00)  HR: 90 (07 Apr 2019 09:11) (75 - 97)  BP: 140/67 (07 Apr 2019 09:11) (106/59 - 140/67)  BP(mean): --  RR: 18 (07 Apr 2019 09:11) (15 - 18)  SpO2: 96% (07 Apr 2019 09:11) (93% - 99%)    I&O's Summary    06 Apr 2019 07:01  -  07 Apr 2019 07:00  --------------------------------------------------------  IN: 2490 mL / OUT: 2012 mL / NET: 478 mL    07 Apr 2019 07:01  -  07 Apr 2019 10:35  --------------------------------------------------------  IN: 520 mL / OUT: 410 mL / NET: 110 mL        PHYSICAL EXAM:  Neurological:  AxOx 3   AOx3, NAD, FC, speech coherent   CN II-XII grossly intact   Motor: MAEx4 5/5 RUE and LE B/L, no drift LUE  5/5 L biceps/triceps/deltoid 3/5  SILT throughout  Incision site: anterior and posterior cervical incisions clean, dry and intact  +[place] J collar in place  +posterior cervical prevena dressing in place  +HMVx2 and MASOOD x1  Incision/Wound: no swelling.    TUBES/LINES:  [] Esposito  [] Lumbar Drain  [x] Wound Drains  [] Others      DIET:  [] NPO  [x] Mechanical  [] Tube feeds    LABS:                        10.9   12.64 )-----------( 236      ( 07 Apr 2019 06:43 )             34.5     04-07    142  |  106  |  13  ----------------------------<  140<H>  4.2   |  25  |  0.43<L>    Ca    9.6      07 Apr 2019 06:43  Phos  3.6     04-07  Mg     2.4     04-07              CAPILLARY BLOOD GLUCOSE      POCT Blood Glucose.: 127 mg/dL (07 Apr 2019 06:49)  POCT Blood Glucose.: 159 mg/dL (06 Apr 2019 22:11)  POCT Blood Glucose.: 133 mg/dL (06 Apr 2019 16:57)  POCT Blood Glucose.: 142 mg/dL (06 Apr 2019 12:09)      Drug Levels: [] N/A    CSF Analysis: [] N/A      Allergies    penicillin (Rash)    Intolerances      MEDICATIONS:  Antibiotics:    Neuro:  acetaminophen   Tablet .. 650 milliGRAM(s) Oral every 6 hours PRN  diazepam    Tablet 5 milliGRAM(s) Oral every 8 hours PRN  gabapentin 600 milliGRAM(s) Oral at bedtime  HYDROmorphone  Injectable 0.5 milliGRAM(s) IV Push every 3 hours PRN  ondansetron Injectable 4 milliGRAM(s) IV Push every 6 hours PRN  pregabalin 100 milliGRAM(s) Oral two times a day  traMADol 25 milliGRAM(s) Oral every 4 hours PRN  traMADol 50 milliGRAM(s) Oral every 4 hours PRN    Anticoagulation:    OTHER:  anastrozole 1 milliGRAM(s) Oral daily  atorvastatin 40 milliGRAM(s) Oral at bedtime  benzocaine 15 mG/menthol 3.6 mG Lozenge 1 Lozenge Oral every 1 hour PRN  dexamethasone  Injectable 4 milliGRAM(s) IV Push every 6 hours  dextrose 40% Gel 15 Gram(s) Oral once PRN  dextrose 50% Injectable 12.5 Gram(s) IV Push once  dextrose 50% Injectable 25 Gram(s) IV Push once  dextrose 50% Injectable 25 Gram(s) IV Push once  docusate sodium 100 milliGRAM(s) Oral three times a day  famotidine    Tablet 20 milliGRAM(s) Oral daily  glucagon  Injectable 1 milliGRAM(s) IntraMuscular once PRN  guaiFENesin    Syrup 100 milliGRAM(s) Oral every 6 hours PRN  insulin lispro (HumaLOG) corrective regimen sliding scale   SubCutaneous Before meals and at bedtime  magnesium hydroxide Suspension 30 milliLiter(s) Oral every 12 hours PRN  metoprolol succinate ER 25 milliGRAM(s) Oral daily  senna 2 Tablet(s) Oral at bedtime    IVF:  cholecalciferol 400 Unit(s) Oral daily  sodium chloride 0.9%. 1000 milliLiter(s) IV Continuous <Continuous>    CULTURES:    RADIOLOGY & ADDITIONAL TESTS:      ASSESSMENT:  68y Female s/p acdf C3-C4 extension to C6, PSF C2-T2, iliac crest bone graft POD#2     CERVICALSPINE M53.2X2  No pertinent family history in first degree relatives  Family history of colon cancer in mother (Mother)  Handoff  MEWS Score  Anxiety  History of breast cancer  Hodgkin disease  HLD (hyperlipidemia)  HTN (hypertension)  Breast CA  Cervical disc herniation  Carpal tunnel syndrome  Cervical disc disease  Hodgkin disease  Breast cancer  Cervical spine instability  Cervical spine instability  Degenerative cervical spinal stenosis  Graft, bone, iliac crest  Fusion of 7 to 12 spinal segments by posterior approach  Revision of anterior cervical discectomy with fusion (ACDF)  History of carpal tunnel surgery  History of tonsillectomy  S/P cervical spinal fusion  History of Mohs micrographic surgery for skin cancer  History of lumpectomy of right breast  H/O lumpectomy  No significant past surgical history  History of lumpectomy  S/P appendectomy      PLAN:  NEURO:  -pain management  -soft diet. swallow study tomorrow  -d/c decadron  -watch O2 sats  -robitussin for secrections  -[place] J collar  -post op CT/ xray   -posterior cervical Drains as per plastics  - remove anterior drain  -start lovenox for dvt prophylaxis  -D/w       DVT PROPHYLAXIS:  [x] Venodynes                                [x] Heparin/Lovenox    DISPOSITION: home

## 2019-04-07 NOTE — SWALLOW BEDSIDE ASSESSMENT ADULT - NS SPL SWALLOW CLINIC TRIAL FT
Pt presented w/ intermittent wet/gurgly coughing and intermittent oral expectoration both prior to and during PO trials of thin liquids, puree, and mechanical soft, suggestive of potential airway protection deficits as well as pharyngeal stasis. Per RN, coughing w/ expectoration increases during meals. Pt reported that sensation of pharyngeal stasis has improved somewhat since yesterday.

## 2019-04-07 NOTE — SWALLOW BEDSIDE ASSESSMENT ADULT - SWALLOW EVAL: RECOMMENDED FEEDING/EATING TECHNIQUES
maintain upright posture during/after eating for 30 mins/allow for swallow between intakes/oral hygiene/small sips/bites/crush medication (when feasible)/alternate food with liquid/position upright (90 degrees)

## 2019-04-08 LAB
GLUCOSE BLDC GLUCOMTR-MCNC: 100 MG/DL — HIGH (ref 70–99)
GLUCOSE BLDC GLUCOMTR-MCNC: 100 MG/DL — HIGH (ref 70–99)
GLUCOSE BLDC GLUCOMTR-MCNC: 106 MG/DL — HIGH (ref 70–99)
GLUCOSE BLDC GLUCOMTR-MCNC: 97 MG/DL — SIGNIFICANT CHANGE UP (ref 70–99)

## 2019-04-08 PROCEDURE — 93970 EXTREMITY STUDY: CPT | Mod: 26

## 2019-04-08 PROCEDURE — 71275 CT ANGIOGRAPHY CHEST: CPT | Mod: 26

## 2019-04-08 PROCEDURE — 93306 TTE W/DOPPLER COMPLETE: CPT | Mod: 26

## 2019-04-08 PROCEDURE — 99222 1ST HOSP IP/OBS MODERATE 55: CPT

## 2019-04-08 PROCEDURE — 74230 X-RAY XM SWLNG FUNCJ C+: CPT | Mod: 26

## 2019-04-08 RX ORDER — DEXAMETHASONE 0.5 MG/5ML
4 ELIXIR ORAL EVERY 12 HOURS
Qty: 0 | Refills: 0 | Status: DISCONTINUED | OUTPATIENT
Start: 2019-04-11 | End: 2019-04-12

## 2019-04-08 RX ORDER — DEXAMETHASONE 0.5 MG/5ML
8 ELIXIR ORAL EVERY 12 HOURS
Qty: 0 | Refills: 0 | Status: COMPLETED | OUTPATIENT
Start: 2019-04-08 | End: 2019-04-10

## 2019-04-08 RX ORDER — DEXAMETHASONE 0.5 MG/5ML
ELIXIR ORAL
Qty: 0 | Refills: 0 | Status: DISCONTINUED | OUTPATIENT
Start: 2019-04-08 | End: 2019-04-12

## 2019-04-08 RX ADMIN — TRAMADOL HYDROCHLORIDE 50 MILLIGRAM(S): 50 TABLET ORAL at 22:15

## 2019-04-08 RX ADMIN — HYDROMORPHONE HYDROCHLORIDE 0.5 MILLIGRAM(S): 2 INJECTION INTRAMUSCULAR; INTRAVENOUS; SUBCUTANEOUS at 14:15

## 2019-04-08 RX ADMIN — Medication 100 MILLIGRAM(S): at 21:14

## 2019-04-08 RX ADMIN — HYDROMORPHONE HYDROCHLORIDE 0.5 MILLIGRAM(S): 2 INJECTION INTRAMUSCULAR; INTRAVENOUS; SUBCUTANEOUS at 07:09

## 2019-04-08 RX ADMIN — ENOXAPARIN SODIUM 40 MILLIGRAM(S): 100 INJECTION SUBCUTANEOUS at 21:14

## 2019-04-08 RX ADMIN — GABAPENTIN 600 MILLIGRAM(S): 400 CAPSULE ORAL at 21:14

## 2019-04-08 RX ADMIN — Medication 100 MILLIGRAM(S): at 13:03

## 2019-04-08 RX ADMIN — SODIUM CHLORIDE 50 MILLILITER(S): 9 INJECTION INTRAMUSCULAR; INTRAVENOUS; SUBCUTANEOUS at 00:27

## 2019-04-08 RX ADMIN — Medication 100 MILLIGRAM(S): at 07:07

## 2019-04-08 RX ADMIN — TRAMADOL HYDROCHLORIDE 50 MILLIGRAM(S): 50 TABLET ORAL at 21:14

## 2019-04-08 RX ADMIN — TRAMADOL HYDROCHLORIDE 50 MILLIGRAM(S): 50 TABLET ORAL at 06:41

## 2019-04-08 RX ADMIN — Medication 100 MILLIGRAM(S): at 05:57

## 2019-04-08 RX ADMIN — FAMOTIDINE 20 MILLIGRAM(S): 10 INJECTION INTRAVENOUS at 13:03

## 2019-04-08 RX ADMIN — HYDROMORPHONE HYDROCHLORIDE 0.5 MILLIGRAM(S): 2 INJECTION INTRAMUSCULAR; INTRAVENOUS; SUBCUTANEOUS at 13:03

## 2019-04-08 RX ADMIN — SENNA PLUS 2 TABLET(S): 8.6 TABLET ORAL at 21:15

## 2019-04-08 RX ADMIN — Medication 25 MILLIGRAM(S): at 05:57

## 2019-04-08 RX ADMIN — Medication 100 MILLIGRAM(S): at 19:01

## 2019-04-08 RX ADMIN — ATORVASTATIN CALCIUM 40 MILLIGRAM(S): 80 TABLET, FILM COATED ORAL at 21:14

## 2019-04-08 RX ADMIN — ANASTROZOLE 1 MILLIGRAM(S): 1 TABLET ORAL at 13:03

## 2019-04-08 RX ADMIN — HYDROMORPHONE HYDROCHLORIDE 0.5 MILLIGRAM(S): 2 INJECTION INTRAMUSCULAR; INTRAVENOUS; SUBCUTANEOUS at 07:27

## 2019-04-08 RX ADMIN — SODIUM CHLORIDE 50 MILLILITER(S): 9 INJECTION INTRAMUSCULAR; INTRAVENOUS; SUBCUTANEOUS at 16:44

## 2019-04-08 RX ADMIN — Medication 400 UNIT(S): at 13:03

## 2019-04-08 RX ADMIN — TRAMADOL HYDROCHLORIDE 50 MILLIGRAM(S): 50 TABLET ORAL at 05:57

## 2019-04-08 NOTE — CONSULT NOTE ADULT - SUBJECTIVE AND OBJECTIVE BOX
REASON FOR CONSULT:    HISTORY OF PRESENT ILLNESS:    PAST MEDICAL & SURGICAL HISTORY:  Anxiety  History of breast cancer: s/p right lumpectomy , RT  Hodgkin disease: 1981, underwent radiation therapy  HLD (hyperlipidemia)  HTN (hypertension)  Cervical disc herniation  Carpal tunnel syndrome: b/l  Cervical disc disease  History of carpal tunnel surgery: 10/2018 left hand  History of tonsillectomy  S/P cervical spinal fusion: 12/15/2018 C4-C6 ACDF  History of Mohs micrographic surgery for skin cancer  History of lumpectomy of right breast: 2013      [ ] Diabetes   [ ] Hypertension  [ ] Hyperlipidemia  [ ] CAD  [ ] PCI  [ ] CABG    PREVIOUS DIAGNOSTIC TESTING:    [ ] Echocardiogram:  [ ]  Catheterization:  [ ] Stress Test:  	    MEDICATIONS:  metoprolol succinate ER 25 milliGRAM(s) Oral daily      guaiFENesin    Syrup 100 milliGRAM(s) Oral every 6 hours PRN    acetaminophen   Tablet .. 650 milliGRAM(s) Oral every 6 hours PRN  diazepam    Tablet 5 milliGRAM(s) Oral every 8 hours PRN  gabapentin 600 milliGRAM(s) Oral at bedtime  HYDROmorphone  Injectable 0.5 milliGRAM(s) IV Push every 3 hours PRN  ondansetron Injectable 4 milliGRAM(s) IV Push every 6 hours PRN  pregabalin 100 milliGRAM(s) Oral two times a day  traMADol 25 milliGRAM(s) Oral every 4 hours PRN  traMADol 50 milliGRAM(s) Oral every 4 hours PRN    docusate sodium 100 milliGRAM(s) Oral three times a day  famotidine    Tablet 20 milliGRAM(s) Oral daily  magnesium hydroxide Suspension 30 milliLiter(s) Oral every 12 hours PRN  senna 2 Tablet(s) Oral at bedtime    atorvastatin 40 milliGRAM(s) Oral at bedtime  dextrose 40% Gel 15 Gram(s) Oral once PRN  dextrose 50% Injectable 12.5 Gram(s) IV Push once  dextrose 50% Injectable 25 Gram(s) IV Push once  dextrose 50% Injectable 25 Gram(s) IV Push once  glucagon  Injectable 1 milliGRAM(s) IntraMuscular once PRN  insulin lispro (HumaLOG) corrective regimen sliding scale   SubCutaneous Before meals and at bedtime    anastrozole 1 milliGRAM(s) Oral daily  benzocaine 15 mG/menthol 3.6 mG Lozenge 1 Lozenge Oral every 1 hour PRN  cholecalciferol 400 Unit(s) Oral daily  enoxaparin Injectable 40 milliGRAM(s) SubCutaneous at bedtime  sodium chloride 0.9%. 1000 milliLiter(s) IV Continuous <Continuous>      FAMILY HISTORY:  Family history of colon cancer in mother (Mother)      SOCIAL HISTORY:    [ x] Non-smoker  [ ] Smoker  [ ] Alcohol    FAMILY HX: NC    Allergies    penicillin (Rash)    Intolerances    	    REVIEW OF SYSTEMS:    [x] as per HPI  CONSTITUTIONAL: No fever, weight loss, or fatigue  ENT:  No difficulty hearing, tinnitus, vertigo; No sinus or throat pain  RESPIRATORY: No cough, wheezing, chills or hemoptysis; No Shortness of Breath  CARDIOVASCULAR: No chest pain, palpitations, dizziness, or leg swelling  GASTROINTESTINAL: No abdominal or epigastric pain. No nausea, vomiting, or hematemesis; No diarrhea or constipation. No melena or hematochezia.  GENITOURINARY: No dysuria, frequency, hematuria, or incontinence  NEUROLOGICAL: No headaches, memory loss, loss of strength, numbness, or tremors  MUSCULOSKELETAL: No joint pain or swelling; No muscle, back, or extremity pain  [x] All others negative	  [ ] Unable to obtain    PHYSICAL EXAM:  T(C): 36.9 (04-08-19 @ 06:01), Max: 36.9 (04-08-19 @ 06:01)  HR: 73 (04-08-19 @ 08:31) (71 - 85)  BP: 88/53 (04-08-19 @ 08:31) (88/53 - 130/60)  RR: 16 (04-08-19 @ 08:31) (16 - 18)  SpO2: 92% (04-08-19 @ 08:31) (92% - 97%)  Wt(kg): --  I&O's Summary    07 Apr 2019 07:01  -  08 Apr 2019 07:00  --------------------------------------------------------  IN: 1580 mL / OUT: 1283 mL / NET: 297 mL        Appearance: Normal	  HEENT:   Normal oral mucosa, PERRL, EOMI	  Lymphatic: No lymphadenopathy  Cardiovascular: Normal S1 S2, No JVD, No murmurs, No edema  Respiratory: Lungs clear to auscultation	  Psychiatry: A & O x 3, Mood & affect appropriate  Gastrointestinal:  Soft, Non-tender, + BS	  Skin: No rashes, No ecchymoses, No cyanosis	  Neurologic: Non-focal  Extremities: Normal range of motion, No clubbing, cyanosis or edema  Vascular: Peripheral pulses palpable 2+ bilaterally    TELEMETRY: 	  nsr yamila atach after APC , no wafib no pauses  ECG:  < from: 12 Lead ECG (12.05.18 @ 00:37) >    Diagnosis Line NORMAL SINUS RHYTHM  POSSIBLE LEFT ATRIAL ENLARGEMENT  NONSPECIFIC ST ABNORMALITY  ABNORMAL ECG    < end of copied text >  < from: 12 Lead ECG (12.05.18 @ 00:37) >    Diagnosis Line NORMAL SINUS RHYTHM  POSSIBLE LEFT ATRIAL ENLARGEMENT  NONSPECIFIC ST ABNORMALITY  ABNORMAL ECG    < end of copied text >    ECHO:   STRESS:  CATH:  	  RADIOLOGY:  CXR:   CT:  US:   	  	  LABS:	 	    CARDIAC MARKERS:                                  10.9   12.64 )-----------( 236      ( 07 Apr 2019 06:43 )             34.5     04-07    142  |  106  |  13  ----------------------------<  140<H>  4.2   |  25  |  0.43<L>    Ca    9.6      07 Apr 2019 06:43  Phos  3.6     04-07  Mg     2.4     04-07      proBNP:   Lipid Profile:   HgA1c:   TSH:     ASSESSMENT/PLAN: 	    # Atrial tach - brief yamila a-tach, electrically silent since  psot operative state  elevated d-dimer 800    recommend:  1. US LE r/o dvt  2. CTA r/o PE  3. 2D echo to eval LVEF and RV pressures  3. tele monitoring for another 24 hours  4. replete electrolytes: K>4 Mg>2 check PO4 level

## 2019-04-08 NOTE — PROGRESS NOTE ADULT - SUBJECTIVE AND OBJECTIVE BOX
Neurology Follow up note    Name  HILARIA KUMAR    HPI:  This is a 69 y/o female, well known to Dr. Daley with PMH of non-hodkins lymphoma (xrt in 1981) and breast ca (2000's), HTN, HLD, osteoporosis.  In 10/18, patient was evaluated for upper extremity weakness and underwent a carpal tunnel release and ulnar transposition without relief of symptoms.   in 11/18, patient was admitted to [facility] for b/l UE weakness and difficulty walking.  Her work up revealed cervical stenosis with compression.  However, at the time, her bone scan also was significant for uptake in her right acetabulum and at the time and the evaluating spine surgeon requested this lesion be addressed prior to any cervical surgery.  A CT of the region was negative and the patient was sent home for follow up. on 12/4/18, patient was seen in the [facility] ER again, this time with worsening symptoms and she was emergently taken to the OR for a three level ACDF C4-C6.  She was discharged after the surgery with an uncomplicated post op course.     Since this surgery, patient has dramatic improvements in her walking, neuropathy and radicular pain.  Follow up imaging with X-ray lead to further evaluation with CT which revealed possible loosening of hardware.  Clinically, patient still has left UE weakness that she thinks may be from her history of broken clavicle. No bowel, bladder dysfunction     She is here for definitive surgical evaluation. (05 Apr 2019 06:51)      Interval History - neck pain and weakness LUE        REVIEW OF SYSTEMS    Vital Signs Last 24 Hrs  T(C): 36.9 (08 Apr 2019 06:01), Max: 36.9 (08 Apr 2019 06:01)  T(F): 98.4 (08 Apr 2019 06:01), Max: 98.4 (08 Apr 2019 06:01)  HR: 78 (08 Apr 2019 13:05) (71 - 85)  BP: 95/55 (08 Apr 2019 13:05) (80/51 - 130/60)  BP(mean): 73 (08 Apr 2019 13:05) (61 - 73)  RR: 18 (08 Apr 2019 13:05) (16 - 18)  SpO2: 93% (08 Apr 2019 13:05) (92% - 96%)    Physical Exam-     Mental Status- awake and alert     Cranial Nerves- full EOM    Gait and station- no foot drop    Motor- proximal LUE weakness    Reflexes- decreased    Sensation- no sensory level    Coordination- no tremors    Vascular - no bruits    Medications  acetaminophen   Tablet .. 650 milliGRAM(s) Oral every 6 hours PRN  anastrozole 1 milliGRAM(s) Oral daily  atorvastatin 40 milliGRAM(s) Oral at bedtime  benzocaine 15 mG/menthol 3.6 mG Lozenge 1 Lozenge Oral every 1 hour PRN  cholecalciferol 400 Unit(s) Oral daily  dextrose 40% Gel 15 Gram(s) Oral once PRN  dextrose 50% Injectable 12.5 Gram(s) IV Push once  dextrose 50% Injectable 25 Gram(s) IV Push once  dextrose 50% Injectable 25 Gram(s) IV Push once  diazepam    Tablet 5 milliGRAM(s) Oral every 8 hours PRN  docusate sodium 100 milliGRAM(s) Oral three times a day  enoxaparin Injectable 40 milliGRAM(s) SubCutaneous at bedtime  famotidine    Tablet 20 milliGRAM(s) Oral daily  gabapentin 600 milliGRAM(s) Oral at bedtime  glucagon  Injectable 1 milliGRAM(s) IntraMuscular once PRN  guaiFENesin    Syrup 100 milliGRAM(s) Oral every 6 hours PRN  HYDROmorphone  Injectable 0.5 milliGRAM(s) IV Push every 3 hours PRN  insulin lispro (HumaLOG) corrective regimen sliding scale   SubCutaneous Before meals and at bedtime  magnesium hydroxide Suspension 30 milliLiter(s) Oral every 12 hours PRN  metoprolol succinate ER 25 milliGRAM(s) Oral daily  ondansetron Injectable 4 milliGRAM(s) IV Push every 6 hours PRN  pregabalin 100 milliGRAM(s) Oral two times a day  senna 2 Tablet(s) Oral at bedtime  sodium chloride 0.9%. 1000 milliLiter(s) IV Continuous <Continuous>  traMADol 25 milliGRAM(s) Oral every 4 hours PRN  traMADol 50 milliGRAM(s) Oral every 4 hours PRN      Lab      Radiology    Assessment- Cervical radiculopathy    Plan Rehab and pain management

## 2019-04-08 NOTE — SWALLOW VFSS/MBS ASSESSMENT ADULT - ESOPHAGEAL STAGE
Extent and duration of pharyngoesophageal segment opening was reduced due to reduced hyolaryngeal excursion and possibly presence of ACDF hardware.  The proximal/mid esophagus ( level C7-T1) also appeared dilated with some retention of swallowed bolus in this region on lateral view.  As such, a quick scan of the esophagus was performed in the A-P view.  This showed dilated portions of the esophagus, as well as moderate retention of swallowed bolus along the length of the esophagus.  Patient again NOT sensate to this residue.  Thin liquid wash reduced esophageal stasis and facilitated transport of a portion of the material through the GE junction, but mild to moderate esophageal residue remained after.

## 2019-04-08 NOTE — PROGRESS NOTE ADULT - SUBJECTIVE AND OBJECTIVE BOX
HPI:  This is a 67 y/o female, well known to Dr. Daley with PMH of non-hodkins lymphoma (xrt in 1981) and breast ca (2000's), HTN, HLD, osteoporosis.  In 10/18, patient was evaluated for upper extremity weakness and underwent a carpal tunnel release and ulnar transposition without relief of symptoms.   in 11/18, patient was admitted to [facility] for b/l UE weakness and difficulty walking.  Her work up revealed cervical stenosis with compression.  However, at the time, her bone scan also was significant for uptake in her right acetabulum and at the time and the evaluating spine surgeon requested this lesion be addressed prior to any cervical surgery.  A CT of the region was negative and the patient was sent home for follow up. on 12/4/18, patient was seen in the [facility] ER again, this time with worsening symptoms and she was emergently taken to the OR for a three level ACDF C4-C6.  She was discharged after the surgery with an uncomplicated post op course.     Since this surgery, patient has dramatic improvements in her walking, neuropathy and radicular pain.  Follow up imaging with X-ray lead to further evaluation with CT which revealed possible loosening of hardware.  Clinically, patient still has left UE weakness that she thinks may be from her history of broken clavicle. No bowel, bladder dysfunction     She is here for definitive surgical evaluation. (05 Apr 2019 06:51)    OVERNIGHT EVENTS:  Vital Signs Last 24 Hrs  T(C): 36.9 (08 Apr 2019 06:01), Max: 36.9 (08 Apr 2019 06:01)  T(F): 98.4 (08 Apr 2019 06:01), Max: 98.4 (08 Apr 2019 06:01)  HR: 82 (08 Apr 2019 06:01) (71 - 90)  BP: 102/57 (08 Apr 2019 06:01) (90/58 - 140/67)  BP(mean): --  RR: 18 (08 Apr 2019 06:01) (16 - 18)  SpO2: 96% (08 Apr 2019 06:01) (94% - 97%)    I&O's Summary    07 Apr 2019 07:01  -  08 Apr 2019 07:00  --------------------------------------------------------  IN: 1580 mL / OUT: 1283 mL / NET: 297 mL      Hospital course:     POD#0: S/P revision of anterior cervical discectomy with fusion of C4-C6 with extension to C3, as well as C2-T2 posterior fusion with right iliac crest autograft.   POD#1: CESARIO overnight. Drains x3 remain in place, [place] J collar and prevena dressing in place. Esposito removed this am, f/u TOV. Reports some throat tightness with mucous, but denies pain, no issues talking or swallowing (tolerating liquid diet)  POD#2 Pt with secrections and difficulty swallowing. continue decadron 4q6. Robitussin started.   4/8 Pt without complaints, uneventful night  DC home later today        PHYSICAL EXAM:  Neurological:  A&OX3 Crnial nerves intact  DOZIER   Crani incision CDI         Cardiovascular: RRR  Respiratory: Lungs CTAB  Gastrointestinal: +BS  Genitourinary: voiding without difficulty  Extremities: warm and dry  Incision/Wound: CDI    DIET:  Regular    LABS:                        10.9   12.64 )-----------( 236      ( 07 Apr 2019 06:43 )             34.5     04-07    142  |  106  |  13  ----------------------------<  140<H>  4.2   |  25  |  0.43<L>    Ca    9.6      07 Apr 2019 06:43  Phos  3.6     04-07  Mg     2.4     04-07    CAPILLARY BLOOD GLUCOSE      POCT Blood Glucose.: 97 mg/dL (08 Apr 2019 06:39)  POCT Blood Glucose.: 107 mg/dL (07 Apr 2019 21:50)  POCT Blood Glucose.: 141 mg/dL (07 Apr 2019 17:09)  POCT Blood Glucose.: 127 mg/dL (07 Apr 2019 11:48)      Drug Levels: [] N/A    CSF Analysis: [] N/A      Allergies    penicillin (Rash)    Intolerances      MEDICATIONS:  Antibiotics:    Neuro:  acetaminophen   Tablet .. 650 milliGRAM(s) Oral every 6 hours PRN  diazepam    Tablet 5 milliGRAM(s) Oral every 8 hours PRN  gabapentin 600 milliGRAM(s) Oral at bedtime  HYDROmorphone  Injectable 0.5 milliGRAM(s) IV Push every 3 hours PRN  ondansetron Injectable 4 milliGRAM(s) IV Push every 6 hours PRN  pregabalin 100 milliGRAM(s) Oral two times a day  traMADol 25 milliGRAM(s) Oral every 4 hours PRN  traMADol 50 milliGRAM(s) Oral every 4 hours PRN    Anticoagulation:  enoxaparin Injectable 40 milliGRAM(s) SubCutaneous at bedtime    OTHER:  anastrozole 1 milliGRAM(s) Oral daily  atorvastatin 40 milliGRAM(s) Oral at bedtime  benzocaine 15 mG/menthol 3.6 mG Lozenge 1 Lozenge Oral every 1 hour PRN  dextrose 40% Gel 15 Gram(s) Oral once PRN  dextrose 50% Injectable 12.5 Gram(s) IV Push once  dextrose 50% Injectable 25 Gram(s) IV Push once  dextrose 50% Injectable 25 Gram(s) IV Push once  docusate sodium 100 milliGRAM(s) Oral three times a day  famotidine    Tablet 20 milliGRAM(s) Oral daily  glucagon  Injectable 1 milliGRAM(s) IntraMuscular once PRN  guaiFENesin    Syrup 100 milliGRAM(s) Oral every 6 hours PRN  insulin lispro (HumaLOG) corrective regimen sliding scale   SubCutaneous Before meals and at bedtime  magnesium hydroxide Suspension 30 milliLiter(s) Oral every 12 hours PRN  metoprolol succinate ER 25 milliGRAM(s) Oral daily  senna 2 Tablet(s) Oral at bedtime    IVF:  cholecalciferol 400 Unit(s) Oral daily  sodium chloride 0.9%. 1000 milliLiter(s) IV Continuous <Continuous>    CULTURES:    RADIOLOGY & ADDITIONAL TESTS:      ASSESSMENT:  68y Female s/p acdf C3-C4 extension to C6, PSF C2-T2, iliac crest bone graft POD#3     PLAN:  NEURO:    Monitor neuro statsu  Steroid taper to off  Continue Keppra X7 days as peer Dr Cadet  Pain Management  Bowel regime  Continue current medical regime    Dispo: Will discuss with atending         Assessment:  Present when checked    []  GCS  E   V  M     Heart Failure: []Acute, [] acute on chronic , []chronic  Heart Failure:  [] Diastolic (HFpEF), [] Systolic (HFrEF), []Combined (HFpEF and HFrEF), [] RHF, [] Pulm HTN, [] Other    [] SUSU, [] ATN, [] AIN, [] other  [] CKD1, [] CKD2, [] CKD 3, [] CKD 4, [] CKD 5, []ESRD    Encephalopathy: [] Metabolic, [] Hepatic, [] toxic, [] Neurological, [] Other    Abnormal Nurtitional Status: [] malnurtition (see nutrition note), [ ]underweight: BMI < 19, [] morbid obesity: BMI >40, [] Cachexia    [] Sepsis  [] hypovolemic shock,[] cardiogenic shock, [] hemorrhagic shock, [] neuogenic shock  [] Acute Respiratory Failure  []Cerebral edema, [] Brain compression/ herniation,   [] Functional quadriplegia  [] Acute blood loss anemia      Dispo: Discussed with  attending HPI:  This is a 69 y/o female, well known to Dr. Daley with PMH of non-hodkins lymphoma (xrt in 1981) and breast ca (2000's), HTN, HLD, osteoporosis.  In 10/18, patient was evaluated for upper extremity weakness and underwent a carpal tunnel release and ulnar transposition without relief of symptoms.   in 11/18, patient was admitted to [facility] for b/l UE weakness and difficulty walking.  Her work up revealed cervical stenosis with compression.  However, at the time, her bone scan also was significant for uptake in her right acetabulum and at the time and the evaluating spine surgeon requested this lesion be addressed prior to any cervical surgery.  A CT of the region was negative and the patient was sent home for follow up. on 12/4/18, patient was seen in the [facility] ER again, this time with worsening symptoms and she was emergently taken to the OR for a three level ACDF C4-C6.  She was discharged after the surgery with an uncomplicated post op course.     Since this surgery, patient has dramatic improvements in her walking, neuropathy and radicular pain.  Follow up imaging with X-ray lead to further evaluation with CT which revealed possible loosening of hardware.  Clinically, patient still has left UE weakness that she thinks may be from her history of broken clavicle. No bowel, bladder dysfunction     She is here for definitive surgical evaluation. (05 Apr 2019 06:51)    OVERNIGHT EVENTS:  Vital Signs Last 24 Hrs  T(C): 36.9 (08 Apr 2019 06:01), Max: 36.9 (08 Apr 2019 06:01)  T(F): 98.4 (08 Apr 2019 06:01), Max: 98.4 (08 Apr 2019 06:01)  HR: 82 (08 Apr 2019 06:01) (71 - 90)  BP: 102/57 (08 Apr 2019 06:01) (90/58 - 140/67)  BP(mean): --  RR: 18 (08 Apr 2019 06:01) (16 - 18)  SpO2: 96% (08 Apr 2019 06:01) (94% - 97%)    I&O's Summary    07 Apr 2019 07:01  -  08 Apr 2019 07:00  --------------------------------------------------------  IN: 1580 mL / OUT: 1283 mL / NET: 297 mL      Hospital course:     POD#0: S/P revision of anterior cervical discectomy with fusion of C4-C6 with extension to C3, as well as C2-T2 posterior fusion with right iliac crest autograft.   POD#1: CESARIO overnight. Drains x3 remain in place, [place] J collar and prevena dressing in place. Esposito removed this am, f/u TOV. Reports some throat tightness with mucous, but denies pain, no issues talking or swallowing (tolerating liquid diet)  POD#2 Pt with secrections and difficulty swallowing. continue decadron 4q6. Robitussin started.   4/8 Pt complaining muscle spasm and pain down BUE L>R          PHYSICAL EXAM:  Neurological:  AxOx 3   AOx3, NAD, FC, speech coherent   CN II-XII grossly intact   Motor: MAEx4 5/5 RUE and LE B/L, no drift LUE  5/5 L biceps/triceps/deltoid 3/5  SILT throughout  Incision site: anterior and posterior cervical incisions clean, dry and intact  +[place] J collar in place  +posterior cervical prevena dressing in place  +HMVx2 and MASOOD x1  Incision/Wound: no swelling.    Cardiovascular: RRR  Respiratory: Lungs CTAB  BUL coarse rhonici   Gastrointestinal: +BS  Genitourinary: voiding without difficulty  Extremities: warm and dry  Incision/Wound: CDI    DIET:  Regular    LABS:                        10.9   12.64 )-----------( 236      ( 07 Apr 2019 06:43 )             34.5     04-07    142  |  106  |  13  ----------------------------<  140<H>  4.2   |  25  |  0.43<L>    Ca    9.6      07 Apr 2019 06:43  Phos  3.6     04-07  Mg     2.4     04-07    CAPILLARY BLOOD GLUCOSE      POCT Blood Glucose.: 97 mg/dL (08 Apr 2019 06:39)  POCT Blood Glucose.: 107 mg/dL (07 Apr 2019 21:50)  POCT Blood Glucose.: 141 mg/dL (07 Apr 2019 17:09)  POCT Blood Glucose.: 127 mg/dL (07 Apr 2019 11:48)      Drug Levels: [] N/A    CSF Analysis: [] N/A      Allergies    penicillin (Rash)    Intolerances      MEDICATIONS:  Antibiotics:    Neuro:  acetaminophen   Tablet .. 650 milliGRAM(s) Oral every 6 hours PRN  diazepam    Tablet 5 milliGRAM(s) Oral every 8 hours PRN  gabapentin 600 milliGRAM(s) Oral at bedtime  HYDROmorphone  Injectable 0.5 milliGRAM(s) IV Push every 3 hours PRN  ondansetron Injectable 4 milliGRAM(s) IV Push every 6 hours PRN  pregabalin 100 milliGRAM(s) Oral two times a day  traMADol 25 milliGRAM(s) Oral every 4 hours PRN  traMADol 50 milliGRAM(s) Oral every 4 hours PRN    Anticoagulation:  enoxaparin Injectable 40 milliGRAM(s) SubCutaneous at bedtime    OTHER:  anastrozole 1 milliGRAM(s) Oral daily  atorvastatin 40 milliGRAM(s) Oral at bedtime  benzocaine 15 mG/menthol 3.6 mG Lozenge 1 Lozenge Oral every 1 hour PRN  dextrose 40% Gel 15 Gram(s) Oral once PRN  dextrose 50% Injectable 12.5 Gram(s) IV Push once  dextrose 50% Injectable 25 Gram(s) IV Push once  dextrose 50% Injectable 25 Gram(s) IV Push once  docusate sodium 100 milliGRAM(s) Oral three times a day  famotidine    Tablet 20 milliGRAM(s) Oral daily  glucagon  Injectable 1 milliGRAM(s) IntraMuscular once PRN  guaiFENesin    Syrup 100 milliGRAM(s) Oral every 6 hours PRN  insulin lispro (HumaLOG) corrective regimen sliding scale   SubCutaneous Before meals and at bedtime  magnesium hydroxide Suspension 30 milliLiter(s) Oral every 12 hours PRN  metoprolol succinate ER 25 milliGRAM(s) Oral daily  senna 2 Tablet(s) Oral at bedtime    IVF:  cholecalciferol 400 Unit(s) Oral daily  sodium chloride 0.9%. 1000 milliLiter(s) IV Continuous <Continuous>    CULTURES:    RADIOLOGY & ADDITIONAL TESTS:      ASSESSMENT:  68y Female s/p acdf C3-C4 extension to C6, PSF C2-T2, iliac crest bone graft POD#3     PLAN:  NEURO:    Monitor neuro status  OT/PT  [place] J Collar in place  Chest PT  Incentive spirometry  F/U Swallow test  NPO/IVF  Monitor hemovac output  Pain Managment  Bowel regime  Continue current medical regime    Dispo: Will discuss with atending       []  GCS  E   V  M     Heart Failure: []Acute, [] acute on chronic , []chronic  Heart Failure:  [] Diastolic (HFpEF), [] Systolic (HFrEF), []Combined (HFpEF and HFrEF), [] RHF, [] Pulm HTN, [] Other    [] SUSU, [] ATN, [] AIN, [] other  [] CKD1, [] CKD2, [] CKD 3, [] CKD 4, [] CKD 5, []ESRD    Encephalopathy: [] Metabolic, [] Hepatic, [] toxic, [] Neurological, [] Other    Abnormal Nurtitional Status: [] malnurtition (see nutrition note), [ ]underweight: BMI < 19, [] morbid obesity: BMI >40, [] Cachexia    [] Sepsis  [] hypovolemic shock,[] cardiogenic shock, [] hemorrhagic shock, [] neuogenic shock  [] Acute Respiratory Failure  []Cerebral edema, [] Brain compression/ herniation,   [] Functional quadriplegia  [] Acute blood loss anemia      Dispo: Discussed with  attending

## 2019-04-08 NOTE — SWALLOW VFSS/MBS ASSESSMENT ADULT - PHARYNGEAL PHASE COMMENTS
Initiation of pharyngeal swallow response was generally timely.  During swallows, laryngeal elevation and anterior hyoid excursion were partially reduced.  base of tongue to posterior pharyngeal wall contraction was mildly reduced.  Epiglottic inversion was reduced, leading to incomplete laryngeal vestibule closure.  These deficits resulted in trace/mild penetration during the swallow of thin liquid, with eventual silent aspiration after the swallow on occasion.  There was also mild to moderate post-deglutitive vallecular and pyriform sinus residue with thin liquid.  There was aspiration of pyriform sinus residue after the swallow on initial thin liquid trial via teaspoon with cough response.  Thin liquid penetration/aspiration eliminated with cued small sips with effortful swallow.  The aforementioned deficits also resulted in moderate post-deglutitive vallecular residue with soft solid, and diffuse residue (valleculae, pyriforms, pharyngeal wall ) with soft solid.  Patient NOT sensate to pharyngeal residue.  Thin liquid wash was beneficial in reducing pharyngeal residue, but resulted in mild penetration during the swallow.  There was also silent aspiration of puree residue after patient took 6 small sips of water to aid in residue clearance.  Cued cough expelled aspirate from airway.

## 2019-04-08 NOTE — SWALLOW VFSS/MBS ASSESSMENT ADULT - RECOMMENDED CONSISTENCY
Mechanical soft diet with thin liquids with safe swallow strategies:    1. Small bites and sips.  Effortful swallow with sips of liquid.  2. Double swallows per bolus.  3. Alternate solids and liquids during meals.  4. Volitional cough/hard throat clear after every 2-3 bites/sips to prevent aspiration.  5. Hack up and expectorate pharyngeal residue at end of meal.  6. Keep upright during and at least 1-2 hours after a meal.

## 2019-04-08 NOTE — SWALLOW VFSS/MBS ASSESSMENT ADULT - SLP PERTINENT HISTORY OF CURRENT PROBLEM
Pt endorsed h/o dysphagia x many years, with wet gurgly vocal quality and expectoration during/after po.  Dysphagia worse since most recent C-spine surgery

## 2019-04-08 NOTE — SWALLOW VFSS/MBS ASSESSMENT ADULT - ADDITIONAL INFORMATION
Anterior and posterior C-spine surgery hardware noted.  No presley prevertebral swelling noted, however, pharyngeal lumen is narrowed ( partially due to presence of ACDF hardware).

## 2019-04-08 NOTE — PROGRESS NOTE ADULT - ASSESSMENT
A/P:   Posterior HV drain can be removed when less than 50mL  MASOOD drain may be removed 24 hours after Deep HV drain has been removed.   Ok to remove Pravena VAC dressing and replace with Aquacel or ABDs  Dispo per Neurosurgery

## 2019-04-08 NOTE — SWALLOW VFSS/MBS ASSESSMENT ADULT - DIAGNOSTIC IMPRESSIONS
Patient presents with moderate to severe pharyngoesophageal dysphagia on this exam.  Pharyngeal phase characterized by reduced pharyngeal swallow efficiency and airway protection resulting in trace penetration during ( with eventual silent aspiration after the swallow on occasion) with thin liquid, as well as aspiration of pyriform sinus residue on initial thin liquid trial via teaspoon.  The aforementioned deficits also resulted in increased post-deglutitive pharyngeal residue with puree and solid, reduced but not completely cleared by liquid wash,  There was also one instance of silent aspiration of puree residue after consecutive small sips of water aimed at residue clearance.  Cued cough/throat clear cleared penetrate/aspirate.  small sips + effortful swallow was beneficial in preventing penetration/aspiration of thin liquid.      Patient also presents with an esophageal dysphagia with dilated esophagus and reduced esophageal peristalsis/dysmotility resulting in moderate diffuse esophageal residue across consistencies tested.      The features of patient's dysphagia on this exam are consistent with clinical observations ( wet gurgly voice, cough, and expectoration during po) and pt's report of "liquid shooting out of the mouth" during meals.  Unclear re: impact of most recent C-spine surgery on swallowing in this patient with reported dysphagia "for years."  Patient feels her swallowing is improving each day s/p surgery 3 days ago.  Believe patient can continue to be on a modified po diet with aspiration precautions/safe swallow strategies.  A course of outpatient dysphagia therapy is also recommended to optimize her oropharyngeal swallow.

## 2019-04-09 DIAGNOSIS — E78.5 HYPERLIPIDEMIA, UNSPECIFIED: ICD-10-CM

## 2019-04-09 DIAGNOSIS — M50.90 CERVICAL DISC DISORDER, UNSPECIFIED, UNSPECIFIED CERVICAL REGION: ICD-10-CM

## 2019-04-09 DIAGNOSIS — I48.91 UNSPECIFIED ATRIAL FIBRILLATION: ICD-10-CM

## 2019-04-09 DIAGNOSIS — R13.10 DYSPHAGIA, UNSPECIFIED: ICD-10-CM

## 2019-04-09 DIAGNOSIS — J18.9 PNEUMONIA, UNSPECIFIED ORGANISM: ICD-10-CM

## 2019-04-09 LAB
ANION GAP SERPL CALC-SCNC: 12 MMOL/L — SIGNIFICANT CHANGE UP (ref 5–17)
BUN SERPL-MCNC: 18 MG/DL — SIGNIFICANT CHANGE UP (ref 7–23)
CALCIUM SERPL-MCNC: 8.9 MG/DL — SIGNIFICANT CHANGE UP (ref 8.4–10.5)
CHLORIDE SERPL-SCNC: 106 MMOL/L — SIGNIFICANT CHANGE UP (ref 96–108)
CO2 SERPL-SCNC: 24 MMOL/L — SIGNIFICANT CHANGE UP (ref 22–31)
CREAT SERPL-MCNC: 0.63 MG/DL — SIGNIFICANT CHANGE UP (ref 0.5–1.3)
GLUCOSE BLDC GLUCOMTR-MCNC: 115 MG/DL — HIGH (ref 70–99)
GLUCOSE BLDC GLUCOMTR-MCNC: 122 MG/DL — HIGH (ref 70–99)
GLUCOSE BLDC GLUCOMTR-MCNC: 129 MG/DL — HIGH (ref 70–99)
GLUCOSE BLDC GLUCOMTR-MCNC: 142 MG/DL — HIGH (ref 70–99)
GLUCOSE SERPL-MCNC: 130 MG/DL — HIGH (ref 70–99)
HCT VFR BLD CALC: 33 % — LOW (ref 34.5–45)
HGB BLD-MCNC: 10.1 G/DL — LOW (ref 11.5–15.5)
MAGNESIUM SERPL-MCNC: 2.1 MG/DL — SIGNIFICANT CHANGE UP (ref 1.6–2.6)
MCHC RBC-ENTMCNC: 30 PG — SIGNIFICANT CHANGE UP (ref 27–34)
MCHC RBC-ENTMCNC: 30.6 GM/DL — LOW (ref 32–36)
MCV RBC AUTO: 97.9 FL — SIGNIFICANT CHANGE UP (ref 80–100)
NRBC # BLD: 0 /100 WBCS — SIGNIFICANT CHANGE UP (ref 0–0)
PHOSPHATE SERPL-MCNC: 3.4 MG/DL — SIGNIFICANT CHANGE UP (ref 2.5–4.5)
PLATELET # BLD AUTO: 213 K/UL — SIGNIFICANT CHANGE UP (ref 150–400)
POTASSIUM SERPL-MCNC: 3.4 MMOL/L — LOW (ref 3.5–5.3)
POTASSIUM SERPL-SCNC: 3.4 MMOL/L — LOW (ref 3.5–5.3)
RBC # BLD: 3.37 M/UL — LOW (ref 3.8–5.2)
RBC # FLD: 14.6 % — HIGH (ref 10.3–14.5)
SODIUM SERPL-SCNC: 142 MMOL/L — SIGNIFICANT CHANGE UP (ref 135–145)
WBC # BLD: 9.46 K/UL — SIGNIFICANT CHANGE UP (ref 3.8–10.5)
WBC # FLD AUTO: 9.46 K/UL — SIGNIFICANT CHANGE UP (ref 3.8–10.5)

## 2019-04-09 PROCEDURE — 71045 X-RAY EXAM CHEST 1 VIEW: CPT | Mod: 26

## 2019-04-09 PROCEDURE — 93010 ELECTROCARDIOGRAM REPORT: CPT

## 2019-04-09 PROCEDURE — 99223 1ST HOSP IP/OBS HIGH 75: CPT

## 2019-04-09 PROCEDURE — 99291 CRITICAL CARE FIRST HOUR: CPT

## 2019-04-09 RX ORDER — SODIUM CHLORIDE 9 MG/ML
500 INJECTION INTRAMUSCULAR; INTRAVENOUS; SUBCUTANEOUS ONCE
Qty: 0 | Refills: 0 | Status: COMPLETED | OUTPATIENT
Start: 2019-04-09 | End: 2019-04-09

## 2019-04-09 RX ORDER — METOPROLOL TARTRATE 50 MG
2.5 TABLET ORAL ONCE
Qty: 0 | Refills: 0 | Status: COMPLETED | OUTPATIENT
Start: 2019-04-09 | End: 2019-04-09

## 2019-04-09 RX ORDER — AMIODARONE HYDROCHLORIDE 400 MG/1
0.5 TABLET ORAL
Qty: 900 | Refills: 0 | Status: DISCONTINUED | OUTPATIENT
Start: 2019-04-09 | End: 2019-04-10

## 2019-04-09 RX ORDER — PIPERACILLIN AND TAZOBACTAM 4; .5 G/20ML; G/20ML
4.5 INJECTION, POWDER, LYOPHILIZED, FOR SOLUTION INTRAVENOUS EVERY 6 HOURS
Qty: 0 | Refills: 0 | Status: DISCONTINUED | OUTPATIENT
Start: 2019-04-09 | End: 2019-04-16

## 2019-04-09 RX ORDER — AMIODARONE HYDROCHLORIDE 400 MG/1
400 TABLET ORAL EVERY 12 HOURS
Qty: 0 | Refills: 0 | Status: DISCONTINUED | OUTPATIENT
Start: 2019-04-10 | End: 2019-04-12

## 2019-04-09 RX ORDER — AMIODARONE HYDROCHLORIDE 400 MG/1
1 TABLET ORAL
Qty: 900 | Refills: 0 | Status: DISCONTINUED | OUTPATIENT
Start: 2019-04-09 | End: 2019-04-09

## 2019-04-09 RX ORDER — VANCOMYCIN HCL 1 G
1000 VIAL (EA) INTRAVENOUS EVERY 12 HOURS
Qty: 0 | Refills: 0 | Status: DISCONTINUED | OUTPATIENT
Start: 2019-04-09 | End: 2019-04-11

## 2019-04-09 RX ORDER — PIPERACILLIN AND TAZOBACTAM 4; .5 G/20ML; G/20ML
3.38 INJECTION, POWDER, LYOPHILIZED, FOR SOLUTION INTRAVENOUS EVERY 8 HOURS
Qty: 0 | Refills: 0 | Status: DISCONTINUED | OUTPATIENT
Start: 2019-04-09 | End: 2019-04-09

## 2019-04-09 RX ORDER — SODIUM CHLORIDE 9 MG/ML
1000 INJECTION INTRAMUSCULAR; INTRAVENOUS; SUBCUTANEOUS
Qty: 0 | Refills: 0 | Status: DISCONTINUED | OUTPATIENT
Start: 2019-04-09 | End: 2019-04-09

## 2019-04-09 RX ORDER — AMIODARONE HYDROCHLORIDE 400 MG/1
150 TABLET ORAL ONCE
Qty: 0 | Refills: 0 | Status: COMPLETED | OUTPATIENT
Start: 2019-04-09 | End: 2019-04-09

## 2019-04-09 RX ORDER — POTASSIUM CHLORIDE 20 MEQ
40 PACKET (EA) ORAL ONCE
Qty: 0 | Refills: 0 | Status: DISCONTINUED | OUTPATIENT
Start: 2019-04-09 | End: 2019-04-09

## 2019-04-09 RX ORDER — DIGOXIN 250 MCG
0.5 TABLET ORAL ONCE
Qty: 0 | Refills: 0 | Status: COMPLETED | OUTPATIENT
Start: 2019-04-09 | End: 2019-04-09

## 2019-04-09 RX ORDER — AMIODARONE HYDROCHLORIDE 400 MG/1
150 TABLET ORAL ONCE
Qty: 0 | Refills: 0 | Status: DISCONTINUED | OUTPATIENT
Start: 2019-04-09 | End: 2019-04-09

## 2019-04-09 RX ORDER — ACETAMINOPHEN 500 MG
1000 TABLET ORAL ONCE
Qty: 0 | Refills: 0 | Status: COMPLETED | OUTPATIENT
Start: 2019-04-09 | End: 2019-04-09

## 2019-04-09 RX ORDER — SODIUM CHLORIDE 9 MG/ML
1000 INJECTION INTRAMUSCULAR; INTRAVENOUS; SUBCUTANEOUS ONCE
Qty: 0 | Refills: 0 | Status: DISCONTINUED | OUTPATIENT
Start: 2019-04-09 | End: 2019-04-09

## 2019-04-09 RX ORDER — PIPERACILLIN AND TAZOBACTAM 4; .5 G/20ML; G/20ML
3.38 INJECTION, POWDER, LYOPHILIZED, FOR SOLUTION INTRAVENOUS ONCE
Qty: 0 | Refills: 0 | Status: DISCONTINUED | OUTPATIENT
Start: 2019-04-09 | End: 2019-04-09

## 2019-04-09 RX ORDER — POTASSIUM CHLORIDE 20 MEQ
10 PACKET (EA) ORAL
Qty: 0 | Refills: 0 | Status: COMPLETED | OUTPATIENT
Start: 2019-04-09 | End: 2019-04-09

## 2019-04-09 RX ADMIN — TRAMADOL HYDROCHLORIDE 50 MILLIGRAM(S): 50 TABLET ORAL at 23:00

## 2019-04-09 RX ADMIN — Medication 250 MILLIGRAM(S): at 13:34

## 2019-04-09 RX ADMIN — AMIODARONE HYDROCHLORIDE 33.33 MG/MIN: 400 TABLET ORAL at 11:24

## 2019-04-09 RX ADMIN — SODIUM CHLORIDE 2000 MILLILITER(S): 9 INJECTION INTRAMUSCULAR; INTRAVENOUS; SUBCUTANEOUS at 07:34

## 2019-04-09 RX ADMIN — Medication 100 MILLIGRAM(S): at 13:03

## 2019-04-09 RX ADMIN — Medication 400 UNIT(S): at 12:49

## 2019-04-09 RX ADMIN — PIPERACILLIN AND TAZOBACTAM 200 GRAM(S): 4; .5 INJECTION, POWDER, LYOPHILIZED, FOR SOLUTION INTRAVENOUS at 17:36

## 2019-04-09 RX ADMIN — Medication 2.5 MILLIGRAM(S): at 06:57

## 2019-04-09 RX ADMIN — PIPERACILLIN AND TAZOBACTAM 200 GRAM(S): 4; .5 INJECTION, POWDER, LYOPHILIZED, FOR SOLUTION INTRAVENOUS at 10:09

## 2019-04-09 RX ADMIN — SODIUM CHLORIDE 1000 MILLILITER(S): 9 INJECTION INTRAMUSCULAR; INTRAVENOUS; SUBCUTANEOUS at 07:45

## 2019-04-09 RX ADMIN — GABAPENTIN 600 MILLIGRAM(S): 400 CAPSULE ORAL at 22:13

## 2019-04-09 RX ADMIN — Medication 25 MILLIGRAM(S): at 05:07

## 2019-04-09 RX ADMIN — FAMOTIDINE 20 MILLIGRAM(S): 10 INJECTION INTRAVENOUS at 12:49

## 2019-04-09 RX ADMIN — Medication 8 MILLIGRAM(S): at 18:04

## 2019-04-09 RX ADMIN — AMIODARONE HYDROCHLORIDE 16.67 MG/MIN: 400 TABLET ORAL at 17:51

## 2019-04-09 RX ADMIN — Medication 0.5 MILLIGRAM(S): at 07:30

## 2019-04-09 RX ADMIN — Medication 100 MILLIGRAM(S): at 20:27

## 2019-04-09 RX ADMIN — Medication 1000 MILLIGRAM(S): at 08:15

## 2019-04-09 RX ADMIN — AMIODARONE HYDROCHLORIDE 600 MILLIGRAM(S): 400 TABLET ORAL at 09:34

## 2019-04-09 RX ADMIN — ENOXAPARIN SODIUM 40 MILLIGRAM(S): 100 INJECTION SUBCUTANEOUS at 22:13

## 2019-04-09 RX ADMIN — SODIUM CHLORIDE 50 MILLILITER(S): 9 INJECTION INTRAMUSCULAR; INTRAVENOUS; SUBCUTANEOUS at 10:09

## 2019-04-09 RX ADMIN — ANASTROZOLE 1 MILLIGRAM(S): 1 TABLET ORAL at 22:13

## 2019-04-09 RX ADMIN — Medication 100 MILLIEQUIVALENT(S): at 12:18

## 2019-04-09 RX ADMIN — Medication 100 MILLIEQUIVALENT(S): at 11:23

## 2019-04-09 RX ADMIN — SODIUM CHLORIDE 100 MILLILITER(S): 9 INJECTION INTRAMUSCULAR; INTRAVENOUS; SUBCUTANEOUS at 11:28

## 2019-04-09 RX ADMIN — ATORVASTATIN CALCIUM 40 MILLIGRAM(S): 80 TABLET, FILM COATED ORAL at 22:13

## 2019-04-09 RX ADMIN — Medication 8 MILLIGRAM(S): at 05:07

## 2019-04-09 RX ADMIN — TRAMADOL HYDROCHLORIDE 50 MILLIGRAM(S): 50 TABLET ORAL at 22:13

## 2019-04-09 RX ADMIN — Medication 2.5 MILLIGRAM(S): at 08:02

## 2019-04-09 RX ADMIN — Medication 100 MILLIGRAM(S): at 05:07

## 2019-04-09 RX ADMIN — Medication 100 MILLIEQUIVALENT(S): at 10:23

## 2019-04-09 RX ADMIN — Medication 400 MILLIGRAM(S): at 07:45

## 2019-04-09 NOTE — PROGRESS NOTE ADULT - SUBJECTIVE AND OBJECTIVE BOX
Pt seen and examined. Currently in Afib with intermittent hypotension.    Pain controlled.     Exam:  Posterior neck/upper back with Aquacel in place.   No evidence of drainage or collection.   MASOOD drain serosanguinous <10mL

## 2019-04-09 NOTE — PROGRESS NOTE ADULT - PROBLEM SELECTOR PLAN 1
Currently in NSR with HR 60s-70s; EP following  - continue amio gtt (should transition to 0.5mg/min x 18hrs at 5:30PM this evening with plan to finish gtt fully at 11:30AM tomorrow). Patient should get amio 400mg PO at 10AM and continue amio 400mg PO BID thereafter per EP recs  - currently on lovenox 40mg SQ QHS - neurosurgery to decide if safe for patient to be transitioned to eliquis, follow up recs  - Echo 4/8/19 revealed EF 65%, trace MR, moderate TR, mild pulmHTN, PAP 48mmHg, mild NJ.

## 2019-04-09 NOTE — PROGRESS NOTE ADULT - PROBLEM SELECTOR PLAN 5
- continue atorvastatin 40mg QHS    DVT PPx: lovenox  DISPO: Tele monitoring with amio loading for management of rapid afib

## 2019-04-09 NOTE — PROGRESS NOTE ADULT - SUBJECTIVE AND OBJECTIVE BOX
Neurology Follow up note    Name  HILARIA KUMAR    HPI:  This is a 69 y/o female, well known to Dr. Daley with PMH of non-hodkins lymphoma (xrt in 1981) and breast ca (2000's), HTN, HLD, osteoporosis.  In 10/18, patient was evaluated for upper extremity weakness and underwent a carpal tunnel release and ulnar transposition without relief of symptoms.   in 11/18, patient was admitted to [facility] for b/l UE weakness and difficulty walking.  Her work up revealed cervical stenosis with compression.  However, at the time, her bone scan also was significant for uptake in her right acetabulum and at the time and the evaluating spine surgeon requested this lesion be addressed prior to any cervical surgery.  A CT of the region was negative and the patient was sent home for follow up. on 12/4/18, patient was seen in the [facility] ER again, this time with worsening symptoms and she was emergently taken to the OR for a three level ACDF C4-C6.  She was discharged after the surgery with an uncomplicated post op course.     Since this surgery, patient has dramatic improvements in her walking, neuropathy and radicular pain.  Follow up imaging with X-ray lead to further evaluation with CT which revealed possible loosening of hardware.  Clinically, patient still has left UE weakness that she thinks may be from her history of broken clavicle. No bowel, bladder dysfunction     She is here for definitive surgical evaluation. (05 Apr 2019 06:51)      Interval History - neck pain and proximal weakness LUE        REVIEW OF SYSTEMS    Vital Signs Last 24 Hrs  T(C): 36.6 (09 Apr 2019 04:35), Max: 37.7 (08 Apr 2019 16:42)  T(F): 97.9 (09 Apr 2019 04:35), Max: 99.8 (08 Apr 2019 16:42)  HR: 71 (09 Apr 2019 04:35) (71 - 86)  BP: 72/36 (09 Apr 2019 07:19) (72/36 - 106/56)  BP(mean): 78 (08 Apr 2019 16:42) (61 - 78)  RR: 18 (09 Apr 2019 04:35) (16 - 18)  SpO2: 93% (09 Apr 2019 04:35) (92% - 95%)    Physical Exam-     Mental Status- awake and alert    Cranial Nerves-nl    Gait and station- n/a    Motor- proximal LUE weakness    Reflexes- decrease Left biceps    Sensation- no sensory level    Coordination- no tremors    Vascular - no bruits    Medications  acetaminophen   Tablet .. 650 milliGRAM(s) Oral every 6 hours PRN  anastrozole 1 milliGRAM(s) Oral daily  atorvastatin 40 milliGRAM(s) Oral at bedtime  benzocaine 15 mG/menthol 3.6 mG Lozenge 1 Lozenge Oral every 1 hour PRN  cholecalciferol 400 Unit(s) Oral daily  dexamethasone     Tablet 8 milliGRAM(s) Oral every 12 hours  dexamethasone     Tablet   Oral   dextrose 40% Gel 15 Gram(s) Oral once PRN  dextrose 50% Injectable 12.5 Gram(s) IV Push once  dextrose 50% Injectable 25 Gram(s) IV Push once  dextrose 50% Injectable 25 Gram(s) IV Push once  diazepam    Tablet 5 milliGRAM(s) Oral every 8 hours PRN  digoxin  Injectable 0.5 milliGRAM(s) IV Push once  docusate sodium 100 milliGRAM(s) Oral three times a day  enoxaparin Injectable 40 milliGRAM(s) SubCutaneous at bedtime  famotidine    Tablet 20 milliGRAM(s) Oral daily  gabapentin 600 milliGRAM(s) Oral at bedtime  glucagon  Injectable 1 milliGRAM(s) IntraMuscular once PRN  guaiFENesin    Syrup 100 milliGRAM(s) Oral every 6 hours PRN  HYDROmorphone  Injectable 0.5 milliGRAM(s) IV Push every 3 hours PRN  insulin lispro (HumaLOG) corrective regimen sliding scale   SubCutaneous Before meals and at bedtime  magnesium hydroxide Suspension 30 milliLiter(s) Oral every 12 hours PRN  metoprolol succinate ER 25 milliGRAM(s) Oral daily  ondansetron Injectable 4 milliGRAM(s) IV Push every 6 hours PRN  pregabalin 100 milliGRAM(s) Oral two times a day  senna 2 Tablet(s) Oral at bedtime  sodium chloride 0.9% Bolus 500 milliLiter(s) IV Bolus once  sodium chloride 0.9%. 1000 milliLiter(s) IV Continuous <Continuous>  traMADol 25 milliGRAM(s) Oral every 4 hours PRN  traMADol 50 milliGRAM(s) Oral every 4 hours PRN      Lab      Radiology    Assessment- Cervical radiculopathy    Plan rehab

## 2019-04-09 NOTE — CHART NOTE - NSCHARTNOTEFT_GEN_A_CORE
Subjective/Interval events: Known patient to the cardiology service. Called by the primary team for atrial fibrillation with rapid ventricular response and low blood pressures from her baseline. Usually in the 85-95/50-60, now HR in 150-160 irregular and Bps 70s/40. Patient initially complaining of dizziness and received 500cc bolus prior to bolus, which mildly improved her symptoms. She was also complaining of a lot of pain at her neck site. Patient otherwise denied chest pain, shortness of breath, palpitations, no more lightheaded or dizziness, loss of conscious ness. Prior to this she was know to have intermittent atrial arrhythmia for which she was started on Toprol XL. Off note she is also complaining of a productive cough with dark yellow to green sputum for the past few days. Denies any fevers or chills.       ROS: A 10-point review of systems was otherwise negative  MEDICATIONS:  acetaminophen   Tablet .. 650 milliGRAM(s) Oral every 6 hours PRN  amiodarone Infusion 1 mG/Min IV Continuous <Continuous>  amiodarone Infusion 0.5 mG/Min IV Continuous <Continuous>  amiodarone IVPB 150 milliGRAM(s) IV Intermittent once  anastrozole 1 milliGRAM(s) Oral daily  atorvastatin 40 milliGRAM(s) Oral at bedtime  benzocaine 15 mG/menthol 3.6 mG Lozenge 1 Lozenge Oral every 1 hour PRN  cholecalciferol 400 Unit(s) Oral daily  dexamethasone     Tablet 8 milliGRAM(s) Oral every 12 hours  dexamethasone     Tablet   Oral   dextrose 40% Gel 15 Gram(s) Oral once PRN  dextrose 50% Injectable 12.5 Gram(s) IV Push once  dextrose 50% Injectable 25 Gram(s) IV Push once  dextrose 50% Injectable 25 Gram(s) IV Push once  diazepam    Tablet 5 milliGRAM(s) Oral every 8 hours PRN  docusate sodium 100 milliGRAM(s) Oral three times a day  enoxaparin Injectable 40 milliGRAM(s) SubCutaneous at bedtime  famotidine    Tablet 20 milliGRAM(s) Oral daily  gabapentin 600 milliGRAM(s) Oral at bedtime  glucagon  Injectable 1 milliGRAM(s) IntraMuscular once PRN  guaiFENesin    Syrup 100 milliGRAM(s) Oral every 6 hours PRN  HYDROmorphone  Injectable 0.5 milliGRAM(s) IV Push every 3 hours PRN  insulin lispro (HumaLOG) corrective regimen sliding scale   SubCutaneous Before meals and at bedtime  magnesium hydroxide Suspension 30 milliLiter(s) Oral every 12 hours PRN  metoprolol succinate ER 25 milliGRAM(s) Oral daily  ondansetron Injectable 4 milliGRAM(s) IV Push every 6 hours PRN  piperacillin/tazobactam IVPB. 4.5 Gram(s) IV Intermittent every 6 hours  potassium chloride    Tablet ER 40 milliEquivalent(s) Oral once  potassium chloride  10 mEq/100 mL IVPB 10 milliEquivalent(s) IV Intermittent every 1 hour  pregabalin 100 milliGRAM(s) Oral two times a day  senna 2 Tablet(s) Oral at bedtime  sodium chloride 0.9% Bolus 500 milliLiter(s) IV Bolus once  sodium chloride 0.9%. 1000 milliLiter(s) IV Continuous <Continuous>  traMADol 25 milliGRAM(s) Oral every 4 hours PRN  traMADol 50 milliGRAM(s) Oral every 4 hours PRN  vancomycin  IVPB 1000 milliGRAM(s) IV Intermittent every 12 hours      PHYSICAL EXAM:  T(C): 36.6 (04-09-19 @ 04:35), Max: 37.7 (04-08-19 @ 16:42)  HR: 71 (04-09-19 @ 04:35) (71 - 86)  BP: 72/36 (04-09-19 @ 07:19) (72/36 - 106/56)  RR: 18 (04-09-19 @ 04:35) (17 - 18)  SpO2: 93% (04-09-19 @ 04:35) (92% - 95%)  Wt(kg): --    GEN: Awake, comfortable. NAD. SPeaking in full sentences  HEENT: Dry mucous membranes, Has a neck collar  RESP: CTA b/l  CV: Tachycardic, Irregular  ABD: Soft, NTND. BS+  EXT: Warm. No edema, clubbing, or cyanosis.   NEURO: AAOx3. No focal deficits.        I&O's Summary    08 Apr 2019 07:01  -  09 Apr 2019 07:00  --------------------------------------------------------  IN: 1150 mL / OUT: 67 mL / NET: 1083 mL      LABS:	 	                          10.1   9.46  )-----------( 213      ( 09 Apr 2019 07:23 )             33.0     04-09    142  |  106  |  18  ----------------------------<  130<H>  3.4<L>   |  24  |  0.63    Ca    8.9      09 Apr 2019 07:23  Phos  3.4     04-09  Mg     2.1     04-09            proBNP:   Lipid Profile:   HgA1c: Hemoglobin A1C, Whole Blood: 5.0 % (11-19 @ 04:10)          TELEMETRY: Afib with RVR with intermittent NSR    ECG:  Afib w RVR          Plan discussed with primary team and cardiology attending  All recs are preliminary pending co-signature of the cardiology attending    Thank you for the consult Subjective/Interval events: Known patient to the cardiology service. Called by the primary team for atrial fibrillation with rapid ventricular response and low blood pressures from her baseline. Usually in the 85-95/50-60, now HR in 150-160 irregular and Bps 70s/40. Patient initially complaining of dizziness and received 500cc bolus prior to bolus, which mildly improved her symptoms. She was also complaining of a lot of pain at her neck site. Patient otherwise denied chest pain, shortness of breath, palpitations, no more lightheaded or dizziness, loss of conscious ness. Prior to this she was know to have intermittent atrial arrhythmia for which she was started on Toprol XL. Off note she is also complaining of a productive cough with dark yellow to green sputum for the past few days. Denies any fevers or chills.       ROS: A 10-point review of systems was otherwise negative      PHYSICAL EXAM:  T(C): 36.6 (04-09-19 @ 04:35), Max: 37.7 (04-08-19 @ 16:42)  HR: 71 (04-09-19 @ 04:35) (71 - 86)  BP: 72/36 (04-09-19 @ 07:19) (72/36 - 106/56)  RR: 18 (04-09-19 @ 04:35) (17 - 18)  SpO2: 93% (04-09-19 @ 04:35) (92% - 95%)  Wt(kg): --    GEN: Awake, comfortable. NAD. SPeaking in full sentences  HEENT: Dry mucous membranes, Has a neck collar  RESP: Bibasilar crackles, limitted inspiratory effort  CV: Tachycardic, Irregular. Had to descern for extra cardiac sounds due to the tachycardia  ABD: Soft, NTND. BS+  EXT: Warm. No edema, clubbing, or cyanosis.   NEURO: AAOx3. No focal deficits.        I&O's Summary    08 Apr 2019 07:01  -  09 Apr 2019 07:00  --------------------------------------------------------  IN: 1150 mL / OUT: 67 mL / NET: 1083 mL      LABS:	 	                          10.1   9.46  )-----------( 213      ( 09 Apr 2019 07:23 )             33.0     04-09    142  |  106  |  18  ----------------------------<  130<H>  3.4<L>   |  24  |  0.63    Ca    8.9      09 Apr 2019 07:23  Phos  3.4     04-09  Mg     2.1     04-09            proBNP:   Lipid Profile:   HgA1c: Hemoglobin A1C, Whole Blood: 5.0 % (11-19 @ 04:10)    TELEMETRY: Afib with RVR with intermittent NSR    ECG:  Afib w RVR    Echocardiogram (04.08.19 @ 11:20)    Normal left ventricular size and wall thickness.The left ventricular wall   motion is normal.The left ventricular ejection fraction is 65%.The left   atrial   size is normal. Right atrial size is normal.The right ventricle is   normal in   size and function.There is trace mitral regurgitation.There is moderate   tricuspid regurgitation.There is mild pulmonary hypertension. The   pulmonary   artery systolic pressure is estimated to be 48 mmHg.  There is mild   pulmonic   regurgitation.No aortic root dilatation.The inferior vena cava is normal   in   size (<2.1 cm) with normal inspiratory collapse (>50%) consistent with   normal   right atrial pressure.  There is no pericardial effusion.    A/P  67 y/o female, PMH of non-hodkins lymphoma (xrt in 1981) and breast ca (2000's), HTN, HLD, osteoporosis s/p ADCF of cervical spine last week is being evaluated for atrial fibrillation with RVR and hypotension. Multiple triggering factors, her pain, hypokalemia, presumable underlying pneumonia and significant hypovolemia    - Patient responded to fluid challenge. Received a total of 2.5L and restoration of SBPs back to her baseline. Patient also states she feels remarkably better. She was also given 1gm IV tylenol, 0.5mg IV dig and 2.5mg IV lopressor.   - Recommend starting Amiodarone bolus follow by the drip (150 mg IV bolus over 10, --> 1 mg/min for 6 hrs --> 0.5 mg/min for 18 hours)  - Recommend continuing with maintenance fluids for another litre  - recommend starting IV Abx for presumable HAP  - Recommend adequate pain control as tolerated    - Please replete electrolytes to maintain K>4 and Mg> 2  - EP team consulted, recs appreciated      Plan discussed with primary team and cardiology attending   All recs are preliminary pending co-signature of the cardiology attending    Cardiology Fellow  PGY 5  APOLLO Garay  Thank you for the consult Subjective/Interval events: Known patient to the cardiology service. Called by the primary team for atrial fibrillation with rapid ventricular response and low blood pressures from her baseline. Usually in the 85-95/50-60, now HR in 150-160 irregular and Bps 70s/40. Patient initially complaining of dizziness and received 500cc bolus prior to bolus, which mildly improved her symptoms. She was also complaining of a lot of pain at her neck site. Patient otherwise denied chest pain, shortness of breath, palpitations, no more lightheaded or dizziness, loss of conscious ness. Prior to this she was know to have intermittent atrial arrhythmia for which she was started on Toprol XL. Off note she is also complaining of a productive cough with dark yellow to green sputum for the past few days. Denies any fevers or chills.       ROS: A 10-point review of systems was otherwise negative      PHYSICAL EXAM:  T(C): 36.6 (04-09-19 @ 04:35), Max: 37.7 (04-08-19 @ 16:42)  HR: 71 (04-09-19 @ 04:35) (71 - 86)  BP: 72/36 (04-09-19 @ 07:19) (72/36 - 106/56)  RR: 18 (04-09-19 @ 04:35) (17 - 18)  SpO2: 93% (04-09-19 @ 04:35) (92% - 95%)  Wt(kg): --    GEN: Awake, comfortable. NAD. SPeaking in full sentences  HEENT: Dry mucous membranes, Has a neck collar  RESP: Bibasilar crackles, limitted inspiratory effort  CV: Tachycardic, Irregular. Had to descern for extra cardiac sounds due to the tachycardia  ABD: Soft, NTND. BS+  EXT: Warm. No edema, clubbing, or cyanosis.   NEURO: AAOx3. No focal deficits.        I&O's Summary    08 Apr 2019 07:01  -  09 Apr 2019 07:00  --------------------------------------------------------  IN: 1150 mL / OUT: 67 mL / NET: 1083 mL      LABS:	 	                          10.1   9.46  )-----------( 213      ( 09 Apr 2019 07:23 )             33.0     04-09    142  |  106  |  18  ----------------------------<  130<H>  3.4<L>   |  24  |  0.63    Ca    8.9      09 Apr 2019 07:23  Phos  3.4     04-09  Mg     2.1     04-09            proBNP:   Lipid Profile:   HgA1c: Hemoglobin A1C, Whole Blood: 5.0 % (11-19 @ 04:10)    TELEMETRY: Afib with RVR with intermittent NSR    ECG:  Afib w RVR    Echocardiogram (04.08.19 @ 11:20)    Normal left ventricular size and wall thickness.The left ventricular wall   motion is normal.The left ventricular ejection fraction is 65%.The left   atrial   size is normal. Right atrial size is normal.The right ventricle is   normal in   size and function.There is trace mitral regurgitation.There is moderate   tricuspid regurgitation.There is mild pulmonary hypertension. The   pulmonary   artery systolic pressure is estimated to be 48 mmHg.  There is mild   pulmonic   regurgitation.No aortic root dilatation.The inferior vena cava is normal   in   size (<2.1 cm) with normal inspiratory collapse (>50%) consistent with   normal   right atrial pressure.  There is no pericardial effusion.    A/P  67 y/o female, PMH of non-hodkins lymphoma (xrt in 1981) and breast ca (2000's), HTN, HLD, osteoporosis s/p ADCF of cervical spine last week is being evaluated for atrial fibrillation with RVR and hypotension. Multiple triggering factors, her pain, hypokalemia, presumable underlying pneumonia and significant hypovolemia    - Patient responded to fluid challenge. Received a total of 2.5L and restoration of SBPs back to her baseline. Patient also states she feels remarkably better. She was also given 1gm IV tylenol, 0.5mg IV dig and 2.5mg IV lopressor.   - Recommend starting Amiodarone bolus follow by the drip (150 mg IV bolus over 10, --> 1 mg/min for 6 hrs --> 0.5 mg/min for 18 hours)  - Recommend continuing with maintenance fluids for another litre  - recommend starting IV Abx for presumable HAP  - Recommend adequate pain control as tolerated    - Please replete electrolytes to maintain K>4 and Mg> 2  - EP team consulted, recs appreciated      Plan discussed with primary team and cardiology attending   All recs are preliminary pending co-signature of the cardiology attending    Cardiology Fellow  PGY 5  APOLLO Garay  Thank you for the consult      CRITICAL CARE TIME SPENT in evaluation and management, reassessments, review and interpretation of labs and x-rays, ventilator and hemodynamic management, formulating a plan and coordinating care: ___90____ MIN.  Time does not include procedural time.    David Velez MD

## 2019-04-09 NOTE — PROGRESS NOTE ADULT - ASSESSMENT
A/P: Pt doing well POD#4.   1. Aquacel can be changed every 3 days  2. Ok to remove MASOOD drain   3. Sutures to stay in for 4 weeks

## 2019-04-09 NOTE — PROGRESS NOTE ADULT - PROBLEM SELECTOR PLAN 4
Speech and Swallow following  - cleared patient for mechanical soft diet with thin liquids and appropriate strategies to aid with swallowing  - aspiration precautions

## 2019-04-09 NOTE — PROGRESS NOTE ADULT - PROBLEM SELECTOR PLAN 3
LLL consolidation thought to be aspiration PNA  - continue vanc/zosyn (day 1 today 4/9)  - cough syrup

## 2019-04-09 NOTE — PROGRESS NOTE ADULT - SUBJECTIVE AND OBJECTIVE BOX
NP note Addnedum Dr Daley    Called to bedside  pt developed Rapid Afib  Cardiology consulted  IV Lopressor 2.5 mg given   Pt slightly hypotensive 70/40 c/o light headedness  Pt placed flat and 500cc Bolus given.  Lab work collected   EKG confiormed rapid Afib  pt recieved total 2L Saline Bolus  Loparessor 2.5 mg IVP repeated   Digoxin 0.5mg IVP given  Hypokalemia arepleted, IV tylenol for pain  Pt BP returned to her normal 80- 90 mmHg Heart rate slightly decreased  Cardiology at bedside pt stable will aremain on 9w tele at this time  at 9;15 Amiodraone boluse over 10mins of 150mg in 100cc NS infusing    Continue lmonitoar pt closely  Hospitalist consulted  Pt congested thick yellow sputum  IV antibx started and will also when stable obtain CT Abdomen and Pelvis to complete work up kidney mass    D< from: CT Angio Chest PE Protocol w/ IV Cont (04.08.19 @ 12:32) >  IMPRESSION:   1.  No pulmonary embolism.  2.  Partially imaged is a 6.6 x 5.7 by at least 6.5 cm lesion within the   left kidney. Thislesion is highly suspicious for renal cell carcinoma   and recommend renal mass protocol CT scan for further characterization.  3.  Left lower lobe atelectasis versus consolidation with left   hemidiaphragm elevation.  4.  1.6 cm cystic structure within the right breast. Correlate clinically   and with annual mammogram.    < end of copied text >    Todays xray      < from: Xray Chest 1 View-PORTABLE IMMEDIATE (04.09.19 @ 09:07) >  IMPRESSION: Persistent left lower lobe consolidation which may represent   atelectasis or pneumonia.      < end of copied text >      Discussed with attending

## 2019-04-09 NOTE — PROGRESS NOTE ADULT - PROBLEM SELECTOR PLAN 2
s/p ACDF C3-C4 extension to C6, PSF C2-T2, iliac crest bone graft POD #4; Plastics and neurosurgery following  - MASOOD drain to be removed by neurosurgery; cleared for removal by plastics  - [place] J-collar in place  - decadron to be tapered over next week (ordered appropriately)  - pain control as ordered with valium, lyrica, dilaudid, tramadol  - incentive spirometry  - regular vitals with neuro checks

## 2019-04-09 NOTE — PROVIDER CONTACT NOTE (OTHER) - RECOMMENDATIONS
Provider Catrachita and neuro team aware and stated not to call Rapid. Team at bedside monitoring patient and putting in orders

## 2019-04-09 NOTE — CONSULT NOTE ADULT - SUBJECTIVE AND OBJECTIVE BOX
CHIEF COMPLAINT:    HISTORY OF PRESENT ILLNESS:  This is a 69 y/o female, well known to Dr. Daley with PMH of non-hodkins lymphoma (xrt in 1981) and breast ca (2000's), HTN, HLD, osteoporosis, carpal tunnel release (10/2018), s/p emergent three level ACDF C4-C6 (12/2018).  Admitted for elective sx, now POD #4 s/p ACDF C3-C4 extension to C6, PSF C2-T2, iliac crest bone graft (4/5/19).  Patient with salvos of AT 4/8/19.  EPS called for management of rapid AF 4/9/19 (HR to 140's).  Patient intermittently hypotensive 60-70s (baseline SBP 90's), improved with 1.5 L bolus.  Received Digoxin 0.5 IV, Lopressor 5 mg IVP without improvement in HR.  Patient mentating well, c/o neck pain and productive cough.      PAST MEDICAL & SURGICAL HISTORY:  Anxiety  History of breast cancer: s/p right lumpectomy , RT  Hodgkin disease: 1981, underwent radiation therapy  HLD (hyperlipidemia)  HTN (hypertension)  Cervical disc herniation  Carpal tunnel syndrome: b/l  Cervical disc disease  History of carpal tunnel surgery: 10/2018 left hand  History of tonsillectomy  S/P cervical spinal fusion: 12/15/2018 C4-C6 ACDF  History of Mohs micrographic surgery for skin cancer  History of lumpectomy of right breast: 2013        PERTINENT DIAGNOSTIC TESTING:    < from: Echocardiogram (04.08.19 @ 11:20) >  EXAM:  ECHOCARDIOGRAM (CARDIOL)                          PROCEDURE DATE:  04/08/2019          INTERPRETATION:  Patient Height: 170.0 cm  Patient Weight: 68.0 kg  Systolic Pressure: 131 mmHg  Diastolic Pressure: 70 mmHg  BSA: 1.8 m^2  InterpretationSummary  Normal left ventricular size and wall thickness.The left ventricular wall   motion is normal.The left ventricular ejection fraction is 65%.The left   atrial   size is normal. Right atrial size is normal.The right ventricle is   normal in   size and function.There is trace mitral regurgitation.There is moderate   tricuspid regurgitation.There is mild pulmonary hypertension. The   pulmonary   artery systolic pressure is estimated to be 48 mmHg.  There is mild   pulmonic   regurgitation.No aortic root dilatation.The inferior vena cava is normal   in   size (<2.1 cm) with normal inspiratory collapse (>50%) consistent with   normal   right atrial pressure.  There is no pericardial effusion.    < end of copied text >        Allergies    penicillin (Rash)    Intolerances    	    MEDICATIONS:  amiodarone Infusion 1 mG/Min IV Continuous <Continuous>  amiodarone Infusion 0.5 mG/Min IV Continuous <Continuous>  amiodarone IVPB 150 milliGRAM(s) IV Intermittent once  metoprolol succinate ER 25 milliGRAM(s) Oral daily  piperacillin/tazobactam IVPB. 4.5 Gram(s) IV Intermittent every 6 hours  vancomycin  IVPB 1000 milliGRAM(s) IV Intermittent every 12 hours  guaiFENesin    Syrup 100 milliGRAM(s) Oral every 6 hours PRN  acetaminophen   Tablet .. 650 milliGRAM(s) Oral every 6 hours PRN  diazepam    Tablet 5 milliGRAM(s) Oral every 8 hours PRN  gabapentin 600 milliGRAM(s) Oral at bedtime  HYDROmorphone  Injectable 0.5 milliGRAM(s) IV Push every 3 hours PRN  ondansetron Injectable 4 milliGRAM(s) IV Push every 6 hours PRN  pregabalin 100 milliGRAM(s) Oral two times a day  traMADol 25 milliGRAM(s) Oral every 4 hours PRN  traMADol 50 milliGRAM(s) Oral every 4 hours PRN  docusate sodium 100 milliGRAM(s) Oral three times a day  famotidine    Tablet 20 milliGRAM(s) Oral daily  magnesium hydroxide Suspension 30 milliLiter(s) Oral every 12 hours PRN  senna 2 Tablet(s) Oral at bedtime  atorvastatin 40 milliGRAM(s) Oral at bedtime  dexamethasone     Tablet 8 milliGRAM(s) Oral every 12 hours  dexamethasone     Tablet   Oral   dextrose 40% Gel 15 Gram(s) Oral once PRN  dextrose 50% Injectable 12.5 Gram(s) IV Push once  dextrose 50% Injectable 25 Gram(s) IV Push once  dextrose 50% Injectable 25 Gram(s) IV Push once  glucagon  Injectable 1 milliGRAM(s) IntraMuscular once PRN  insulin lispro (HumaLOG) corrective regimen sliding scale   SubCutaneous Before meals and at bedtime  anastrozole 1 milliGRAM(s) Oral daily  benzocaine 15 mG/menthol 3.6 mG Lozenge 1 Lozenge Oral every 1 hour PRN  cholecalciferol 400 Unit(s) Oral daily  enoxaparin Injectable 40 milliGRAM(s) SubCutaneous at bedtime  potassium chloride    Tablet ER 40 milliEquivalent(s) Oral once  potassium chloride  10 mEq/100 mL IVPB 10 milliEquivalent(s) IV Intermittent every 1 hour  sodium chloride 0.9% Bolus 500 milliLiter(s) IV Bolus once  sodium chloride 0.9%. 1000 milliLiter(s) IV Continuous <Continuous>      FAMILY HISTORY:  Family history of colon cancer in mother (Mother)      SOCIAL HISTORY:    [x ] Non-smoker  [ ] Smoker  [ ] Alcohol        REVIEW OF SYSTEMS:        [x ] All others negative	  [ ] Unable to obtain    PHYSICAL EXAM:  T(C): 36.6 (04-09-19 @ 04:35), Max: 37.7 (04-08-19 @ 16:42)  HR: 71 (04-09-19 @ 04:35) (71 - 86)  BP: 72/36 (04-09-19 @ 07:19) (72/36 - 106/56)  RR: 18 (04-09-19 @ 04:35) (17 - 18)  SpO2: 93% (04-09-19 @ 04:35) (92% - 95%)  Wt(kg): --  I&O's Summary    08 Apr 2019 07:01  -  09 Apr 2019 07:00  --------------------------------------------------------  IN: 1150 mL / OUT: 67 mL / NET: 1083 mL        TELEMETRY: 	  AF with -140's  ECG:     Appearance: Normal	  HEENT:   dry oral mucosa, PERRL, EOMI	  Cardiovascular: tachy, s1s2 irreg irreg, No JVD, No edema  Respiratory: diminished BS LLL	  Gastrointestinal:  Soft, Non-tender, + BS	  Neurologic: A&O x 3, Non-focal  Extremities:   Vascular: Peripheral pulses palpable 2+ bilaterally    	  LABS:	 	    CARDIAC MARKERS:                                  10.1   9.46  )-----------( 213      ( 09 Apr 2019 07:23 )             33.0     04-09    142  |  106  |  18  ----------------------------<  130<H>  3.4<L>   |  24  |  0.63    Ca    8.9      09 Apr 2019 07:23  Phos  3.4     04-09  Mg     2.1     04-09      proBNP:   Lipid Profile:   HgA1c:   TSH:     ASSESSMENT/PLAN: 	  69 y/o F POD #4 with new onset rapid atrial fibrillation c/b hypotension that improved with fluid resuscitation.  NOrmal LVEF, LA size normal.   - Agree with AMIO IV bolus followed by gtt, continue Toprol XL 25 mg daily   - Recommend oral anticoagulation Eliquis 5 mg BID (currently on Lovenox QHS)  - Agree w/ abx tx for possible PNA   - Check TFTs  - Will follow on telemetry.  Will consider elective DCCV if patient does not spontaneously convert to SR

## 2019-04-09 NOTE — PROGRESS NOTE ADULT - SUBJECTIVE AND OBJECTIVE BOX
Interventional Cardiology PA Adult Step Over Acceptance Note    HPI: 69yo F with PMHx of non-hodgkins lymphoma (radiation therapy in 1981), breast CA (2000s), HTN, HLD, osteoporosis, carpal tunnel release 10/2018, s/p emergent 3 level anterior cervical discectomy and fusion C4-C6 who was admitted on 4/5 for elective ACDF C3-C4 extension to C6, PSF C2-T2, iliac crest bone graft POD #4. She currently has a MASOOD drain in place which plastics has cleared to be removed today which should be done by neurosurgery. Her course was complicated by hoarse voice over the weekend for which ENT was consulted and reported no issues from their standpoint. Patient had a speech and swallow eval for dysphagia which showed that she is aspirating but they feel it would be safe to continue a mechanical soft diet with thin liquids with appropriate strategies as recommended to help swallowing. Given wet cough, possible aspiration and LLL consolidation, patient started on vanc and zosyn on 4/9 for possible aspiration PNA. Patient developed paroxysmal afib with RVR yesterday 4/8 likely in the setting of PNA. She was given Lopressor 2.5mg IV and became hypotensive to SBP 70s. BP improved with 500cc fluid bolus and she converted back to NSR. Echo 4/8/19 revealed EF 65%, trace MR, moderate TR, mild pulmHTN, PAP 48mmHg, mild WA. D-dimer was elevated and patient underwent CTA ruling out PE. LE duplex negative for DVT. Today she went into rapid afib with RVR again and was given another Lopressor 2.5mg IV x 1. SBP dropped to 60s and she got a total of 2L of NS with improvement. She was then given dig 0.5mg IV with no improvement. She received amio bolus and was started on gtt. She converted back to NSR. She is continued on lovenox 40mg SQ daily at bedtime. Neurosurgery to provide recs regarding whether patient can be fully anticoagulated. She is now transferred to [facility] for further management of afib with RVR in the setting of PNA. Neurosurgery and plastics to continue to follow for post-op course.  CC: "I feel much better"    Subjective Assessment: Patient seen and examined at bedside today and reports she is feeling much better. She denies palpitations or CP at this time.     ROS otherwise negative except per subjective  	  MEDICATIONS:  amiodarone Infusion 1 mG/Min IV Continuous <Continuous>  amiodarone Infusion 0.5 mG/Min IV Continuous <Continuous>  metoprolol succinate ER 25 milliGRAM(s) Oral daily  piperacillin/tazobactam IVPB. 4.5 Gram(s) IV Intermittent every 6 hours  vancomycin  IVPB 1000 milliGRAM(s) IV Intermittent every 12 hours  guaiFENesin    Syrup 100 milliGRAM(s) Oral every 6 hours PRN  acetaminophen   Tablet .. 650 milliGRAM(s) Oral every 6 hours PRN  diazepam    Tablet 5 milliGRAM(s) Oral every 8 hours PRN  gabapentin 600 milliGRAM(s) Oral at bedtime  HYDROmorphone  Injectable 0.5 milliGRAM(s) IV Push every 3 hours PRN  ondansetron Injectable 4 milliGRAM(s) IV Push every 6 hours PRN  pregabalin 100 milliGRAM(s) Oral two times a day  traMADol 25 milliGRAM(s) Oral every 4 hours PRN  traMADol 50 milliGRAM(s) Oral every 4 hours PRN  docusate sodium 100 milliGRAM(s) Oral three times a day  famotidine    Tablet 20 milliGRAM(s) Oral daily  magnesium hydroxide Suspension 30 milliLiter(s) Oral every 12 hours PRN  senna 2 Tablet(s) Oral at bedtime  atorvastatin 40 milliGRAM(s) Oral at bedtime  dexamethasone     Tablet 8 milliGRAM(s) Oral every 12 hours  dexamethasone     Tablet   Oral   insulin lispro (HumaLOG) corrective regimen sliding scale   SubCutaneous Before meals and at bedtime  anastrozole 1 milliGRAM(s) Oral daily  benzocaine 15 mG/menthol 3.6 mG Lozenge 1 Lozenge Oral every 1 hour PRN  cholecalciferol 400 Unit(s) Oral daily  enoxaparin Injectable 40 milliGRAM(s) SubCutaneous at bedtime      [PHYSICAL EXAM:  TELEMETRY:  T(C): 36.7 (04-09-19 @ 13:05), Max: 37.7 (04-08-19 @ 16:42)  HR: 67 (04-09-19 @ 13:05) (67 - 86)  BP: 113/54 (04-09-19 @ 13:05) (70/54 - 113/54)  RR: 17 (04-09-19 @ 13:05) (16 - 20)  SpO2: 95% (04-09-19 @ 13:05) (92% - 95%)  Wt(kg): --  I&O's Summary    08 Apr 2019 07:01  -  09 Apr 2019 07:00  --------------------------------------------------------  IN: 1150 mL / OUT: 67 mL / NET: 1083 mL    09 Apr 2019 07:01  -  09 Apr 2019 15:26  --------------------------------------------------------  IN: 1299.9 mL / OUT: 0 mL / NET: 1299.9 mL    Appearance: Normal	  HEENT:   Normal oral mucosa, PERRL, EOMI	  Neck: Supple, C-collar in place  Cardiovascular: Normal S1 S2, No JVD, No murmurs  Respiratory: Rales left middle and lower lobe, right lung CTA   Gastrointestinal:  Soft, Non-tender, + BS	  Skin: No rashes, No ecchymoses, No cyanosis  Extremities: Normal range of motion, No clubbing, cyanosis or edema  Vascular: Peripheral pulses palpable 2+ bilaterally  Neurologic: Non-focal  Psychiatry: A & O x 3, Mood & affect appropriate      LABS:	 	  CARDIAC MARKERS:                          10.1   9.46  )-----------( 213      ( 09 Apr 2019 07:23 )             33.0     04-09    142  |  106  |  18  ----------------------------<  130<H>  3.4<L>   |  24  |  0.63    Ca    8.9      09 Apr 2019 07:23  Phos  3.4     04-09  Mg     2.1     04-09

## 2019-04-09 NOTE — PROGRESS NOTE ADULT - SUBJECTIVE AND OBJECTIVE BOX
HPI:  This is a 67 y/o female, well known to Dr. Daley with PMH of non-hodkins lymphoma (xrt in 1981) and breast ca (2000's), HTN, HLD, osteoporosis.  In 10/18, patient was evaluated for upper extremity weakness and underwent a carpal tunnel release and ulnar transposition without relief of symptoms.   in 11/18, patient was admitted to [facility] for b/l UE weakness and difficulty walking.  Her work up revealed cervical stenosis with compression.  However, at the time, her bone scan also was significant for uptake in her right acetabulum and at the time and the evaluating spine surgeon requested this lesion be addressed prior to any cervical surgery.  A CT of the region was negative and the patient was sent home for follow up. on 12/4/18, patient was seen in the [facility] ER again, this time with worsening symptoms and she was emergently taken to the OR for a three level ACDF C4-C6.  She was discharged after the surgery with an uncomplicated post op course.     Since this surgery, patient has dramatic improvements in her walking, neuropathy and radicular pain.  Follow up imaging with X-ray lead to further evaluation with CT which revealed possible loosening of hardware.  Clinically, patient still has left UE weakness that she thinks may be from her history of broken clavicle. No bowel, bladder dysfunction     She is here for definitive surgical evaluation. (05 Apr 2019 06:51)    OVERNIGHT EVENTS: no acute events overnight, neuro stable.     Hospital course:   POD#0: S/P revision of anterior cervical discectomy with fusion of C4-C6 with extension to C3, as well as C2-T2 posterior fusion with right iliac crest autograft.   POD#1: CESARIO overnight. Drains x3 remain in place, [place] J collar and prevena dressing in place. Esposito removed this am, f/u TOV. Reports some throat tightness with mucous, but denies pain, no issues talking or swallowing (tolerating liquid diet)  POD#2 Pt with secrections and difficulty swallowing. continue decadron 4q6. Robitussin started.   4/8 Pt complaining muscle spasm and pain down BUE L>R  4/9: no acute events overnight, neuro stable.     Vital Signs Last 24 Hrs  T(C): 36.6 (09 Apr 2019 00:06), Max: 37.7 (08 Apr 2019 16:42)  T(F): 97.9 (09 Apr 2019 00:06), Max: 99.8 (08 Apr 2019 16:42)  HR: 75 (09 Apr 2019 00:06) (72 - 86)  BP: 90/54 (09 Apr 2019 00:06) (80/51 - 105/54)  BP(mean): 78 (08 Apr 2019 16:42) (61 - 78)  RR: 18 (09 Apr 2019 00:06) (16 - 18)  SpO2: 95% (09 Apr 2019 00:06) (92% - 96%)    I&O's Summary    07 Apr 2019 07:01  -  08 Apr 2019 07:00  --------------------------------------------------------  IN: 1580 mL / OUT: 1283 mL / NET: 297 mL    08 Apr 2019 07:01  -  09 Apr 2019 01:00  --------------------------------------------------------  IN: 850 mL / OUT: 55 mL / NET: 795 mL        PHYSICAL EXAM:  Gen: laying in hospital bed, NAD. mi[place] J collar in place  Neurological: AA+Ox3, OE spont, FC  CN II-XII: PERRL, EOMI  Motor exam: MAEx4, 5/5 strength throughout, no drift  SILT in UE and LE b/l  Cardiovascular: regular rate and rhythm  Respiratory: clear to auscultation   Gastrointestinal: soft, nontender, nondistended  Incision/Wound: cervical site C/D/I    TUBES/LINES:  [] Esposito  [] Lumbar Drain  [x] Wound Drains: HMV, MASOOD per plastics  [] Others      DIET:  [] NPO  [x] Mechanical: mechanical soft diet  [] Tube feeds    LABS:                        10.9   12.64 )-----------( 236      ( 07 Apr 2019 06:43 )             34.5     04-07    142  |  106  |  13  ----------------------------<  140<H>  4.2   |  25  |  0.43<L>    Ca    9.6      07 Apr 2019 06:43  Phos  3.6     04-07  Mg     2.4     04-07              CAPILLARY BLOOD GLUCOSE      POCT Blood Glucose.: 106 mg/dL (08 Apr 2019 21:33)  POCT Blood Glucose.: 100 mg/dL (08 Apr 2019 17:57)  POCT Blood Glucose.: 100 mg/dL (08 Apr 2019 12:53)  POCT Blood Glucose.: 97 mg/dL (08 Apr 2019 06:39)      Drug Levels: [] N/A    CSF Analysis: [] N/A      Allergies    penicillin (Rash)    Intolerances      MEDICATIONS:  Antibiotics:    Neuro:  acetaminophen   Tablet .. 650 milliGRAM(s) Oral every 6 hours PRN  diazepam    Tablet 5 milliGRAM(s) Oral every 8 hours PRN  gabapentin 600 milliGRAM(s) Oral at bedtime  HYDROmorphone  Injectable 0.5 milliGRAM(s) IV Push every 3 hours PRN  ondansetron Injectable 4 milliGRAM(s) IV Push every 6 hours PRN  pregabalin 100 milliGRAM(s) Oral two times a day  traMADol 25 milliGRAM(s) Oral every 4 hours PRN  traMADol 50 milliGRAM(s) Oral every 4 hours PRN    Anticoagulation:  enoxaparin Injectable 40 milliGRAM(s) SubCutaneous at bedtime    OTHER:  anastrozole 1 milliGRAM(s) Oral daily  atorvastatin 40 milliGRAM(s) Oral at bedtime  benzocaine 15 mG/menthol 3.6 mG Lozenge 1 Lozenge Oral every 1 hour PRN  dexamethasone     Tablet 8 milliGRAM(s) Oral every 12 hours  dexamethasone     Tablet   Oral   dextrose 40% Gel 15 Gram(s) Oral once PRN  dextrose 50% Injectable 12.5 Gram(s) IV Push once  dextrose 50% Injectable 25 Gram(s) IV Push once  dextrose 50% Injectable 25 Gram(s) IV Push once  docusate sodium 100 milliGRAM(s) Oral three times a day  famotidine    Tablet 20 milliGRAM(s) Oral daily  glucagon  Injectable 1 milliGRAM(s) IntraMuscular once PRN  guaiFENesin    Syrup 100 milliGRAM(s) Oral every 6 hours PRN  insulin lispro (HumaLOG) corrective regimen sliding scale   SubCutaneous Before meals and at bedtime  magnesium hydroxide Suspension 30 milliLiter(s) Oral every 12 hours PRN  metoprolol succinate ER 25 milliGRAM(s) Oral daily  senna 2 Tablet(s) Oral at bedtime    IVF:  cholecalciferol 400 Unit(s) Oral daily  sodium chloride 0.9%. 1000 milliLiter(s) IV Continuous <Continuous>    CULTURES:    RADIOLOGY & ADDITIONAL TESTS:      ASSESSMENT:  68y Female s/p ACDF C3-C4 extension to C6, PSF C2-T2, iliac crest bone graft (4/5/19)    PLAN:  - neuro checks  - vitals checks  - pain control with gabapentin, valium, lyrica  - Decadron taper over 1 week   - [place] J collar in place  - HMV, MASOOD in place per plastics  - CT, xray post op complete   - incentive spirometry   - mechanical soft diet  - bowel regimen: colace, senna  - GI ppx: pepcid   - DVT PROPHYLAXIS: [x] Venodynes   [x] Heparin/Lovenox  - PT/OT/OOB    DISPOSITION: tele status, full code, dispo pending     d/w Dr. Daley

## 2019-04-10 LAB
ANION GAP SERPL CALC-SCNC: 10 MMOL/L — SIGNIFICANT CHANGE UP (ref 5–17)
BUN SERPL-MCNC: 12 MG/DL — SIGNIFICANT CHANGE UP (ref 7–23)
CALCIUM SERPL-MCNC: 9.1 MG/DL — SIGNIFICANT CHANGE UP (ref 8.4–10.5)
CHLORIDE SERPL-SCNC: 104 MMOL/L — SIGNIFICANT CHANGE UP (ref 96–108)
CHOLEST SERPL-MCNC: 132 MG/DL — SIGNIFICANT CHANGE UP (ref 10–199)
CO2 SERPL-SCNC: 23 MMOL/L — SIGNIFICANT CHANGE UP (ref 22–31)
CREAT SERPL-MCNC: 0.45 MG/DL — LOW (ref 0.5–1.3)
GLUCOSE BLDC GLUCOMTR-MCNC: 130 MG/DL — HIGH (ref 70–99)
GLUCOSE BLDC GLUCOMTR-MCNC: 132 MG/DL — HIGH (ref 70–99)
GLUCOSE BLDC GLUCOMTR-MCNC: 136 MG/DL — HIGH (ref 70–99)
GLUCOSE BLDC GLUCOMTR-MCNC: 149 MG/DL — HIGH (ref 70–99)
GLUCOSE SERPL-MCNC: 158 MG/DL — HIGH (ref 70–99)
HBA1C BLD-MCNC: 5.3 % — SIGNIFICANT CHANGE UP (ref 4–5.6)
HCT VFR BLD CALC: 33 % — LOW (ref 34.5–45)
HDLC SERPL-MCNC: 67 MG/DL — SIGNIFICANT CHANGE UP
HGB BLD-MCNC: 10.6 G/DL — LOW (ref 11.5–15.5)
LIPID PNL WITH DIRECT LDL SERPL: 50 MG/DL — SIGNIFICANT CHANGE UP
MAGNESIUM SERPL-MCNC: 2.1 MG/DL — SIGNIFICANT CHANGE UP (ref 1.6–2.6)
MCHC RBC-ENTMCNC: 30.7 PG — SIGNIFICANT CHANGE UP (ref 27–34)
MCHC RBC-ENTMCNC: 32.1 GM/DL — SIGNIFICANT CHANGE UP (ref 32–36)
MCV RBC AUTO: 95.7 FL — SIGNIFICANT CHANGE UP (ref 80–100)
NRBC # BLD: 0 /100 WBCS — SIGNIFICANT CHANGE UP (ref 0–0)
PLATELET # BLD AUTO: 227 K/UL — SIGNIFICANT CHANGE UP (ref 150–400)
POTASSIUM SERPL-MCNC: 3.9 MMOL/L — SIGNIFICANT CHANGE UP (ref 3.5–5.3)
POTASSIUM SERPL-SCNC: 3.9 MMOL/L — SIGNIFICANT CHANGE UP (ref 3.5–5.3)
RBC # BLD: 3.45 M/UL — LOW (ref 3.8–5.2)
RBC # FLD: 13.9 % — SIGNIFICANT CHANGE UP (ref 10.3–14.5)
SODIUM SERPL-SCNC: 137 MMOL/L — SIGNIFICANT CHANGE UP (ref 135–145)
TOTAL CHOLESTEROL/HDL RATIO MEASUREMENT: 2 RATIO — LOW (ref 3.3–7.1)
TRIGL SERPL-MCNC: 76 MG/DL — SIGNIFICANT CHANGE UP (ref 10–149)
TSH SERPL-MCNC: 0.74 UIU/ML — SIGNIFICANT CHANGE UP (ref 0.35–4.94)
VANCOMYCIN TROUGH SERPL-MCNC: 4.8 UG/ML — LOW (ref 10–20)
WBC # BLD: 7.5 K/UL — SIGNIFICANT CHANGE UP (ref 3.8–10.5)
WBC # FLD AUTO: 7.5 K/UL — SIGNIFICANT CHANGE UP (ref 3.8–10.5)

## 2019-04-10 PROCEDURE — 99232 SBSQ HOSP IP/OBS MODERATE 35: CPT

## 2019-04-10 RX ADMIN — Medication 100 MILLIGRAM(S): at 21:27

## 2019-04-10 RX ADMIN — TRAMADOL HYDROCHLORIDE 50 MILLIGRAM(S): 50 TABLET ORAL at 11:12

## 2019-04-10 RX ADMIN — AMIODARONE HYDROCHLORIDE 400 MILLIGRAM(S): 400 TABLET ORAL at 21:27

## 2019-04-10 RX ADMIN — PIPERACILLIN AND TAZOBACTAM 200 GRAM(S): 4; .5 INJECTION, POWDER, LYOPHILIZED, FOR SOLUTION INTRAVENOUS at 19:37

## 2019-04-10 RX ADMIN — TRAMADOL HYDROCHLORIDE 50 MILLIGRAM(S): 50 TABLET ORAL at 16:52

## 2019-04-10 RX ADMIN — Medication 8 MILLIGRAM(S): at 06:02

## 2019-04-10 RX ADMIN — ENOXAPARIN SODIUM 40 MILLIGRAM(S): 100 INJECTION SUBCUTANEOUS at 21:27

## 2019-04-10 RX ADMIN — ATORVASTATIN CALCIUM 40 MILLIGRAM(S): 80 TABLET, FILM COATED ORAL at 21:27

## 2019-04-10 RX ADMIN — FAMOTIDINE 20 MILLIGRAM(S): 10 INJECTION INTRAVENOUS at 14:30

## 2019-04-10 RX ADMIN — PIPERACILLIN AND TAZOBACTAM 200 GRAM(S): 4; .5 INJECTION, POWDER, LYOPHILIZED, FOR SOLUTION INTRAVENOUS at 00:18

## 2019-04-10 RX ADMIN — TRAMADOL HYDROCHLORIDE 50 MILLIGRAM(S): 50 TABLET ORAL at 10:25

## 2019-04-10 RX ADMIN — AMIODARONE HYDROCHLORIDE 400 MILLIGRAM(S): 400 TABLET ORAL at 09:57

## 2019-04-10 RX ADMIN — PIPERACILLIN AND TAZOBACTAM 200 GRAM(S): 4; .5 INJECTION, POWDER, LYOPHILIZED, FOR SOLUTION INTRAVENOUS at 06:02

## 2019-04-10 RX ADMIN — GABAPENTIN 600 MILLIGRAM(S): 400 CAPSULE ORAL at 21:27

## 2019-04-10 RX ADMIN — Medication 100 MILLIGRAM(S): at 19:44

## 2019-04-10 RX ADMIN — Medication 100 MILLIGRAM(S): at 07:45

## 2019-04-10 RX ADMIN — Medication 8 MILLIGRAM(S): at 17:08

## 2019-04-10 RX ADMIN — Medication 250 MILLIGRAM(S): at 18:24

## 2019-04-10 RX ADMIN — PIPERACILLIN AND TAZOBACTAM 200 GRAM(S): 4; .5 INJECTION, POWDER, LYOPHILIZED, FOR SOLUTION INTRAVENOUS at 14:27

## 2019-04-10 RX ADMIN — Medication 25 MILLIGRAM(S): at 06:02

## 2019-04-10 RX ADMIN — Medication 250 MILLIGRAM(S): at 01:27

## 2019-04-10 RX ADMIN — ANASTROZOLE 1 MILLIGRAM(S): 1 TABLET ORAL at 14:29

## 2019-04-10 RX ADMIN — Medication 250 MILLIGRAM(S): at 18:28

## 2019-04-10 RX ADMIN — Medication 400 UNIT(S): at 14:29

## 2019-04-10 RX ADMIN — TRAMADOL HYDROCHLORIDE 50 MILLIGRAM(S): 50 TABLET ORAL at 17:52

## 2019-04-10 RX ADMIN — Medication 100 MILLIGRAM(S): at 14:28

## 2019-04-10 RX ADMIN — Medication 100 MILLIGRAM(S): at 06:02

## 2019-04-10 NOTE — PROGRESS NOTE ADULT - ASSESSMENT
69yo F with PMHx of non-hodgkins lymphoma (radiation therapy in 1981), breast CA (2000s), HTN, HLD, osteoporosis, carpal tunnel release 10/2018, s/p emergent 3 level anterior cervical discectomy and fusion C4-C6 who was admitted on 4/5 for elective ACDF C3-C4 extension to C6, PSF C2-T2, iliac crest bone graft POD #5. Course complicated by hoarse voice evaluated by ENT with NTD, dysphagia for which speech and swallow is following, PNA being tx'd by abx and afib with RVR for which she is currently in NSR s/p amio gtt which is completed today; transitioned to amio load (400 mg BID X 7 days; day 1; 4/10) and to c/w amio 200 mg daily as per EP.

## 2019-04-10 NOTE — PROGRESS NOTE ADULT - SUBJECTIVE AND OBJECTIVE BOX
Interventional Cardiology PA Adult Progress Note    CC: Cervical hardware failure  Subjective Assessment: Pt. seen and examined at bedside today am.     12 ROS negative except as per subjective HPI.   	  MEDICATIONS:  amiodarone    Tablet 400 milliGRAM(s) Oral every 12 hours  metoprolol succinate ER 25 milliGRAM(s) Oral daily  piperacillin/tazobactam IVPB. 4.5 Gram(s) IV Intermittent every 6 hours  vancomycin  IVPB 1000 milliGRAM(s) IV Intermittent every 12 hours  guaiFENesin    Syrup 100 milliGRAM(s) Oral every 6 hours PRN  acetaminophen   Tablet .. 650 milliGRAM(s) Oral every 6 hours PRN  diazepam    Tablet 5 milliGRAM(s) Oral every 8 hours PRN  gabapentin 600 milliGRAM(s) Oral at bedtime  HYDROmorphone  Injectable 0.5 milliGRAM(s) IV Push every 3 hours PRN  ondansetron Injectable 4 milliGRAM(s) IV Push every 6 hours PRN  pregabalin 100 milliGRAM(s) Oral two times a day  traMADol 25 milliGRAM(s) Oral every 4 hours PRN  traMADol 50 milliGRAM(s) Oral every 4 hours PRN  docusate sodium 100 milliGRAM(s) Oral three times a day  famotidine    Tablet 20 milliGRAM(s) Oral daily  magnesium hydroxide Suspension 30 milliLiter(s) Oral every 12 hours PRN  senna 2 Tablet(s) Oral at bedtime  atorvastatin 40 milliGRAM(s) Oral at bedtime  dexamethasone     Tablet   Oral   dextrose 40% Gel 15 Gram(s) Oral once PRN  dextrose 50% Injectable 12.5 Gram(s) IV Push once  dextrose 50% Injectable 25 Gram(s) IV Push once  dextrose 50% Injectable 25 Gram(s) IV Push once  glucagon  Injectable 1 milliGRAM(s) IntraMuscular once PRN  insulin lispro (HumaLOG) corrective regimen sliding scale   SubCutaneous Before meals and at bedtime  anastrozole 1 milliGRAM(s) Oral daily  benzocaine 15 mG/menthol 3.6 mG Lozenge 1 Lozenge Oral every 1 hour PRN  cholecalciferol 400 Unit(s) Oral daily  enoxaparin Injectable 40 milliGRAM(s) SubCutaneous at bedtime      	    [PHYSICAL EXAM:  TELEMETRY:  T(C): 36.9 (04-10-19 @ 13:41), Max: 36.9 (04-10-19 @ 13:41)  HR: 70 (04-10-19 @ 14:30) (64 - 78)  BP: 138/63 (04-10-19 @ 14:30) (91/55 - 141/99)  RR: 18 (04-10-19 @ 14:30) (18 - 18)  SpO2: 99% (04-10-19 @ 14:30) (94% - 100%)  Wt(kg): --  I&O's Summary    09 Apr 2019 07:01  -  10 Apr 2019 07:00  --------------------------------------------------------  IN: 2029.6 mL / OUT: 70 mL / NET: 1959.6 mL    10 Apr 2019 07:01  -  10 Apr 2019 18:22  --------------------------------------------------------  IN: 270 mL / OUT: 0 mL / NET: 270 mL        Esposito:  Central/PICC/Mid Line:                                         Appearance: Normal	  HEENT:   Normal oral mucosa, PERRL, EOMI	  Neck: Supple, C-collar in place  Cardiovascular: Normal S1 S2, No JVD, No murmurs  Respiratory: Rales left middle and lower lobe, right lung CTA   Gastrointestinal:  Soft, Non-tender, + BS	  Skin: No rashes, No ecchymoses, No cyanosis  Extremities: Normal range of motion, No clubbing, cyanosis or edema  Vascular: Peripheral pulses palpable 2+ bilaterally  Neurologic: Non-focal  Psychiatry: A & O x 3, Mood & affect appropriate    	    ECG:  	  RADIOLOGY:   DIAGNOSTIC TESTING:  [ ] Echocardiogram:  [ ]  Catheterization:  [ ] Stress Test:    [ ] KO  OTHER: 	    LABS:	 	  CARDIAC MARKERS:                                  10.6   7.50  )-----------( 227      ( 10 Apr 2019 06:56 )             33.0     04-10    137  |  104  |  12  ----------------------------<  158<H>  3.9   |  23  |  0.45<L>    Ca    9.1      10 Apr 2019 06:56  Phos  3.4     04-09  Mg     2.1     04-10      proBNP:   Lipid Profile:   HgA1c: Hemoglobin A1C, Whole Blood: 5.3 % (04-10 @ 06:56)    TSH: Thyroid Stimulating Hormone, Serum: 0.743 uIU/mL (04-10 @ 06:56)        ASSESSMENT/PLAN: 	        DVT ppx:  Dispo:

## 2019-04-10 NOTE — PROGRESS NOTE ADULT - PROBLEM SELECTOR PLAN 3
LLL consolidation thought to be aspiration PNA  - continue vanc/zosyn (day 2 today 4/10); vanc trough 4.8; as d/w pharmacist Danyell; ade continue with same vanc order for now and check vanc trough level in am prior to 4th dose.   - cough syrup  - Dr. Pina consulted; f/u recs.

## 2019-04-10 NOTE — DIETITIAN INITIAL EVALUATION ADULT. - ENERGY NEEDS
Height: 5'7" Weight: 150.5 lbs,  lbs+/-10%, %%, BMI 23.6,   UBW used to calculate EER as per pt's current body weight is within % IBW   Estimated nutrient needs based on [facility] SOC for maintenance in older adults.

## 2019-04-10 NOTE — PROGRESS NOTE ADULT - PROBLEM SELECTOR PLAN 2
s/p ACDF C3-C4 extension to C6, PSF C2-T2, iliac crest bone graft POD #5 ; Plastics and neurosurgery following  - MASOOD drain still on place which is to be removed by neurosurgery; Continue posterior drain: per plastics will remove hemovac when drain is less than 50ml in 24hrs and then remove MASOOD 24hrs later.  - [place] J-collar in place  - decadron to be tapered over next week (ordered appropriately)  - pain control as ordered with valium, lyrica, dilaudid, tramadol  - incentive spirometry  - regular vitals with neuro checks

## 2019-04-10 NOTE — PROGRESS NOTE ADULT - SUBJECTIVE AND OBJECTIVE BOX
Neurology Follow up note    Name  HILARIA KUMAR    HPI:  This is a 67 y/o female, well known to Dr. Daley with PMH of non-hodkins lymphoma (xrt in 1981) and breast ca (2000's), HTN, HLD, osteoporosis.  In 10/18, patient was evaluated for upper extremity weakness and underwent a carpal tunnel release and ulnar transposition without relief of symptoms.   in 11/18, patient was admitted to [facility] for b/l UE weakness and difficulty walking.  Her work up revealed cervical stenosis with compression.  However, at the time, her bone scan also was significant for uptake in her right acetabulum and at the time and the evaluating spine surgeon requested this lesion be addressed prior to any cervical surgery.  A CT of the region was negative and the patient was sent home for follow up. on 12/4/18, patient was seen in the [facility] ER again, this time with worsening symptoms and she was emergently taken to the OR for a three level ACDF C4-C6.  She was discharged after the surgery with an uncomplicated post op course.     Since this surgery, patient has dramatic improvements in her walking, neuropathy and radicular pain.  Follow up imaging with X-ray lead to further evaluation with CT which revealed possible loosening of hardware.  Clinically, patient still has left UE weakness that she thinks may be from her history of broken clavicle. No bowel, bladder dysfunction     She is here for definitive surgical evaluation. (05 Apr 2019 06:51)      Interval History - continued weakness in the LUE- no new tremors - no headaches        REVIEW OF SYSTEMS    Vital Signs Last 24 Hrs  T(C): 36.4 (10 Apr 2019 06:03), Max: 36.8 (09 Apr 2019 21:42)  T(F): 97.6 (10 Apr 2019 06:03), Max: 98.2 (09 Apr 2019 21:42)  HR: 67 (10 Apr 2019 08:18) (64 - 75)  BP: 110/59 (10 Apr 2019 08:18) (70/54 - 141/99)  BP(mean): 81 (10 Apr 2019 08:18) (81 - 81)  RR: 18 (10 Apr 2019 08:18) (16 - 20)  SpO2: 99% (10 Apr 2019 08:18) (94% - 100%)    Physical Exam-     Mental Status- awake and alert    Cranial Nerves- full EOM    Gait and station- n/a    Motor- moves all 4 extremities- proximal LUE weakness    Reflexes- decreased    Sensation- no sensory level    Coordination- no tremors    Vascular - no bruits    Medications  acetaminophen   Tablet .. 650 milliGRAM(s) Oral every 6 hours PRN  amiodarone    Tablet 400 milliGRAM(s) Oral every 12 hours  amiodarone Infusion 0.5 mG/Min IV Continuous <Continuous>  anastrozole 1 milliGRAM(s) Oral daily  atorvastatin 40 milliGRAM(s) Oral at bedtime  benzocaine 15 mG/menthol 3.6 mG Lozenge 1 Lozenge Oral every 1 hour PRN  cholecalciferol 400 Unit(s) Oral daily  dexamethasone     Tablet 8 milliGRAM(s) Oral every 12 hours  dexamethasone     Tablet   Oral   dextrose 40% Gel 15 Gram(s) Oral once PRN  dextrose 50% Injectable 12.5 Gram(s) IV Push once  dextrose 50% Injectable 25 Gram(s) IV Push once  dextrose 50% Injectable 25 Gram(s) IV Push once  diazepam    Tablet 5 milliGRAM(s) Oral every 8 hours PRN  docusate sodium 100 milliGRAM(s) Oral three times a day  enoxaparin Injectable 40 milliGRAM(s) SubCutaneous at bedtime  famotidine    Tablet 20 milliGRAM(s) Oral daily  gabapentin 600 milliGRAM(s) Oral at bedtime  glucagon  Injectable 1 milliGRAM(s) IntraMuscular once PRN  guaiFENesin    Syrup 100 milliGRAM(s) Oral every 6 hours PRN  HYDROmorphone  Injectable 0.5 milliGRAM(s) IV Push every 3 hours PRN  insulin lispro (HumaLOG) corrective regimen sliding scale   SubCutaneous Before meals and at bedtime  magnesium hydroxide Suspension 30 milliLiter(s) Oral every 12 hours PRN  metoprolol succinate ER 25 milliGRAM(s) Oral daily  ondansetron Injectable 4 milliGRAM(s) IV Push every 6 hours PRN  piperacillin/tazobactam IVPB. 4.5 Gram(s) IV Intermittent every 6 hours  pregabalin 100 milliGRAM(s) Oral two times a day  senna 2 Tablet(s) Oral at bedtime  traMADol 25 milliGRAM(s) Oral every 4 hours PRN  traMADol 50 milliGRAM(s) Oral every 4 hours PRN  vancomycin  IVPB 1000 milliGRAM(s) IV Intermittent every 12 hours      Lab      Radiology    Assessment- Cervical radiculopathy    Plan rehab

## 2019-04-10 NOTE — DIETITIAN INITIAL EVALUATION ADULT. - PERTINENT MEDS FT
Zosyn, Vanco, Dexamethasone, Amiodarone, Dilaudid, Tramadol, Acetaminophen, Atorvastatin, Docusate sodium, Cholecalciferol, Magnesium hydroxide suspension, Metoprolol succinate ER, Zofran, Senna

## 2019-04-10 NOTE — PROGRESS NOTE ADULT - PROBLEM SELECTOR PLAN 5
- continue atorvastatin 40mg QHS    DVT PPx: lovenox  DISPO: Tele monitoring with amio loading for management of rapid afib; will most likely transfer to [facility] tomorrow.

## 2019-04-10 NOTE — PROGRESS NOTE ADULT - PROBLEM SELECTOR PLAN 1
Currently in NSR with HR 60s-70s; EP following  - TSH normal  - s/p amio drip; started pt. on amio load 400 mg BID; c/w amio 400 mg BID X 7 days (day 1 today) and then to c/w amio 200 mg daily.   - currently on lovenox 40mg SQ QHS - neurosurgery to decide if safe for patient to be transitioned to eliquis, follow up recs  - Echo 4/8/19 revealed EF 65%, trace MR, moderate TR, mild pulmHTN, PAP 48mmHg, mild [place].

## 2019-04-10 NOTE — DIETITIAN INITIAL EVALUATION ADULT. - OTHER INFO
68 y.o F from home with PMHx of non-Hodgkin lymphoma s/p XRT and Breast CA, HTN, HLD, OP. Pt admitted and underwent ACDF C3-C4 extension to C6, PSF C2-T2 4/4. Pt transferred to [facility] for Afib with RVR in setting of PNA. Pt c/o anorexia, dysphagia few days PTA.  lbs, denies recent significant weight loss, NKFA. Pt takes Vitamin D3 per home meds. SLP recommend Mech soft with thin liquids, able to tolerate Mech Soft, appears difficulty swallowing with thin liquids, SLP team made aware. Pt p/w poor <45% to fair 50-74% PO intake of meals. Agreeable to Ensure Pudding chocolate flavor and yogurts added to tray. Denies N/V/D/C or pain. +BM 4/8. Skin intact. RD will f/u per moderate risk protocol.

## 2019-04-10 NOTE — PROGRESS NOTE ADULT - SUBJECTIVE AND OBJECTIVE BOX
Subjective: NEUROSURGERY POC:    S/P day 5 ACDF C3-C4 extension to C6, PSF C2-T2, iliac crest bone graft.  Patient was transferred to cardiology service for management of rapid A.fib and severe hypotension.  Today, patient is significantly improved. Amiodarone drip was discontinued. A. fib is resolved, BP is well controlled.  [place]  J collar is in place. Patient couching but it is improving. Continue to have difficulty with thin liquids.    T(C): 36.9 (04-10-19 @ 13:41), Max: 36.9 (04-10-19 @ 13:41)  HR: 70 (04-10-19 @ 14:30) (64 - 78)  BP: 138/63 (04-10-19 @ 14:30) (91/55 - 141/99)  RR: 18 (04-10-19 @ 14:30) (16 - 18)  SpO2: 99% (04-10-19 @ 14:30) (94% - 100%)  Wt(kg): --    Exam:AxOx 3   AOx3, NAD, FC, speech coherent   CN II-XII grossly intact   Motor: MAEx4 5/5 RUE and LE B/L, no drift LUE  5/5 L biceps/triceps/deltoid 3/5  SILT throughout  Incision site: anterior and posterior cervical incisions clean, dry and intact  +[place] J collar in place  Anterior drain removed.  posterior hemovac output: 60ml. MASOOD: minimal output.    CBC Full  -  ( 10 Apr 2019 06:56 )  WBC Count : 7.50 K/uL  RBC Count : 3.45 M/uL  Hemoglobin : 10.6 g/dL  Hematocrit : 33.0 %  Platelet Count - Automated : 227 K/uL  Mean Cell Volume : 95.7 fl  Mean Cell Hemoglobin : 30.7 pg  Mean Cell Hemoglobin Concentration : 32.1 gm/dL  Auto Neutrophil # : x  Auto Lymphocyte # : x  Auto Monocyte # : x  Auto Eosinophil # : x  Auto Basophil # : x  Auto Neutrophil % : x  Auto Lymphocyte % : x  Auto Monocyte % : x  Auto Eosinophil % : x  Auto Basophil % : x    04-10    137  |  104  |  12  ----------------------------<  158<H>  3.9   |  23  |  0.45<L>    Ca    9.1      10 Apr 2019 06:56  Phos  3.4     04-09  Mg     2.1     04-10    Wound: Dry and clean as above      Assessment/Plan: S/P  acdf C3-C4 extension to C6, PSF C2-T2, iliac crest bone graft POD # 5.  Resolved A.Fib.  Plan: Continue posterior drain: per plastics will remove hemovac when drain is less than 50ml in 24hrs and then remove MASOOD 24hrs later.          Transfer back to neurosurgery Dr. Daley service when cleared by cardiology.  Case discussed with Dr. Daley.

## 2019-04-11 DIAGNOSIS — R13.19 OTHER DYSPHAGIA: ICD-10-CM

## 2019-04-11 DIAGNOSIS — J69.0 PNEUMONITIS DUE TO INHALATION OF FOOD AND VOMIT: ICD-10-CM

## 2019-04-11 LAB
ANION GAP SERPL CALC-SCNC: 11 MMOL/L — SIGNIFICANT CHANGE UP (ref 5–17)
BASOPHILS # BLD AUTO: 0.01 K/UL — SIGNIFICANT CHANGE UP (ref 0–0.2)
BASOPHILS NFR BLD AUTO: 0.1 % — SIGNIFICANT CHANGE UP (ref 0–2)
BUN SERPL-MCNC: 10 MG/DL — SIGNIFICANT CHANGE UP (ref 7–23)
CALCIUM SERPL-MCNC: 9.2 MG/DL — SIGNIFICANT CHANGE UP (ref 8.4–10.5)
CHLORIDE SERPL-SCNC: 103 MMOL/L — SIGNIFICANT CHANGE UP (ref 96–108)
CO2 SERPL-SCNC: 27 MMOL/L — SIGNIFICANT CHANGE UP (ref 22–31)
CREAT SERPL-MCNC: 0.51 MG/DL — SIGNIFICANT CHANGE UP (ref 0.5–1.3)
EOSINOPHIL # BLD AUTO: 0 K/UL — SIGNIFICANT CHANGE UP (ref 0–0.5)
EOSINOPHIL NFR BLD AUTO: 0 % — SIGNIFICANT CHANGE UP (ref 0–6)
GLUCOSE BLDC GLUCOMTR-MCNC: 113 MG/DL — HIGH (ref 70–99)
GLUCOSE BLDC GLUCOMTR-MCNC: 119 MG/DL — HIGH (ref 70–99)
GLUCOSE BLDC GLUCOMTR-MCNC: 135 MG/DL — HIGH (ref 70–99)
GLUCOSE BLDC GLUCOMTR-MCNC: 139 MG/DL — HIGH (ref 70–99)
GLUCOSE SERPL-MCNC: 133 MG/DL — HIGH (ref 70–99)
HCT VFR BLD CALC: 33.7 % — LOW (ref 34.5–45)
HGB BLD-MCNC: 10.8 G/DL — LOW (ref 11.5–15.5)
IMM GRANULOCYTES NFR BLD AUTO: 0.6 % — SIGNIFICANT CHANGE UP (ref 0–1.5)
LYMPHOCYTES # BLD AUTO: 1.09 K/UL — SIGNIFICANT CHANGE UP (ref 1–3.3)
LYMPHOCYTES # BLD AUTO: 8.6 % — LOW (ref 13–44)
MAGNESIUM SERPL-MCNC: 2.2 MG/DL — SIGNIFICANT CHANGE UP (ref 1.6–2.6)
MCHC RBC-ENTMCNC: 30.3 PG — SIGNIFICANT CHANGE UP (ref 27–34)
MCHC RBC-ENTMCNC: 32 GM/DL — SIGNIFICANT CHANGE UP (ref 32–36)
MCV RBC AUTO: 94.7 FL — SIGNIFICANT CHANGE UP (ref 80–100)
MONOCYTES # BLD AUTO: 0.95 K/UL — HIGH (ref 0–0.9)
MONOCYTES NFR BLD AUTO: 7.5 % — SIGNIFICANT CHANGE UP (ref 2–14)
NEUTROPHILS # BLD AUTO: 10.51 K/UL — HIGH (ref 1.8–7.4)
NEUTROPHILS NFR BLD AUTO: 83.2 % — HIGH (ref 43–77)
NRBC # BLD: 0 /100 WBCS — SIGNIFICANT CHANGE UP (ref 0–0)
PLATELET # BLD AUTO: 285 K/UL — SIGNIFICANT CHANGE UP (ref 150–400)
POTASSIUM SERPL-MCNC: 3.8 MMOL/L — SIGNIFICANT CHANGE UP (ref 3.5–5.3)
POTASSIUM SERPL-SCNC: 3.8 MMOL/L — SIGNIFICANT CHANGE UP (ref 3.5–5.3)
RBC # BLD: 3.56 M/UL — LOW (ref 3.8–5.2)
RBC # FLD: 13.8 % — SIGNIFICANT CHANGE UP (ref 10.3–14.5)
SODIUM SERPL-SCNC: 141 MMOL/L — SIGNIFICANT CHANGE UP (ref 135–145)
VANCOMYCIN TROUGH SERPL-MCNC: 8.6 UG/ML — LOW (ref 10–20)
WBC # BLD: 12.63 K/UL — HIGH (ref 3.8–10.5)
WBC # FLD AUTO: 12.63 K/UL — HIGH (ref 3.8–10.5)

## 2019-04-11 PROCEDURE — 99232 SBSQ HOSP IP/OBS MODERATE 35: CPT

## 2019-04-11 PROCEDURE — 99223 1ST HOSP IP/OBS HIGH 75: CPT | Mod: GC

## 2019-04-11 RX ORDER — VANCOMYCIN HCL 1 G
1250 VIAL (EA) INTRAVENOUS EVERY 12 HOURS
Qty: 0 | Refills: 0 | Status: DISCONTINUED | OUTPATIENT
Start: 2019-04-11 | End: 2019-04-13

## 2019-04-11 RX ADMIN — AMIODARONE HYDROCHLORIDE 400 MILLIGRAM(S): 400 TABLET ORAL at 09:19

## 2019-04-11 RX ADMIN — PIPERACILLIN AND TAZOBACTAM 200 GRAM(S): 4; .5 INJECTION, POWDER, LYOPHILIZED, FOR SOLUTION INTRAVENOUS at 13:24

## 2019-04-11 RX ADMIN — Medication 250 MILLIGRAM(S): at 07:21

## 2019-04-11 RX ADMIN — Medication 25 MILLIGRAM(S): at 07:19

## 2019-04-11 RX ADMIN — PIPERACILLIN AND TAZOBACTAM 200 GRAM(S): 4; .5 INJECTION, POWDER, LYOPHILIZED, FOR SOLUTION INTRAVENOUS at 07:02

## 2019-04-11 RX ADMIN — ENOXAPARIN SODIUM 40 MILLIGRAM(S): 100 INJECTION SUBCUTANEOUS at 22:24

## 2019-04-11 RX ADMIN — Medication 4 MILLIGRAM(S): at 07:20

## 2019-04-11 RX ADMIN — Medication 166.67 MILLIGRAM(S): at 21:45

## 2019-04-11 RX ADMIN — ANASTROZOLE 1 MILLIGRAM(S): 1 TABLET ORAL at 15:47

## 2019-04-11 RX ADMIN — Medication 100 MILLIGRAM(S): at 07:20

## 2019-04-11 RX ADMIN — PIPERACILLIN AND TAZOBACTAM 200 GRAM(S): 4; .5 INJECTION, POWDER, LYOPHILIZED, FOR SOLUTION INTRAVENOUS at 18:41

## 2019-04-11 RX ADMIN — PIPERACILLIN AND TAZOBACTAM 200 GRAM(S): 4; .5 INJECTION, POWDER, LYOPHILIZED, FOR SOLUTION INTRAVENOUS at 00:35

## 2019-04-11 RX ADMIN — Medication 100 MILLIGRAM(S): at 07:19

## 2019-04-11 NOTE — PROGRESS NOTE ADULT - SUBJECTIVE AND OBJECTIVE BOX
Interventional Cardiology PA Adult Progress Note    CC:   Subjective Assessment: Pt. seen and examined at bedside today am. Pt. still reports of some coughing; denies any CP; SOB; dizziness and palpitations.    ROS negative except as per subjective HPI.   	  MEDICATIONS:  amiodarone    Tablet 400 milliGRAM(s) Oral every 12 hours  metoprolol succinate ER 25 milliGRAM(s) Oral daily  piperacillin/tazobactam IVPB. 4.5 Gram(s) IV Intermittent every 6 hours  vancomycin  IVPB 1000 milliGRAM(s) IV Intermittent every 12 hours  guaiFENesin    Syrup 100 milliGRAM(s) Oral every 6 hours PRN  acetaminophen   Tablet .. 650 milliGRAM(s) Oral every 6 hours PRN  diazepam    Tablet 5 milliGRAM(s) Oral every 8 hours PRN  gabapentin 600 milliGRAM(s) Oral at bedtime  HYDROmorphone  Injectable 0.5 milliGRAM(s) IV Push every 3 hours PRN  ondansetron Injectable 4 milliGRAM(s) IV Push every 6 hours PRN  pregabalin 100 milliGRAM(s) Oral two times a day  traMADol 25 milliGRAM(s) Oral every 4 hours PRN  traMADol 50 milliGRAM(s) Oral every 4 hours PRN  docusate sodium 100 milliGRAM(s) Oral three times a day  famotidine    Tablet 20 milliGRAM(s) Oral daily  magnesium hydroxide Suspension 30 milliLiter(s) Oral every 12 hours PRN  senna 2 Tablet(s) Oral at bedtime  atorvastatin 40 milliGRAM(s) Oral at bedtime  dexamethasone     Tablet 4 milliGRAM(s) Oral every 12 hours  dexamethasone     Tablet   Oral   dextrose 40% Gel 15 Gram(s) Oral once PRN  dextrose 50% Injectable 12.5 Gram(s) IV Push once  dextrose 50% Injectable 25 Gram(s) IV Push once  dextrose 50% Injectable 25 Gram(s) IV Push once  glucagon  Injectable 1 milliGRAM(s) IntraMuscular once PRN  insulin lispro (HumaLOG) corrective regimen sliding scale   SubCutaneous Before meals and at bedtime    anastrozole 1 milliGRAM(s) Oral daily  benzocaine 15 mG/menthol 3.6 mG Lozenge 1 Lozenge Oral every 1 hour PRN  cholecalciferol 400 Unit(s) Oral daily  enoxaparin Injectable 40 milliGRAM(s) SubCutaneous at bedtime      	    [PHYSICAL EXAM:  TELEMETRY:  T(C): 36.6 (04-11-19 @ 08:55), Max: 37.1 (04-10-19 @ 22:12)  HR: 84 (04-11-19 @ 09:06) (66 - 84)  BP: 128/64 (04-11-19 @ 09:06) (103/57 - 142/80)  RR: 18 (04-11-19 @ 09:06) (18 - 18)  SpO2: 95% (04-11-19 @ 09:06) (94% - 99%)  Wt(kg): --  I&O's Summary    10 Apr 2019 07:01  -  11 Apr 2019 07:00  --------------------------------------------------------  IN: 390 mL / OUT: 32 mL / NET: 358 mL    11 Apr 2019 07:01  -  11 Apr 2019 11:24  --------------------------------------------------------  IN: 120 mL / OUT: 0 mL / NET: 120 mL        Esposito:  Central/PICC/Mid Line:                                         Appearance: Normal	  HEENT:   Normal oral mucosa, PERRL, EOMI	  Neck: Supple, C-collar in place; +MASOOD, +HMV  Cardiovascular: Normal S1 S2, No JVD, No murmurs  Respiratory: Rales left middle and lower lobe, right lung CTA   Gastrointestinal:  Soft, Non-tender, + BS	  Skin: No rashes, No ecchymoses, No cyanosis  Extremities: Normal range of motion, No clubbing, cyanosis or edema  Vascular: Peripheral pulses palpable 2+ bilaterally  Neurologic: Non-focal  Psychiatry: A & O x 3, Mood & affect appropriate      	    ECG:  	  RADIOLOGY:   DIAGNOSTIC TESTING:  [ ] Echocardiogram:  [ ]  Catheterization:  [ ] Stress Test:    [ ] KO  OTHER: 	    LABS:	 	  CARDIAC MARKERS:                                  10.8   12.63 )-----------( 285      ( 11 Apr 2019 06:28 )             33.7     04-11    141  |  103  |  10  ----------------------------<  133<H>  3.8   |  27  |  0.51    Ca    9.2      11 Apr 2019 06:28  Mg     2.2     04-11      proBNP:   Lipid Profile:   HgA1c:   TSH:       ASSESSMENT/PLAN: 	        DVT ppx:  Dispo: Interventional Cardiology PA Adult Progress Note    CC: Cervical hardware failure  TX note to Dr. Daley's team.     67 y/o F with PMHx of non-hodgkins lymphoma (radiation therapy in 1981), breast CA (2000s), HTN, HLD, osteoporosis, carpal tunnel release 10/2018, s/p emergent 3 level anterior cervical discectomy and fusion C4-C6 who was admitted on 4/5 for elective ACDF C3-C4 extension to C6, PSF C2-T2, iliac crest bone graft POD #6.  Her course was complicated by hoarse voice over the weekend for which ENT was consulted and reported no issues from their standpoint. Patient had a speech and swallow eval for dysphagia which showed that she is aspirating but they feel it would be safe to continue a mechanical soft diet with thin liquids with appropriate strategies as recommended to help swallowing. Given wet cough, possible aspiration and LLL consolidation, patient started on vanc and zosyn on 4/9 for possible aspiration PNA. Patient developed paroxysmal afib with RVR  4/8 likely in the setting of PNA. She was given Lopressor 2.5mg IV and became hypotensive to SBP 70s. BP improved with 500cc fluid bolus and she converted back to NSR. Echo 4/8/19 revealed EF 65%, trace MR, moderate TR, mild pulmHTN, PAP 48mmHg, mild OK. D-dimer was elevated and patient underwent CTA ruling out PE. LE duplex negative for DVT. 4/9 she went into rapid afib with RVR again and was given another Lopressor 2.5mg IV x 1. SBP dropped to 60s and she got a total of 2L of NS with improvement. She was then given dig 0.5mg IV with no improvement. She received amio bolus and was started on gtt. She converted back to NSR. She is continued on lovenox 40mg SQ daily at bedtime. She is now transferred to [facility] for further management of afib with RVR in the setting of PNA. pt. s/p amio drip which is completed; transitioned to amio 400 mg BID X 7 days ( today day 2; started 4/10/19); after amio load completed; please start pt. on amio 200 mg daily. Pt. has been in NSR and is being transferred to neurosurgery Dr. Daley's team.      Pt. with +[place] J collar. Left anterior incision C/D/I. Posterior cervical incision w/ dressing C/D/I, +MASOOD, +HMV. Neurosurgery and plastics to continue to follow for post-op course.   Pt. with + coughing; pt. has been made NPO as per neurosurgery PA: discussion to start NG tube has been initiated with pt. please f/u     Subjective Assessment: Pt. seen and examined at bedside today am. Pt. still reports of some coughing; denies any CP; SOB; dizziness and palpitations.    ROS negative except as per subjective HPI.   	  MEDICATIONS:  amiodarone    Tablet 400 milliGRAM(s) Oral every 12 hours  metoprolol succinate ER 25 milliGRAM(s) Oral daily  piperacillin/tazobactam IVPB. 4.5 Gram(s) IV Intermittent every 6 hours  vancomycin  IVPB 1000 milliGRAM(s) IV Intermittent every 12 hours  guaiFENesin    Syrup 100 milliGRAM(s) Oral every 6 hours PRN  acetaminophen   Tablet .. 650 milliGRAM(s) Oral every 6 hours PRN  diazepam    Tablet 5 milliGRAM(s) Oral every 8 hours PRN  gabapentin 600 milliGRAM(s) Oral at bedtime  HYDROmorphone  Injectable 0.5 milliGRAM(s) IV Push every 3 hours PRN  ondansetron Injectable 4 milliGRAM(s) IV Push every 6 hours PRN  pregabalin 100 milliGRAM(s) Oral two times a day  traMADol 25 milliGRAM(s) Oral every 4 hours PRN  traMADol 50 milliGRAM(s) Oral every 4 hours PRN  docusate sodium 100 milliGRAM(s) Oral three times a day  famotidine    Tablet 20 milliGRAM(s) Oral daily  magnesium hydroxide Suspension 30 milliLiter(s) Oral every 12 hours PRN  senna 2 Tablet(s) Oral at bedtime  atorvastatin 40 milliGRAM(s) Oral at bedtime  dexamethasone     Tablet 4 milliGRAM(s) Oral every 12 hours  dexamethasone     Tablet   Oral   dextrose 40% Gel 15 Gram(s) Oral once PRN  dextrose 50% Injectable 12.5 Gram(s) IV Push once  dextrose 50% Injectable 25 Gram(s) IV Push once  dextrose 50% Injectable 25 Gram(s) IV Push once  glucagon  Injectable 1 milliGRAM(s) IntraMuscular once PRN  insulin lispro (HumaLOG) corrective regimen sliding scale   SubCutaneous Before meals and at bedtime    anastrozole 1 milliGRAM(s) Oral daily  benzocaine 15 mG/menthol 3.6 mG Lozenge 1 Lozenge Oral every 1 hour PRN  cholecalciferol 400 Unit(s) Oral daily  enoxaparin Injectable 40 milliGRAM(s) SubCutaneous at bedtime      	    [PHYSICAL EXAM:  TELEMETRY:  T(C): 36.6 (04-11-19 @ 08:55), Max: 37.1 (04-10-19 @ 22:12)  HR: 84 (04-11-19 @ 09:06) (66 - 84)  BP: 128/64 (04-11-19 @ 09:06) (103/57 - 142/80)  RR: 18 (04-11-19 @ 09:06) (18 - 18)  SpO2: 95% (04-11-19 @ 09:06) (94% - 99%)  Wt(kg): --  I&O's Summary    10 Apr 2019 07:01  -  11 Apr 2019 07:00  --------------------------------------------------------  IN: 390 mL / OUT: 32 mL / NET: 358 mL    11 Apr 2019 07:01  -  11 Apr 2019 11:24  --------------------------------------------------------  IN: 120 mL / OUT: 0 mL / NET: 120 mL        Esposito:  Central/PICC/Mid Line:                                         Appearance: Normal	  HEENT:   Normal oral mucosa, PERRL, EOMI	  Neck: Supple, C-collar in place; +MASOOD, +HMV  Cardiovascular: Normal S1 S2, No JVD, No murmurs  Respiratory: Rales left middle and lower lobe, right lung CTA   Gastrointestinal:  Soft, Non-tender, + BS	  Skin: No rashes, No ecchymoses, No cyanosis  Extremities: Normal range of motion, No clubbing, cyanosis or edema  Vascular: Peripheral pulses palpable 2+ bilaterally  Neurologic: Non-focal  Psychiatry: A & O x 3, Mood & affect appropriate      	    ECG:  	  RADIOLOGY:   DIAGNOSTIC TESTING:  [ ] Echocardiogram:  [ ]  Catheterization:  [ ] Stress Test:    [ ] KO  OTHER: 	    LABS:	 	  CARDIAC MARKERS:                                  10.8   12.63 )-----------( 285      ( 11 Apr 2019 06:28 )             33.7     04-11    141  |  103  |  10  ----------------------------<  133<H>  3.8   |  27  |  0.51    Ca    9.2      11 Apr 2019 06:28  Mg     2.2     04-11      proBNP:   Lipid Profile:   HgA1c:   TSH:       ASSESSMENT/PLAN: 	        DVT ppx:  Dispo:

## 2019-04-11 NOTE — CHART NOTE - NSCHARTNOTEFT_GEN_A_CORE
Admitting Diagnosis:   Patient is a 68y old  Female who presents with a chief complaint of Cervical hardware failure (11 Apr 2019 11:24)      PAST MEDICAL & SURGICAL HISTORY:  Anxiety  History of breast cancer: s/p right lumpectomy , RT  Hodgkin disease: 1981, underwent radiation therapy  HLD (hyperlipidemia)  HTN (hypertension)  Cervical disc herniation  Carpal tunnel syndrome: b/l  Cervical disc disease  History of carpal tunnel surgery: 10/2018 left hand  History of tonsillectomy  S/P cervical spinal fusion: 12/15/2018 C4-C6 ACDF  History of Mohs micrographic surgery for skin cancer  History of lumpectomy of right breast: 2013      Current Nutrition Order:   Diet, Dysphagia 2 Mechanical Soft-Thin Liquids:   Supplement Feeding Modality:  Oral  Ensure Pudding Cans or Servings Per Day:  1       Frequency:  Three Times a day (04-10-19 @ 18:22)      PO Intake: Good (%) [   ]  Fair (50-75%) [   ] Poor (<25%) [  x ]    GI Issues: Denies N/V/C/D with last BM yesterday, +dysphagia    Pain: Not reporting pain at this time     Skin Integrity: 4/10: 1+ edema L arm, +surgical incision    Labs: POCT: 4/11: 119, 4/10: 130-149, 4/9: 115-142  04-11    141  |  103  |  10  ----------------------------<  133<H>  3.8   |  27  |  0.51    Ca    9.2      11 Apr 2019 06:28  Mg     2.2     04-11      CAPILLARY BLOOD GLUCOSE      POCT Blood Glucose.: 135 mg/dL (11 Apr 2019 10:57)  POCT Blood Glucose.: 119 mg/dL (11 Apr 2019 07:11)  POCT Blood Glucose.: 132 mg/dL (10 Apr 2019 21:38)  POCT Blood Glucose.: 136 mg/dL (10 Apr 2019 16:14)      Medications:  MEDICATIONS  (STANDING):  amiodarone    Tablet 400 milliGRAM(s) Oral every 12 hours  anastrozole 1 milliGRAM(s) Oral daily  atorvastatin 40 milliGRAM(s) Oral at bedtime  cholecalciferol 400 Unit(s) Oral daily  dexamethasone     Tablet 4 milliGRAM(s) Oral every 12 hours  dexamethasone     Tablet   Oral   dextrose 50% Injectable 12.5 Gram(s) IV Push once  dextrose 50% Injectable 25 Gram(s) IV Push once  dextrose 50% Injectable 25 Gram(s) IV Push once  docusate sodium 100 milliGRAM(s) Oral three times a day  enoxaparin Injectable 40 milliGRAM(s) SubCutaneous at bedtime  famotidine    Tablet 20 milliGRAM(s) Oral daily  gabapentin 600 milliGRAM(s) Oral at bedtime  insulin lispro (HumaLOG) corrective regimen sliding scale   SubCutaneous Before meals and at bedtime  metoprolol succinate ER 25 milliGRAM(s) Oral daily  piperacillin/tazobactam IVPB. 4.5 Gram(s) IV Intermittent every 6 hours  pregabalin 100 milliGRAM(s) Oral two times a day  senna 2 Tablet(s) Oral at bedtime  vancomycin  IVPB 1250 milliGRAM(s) IV Intermittent every 12 hours    MEDICATIONS  (PRN):  acetaminophen   Tablet .. 650 milliGRAM(s) Oral every 6 hours PRN Temp greater or equal to 38C (100.4F), Mild Pain (1 - 3)  benzocaine 15 mG/menthol 3.6 mG Lozenge 1 Lozenge Oral every 1 hour PRN Sore Throat  dextrose 40% Gel 15 Gram(s) Oral once PRN Blood Glucose LESS THAN 70 milliGRAM(s)/deciliter  diazepam    Tablet 5 milliGRAM(s) Oral every 8 hours PRN muscle spasm  glucagon  Injectable 1 milliGRAM(s) IntraMuscular once PRN Glucose LESS THAN 70 milligrams/deciliter  guaiFENesin    Syrup 100 milliGRAM(s) Oral every 6 hours PRN Cough  HYDROmorphone  Injectable 0.5 milliGRAM(s) IV Push every 3 hours PRN breakthrough pain  magnesium hydroxide Suspension 30 milliLiter(s) Oral every 12 hours PRN Constipation  ondansetron Injectable 4 milliGRAM(s) IV Push every 6 hours PRN Nausea  traMADol 25 milliGRAM(s) Oral every 4 hours PRN Moderate Pain (4 - 6)  traMADol 50 milliGRAM(s) Oral every 4 hours PRN Severe Pain (7 - 10)      Weight:  68.3kg- admit wt  67.5kg- 4/10    Weight Change: UBW 67.7kg, denies recent significant weight loss    Nutrition Focused Physical Exam: Completed [   ]  Not Pertinent [ x  ]    Estimated energy needs:   Height: 5'7" Weight: 150.5 lbs,  lbs+/-10%, %%, BMI 23.6,   UBW used to calculate EER as per pt's current body weight is within % IBW   Estimated nutrient needs based on [facility] SOC for maintenance in older adults.  20-25kcal/kg= 1350-1687kcal  1.0-1.2gms pro/kg= 67.5-81gms pro  30-35ml/kg= 2025-2362ml    Subjective:   Pt seen for follow up. 68 y.o F from home with PMHx of non-Hodgkin lymphoma s/p XRT 1981 and Breast CA (2000’s), HTN, HLD, OP. Pt admitted and underwent ACDF C3-C4 extension to C6, PSF C2-T2 4/4. Pt transferred to [facility] for Afib with RVR in setting of PNA. Pt c/o anorexia, dysphagia xfew days PTA. SLP recommended mechanical soft diet with thin liquids 4/8. Pt w/ continued complaints of dysphagia and excessive coughing, exacerbated during meal time per pt. Consider SLP follow up to re-evaluate. Pt w/ minimal PO intake, however consuming Ensure Pudding and yogurts. RD to follow up per protocol.     Previous Nutrition Diagnosis:  Inadequate energy intake R/T anorexia, dysphagia AEB pt with poor <45% to fair 50-74% PO intake of meals    Active [ x  ]  Resolved [   ]    If resolved, new PES:     Goal:  Tolerance to PO and to meet >75% EER from PO    Recommendations:  1. Continue texture modified diet per SLP recommendation- consider SLP follow up to re-evaluate pts tolerance to texture modified diet.   2. Continue Ensure Pudding TID  3. Honor food preferences as requested by pt (w/in therapeutic diet)(Food preferences for lunch and dinner noted by RD).  4. Weekly wts.  *d/w team    Education: Discussed soft foods to trial (ie: egg salad, tuna salad, mashed potatoes). Encouraged consumption of ONS.     Risk Level: High [ x  ] Moderate [   ] Low [   ]

## 2019-04-11 NOTE — CONSULT NOTE ADULT - SUBJECTIVE AND OBJECTIVE BOX
Interval Events: Reviewed  Patient seen and examined at bedside.    Patient is a 68y old  Female who presents with a chief complaint of Cervical hardware failure (11 Apr 2019 11:24)      PAST MEDICAL & SURGICAL HISTORY:  Anxiety  History of breast cancer: s/p right lumpectomy , RT  Hodgkin disease: 1981, underwent radiation therapy  HLD (hyperlipidemia)  HTN (hypertension)  Cervical disc herniation  Carpal tunnel syndrome: b/l  Cervical disc disease  History of carpal tunnel surgery: 10/2018 left hand  History of tonsillectomy  S/P cervical spinal fusion: 12/15/2018 C4-C6 ACDF  History of Mohs micrographic surgery for skin cancer  History of lumpectomy of right breast: 2013      MEDICATIONS:  Pulmonary:  guaiFENesin    Syrup 100 milliGRAM(s) Oral every 6 hours PRN    Antimicrobials:  piperacillin/tazobactam IVPB. 4.5 Gram(s) IV Intermittent every 6 hours  vancomycin  IVPB 1250 milliGRAM(s) IV Intermittent every 12 hours    Anticoagulants:  enoxaparin Injectable 40 milliGRAM(s) SubCutaneous at bedtime    Cardiac:  amiodarone    Tablet 400 milliGRAM(s) Oral every 12 hours  metoprolol succinate ER 25 milliGRAM(s) Oral daily      Allergies    penicillin (Rash)    Intolerances        Vital Signs Last 24 Hrs  T(C): 36.7 (11 Apr 2019 21:29), Max: 37.1 (10 Apr 2019 22:12)  T(F): 98.1 (11 Apr 2019 21:29), Max: 98.8 (10 Apr 2019 22:12)  HR: 71 (11 Apr 2019 21:29) (66 - 85)  BP: 112/72 (11 Apr 2019 21:29) (103/57 - 142/80)  BP(mean): 82 (11 Apr 2019 18:51) (74 - 106)  RR: 16 (11 Apr 2019 21:29) (16 - 18)  SpO2: 97% (11 Apr 2019 21:29) (94% - 98%)    04-10 @ 07:01  -  04-11 @ 07:00  --------------------------------------------------------  IN: 390 mL / OUT: 32 mL / NET: 358 mL    04-11 @ 07:01  -  04-11 @ 21:36  --------------------------------------------------------  IN: 880 mL / OUT: 5 mL / NET: 875 mL          LABS:      CBC Full  -  ( 11 Apr 2019 06:28 )  WBC Count : 12.63 K/uL  RBC Count : 3.56 M/uL  Hemoglobin : 10.8 g/dL  Hematocrit : 33.7 %  Platelet Count - Automated : 285 K/uL  Mean Cell Volume : 94.7 fl  Mean Cell Hemoglobin : 30.3 pg  Mean Cell Hemoglobin Concentration : 32.0 gm/dL  Auto Neutrophil # : 10.51 K/uL  Auto Lymphocyte # : 1.09 K/uL  Auto Monocyte # : 0.95 K/uL  Auto Eosinophil # : 0.00 K/uL  Auto Basophil # : 0.01 K/uL  Auto Neutrophil % : 83.2 %  Auto Lymphocyte % : 8.6 %  Auto Monocyte % : 7.5 %  Auto Eosinophil % : 0.0 %  Auto Basophil % : 0.1 %    04-11    141  |  103  |  10  ----------------------------<  133<H>  3.8   |  27  |  0.51    Ca    9.2      11 Apr 2019 06:28  Mg     2.2     04-11    < from: Xray Chest 1 View-PORTABLE IMMEDIATE (04.09.19 @ 09:07) >  EXAM:  XR CHEST PORTABLE IMMED 1V                          PROCEDURE DATE:  04/09/2019          INTERPRETATION:  Chest x-ray    Indication: ICU patient    A portable frontal view of the chest is compared to the prior study dated   10/2/2013 and CT of 4/8/2019. Partial visualization of ACDF device   overlying the lower neck. Mild cardiomegaly. Retrocardiac opacity is   again noted consistent with airspace consolidation seen on CT study.   Cannot exclude a small left pleural effusion. No right pleural effusion.   No pneumothorax.      IMPRESSION: Persistent left lower lobe consolidation which may represent   atelectasis or pneumonia.    < end of copied text >                        RADIOLOGY & ADDITIONAL STUDIES (The following images were personally reviewed):  Esposito:                                     No  Urine output:                       adequate  DVT prophylaxis:                 Yes  Flattus:                                  Yes  Bowel movement:              No

## 2019-04-11 NOTE — PROGRESS NOTE ADULT - PROBLEM SELECTOR PLAN 4
Speech and Swallow following  - cleared patient for mechanical soft diet with thin liquids and appropriate strategies to aid with swallowing  - aspiration precautions Speech and Swallow following  - cleared patient for mechanical soft diet with thin liquids and appropriate strategies to aid with swallowing  - aspiration precautions  - pt. has been made NPO as per neurosurgery PA recs; discussion to start NG tube has been initiated; f/u

## 2019-04-11 NOTE — PROGRESS NOTE ADULT - ASSESSMENT
69yo F with PMHx of non-hodgkins lymphoma (radiation therapy in 1981), breast CA (2000s), HTN, HLD, osteoporosis, carpal tunnel release 10/2018, s/p emergent 3 level anterior cervical discectomy and fusion C4-C6 who was admitted on 4/5 for elective ACDF C3-C4 extension to C6, PSF C2-T2, iliac crest bone graft POD #5. Course complicated by hoarse voice evaluated by ENT with NTD, dysphagia for which speech and swallow is following, PNA being tx'd by abx and afib with RVR for which she is currently in NSR s/p amio gtt which is completed today; transitioned to amio load (400 mg BID X 7 days; day 1; 4/10) and to c/w amio 200 mg daily as per EP. 69yo F with PMHx of non-hodgkins lymphoma (radiation therapy in 1981), breast CA (2000s), HTN, HLD, osteoporosis, carpal tunnel release 10/2018, s/p emergent 3 level anterior cervical discectomy and fusion C4-C6 who was admitted on 4/5 for elective ACDF C3-C4 extension to C6, PSF C2-T2, iliac crest bone graft POD #6 . Course complicated by hoarse voice evaluated by ENT with NTD, dysphagia for which speech and swallow is following, PNA being tx'd by abx and afib with RVR for which she is currently in NSR s/p amio gtt which is completed 4/1019; transitioned to amio load (400 mg BID X 7 days; day 2 ; started 4/10) and to c/w amio 200 mg daily. Please continue to follow up post up care from neurosurgery and plastics. Please follow up speech and swallow recs.

## 2019-04-11 NOTE — PROGRESS NOTE ADULT - PROBLEM SELECTOR PLAN 3
LLL consolidation thought to be aspiration PNA  - continue vanc/zosyn (day 2 today 4/10); vanc trough 4.8; as d/w pharmacist Danyell; ade continue with same vanc order for now and check vanc trough level in am prior to 4th dose.   - cough syrup  - Dr. Pina consulted; f/u recs. LLL consolidation thought to be aspiration PNA  - continue vanc/zosyn (day 3 today 4/11); vanc trough level low; vanc increased to 1250 mg BID as d/w pharmacist Danyell; please follow up vanc trough level prior to evening dose tomorrow.   - cough syrup  - Dr. Pina consulted; f/u recs.

## 2019-04-11 NOTE — PROGRESS NOTE ADULT - PROBLEM SELECTOR PLAN 5
- continue atorvastatin 40mg QHS    DVT PPx: lovenox  DISPO: Tele monitoring with amio loading for management of rapid afib; will most likely transfer to 9 WO tomorrow. - continue atorvastatin 40mg QHS    DVT PPx: lovenox  DISPO: transfer to Dr. Brie santos

## 2019-04-11 NOTE — PROGRESS NOTE ADULT - PROBLEM SELECTOR PLAN 2
s/p ACDF C3-C4 extension to C6, PSF C2-T2, iliac crest bone graft POD #5 ; Plastics and neurosurgery following  - MASOOD drain still on place which is to be removed by neurosurgery; Continue posterior drain: per plastics will remove hemovac when drain is less than 50ml in 24hrs and then remove MASOOD 24hrs later.  - [place] J-collar in place  - decadron to be tapered over next week (ordered appropriately)  - pain control as ordered with valium, lyrica, dilaudid, tramadol  - incentive spirometry  - regular vitals with neuro checks s/p ACDF C3-C4 extension to C6, PSF C2-T2, iliac crest bone graft POD #6 ; Plastics and neurosurgery following  - MASOOD drain still on place which is to be removed by neurosurgery; Continue posterior drain: per plastics will remove hemovac when drain is less than 50ml in 24hrs and then remove MASOOD 24hrs later. f/u neurosurgery and plastics rec  - [place] J-collar in place  - decadron to be tapered over next week   - pain control as ordered with valium, lyrica, dilaudid, tramadol  - incentive spirometry  - regular vitals with neuro checks

## 2019-04-11 NOTE — PROGRESS NOTE ADULT - PROBLEM SELECTOR PLAN 1
Currently in NSR with HR 60s-70s; EP following  - TSH normal  - s/p amio drip; started pt. on amio load 400 mg BID; c/w amio 400 mg BID X 7 days (day 1 today) and then to c/w amio 200 mg daily.   - currently on lovenox 40mg SQ QHS - neurosurgery to decide if safe for patient to be transitioned to eliquis, follow up recs  - Echo 4/8/19 revealed EF 65%, trace MR, moderate TR, mild pulmHTN, PAP 48mmHg, mild NC. Currently in NSR with HR 60s-70s;   - TSH normal  - s/p amio drip; started pt. on amio load 400 mg BID 4/10/19; c/w amio 400 mg BID X 7 days (day 2 today) and then to c/w amio 200 mg daily.   - currently on lovenox 40mg SQ QHS - neurosurgery to decide if safe for patient to be transitioned to eliquis, follow up recs  - Echo 4/8/19 revealed EF 65%, trace MR, moderate TR, mild pulmHTN, PAP 48mmHg, mild NE.

## 2019-04-11 NOTE — PROGRESS NOTE ADULT - SUBJECTIVE AND OBJECTIVE BOX
HPI:  This is a 67 y/o female, well known to Dr. Daley with PMH of non-hodkins lymphoma (xrt in 1981) and breast ca (2000's), HTN, HLD, osteoporosis.  In 10/18, patient was evaluated for upper extremity weakness and underwent a carpal tunnel release and ulnar transposition without relief of symptoms.   in 11/18, patient was admitted to [facility] for b/l UE weakness and difficulty walking.  Her work up revealed cervical stenosis with compression.  However, at the time, her bone scan also was significant for uptake in her right acetabulum and at the time and the evaluating spine surgeon requested this lesion be addressed prior to any cervical surgery.  A CT of the region was negative and the patient was sent home for follow up. on 12/4/18, patient was seen in the [facility] ER again, this time with worsening symptoms and she was emergently taken to the OR for a three level ACDF C4-C6.  She was discharged after the surgery with an uncomplicated post op course.     Since this surgery, patient has dramatic improvements in her walking, neuropathy and radicular pain.  Follow up imaging with X-ray lead to further evaluation with CT which revealed possible loosening of hardware.  Clinically, patient still has left UE weakness that she thinks may be from her history of broken clavicle. No bowel, bladder dysfunction     She is here for definitive surgical evaluation. (05 Apr 2019 06:51)    OVERNIGHT EVENTS: No significant issues overnight. Patient is uncomfortable with the posterior cervical drains. She reports persistent coughing. Tolerating PO diet.    Hospital course:   POD#0: S/P revision of anterior cervical discectomy with fusion of C4-C6 with extension to C3, as well as C2-T2 posterior fusion with right iliac crest autograft.   POD#1: CESARIO overnight. Drains x3 remain in place, [place] J collar and prevena dressing in place. Esposito removed this am, f/u TOV. Reports some throat tightness with mucous, but denies pain, no issues talking or swallowing (tolerating liquid diet)  POD#2 Pt with secrections and difficulty swallowing. continue decadron 4q6. Robitussin started.   4/8 Pt complaining muscle spasm and pain down BUE L>R  4/9: no acute events overnight, neuro stable. Transferred to CCU for rapid Afib, started on Amiodarone drip.  4/10: Transitioned to PO Amiodarone, off IV drip, on tele, pending transfer back to Neurosurgery. Posterior HARRY and HMV remains.      Vital Signs Last 24 Hrs  T(C): 36.3 (11 Apr 2019 05:22), Max: 37.1 (10 Apr 2019 22:12)  T(F): 97.4 (11 Apr 2019 05:22), Max: 98.8 (10 Apr 2019 22:12)  HR: 66 (11 Apr 2019 00:38) (66 - 78)  BP: 103/57 (11 Apr 2019 00:38) (103/57 - 138/63)  BP(mean): 74 (11 Apr 2019 00:38) (74 - 91)  RR: 18 (11 Apr 2019 00:38) (18 - 18)  SpO2: 98% (11 Apr 2019 00:38) (94% - 99%)    I&O's Summary    09 Apr 2019 07:01  -  10 Apr 2019 07:00  --------------------------------------------------------  IN: 2029.6 mL / OUT: 70 mL / NET: 1959.6 mL    10 Apr 2019 07:01  -  11 Apr 2019 06:04  --------------------------------------------------------  IN: 390 mL / OUT: 32 mL / NET: 358 mL        PHYSICAL EXAM:  Gen: NAD, AAOx3  HEENT: PERRL. EOMI.  Neck: +[place] J collar. Left anterior incision C/D/I. Posterior cervical incision w/ dressing C/D/I, +HARRY, +HMV.  Lungs: Clear b/l  Heart: S1, S2. NSR.  Abd: Soft, NT/ND. +BS  Back: Right iliac crest graft site C/D/I.  Neuro: CNs II-XII intact. Left  4/5, proximal 3/5. RUE and LEs b/l 5/5. Sensation intact throughout. Following commands. Speech clear.     TUBES/LINES:  [] Esposito  [] Lumbar Drain  [x] Wound Drains  [] Others      DIET:  [] NPO  [x] Mechanical  [] Tube feeds    LABS:                        10.6   7.50  )-----------( 227      ( 10 Apr 2019 06:56 )             33.0     04-10    137  |  104  |  12  ----------------------------<  158<H>  3.9   |  23  |  0.45<L>    Ca    9.1      10 Apr 2019 06:56  Phos  3.4     04-09  Mg     2.1     04-10              CAPILLARY BLOOD GLUCOSE      POCT Blood Glucose.: 132 mg/dL (10 Apr 2019 21:38)  POCT Blood Glucose.: 136 mg/dL (10 Apr 2019 16:14)  POCT Blood Glucose.: 130 mg/dL (10 Apr 2019 10:43)  POCT Blood Glucose.: 149 mg/dL (10 Apr 2019 06:38)      Drug Levels: [] N/A  Vancomycin Level, Trough: 4.8 ug/mL (04-10 @ 17:26)    CSF Analysis: [] N/A      Allergies    penicillin (Rash)    Intolerances      MEDICATIONS:  Antibiotics:  piperacillin/tazobactam IVPB. 4.5 Gram(s) IV Intermittent every 6 hours  vancomycin  IVPB 1000 milliGRAM(s) IV Intermittent every 12 hours    Neuro:  acetaminophen   Tablet .. 650 milliGRAM(s) Oral every 6 hours PRN  diazepam    Tablet 5 milliGRAM(s) Oral every 8 hours PRN  gabapentin 600 milliGRAM(s) Oral at bedtime  HYDROmorphone  Injectable 0.5 milliGRAM(s) IV Push every 3 hours PRN  ondansetron Injectable 4 milliGRAM(s) IV Push every 6 hours PRN  pregabalin 100 milliGRAM(s) Oral two times a day  traMADol 25 milliGRAM(s) Oral every 4 hours PRN  traMADol 50 milliGRAM(s) Oral every 4 hours PRN    Anticoagulation:  enoxaparin Injectable 40 milliGRAM(s) SubCutaneous at bedtime    OTHER:  amiodarone    Tablet 400 milliGRAM(s) Oral every 12 hours  anastrozole 1 milliGRAM(s) Oral daily  atorvastatin 40 milliGRAM(s) Oral at bedtime  benzocaine 15 mG/menthol 3.6 mG Lozenge 1 Lozenge Oral every 1 hour PRN  dexamethasone     Tablet 4 milliGRAM(s) Oral every 12 hours  dexamethasone     Tablet   Oral   dextrose 40% Gel 15 Gram(s) Oral once PRN  dextrose 50% Injectable 12.5 Gram(s) IV Push once  dextrose 50% Injectable 25 Gram(s) IV Push once  dextrose 50% Injectable 25 Gram(s) IV Push once  docusate sodium 100 milliGRAM(s) Oral three times a day  famotidine    Tablet 20 milliGRAM(s) Oral daily  glucagon  Injectable 1 milliGRAM(s) IntraMuscular once PRN  guaiFENesin    Syrup 100 milliGRAM(s) Oral every 6 hours PRN  insulin lispro (HumaLOG) corrective regimen sliding scale   SubCutaneous Before meals and at bedtime  magnesium hydroxide Suspension 30 milliLiter(s) Oral every 12 hours PRN  metoprolol succinate ER 25 milliGRAM(s) Oral daily  senna 2 Tablet(s) Oral at bedtime    IVF:  cholecalciferol 400 Unit(s) Oral daily    CULTURES:    RADIOLOGY & ADDITIONAL TESTS:      ASSESSMENT:  68y Female w/ PMH of HTN, HLD, h/o Breast Ca, h/o Hodgkins Lymphoma, h/o ACDF C4-C6 now s/p ACDF C3-C4 extension to C6, PSF C2-T2, right iliac crest bone graft 4/5    PLAN:  - Plan for transfer back to Neurosurgery service. Dr. Daley, telemetry,  -Continue pain medications as ordered,  -Decadron taper x1 week,  -Continue cervical hard collar,  -Harry/Hmv drain as per Plastic surgery, likely DC hemovac today if output low,  -Post-op imaging done, reviewed,  -SQL for DVT prophylaxis, PE protocol negative,  -Continue PO Amiodarone for recent rapid Afib,  -Vanco/Zosyn for suspected Pneumonia,  -PO/OOB as tolerated, cont PT/OT,  -D/w Dr. Daley    Assessment:  Present when checked    []  GCS  E   V  M     Heart Failure: []Acute, [] acute on chronic , []chronic  Heart Failure:  [] Diastolic (HFpEF), [] Systolic (HFrEF), []Combined (HFpEF and HFrEF), [] RHF, [] Pulm HTN, [] Other    [] SUSU, [] ATN, [] AIN, [] other  [] CKD1, [] CKD2, [] CKD 3, [] CKD 4, [] CKD 5, []ESRD    Encephalopathy: [] Metabolic, [] Hepatic, [] toxic, [] Neurological, [] Other    Abnormal Nurtitional Status: [] malnurtition (see nutrition note), [ ]underweight: BMI < 19, [] morbid obesity: BMI >40, [] Cachexia    [] Sepsis  [] hypovolemic shock,[] cardiogenic shock, [] hemorrhagic shock, [] neuogenic shock  [] Acute Respiratory Failure  []Cerebral edema, [] Brain compression/ herniation,   [] Functional quadriplegia  [] Acute blood loss anemia

## 2019-04-12 LAB
GLUCOSE BLDC GLUCOMTR-MCNC: 103 MG/DL — HIGH (ref 70–99)
GLUCOSE BLDC GLUCOMTR-MCNC: 109 MG/DL — HIGH (ref 70–99)
GLUCOSE BLDC GLUCOMTR-MCNC: 115 MG/DL — HIGH (ref 70–99)
GLUCOSE BLDC GLUCOMTR-MCNC: 134 MG/DL — HIGH (ref 70–99)

## 2019-04-12 PROCEDURE — 99232 SBSQ HOSP IP/OBS MODERATE 35: CPT

## 2019-04-12 PROCEDURE — 99232 SBSQ HOSP IP/OBS MODERATE 35: CPT | Mod: GC

## 2019-04-12 RX ORDER — METOPROLOL TARTRATE 50 MG
2.5 TABLET ORAL EVERY 6 HOURS
Qty: 0 | Refills: 0 | Status: DISCONTINUED | OUTPATIENT
Start: 2019-04-12 | End: 2019-04-12

## 2019-04-12 RX ORDER — METOPROLOL TARTRATE 50 MG
25 TABLET ORAL DAILY
Qty: 0 | Refills: 0 | Status: DISCONTINUED | OUTPATIENT
Start: 2019-04-12 | End: 2019-04-16

## 2019-04-12 RX ORDER — SODIUM CHLORIDE 9 MG/ML
1000 INJECTION INTRAMUSCULAR; INTRAVENOUS; SUBCUTANEOUS
Qty: 0 | Refills: 0 | Status: DISCONTINUED | OUTPATIENT
Start: 2019-04-12 | End: 2019-04-14

## 2019-04-12 RX ORDER — AMIODARONE HYDROCHLORIDE 400 MG/1
400 TABLET ORAL EVERY 12 HOURS
Qty: 0 | Refills: 0 | Status: DISCONTINUED | OUTPATIENT
Start: 2019-04-12 | End: 2019-04-16

## 2019-04-12 RX ORDER — SODIUM CHLORIDE 9 MG/ML
1000 INJECTION INTRAMUSCULAR; INTRAVENOUS; SUBCUTANEOUS ONCE
Qty: 0 | Refills: 0 | Status: DISCONTINUED | OUTPATIENT
Start: 2019-04-12 | End: 2019-04-12

## 2019-04-12 RX ORDER — METOCLOPRAMIDE HCL 10 MG
10 TABLET ORAL EVERY 4 HOURS
Qty: 0 | Refills: 0 | Status: DISCONTINUED | OUTPATIENT
Start: 2019-04-12 | End: 2019-04-12

## 2019-04-12 RX ORDER — SODIUM CHLORIDE 9 MG/ML
1000 INJECTION INTRAMUSCULAR; INTRAVENOUS; SUBCUTANEOUS ONCE
Qty: 0 | Refills: 0 | Status: COMPLETED | OUTPATIENT
Start: 2019-04-12 | End: 2019-04-12

## 2019-04-12 RX ORDER — METOCLOPRAMIDE HCL 10 MG
10 TABLET ORAL EVERY 4 HOURS
Qty: 0 | Refills: 0 | Status: DISCONTINUED | OUTPATIENT
Start: 2019-04-12 | End: 2019-04-16

## 2019-04-12 RX ORDER — SODIUM CHLORIDE 9 MG/ML
500 INJECTION INTRAMUSCULAR; INTRAVENOUS; SUBCUTANEOUS ONCE
Qty: 0 | Refills: 0 | Status: COMPLETED | OUTPATIENT
Start: 2019-04-12 | End: 2019-04-12

## 2019-04-12 RX ADMIN — PIPERACILLIN AND TAZOBACTAM 200 GRAM(S): 4; .5 INJECTION, POWDER, LYOPHILIZED, FOR SOLUTION INTRAVENOUS at 00:45

## 2019-04-12 RX ADMIN — Medication 100 MILLIGRAM(S): at 18:13

## 2019-04-12 RX ADMIN — PIPERACILLIN AND TAZOBACTAM 200 GRAM(S): 4; .5 INJECTION, POWDER, LYOPHILIZED, FOR SOLUTION INTRAVENOUS at 06:01

## 2019-04-12 RX ADMIN — AMIODARONE HYDROCHLORIDE 400 MILLIGRAM(S): 400 TABLET ORAL at 18:12

## 2019-04-12 RX ADMIN — Medication 25 MILLIGRAM(S): at 18:13

## 2019-04-12 RX ADMIN — SODIUM CHLORIDE 75 MILLILITER(S): 9 INJECTION INTRAMUSCULAR; INTRAVENOUS; SUBCUTANEOUS at 13:59

## 2019-04-12 RX ADMIN — Medication 166.67 MILLIGRAM(S): at 10:06

## 2019-04-12 RX ADMIN — SODIUM CHLORIDE 1000 MILLILITER(S): 9 INJECTION INTRAMUSCULAR; INTRAVENOUS; SUBCUTANEOUS at 11:00

## 2019-04-12 RX ADMIN — Medication 5 MILLIGRAM(S): at 23:38

## 2019-04-12 RX ADMIN — PIPERACILLIN AND TAZOBACTAM 200 GRAM(S): 4; .5 INJECTION, POWDER, LYOPHILIZED, FOR SOLUTION INTRAVENOUS at 12:52

## 2019-04-12 RX ADMIN — ENOXAPARIN SODIUM 40 MILLIGRAM(S): 100 INJECTION SUBCUTANEOUS at 22:02

## 2019-04-12 RX ADMIN — PIPERACILLIN AND TAZOBACTAM 200 GRAM(S): 4; .5 INJECTION, POWDER, LYOPHILIZED, FOR SOLUTION INTRAVENOUS at 18:13

## 2019-04-12 RX ADMIN — PIPERACILLIN AND TAZOBACTAM 200 GRAM(S): 4; .5 INJECTION, POWDER, LYOPHILIZED, FOR SOLUTION INTRAVENOUS at 23:29

## 2019-04-12 RX ADMIN — ONDANSETRON 4 MILLIGRAM(S): 8 TABLET, FILM COATED ORAL at 21:36

## 2019-04-12 RX ADMIN — SODIUM CHLORIDE 1000 MILLILITER(S): 9 INJECTION INTRAMUSCULAR; INTRAVENOUS; SUBCUTANEOUS at 18:35

## 2019-04-12 NOTE — PROCEDURE NOTE - ADDITIONAL PROCEDURE DETAILS
MASOOD drain removed from posterior cervical wound. Tip visualized. Steristrips placed.
Hemovac removed from anterior neck. tip visualized. steristrips placed.

## 2019-04-12 NOTE — SWALLOW FEES ASSESSMENT ADULT - CONSISTENCIES ADMINISTERED
puree/thin liquid/ice-chip x1; tsp thin x2, tsp NTL x1, cup sip x2, 1/2 tsp applesauce x2/nectar thick

## 2019-04-12 NOTE — SWALLOW FEES ASSESSMENT ADULT - PHARYNGEAL PHASE COMMENTS
Timely swallow trigger, decreased BOT retraction & decreased pharyngeal squeeze result in residue in the valleculae, along the lateral channels & in the pyriform sinuses after the swallow with increasing residue as bolus viscosity increases. There is evidence of trace penetration after the swallow from spillage of residue into the interarytenoid space & over the A-E folds. There was one episode of visualized aspiration of pooled secretions & bolus residue. There were multiple spontaneous coughs throughout the exam suggestive of additional episodes of trace aspiration across textures, which would be consistent with findings on MBS on 4/8. One episode of productive cough with expectoration of puree residue was observed.

## 2019-04-12 NOTE — SWALLOW FEES ASSESSMENT ADULT - DIAGNOSTIC IMPRESSIONS
Mrs. Shelton presents with a moderate pharyngeal dysphagia characterized by decreased bolus clearance during the swallow & trace penetration & aspiration after the swallow. Although Pt is highly sensate to aspiration & has a strong, productive cough, frequency of coughing & regurgitation with expectoration of pharyngeal residue, during minimal PO trials -in combination with know esophageal dysmotility from MBS - makes oral intake very inefficient, and Pt is at increased risk of developing asp pna d/t currently deconditioned status and h/o multiple medical problems.

## 2019-04-12 NOTE — PROGRESS NOTE ADULT - SUBJECTIVE AND OBJECTIVE BOX
HPI:  This is a 67 y/o female, well known to Dr. Daley with PMH of non-hodkins lymphoma (xrt in 1981) and breast ca (2000's), HTN, HLD, osteoporosis.  In 10/18, patient was evaluated for upper extremity weakness and underwent a carpal tunnel release and ulnar transposition without relief of symptoms.   in 11/18, patient was admitted to [facility] for b/l UE weakness and difficulty walking.  Her work up revealed cervical stenosis with compression.  However, at the time, her bone scan also was significant for uptake in her right acetabulum and at the time and the evaluating spine surgeon requested this lesion be addressed prior to any cervical surgery.  A CT of the region was negative and the patient was sent home for follow up. on 12/4/18, patient was seen in the [facility] ER again, this time with worsening symptoms and she was emergently taken to the OR for a three level ACDF C4-C6.  She was discharged after the surgery with an uncomplicated post op course.     Since this surgery, patient has dramatic improvements in her walking, neuropathy and radicular pain.  Follow up imaging with X-ray lead to further evaluation with CT which revealed possible loosening of hardware.  Clinically, patient still has left UE weakness that she thinks may be from her history of broken clavicle. No bowel, bladder dysfunction     She is here for definitive surgical evaluation. (05 Apr 2019 06:51)    OVERNIGHT EVENTS:  Pt with productive white sputum and cough. Pain controlled. Still difficulty swallowing saliva, but voice less hoarse.    Hospital course:   POD#0: S/P revision of anterior cervical discectomy with fusion of C4-C6 with extension to C3, as well as C2-T2 posterior fusion with right iliac crest autograft.   POD#1: CESARIO overnight. Drains x3 remain in place, [place] J collar and prevena dressing in place. Esposito removed this am, f/u TOV. Reports some throat tightness with mucous, but denies pain, no issues talking or swallowing (tolerating liquid diet)  POD#2 Pt with secrections and difficulty swallowing. continue decadron 4q6. Robitussin started.   4/8 Pt complaining muscle spasm and pain down BUE L>R  4/9: no acute events overnight, neuro stable. Transferred to CCU for rapid Afib, started on Amiodarone drip.  4/10: Transitioned to PO Amiodarone, off IV drip, on tele, pending transfer back to Neurosurgery. Posterior MASOOD and HMV remains.  4/11: Posterior drain removed. Pt on Vanco/zosyn for aspiration PNA  4/12 Repeat Fees. 2nd posterior drain removed. sputum culture sent    Vital Signs Last 24 Hrs  T(C): 36.8 (12 Apr 2019 04:12), Max: 36.9 (12 Apr 2019 00:32)  T(F): 98.2 (12 Apr 2019 04:12), Max: 98.4 (12 Apr 2019 00:32)  HR: 76 (12 Apr 2019 04:12) (71 - 85)  BP: 123/77 (12 Apr 2019 04:12) (112/72 - 129/78)  BP(mean): 82 (11 Apr 2019 18:51) (82 - 88)  RR: 16 (12 Apr 2019 04:12) (16 - 18)  SpO2: 97% (12 Apr 2019 04:12) (94% - 97%)    I&O's Summary    11 Apr 2019 07:01  -  12 Apr 2019 07:00  --------------------------------------------------------  IN: 1330 mL / OUT: 609.5 mL / NET: 720.5 mL        PHYSICAL EXAM:  Neurological:  Gen: NAD, AAOx3  HEENT: PERRL. EOMI.  Neck: +[place] J collar. Left anterior incision C/D/I. Posterior cervical incision w/ dressing C/D/I, +MASOOD, +HMV.  Lungs: Clear b/l  Heart: S1, S2. NSR.  Abd: Soft, NT/ND. +BS  Back: Right iliac crest graft site C/D/I.  Neuro: CNs II-XII intact. Left  4/5, proximal 3/5. RUE and LEs b/l 5/5. Sensation intact throughout. Following commands. Speech clear.   Incision/Wound: C/D/I     TUBES/LINES:  [] Esposito  [] Lumbar Drain  [x] Wound Drains- 10cc/24hr  [] Others      DIET:  [x] NPO  [] Mechanical  [] Tube feeds    LABS:                        10.8   12.63 )-----------( 285      ( 11 Apr 2019 06:28 )             33.7     04-11    141  |  103  |  10  ----------------------------<  133<H>  3.8   |  27  |  0.51    Ca    9.2      11 Apr 2019 06:28  Mg     2.2     04-11              CAPILLARY BLOOD GLUCOSE      POCT Blood Glucose.: 109 mg/dL (12 Apr 2019 06:45)  POCT Blood Glucose.: 113 mg/dL (11 Apr 2019 21:35)  POCT Blood Glucose.: 139 mg/dL (11 Apr 2019 16:02)  POCT Blood Glucose.: 135 mg/dL (11 Apr 2019 10:57)      Drug Levels: [] N/A  Vancomycin Level, Trough: 8.6 ug/mL (04-11 @ 06:28)  Vancomycin Level, Trough: 4.8 ug/mL (04-10 @ 17:26)    CSF Analysis: [] N/A      Allergies    penicillin (Rash)    Intolerances      MEDICATIONS:  Antibiotics:  piperacillin/tazobactam IVPB. 4.5 Gram(s) IV Intermittent every 6 hours  vancomycin  IVPB 1250 milliGRAM(s) IV Intermittent every 12 hours    Neuro:  acetaminophen   Tablet .. 650 milliGRAM(s) Oral every 6 hours PRN  diazepam    Tablet 5 milliGRAM(s) Oral every 8 hours PRN  gabapentin 600 milliGRAM(s) Oral at bedtime  HYDROmorphone  Injectable 0.5 milliGRAM(s) IV Push every 3 hours PRN  ondansetron Injectable 4 milliGRAM(s) IV Push every 6 hours PRN  pregabalin 100 milliGRAM(s) Oral two times a day  traMADol 25 milliGRAM(s) Oral every 4 hours PRN  traMADol 50 milliGRAM(s) Oral every 4 hours PRN    Anticoagulation:  enoxaparin Injectable 40 milliGRAM(s) SubCutaneous at bedtime    OTHER:  amiodarone    Tablet 400 milliGRAM(s) Oral every 12 hours  anastrozole 1 milliGRAM(s) Oral daily  atorvastatin 40 milliGRAM(s) Oral at bedtime  benzocaine 15 mG/menthol 3.6 mG Lozenge 1 Lozenge Oral every 1 hour PRN  dexamethasone     Tablet 4 milliGRAM(s) Oral every 12 hours  dexamethasone     Tablet   Oral   dextrose 40% Gel 15 Gram(s) Oral once PRN  dextrose 50% Injectable 12.5 Gram(s) IV Push once  dextrose 50% Injectable 25 Gram(s) IV Push once  dextrose 50% Injectable 25 Gram(s) IV Push once  docusate sodium 100 milliGRAM(s) Oral three times a day  famotidine    Tablet 20 milliGRAM(s) Oral daily  glucagon  Injectable 1 milliGRAM(s) IntraMuscular once PRN  guaiFENesin    Syrup 100 milliGRAM(s) Oral every 6 hours PRN  insulin lispro (HumaLOG) corrective regimen sliding scale   SubCutaneous Before meals and at bedtime  magnesium hydroxide Suspension 30 milliLiter(s) Oral every 12 hours PRN  metoprolol succinate ER 25 milliGRAM(s) Oral daily  senna 2 Tablet(s) Oral at bedtime    IVF:  cholecalciferol 400 Unit(s) Oral daily  sodium chloride 0.9% Bolus 1000 milliLiter(s) IV Bolus once  sodium chloride 0.9%. 1000 milliLiter(s) IV Continuous <Continuous>    CULTURES:    RADIOLOGY & ADDITIONAL TESTS:  < from: Xray Chest 1 View-PORTABLE IMMEDIATE (04.09.19 @ 09:07) >  Persistent left lower lobe consolidation which may represent   atelectasis or pneumonia.      < end of copied text >      ASSESSMENT:  68y Female w/ PMH of HTN, HLD, h/o Breast Ca, h/o Hodgkins Lymphoma, h/o ACDF C4-C6 now s/p ACDF C3-C4 extension to C6, PSF C2-T2, right iliac crest bone graft 4/5      CERVICALSPINE M53.2X2  No pertinent family history in first degree relatives  Family history of colon cancer in mother (Mother)  Handoff  MEWS Score  Anxiety  History of breast cancer  Hodgkin disease  HLD (hyperlipidemia)  HTN (hypertension)  Breast CA  Cervical disc herniation  Carpal tunnel syndrome  Cervical disc disease  Hodgkin disease  Breast cancer  Cervical spine instability  Cervical spine instability  Degenerative cervical spinal stenosis  Aspiration pneumonia of left lower lobe, unspecified aspiration pneumonia type  Other dysphagia  HLD (hyperlipidemia)  Dysphagia  PNA (pneumonia)  Cervical disc disease  Atrial fibrillation  Graft, bone, iliac crest  Fusion of 7 to 12 spinal segments by posterior approach  Revision of anterior cervical discectomy with fusion (ACDF)  History of carpal tunnel surgery  History of tonsillectomy  S/P cervical spinal fusion  History of Mohs micrographic surgery for skin cancer  History of lumpectomy of right breast  H/O lumpectomy  No significant past surgical history  History of lumpectomy  S/P appendectomy      PLAN:  -FEES repeat today. Pt npo for now  -sputum culture. Pt on vanco/zosyn for aspiration pneumonia.   -decadron taper  -hard collar on at all times  -d/c last drain today  -f/u vanco trough today 6pm  -Afib. Pt on Amiodarone. Too soon post op to start full AC  -D/W     DVT PROPHYLAXIS:  [x] Venodynes                                [x] Heparin/Lovenox    DISPOSITION: pending progress

## 2019-04-12 NOTE — PROGRESS NOTE ADULT - SUBJECTIVE AND OBJECTIVE BOX
Neurology Follow up note    Name  HILARIA KUMAR    HPI:  This is a 67 y/o female, well known to Dr. Daley with PMH of non-hodkins lymphoma (xrt in 1981) and breast ca (2000's), HTN, HLD, osteoporosis.  In 10/18, patient was evaluated for upper extremity weakness and underwent a carpal tunnel release and ulnar transposition without relief of symptoms.   in 11/18, patient was admitted to [facility] for b/l UE weakness and difficulty walking.  Her work up revealed cervical stenosis with compression.  However, at the time, her bone scan also was significant for uptake in her right acetabulum and at the time and the evaluating spine surgeon requested this lesion be addressed prior to any cervical surgery.  A CT of the region was negative and the patient was sent home for follow up. on 12/4/18, patient was seen in the [facility] ER again, this time with worsening symptoms and she was emergently taken to the OR for a three level ACDF C4-C6.  She was discharged after the surgery with an uncomplicated post op course.     Since this surgery, patient has dramatic improvements in her walking, neuropathy and radicular pain.  Follow up imaging with X-ray lead to further evaluation with CT which revealed possible loosening of hardware.  Clinically, patient still has left UE weakness that she thinks may be from her history of broken clavicle. No bowel, bladder dysfunction     She is here for definitive surgical evaluation. (05 Apr 2019 06:51)      Interval History - neck pain and proximal weakness in the LUE        REVIEW OF SYSTEMS    Vital Signs Last 24 Hrs  T(C): 36.6 (12 Apr 2019 09:25), Max: 36.9 (12 Apr 2019 00:32)  T(F): 97.8 (12 Apr 2019 09:25), Max: 98.4 (12 Apr 2019 00:32)  HR: 82 (12 Apr 2019 09:25) (71 - 85)  BP: 151/87 (12 Apr 2019 09:25) (112/72 - 151/87)  BP(mean): 82 (11 Apr 2019 18:51) (82 - 88)  RR: 16 (12 Apr 2019 09:25) (16 - 18)  SpO2: 94% (12 Apr 2019 09:25) (94% - 97%)    Physical Exam-     Mental Status- awake and alert    Cranial Nerves- full EOM    Gait and station- n/a    Motor- moves all 4 extremities    Reflexes- decreased    Sensation- no sensory level    Coordination-    Vascular -    Medications  acetaminophen   Tablet .. 650 milliGRAM(s) Oral every 6 hours PRN  anastrozole 1 milliGRAM(s) Oral daily  atorvastatin 40 milliGRAM(s) Oral at bedtime  benzocaine 15 mG/menthol 3.6 mG Lozenge 1 Lozenge Oral every 1 hour PRN  cholecalciferol 400 Unit(s) Oral daily  dexamethasone     Tablet 4 milliGRAM(s) Oral every 12 hours  dexamethasone     Tablet   Oral   dextrose 40% Gel 15 Gram(s) Oral once PRN  dextrose 50% Injectable 12.5 Gram(s) IV Push once  dextrose 50% Injectable 25 Gram(s) IV Push once  dextrose 50% Injectable 25 Gram(s) IV Push once  diazepam    Tablet 5 milliGRAM(s) Oral every 8 hours PRN  docusate sodium 100 milliGRAM(s) Oral three times a day  enoxaparin Injectable 40 milliGRAM(s) SubCutaneous at bedtime  famotidine    Tablet 20 milliGRAM(s) Oral daily  gabapentin 600 milliGRAM(s) Oral at bedtime  glucagon  Injectable 1 milliGRAM(s) IntraMuscular once PRN  guaiFENesin    Syrup 100 milliGRAM(s) Oral every 6 hours PRN  HYDROmorphone  Injectable 0.5 milliGRAM(s) IV Push every 3 hours PRN  insulin lispro (HumaLOG) corrective regimen sliding scale   SubCutaneous Before meals and at bedtime  magnesium hydroxide Suspension 30 milliLiter(s) Oral every 12 hours PRN  metoprolol tartrate Injectable 2.6 milliGRAM(s) IV Push every 6 hours  ondansetron Injectable 4 milliGRAM(s) IV Push every 6 hours PRN  piperacillin/tazobactam IVPB. 4.5 Gram(s) IV Intermittent every 6 hours  pregabalin 100 milliGRAM(s) Oral two times a day  senna 2 Tablet(s) Oral at bedtime  sodium chloride 0.9% Bolus 1000 milliLiter(s) IV Bolus once  sodium chloride 0.9%. 1000 milliLiter(s) IV Continuous <Continuous>  traMADol 25 milliGRAM(s) Oral every 4 hours PRN  traMADol 50 milliGRAM(s) Oral every 4 hours PRN  vancomycin  IVPB 1250 milliGRAM(s) IV Intermittent every 12 hours      Lab      Radiology    Assessment- Cervical radiculopathy    Plan Rehab

## 2019-04-12 NOTE — PROGRESS NOTE ADULT - SUBJECTIVE AND OBJECTIVE BOX
Chief Complaint/Reason for Consult: afib  INTERVAL HPI: noted for trouble swallowing no rvr o/n clinically more euvolemic  	  MEDICATIONS:  amiodarone    Tablet 400 milliGRAM(s) Oral every 12 hours  metoprolol succinate ER 25 milliGRAM(s) Oral daily    piperacillin/tazobactam IVPB. 4.5 Gram(s) IV Intermittent every 6 hours  vancomycin  IVPB 1250 milliGRAM(s) IV Intermittent every 12 hours    guaiFENesin    Syrup 100 milliGRAM(s) Oral every 6 hours PRN    acetaminophen   Tablet .. 650 milliGRAM(s) Oral every 6 hours PRN  diazepam    Tablet 5 milliGRAM(s) Oral every 8 hours PRN  gabapentin 600 milliGRAM(s) Oral at bedtime  HYDROmorphone  Injectable 0.5 milliGRAM(s) IV Push every 3 hours PRN  ondansetron Injectable 4 milliGRAM(s) IV Push every 6 hours PRN  pregabalin 100 milliGRAM(s) Oral two times a day  traMADol 25 milliGRAM(s) Oral every 4 hours PRN  traMADol 50 milliGRAM(s) Oral every 4 hours PRN    docusate sodium 100 milliGRAM(s) Oral three times a day  famotidine    Tablet 20 milliGRAM(s) Oral daily  magnesium hydroxide Suspension 30 milliLiter(s) Oral every 12 hours PRN  senna 2 Tablet(s) Oral at bedtime    atorvastatin 40 milliGRAM(s) Oral at bedtime  dextrose 40% Gel 15 Gram(s) Oral once PRN  dextrose 50% Injectable 12.5 Gram(s) IV Push once  dextrose 50% Injectable 25 Gram(s) IV Push once  dextrose 50% Injectable 25 Gram(s) IV Push once  glucagon  Injectable 1 milliGRAM(s) IntraMuscular once PRN  insulin lispro (HumaLOG) corrective regimen sliding scale   SubCutaneous Before meals and at bedtime    anastrozole 1 milliGRAM(s) Oral daily  benzocaine 15 mG/menthol 3.6 mG Lozenge 1 Lozenge Oral every 1 hour PRN  cholecalciferol 400 Unit(s) Oral daily  enoxaparin Injectable 40 milliGRAM(s) SubCutaneous at bedtime  sodium chloride 0.9%. 1000 milliLiter(s) IV Continuous <Continuous>      REVIEW OF SYSTEMS:  [x] As per HPI  CONSTITUTIONAL: No fever, weight loss, or fatigue  RESPIRATORY: No cough, wheezing, chills or hemoptysis; No Shortness of Breath  CARDIOVASCULAR: No chest pain, palpitations, dizziness, or leg swelling  GASTROINTESTINAL: No abdominal or epigastric pain. No nausea, vomiting, or hematemesis; No diarrhea or constipation. No melena or hematochezia.  MUSCULOSKELETAL: No joint pain or swelling; No muscle, back, or extremity pain  [x] All others negative	  [ ] Unable to obtain    PHYSICAL EXAM:  T(C): 37 (04-12-19 @ 14:34), Max: 37 (04-12-19 @ 14:34)  HR: 91 (04-12-19 @ 14:34) (71 - 91)  BP: 182/82 (04-12-19 @ 14:34) (112/72 - 182/82)  RR: 18 (04-12-19 @ 14:34) (16 - 18)  SpO2: 96% (04-12-19 @ 14:34) (94% - 97%)  Wt(kg): --  I&O's Summary    11 Apr 2019 07:01  -  12 Apr 2019 07:00  --------------------------------------------------------  IN: 1330 mL / OUT: 609.5 mL / NET: 720.5 mL    12 Apr 2019 07:01  -  12 Apr 2019 15:11  --------------------------------------------------------  IN: 250 mL / OUT: 200 mL / NET: 50 mL          Appearance: Normal	  HEENT:   Normal oral mucosa  Cardiovascular: Normal S1 S2, No JVD, No murmurs, No edema  Respiratory: Lungs clear to auscultation	  Gastrointestinal:  Soft, Non-tender, + BS	  Extremities: Normal range of motion, No clubbing, cyanosis or edema  Vascular: Peripheral pulses palpable 2+ bilaterally    TELEMETRY: 	    ECG:   	  RADIOLOGY:   CXR:  CT:  US:    CARDIAC TESTING:  Echocardiogram:  Catheterization:  Stress Test:      LABS:	 	    CARDIAC MARKERS:                                  10.8   12.63 )-----------( 285      ( 11 Apr 2019 06:28 )             33.7     04-11    141  |  103  |  10  ----------------------------<  133<H>  3.8   |  27  |  0.51    Ca    9.2      11 Apr 2019 06:28  Mg     2.2     04-11      proBNP:   Lipid Profile:   HgA1c:   TSH:     ASSESSMENT/PLAN: 	    Atrial fibrillation.  Plan: Currently in NSR with HR 60s-70s;   - TSH normal  - s/p amio drip; started pt. on amio load 400 mg BID 4/10/19; c/w amio 400 mg BID X 7 days (day 2 today) and then to c/w amio 200 mg daily.   - currently on lovenox 40mg SQ QHS - neurosurgery to decide if safe for patient to be transitioned to eliquis, follow up recs  - Echo 4/8/19 revealed EF 65%, trace MR, moderate TR, mild pulmHTN, PAP 48mmHg, mild NH.  If NPO: hold amiodarone po, start MTP 2.5mg IV q6hr and titrate to 5mg is HR not at goal (60-80bpm),   can augment with PRN MTP IVP 2.5mg RVR  aggressive IV hydration  If return to RVR with hypotension, then rebolus Amiodarone 150mg IV over 10min and run Amio gtt over 24 hours

## 2019-04-12 NOTE — SWALLOW FEES ASSESSMENT ADULT - COMMENTS
[place], [place], and  Pt's brother present. Risks/benefits/alternatives of procedure were explained to the patient who provided verbal consent to participate. Pt had poor tolerance of the scope & requested to terminate the study after 5 minutes.

## 2019-04-12 NOTE — SWALLOW FEES ASSESSMENT ADULT - RECOMMENDED CONSISTENCY
If GOC are c/w full oral intake, consider smooth/moist purees, e.g. pudding, and small sips of thin liquid with previously recommended strategies of effortful swallow & volitional throat clear & reswallow with each sip, provided that Pt and family understand the risks of oral intake, i.e aspiration & its sequelae. If GOC are to avoid prandial aspiration & optimize nutritional intake consider long-term alternative means of nutrition/hydration & medication while Pt continues to heal and initiate a course of dysphagia therapy.

## 2019-04-12 NOTE — SWALLOW FEES ASSESSMENT ADULT - SLP PERTINENT HISTORY OF CURRENT PROBLEM
h/o XRT 40 yrs ago for lymphoma, ACDF (2018), reported baseline dysphagia but not worked-up formally, now s/p posterior c-spine fusion & being tx'd for asp pneumonitis. Seen for MBS on 4/8 w/rec for mech soft solids/thin liq with very strict safe feeding guidelines d/t silent aspiration of thin & puree.

## 2019-04-12 NOTE — SWALLOW FEES ASSESSMENT ADULT - ADDITIONAL RECOMMENDATIONS
If plan is to pursue TFs, please allow ice-chips in moderation for hydration, comfort & swallowing practice, provided pt receives impeccable oral hygiene.

## 2019-04-12 NOTE — SWALLOW FEES ASSESSMENT ADULT - SLP GENERAL OBSERVATIONS
Awake but reports feeling tired d/t poor sleep last night. +Raspy voice, language skills intact at the conversational level

## 2019-04-12 NOTE — PROGRESS NOTE ADULT - SUBJECTIVE AND OBJECTIVE BOX
Interval Events: Reviewed  Patient seen and examined at bedside.    Patient is a 68y old  Female who presents with a chief complaint of Cervical hardware failure (12 Apr 2019 09:42)    she is still coughing and not eating now.  She is aspiration on her saliva  PAST MEDICAL & SURGICAL HISTORY:  Anxiety  History of breast cancer: s/p right lumpectomy , RT  Hodgkin disease: 1981, underwent radiation therapy  HLD (hyperlipidemia)  HTN (hypertension)  Cervical disc herniation  Carpal tunnel syndrome: b/l  Cervical disc disease  History of carpal tunnel surgery: 10/2018 left hand  History of tonsillectomy  S/P cervical spinal fusion: 12/15/2018 C4-C6 ACDF  History of Mohs micrographic surgery for skin cancer  History of lumpectomy of right breast: 2013      MEDICATIONS:  Pulmonary:  guaiFENesin    Syrup 100 milliGRAM(s) Oral every 6 hours PRN    Antimicrobials:  piperacillin/tazobactam IVPB. 4.5 Gram(s) IV Intermittent every 6 hours  vancomycin  IVPB 1250 milliGRAM(s) IV Intermittent every 12 hours    Anticoagulants:  enoxaparin Injectable 40 milliGRAM(s) SubCutaneous at bedtime    Cardiac:  amiodarone    Tablet 400 milliGRAM(s) Oral every 12 hours  metoprolol succinate ER 25 milliGRAM(s) Oral daily      Allergies    penicillin (Rash)    Intolerances        Vital Signs Last 24 Hrs  T(C): 36.6 (12 Apr 2019 09:25), Max: 36.9 (12 Apr 2019 00:32)  T(F): 97.8 (12 Apr 2019 09:25), Max: 98.4 (12 Apr 2019 00:32)  HR: 82 (12 Apr 2019 09:25) (71 - 85)  BP: 151/87 (12 Apr 2019 09:25) (112/72 - 151/87)  BP(mean): 82 (11 Apr 2019 18:51) (82 - 88)  RR: 16 (12 Apr 2019 09:25) (16 - 18)  SpO2: 94% (12 Apr 2019 09:25) (94% - 97%)    04-11 @ 07:01  -  04-12 @ 07:00  --------------------------------------------------------  IN: 1330 mL / OUT: 609.5 mL / NET: 720.5 mL    04-12 @ 07:01  - 04-12 @ 12:10  --------------------------------------------------------  IN: 250 mL / OUT: 200 mL / NET: 50 mL          LABS:      CBC Full  -  ( 11 Apr 2019 06:28 )  WBC Count : 12.63 K/uL  RBC Count : 3.56 M/uL  Hemoglobin : 10.8 g/dL  Hematocrit : 33.7 %  Platelet Count - Automated : 285 K/uL  Mean Cell Volume : 94.7 fl  Mean Cell Hemoglobin : 30.3 pg  Mean Cell Hemoglobin Concentration : 32.0 gm/dL  Auto Neutrophil # : 10.51 K/uL  Auto Lymphocyte # : 1.09 K/uL  Auto Monocyte # : 0.95 K/uL  Auto Eosinophil # : 0.00 K/uL  Auto Basophil # : 0.01 K/uL  Auto Neutrophil % : 83.2 %  Auto Lymphocyte % : 8.6 %  Auto Monocyte % : 7.5 %  Auto Eosinophil % : 0.0 %  Auto Basophil % : 0.1 %    04-11    141  |  103  |  10  ----------------------------<  133<H>  3.8   |  27  |  0.51    Ca    9.2      11 Apr 2019 06:28  Mg     2.2     04-11                          RADIOLOGY & ADDITIONAL STUDIES (The following images were personally reviewed):  Esposito:                                     No  Urine output:                       adequate  DVT prophylaxis:                 Yes  Flattus:                                  Yes  Bowel movement:              No

## 2019-04-12 NOTE — PROGRESS NOTE ADULT - PROBLEM SELECTOR PLAN 1
she is for swallow evaluation.  I recommend ENT re-evaluation.  NPO for now. I discussed the case in details with the  and patient.  I explained options either NG tube feeding or apparent. The patient is refusing that. I discussed the case with neurosurgery.

## 2019-04-13 DIAGNOSIS — J98.6 DISORDERS OF DIAPHRAGM: ICD-10-CM

## 2019-04-13 DIAGNOSIS — R09.02 HYPOXEMIA: ICD-10-CM

## 2019-04-13 LAB
ANION GAP SERPL CALC-SCNC: 15 MMOL/L — SIGNIFICANT CHANGE UP (ref 5–17)
APPEARANCE UR: CLEAR — SIGNIFICANT CHANGE UP
BILIRUB UR-MCNC: ABNORMAL
BUN SERPL-MCNC: 10 MG/DL — SIGNIFICANT CHANGE UP (ref 7–23)
CALCIUM SERPL-MCNC: 8.6 MG/DL — SIGNIFICANT CHANGE UP (ref 8.4–10.5)
CHLORIDE SERPL-SCNC: 107 MMOL/L — SIGNIFICANT CHANGE UP (ref 96–108)
CO2 SERPL-SCNC: 24 MMOL/L — SIGNIFICANT CHANGE UP (ref 22–31)
COLOR SPEC: YELLOW — SIGNIFICANT CHANGE UP
CREAT SERPL-MCNC: 0.54 MG/DL — SIGNIFICANT CHANGE UP (ref 0.5–1.3)
DIFF PNL FLD: ABNORMAL
EXTRA LAVENDER TOP TUBE: SIGNIFICANT CHANGE UP
GLUCOSE BLDC GLUCOMTR-MCNC: 102 MG/DL — HIGH (ref 70–99)
GLUCOSE BLDC GLUCOMTR-MCNC: 108 MG/DL — HIGH (ref 70–99)
GLUCOSE BLDC GLUCOMTR-MCNC: 119 MG/DL — HIGH (ref 70–99)
GLUCOSE BLDC GLUCOMTR-MCNC: 120 MG/DL — HIGH (ref 70–99)
GLUCOSE SERPL-MCNC: 120 MG/DL — HIGH (ref 70–99)
GLUCOSE UR QL: NEGATIVE — SIGNIFICANT CHANGE UP
GRAM STN FLD: SIGNIFICANT CHANGE UP
HCT VFR BLD CALC: 32.2 % — LOW (ref 34.5–45)
HGB BLD-MCNC: 10.2 G/DL — LOW (ref 11.5–15.5)
KETONES UR-MCNC: ABNORMAL MG/DL
LEUKOCYTE ESTERASE UR-ACNC: ABNORMAL
MCHC RBC-ENTMCNC: 30 PG — SIGNIFICANT CHANGE UP (ref 27–34)
MCHC RBC-ENTMCNC: 31.7 GM/DL — LOW (ref 32–36)
MCV RBC AUTO: 94.7 FL — SIGNIFICANT CHANGE UP (ref 80–100)
NITRITE UR-MCNC: NEGATIVE — SIGNIFICANT CHANGE UP
NRBC # BLD: 0 /100 WBCS — SIGNIFICANT CHANGE UP (ref 0–0)
PH UR: 6 — SIGNIFICANT CHANGE UP (ref 5–8)
PLATELET # BLD AUTO: 298 K/UL — SIGNIFICANT CHANGE UP (ref 150–400)
POTASSIUM SERPL-MCNC: 3.1 MMOL/L — LOW (ref 3.5–5.3)
POTASSIUM SERPL-SCNC: 3.1 MMOL/L — LOW (ref 3.5–5.3)
PROT UR-MCNC: 30 MG/DL
RBC # BLD: 3.4 M/UL — LOW (ref 3.8–5.2)
RBC # FLD: 14.5 % — SIGNIFICANT CHANGE UP (ref 10.3–14.5)
SODIUM SERPL-SCNC: 146 MMOL/L — HIGH (ref 135–145)
SP GR SPEC: 1.02 — SIGNIFICANT CHANGE UP (ref 1–1.03)
SPECIMEN SOURCE: SIGNIFICANT CHANGE UP
UROBILINOGEN FLD QL: 1 E.U./DL — SIGNIFICANT CHANGE UP
VANCOMYCIN TROUGH SERPL-MCNC: 10.7 UG/ML — SIGNIFICANT CHANGE UP (ref 10–20)
VANCOMYCIN TROUGH SERPL-MCNC: 26.3 UG/ML — CRITICAL HIGH (ref 10–20)
WBC # BLD: 21.97 K/UL — HIGH (ref 3.8–10.5)
WBC # FLD AUTO: 21.97 K/UL — HIGH (ref 3.8–10.5)

## 2019-04-13 PROCEDURE — 99233 SBSQ HOSP IP/OBS HIGH 50: CPT | Mod: GC

## 2019-04-13 PROCEDURE — 71045 X-RAY EXAM CHEST 1 VIEW: CPT | Mod: 26

## 2019-04-13 RX ORDER — DOCUSATE SODIUM 100 MG
100 CAPSULE ORAL THREE TIMES A DAY
Qty: 0 | Refills: 0 | Status: DISCONTINUED | OUTPATIENT
Start: 2019-04-13 | End: 2019-04-15

## 2019-04-13 RX ORDER — VANCOMYCIN HCL 1 G
1500 VIAL (EA) INTRAVENOUS
Qty: 0 | Refills: 0 | Status: DISCONTINUED | OUTPATIENT
Start: 2019-04-13 | End: 2019-04-16

## 2019-04-13 RX ORDER — SODIUM CHLORIDE 9 MG/ML
500 INJECTION INTRAMUSCULAR; INTRAVENOUS; SUBCUTANEOUS ONCE
Qty: 0 | Refills: 0 | Status: DISCONTINUED | OUTPATIENT
Start: 2019-04-13 | End: 2019-04-14

## 2019-04-13 RX ORDER — VANCOMYCIN HCL 1 G
1250 VIAL (EA) INTRAVENOUS EVERY 8 HOURS
Qty: 0 | Refills: 0 | Status: DISCONTINUED | OUTPATIENT
Start: 2019-04-13 | End: 2019-04-13

## 2019-04-13 RX ORDER — POTASSIUM CHLORIDE 20 MEQ
40 PACKET (EA) ORAL ONCE
Qty: 0 | Refills: 0 | Status: COMPLETED | OUTPATIENT
Start: 2019-04-13 | End: 2019-04-13

## 2019-04-13 RX ORDER — VANCOMYCIN HCL 1 G
250 VIAL (EA) INTRAVENOUS ONCE
Qty: 0 | Refills: 0 | Status: COMPLETED | OUTPATIENT
Start: 2019-04-13 | End: 2019-04-13

## 2019-04-13 RX ADMIN — SENNA PLUS 2 TABLET(S): 8.6 TABLET ORAL at 22:05

## 2019-04-13 RX ADMIN — PIPERACILLIN AND TAZOBACTAM 200 GRAM(S): 4; .5 INJECTION, POWDER, LYOPHILIZED, FOR SOLUTION INTRAVENOUS at 06:39

## 2019-04-13 RX ADMIN — FAMOTIDINE 20 MILLIGRAM(S): 10 INJECTION INTRAVENOUS at 13:58

## 2019-04-13 RX ADMIN — Medication 400 UNIT(S): at 13:58

## 2019-04-13 RX ADMIN — PIPERACILLIN AND TAZOBACTAM 200 GRAM(S): 4; .5 INJECTION, POWDER, LYOPHILIZED, FOR SOLUTION INTRAVENOUS at 19:47

## 2019-04-13 RX ADMIN — GABAPENTIN 600 MILLIGRAM(S): 400 CAPSULE ORAL at 22:05

## 2019-04-13 RX ADMIN — Medication 40 MILLIEQUIVALENT(S): at 19:24

## 2019-04-13 RX ADMIN — Medication 25 MILLIGRAM(S): at 06:38

## 2019-04-13 RX ADMIN — Medication 166.67 MILLIGRAM(S): at 01:31

## 2019-04-13 RX ADMIN — ANASTROZOLE 1 MILLIGRAM(S): 1 TABLET ORAL at 13:58

## 2019-04-13 RX ADMIN — ATORVASTATIN CALCIUM 40 MILLIGRAM(S): 80 TABLET, FILM COATED ORAL at 22:05

## 2019-04-13 RX ADMIN — ENOXAPARIN SODIUM 40 MILLIGRAM(S): 100 INJECTION SUBCUTANEOUS at 22:05

## 2019-04-13 RX ADMIN — AMIODARONE HYDROCHLORIDE 400 MILLIGRAM(S): 400 TABLET ORAL at 06:38

## 2019-04-13 RX ADMIN — PIPERACILLIN AND TAZOBACTAM 200 GRAM(S): 4; .5 INJECTION, POWDER, LYOPHILIZED, FOR SOLUTION INTRAVENOUS at 12:26

## 2019-04-13 RX ADMIN — Medication 100 MILLIGRAM(S): at 04:01

## 2019-04-13 RX ADMIN — Medication 300 MILLIGRAM(S): at 13:58

## 2019-04-13 RX ADMIN — Medication 100 MILLIGRAM(S): at 06:39

## 2019-04-13 RX ADMIN — Medication 100 MILLIGRAM(S): at 22:11

## 2019-04-13 RX ADMIN — AMIODARONE HYDROCHLORIDE 400 MILLIGRAM(S): 400 TABLET ORAL at 22:05

## 2019-04-13 NOTE — PROGRESS NOTE ADULT - SUBJECTIVE AND OBJECTIVE BOX
Neurology Follow up note    Name  HILARIA KUMAR    HPI:  This is a 69 y/o female, well known to Dr. Daley with PMH of non-hodkins lymphoma (xrt in 1981) and breast ca (2000's), HTN, HLD, osteoporosis.  In 10/18, patient was evaluated for upper extremity weakness and underwent a carpal tunnel release and ulnar transposition without relief of symptoms.   in 11/18, patient was admitted to [facility] for b/l UE weakness and difficulty walking.  Her work up revealed cervical stenosis with compression.  However, at the time, her bone scan also was significant for uptake in her right acetabulum and at the time and the evaluating spine surgeon requested this lesion be addressed prior to any cervical surgery.  A CT of the region was negative and the patient was sent home for follow up. on 12/4/18, patient was seen in the [facility] ER again, this time with worsening symptoms and she was emergently taken to the OR for a three level ACDF C4-C6.  She was discharged after the surgery with an uncomplicated post op course.     Since this surgery, patient has dramatic improvements in her walking, neuropathy and radicular pain.  Follow up imaging with X-ray lead to further evaluation with CT which revealed possible loosening of hardware.  Clinically, patient still has left UE weakness that she thinks may be from her history of broken clavicle. No bowel, bladder dysfunction     She is here for definitive surgical evaluation. (05 Apr 2019 06:51)      Interval History - neck pain and LUE weakness        REVIEW OF SYSTEMS    Vital Signs Last 24 Hrs  T(C): 36.9 (13 Apr 2019 09:31), Max: 37.2 (13 Apr 2019 01:44)  T(F): 98.5 (13 Apr 2019 09:31), Max: 99 (13 Apr 2019 01:44)  HR: 75 (13 Apr 2019 09:31) (62 - 91)  BP: 95/58 (13 Apr 2019 09:31) (90/53 - 182/82)  BP(mean): --  RR: 16 (13 Apr 2019 09:31) (16 - 18)  SpO2: 98% (13 Apr 2019 09:31) (95% - 98%)    Physical Exam-     Mental Status- awake and alert    Cranial Nerves- full EOM    Gait and station- n/a    Motor- LUE proximal weakness    Reflexes- decrease Left BJ    Sensation- no sensory level    Coordination- no tremors    Vascular - no bruits    Medications  acetaminophen   Tablet .. 650 milliGRAM(s) Oral every 6 hours PRN  amiodarone    Tablet 400 milliGRAM(s) Oral every 12 hours  anastrozole 1 milliGRAM(s) Oral daily  atorvastatin 40 milliGRAM(s) Oral at bedtime  benzocaine 15 mG/menthol 3.6 mG Lozenge 1 Lozenge Oral every 1 hour PRN  cholecalciferol 400 Unit(s) Oral daily  dextrose 40% Gel 15 Gram(s) Oral once PRN  dextrose 50% Injectable 12.5 Gram(s) IV Push once  dextrose 50% Injectable 25 Gram(s) IV Push once  dextrose 50% Injectable 25 Gram(s) IV Push once  docusate sodium 100 milliGRAM(s) Oral three times a day  enoxaparin Injectable 40 milliGRAM(s) SubCutaneous at bedtime  famotidine    Tablet 20 milliGRAM(s) Oral daily  gabapentin 600 milliGRAM(s) Oral at bedtime  glucagon  Injectable 1 milliGRAM(s) IntraMuscular once PRN  guaiFENesin    Syrup 100 milliGRAM(s) Oral every 6 hours PRN  HYDROmorphone  Injectable 0.5 milliGRAM(s) IV Push every 3 hours PRN  insulin lispro (HumaLOG) corrective regimen sliding scale   SubCutaneous Before meals and at bedtime  magnesium hydroxide Suspension 30 milliLiter(s) Oral every 12 hours PRN  metoclopramide Injectable 10 milliGRAM(s) IV Push every 4 hours PRN  metoprolol succinate ER 25 milliGRAM(s) Oral daily  ondansetron Injectable 4 milliGRAM(s) IV Push every 6 hours PRN  piperacillin/tazobactam IVPB. 4.5 Gram(s) IV Intermittent every 6 hours  pregabalin 100 milliGRAM(s) Oral two times a day  senna 2 Tablet(s) Oral at bedtime  sodium chloride 0.9%. 1000 milliLiter(s) IV Continuous <Continuous>  traMADol 25 milliGRAM(s) Oral every 4 hours PRN  traMADol 50 milliGRAM(s) Oral every 4 hours PRN  vancomycin  IVPB 1500 milliGRAM(s) IV Intermittent <User Schedule>      Lab      Radiology    Assessment- Cervical radiculopathy    Plan as per NS

## 2019-04-13 NOTE — PROGRESS NOTE ADULT - SUBJECTIVE AND OBJECTIVE BOX
Interval Events: Reviewed  Patient seen and examined at bedside.    Patient is a 68y old  Female who presents with a chief complaint of Cervical hardware failure (2019 12:47)    she is coughing but nothing is coming up.  She is not wheezing  PAST MEDICAL & SURGICAL HISTORY:  Anxiety  History of breast cancer: s/p right lumpectomy , RT  Hodgkin disease: , underwent radiation therapy  HLD (hyperlipidemia)  HTN (hypertension)  Cervical disc herniation  Carpal tunnel syndrome: b/l  Cervical disc disease  History of carpal tunnel surgery: 10/2018 left hand  History of tonsillectomy  S/P cervical spinal fusion: 12/15/2018 C4-C6 ACDF  History of Mohs micrographic surgery for skin cancer  History of lumpectomy of right breast:       MEDICATIONS:  Pulmonary:  guaiFENesin    Syrup 100 milliGRAM(s) Oral every 6 hours PRN    Antimicrobials:  piperacillin/tazobactam IVPB. 4.5 Gram(s) IV Intermittent every 6 hours  vancomycin  IVPB 1500 milliGRAM(s) IV Intermittent <User Schedule>    Anticoagulants:  enoxaparin Injectable 40 milliGRAM(s) SubCutaneous at bedtime    Cardiac:  amiodarone    Tablet 400 milliGRAM(s) Oral every 12 hours  metoprolol succinate ER 25 milliGRAM(s) Oral daily      Allergies    penicillin (Rash)    Intolerances        Vital Signs Last 24 Hrs  T(C): 36.7 (2019 13:14), Max: 37.2 (2019 01:44)  T(F): 98.1 (2019 13:14), Max: 99 (2019 01:44)  HR: 77 (2019 13:14) (62 - 89)  BP: 98/55 (2019 13:14) (90/53 - 145/90)  BP(mean): --  RR: 17 (2019 13:14) (16 - 18)  SpO2: 96% (2019 13:14) (95% - 98%)    12 @ 07:01  -   @ 07:00  --------------------------------------------------------  IN: 3640 mL / OUT: 700 mL / NET: 2940 mL    04-13 @ 07:  -   @ 16:14  --------------------------------------------------------  IN: 200 mL / OUT: 0 mL / NET: 200 mL          LABS:      CBC Full  -  ( 2019 15:33 )  WBC Count : 21.97 K/uL  RBC Count : 3.40 M/uL  Hemoglobin : 10.2 g/dL  Hematocrit : 32.2 %  Platelet Count - Automated : 298 K/uL  Mean Cell Volume : 94.7 fl  Mean Cell Hemoglobin : 30.0 pg  Mean Cell Hemoglobin Concentration : 31.7 gm/dL  Auto Neutrophil # : x  Auto Lymphocyte # : x  Auto Monocyte # : x  Auto Eosinophil # : x  Auto Basophil # : x  Auto Neutrophil % : x  Auto Lymphocyte % : x  Auto Monocyte % : x  Auto Eosinophil % : x  Auto Basophil % : x                Urinalysis Basic - ( 2019 15:28 )    Color: Yellow / Appearance: Clear / S.025 / pH: x  Gluc: x / Ketone: Trace mg/dL  / Bili: Small / Urobili: 1.0 E.U./dL   Blood: x / Protein: 30 mg/dL / Nitrite: NEGATIVE   Leuk Esterase: Moderate / RBC: < 5 /HPF / WBC Many /HPF   Sq Epi: x / Non Sq Epi: 5-10 /HPF / Bacteria: Present /HPF      < from: Xray Chest 1 View-PORTABLE IMMEDIATE (19 @ 09:07) >  EXAM:  XR CHEST PORTABLE IMMED 1V                          PROCEDURE DATE:  2019          INTERPRETATION:  Chest x-ray    Indication: ICU patient    A portable frontal view of the chest is compared to the prior study dated   10/2/2013 and CT of 2019. Partial visualization of ACDF device   overlying the lower neck. Mild cardiomegaly. Retrocardiac opacity is   again noted consistent with airspace consolidation seen on CT study.   Cannot exclude a small left pleural effusion. No right pleural effusion.   No pneumothorax.      IMPRESSION: Persistent left lower lobe consolidation which may represent   atelectasis or pneumonia.      < end of copied text >    CXR LLL infiltrate with elevation of the left hemidiaghragm          RADIOLOGY & ADDITIONAL STUDIES (The following images were personally reviewed):  Esposito:                                     No  Urine output:                       adequate  DVT prophylaxis:                 Yes  Flattus:                                  Yes  Bowel movement:              No

## 2019-04-13 NOTE — PROGRESS NOTE ADULT - PROBLEM SELECTOR PLAN 2
Continue vancomycin and Zosyn. Await sputum culture. The patient is afebrile.  The WBC is elevated  and monitor

## 2019-04-13 NOTE — PROGRESS NOTE ADULT - PROBLEM SELECTOR PLAN 1
she is for swallow evaluation.  I recommend ENT re-evaluation.  NPO for now. I discussed the case in details with the  and patient.  I explained options either NG tube feeding or apparent. The patient is refusing that. I discussed the case with neurosurgery.  I recommended re-evaluation by ENT.

## 2019-04-13 NOTE — PROGRESS NOTE ADULT - SUBJECTIVE AND OBJECTIVE BOX
HPI:  This is a 67 y/o female, well known to Dr. Daley with PMH of non-hodkins lymphoma (xrt in 1981) and breast ca (2000's), HTN, HLD, osteoporosis.  In 10/18, patient was evaluated for upper extremity weakness and underwent a carpal tunnel release and ulnar transposition without relief of symptoms.   in 11/18, patient was admitted to [facility] for b/l UE weakness and difficulty walking.  Her work up revealed cervical stenosis with compression.  However, at the time, her bone scan also was significant for uptake in her right acetabulum and at the time and the evaluating spine surgeon requested this lesion be addressed prior to any cervical surgery.  A CT of the region was negative and the patient was sent home for follow up. on 12/4/18, patient was seen in the [facility] ER again, this time with worsening symptoms and she was emergently taken to the OR for a three level ACDF C4-C6.  She was discharged after the surgery with an uncomplicated post op course.     Since this surgery, patient has dramatic improvements in her walking, neuropathy and radicular pain.  Follow up imaging with X-ray lead to further evaluation with CT which revealed possible loosening of hardware.  Clinically, patient still has left UE weakness that she thinks may be from her history of broken clavicle. No bowel, bladder dysfunction     She is here for definitive surgical evaluation. (05 Apr 2019 06:51)    OVERNIGHT EVENTS:  Pt with productive white sputum and cough. Pain controlled. Still difficulty swallowing saliva, but voice less hoarse.    Hospital course:   POD#0: S/P revision of anterior cervical discectomy with fusion of C4-C6 with extension to C3, as well as C2-T2 posterior fusion with right iliac crest autograft.   POD#1: CESARIO overnight. Drains x3 remain in place, [place] J collar and prevena dressing in place. Esposito removed this am, f/u TOV. Reports some throat tightness with mucous, but denies pain, no issues talking or swallowing (tolerating liquid diet)  POD#2 Pt with secrections and difficulty swallowing. continue decadron 4q6. Robitussin started.   4/8 Pt complaining muscle spasm and pain down BUE L>R  4/9: no acute events overnight, neuro stable. Transferred to CCU for rapid Afib, started on Amiodarone drip.  4/10: Transitioned to PO Amiodarone, off IV drip, on tele, pending transfer back to Neurosurgery. Posterior MASOOD and HMV remains.  4/11: Posterior drain removed. Pt on Vanco/zosyn for aspiration PNA  4/12 Repeat Fees. 2nd posterior drain removed. sputum culture sent  4/13: patient requiring supplemental O2 while at rest and during PT.  had one bout of nausea overnight.    Vital Signs Last 24 Hrs  T(C): 36.9 (13 Apr 2019 09:31), Max: 37.2 (13 Apr 2019 01:44)  T(F): 98.5 (13 Apr 2019 09:31), Max: 99 (13 Apr 2019 01:44)  HR: 75 (13 Apr 2019 09:31) (62 - 91)  BP: 95/58 (13 Apr 2019 09:31) (90/53 - 182/82)  BP(mean): --  RR: 16 (13 Apr 2019 09:31) (16 - 18)  SpO2: 98% (13 Apr 2019 09:31) (95% - 98%)    I&O's Summary    12 Apr 2019 07:01  -  13 Apr 2019 07:00  --------------------------------------------------------  IN: 3640 mL / OUT: 700 mL / NET: 2940 mL        PHYSICAL EXAM:  Lungs: Clear b/l  Heart: S1, S2. NSR.  Abd: Soft, NT/ND. +BS  AAOX3. Verbal function intact  Cranial Nerves: II-XII intact  Neuro: CNs II-XII intact. Left  4/5, proximal 3/5. RUE and LEs b/l 5/5. Sensation intact throughout. Following commands. Speech clear.   Sensation: intact to touch in all extremitie  Incision/Wound: C/D/I hard collar in place  Back: Right iliac crest graft site C/D/I    TUBES/LINES:  none      DIET:  [] NPO  [x] Mechanical- honey thick  [] Tube feeds    LABS: pending  CAPILLARY BLOOD GLUCOSE      POCT Blood Glucose.: 108 mg/dL (13 Apr 2019 11:37)  POCT Blood Glucose.: 102 mg/dL (13 Apr 2019 06:57)  POCT Blood Glucose.: 134 mg/dL (12 Apr 2019 22:13)  POCT Blood Glucose.: 115 mg/dL (12 Apr 2019 17:09)      Drug Levels: [] N/A  Vancomycin Level, Trough: 26.3 ug/mL (04-13 @ 06:36)  Vancomycin Level, Trough: 10.7 ug/mL (04-12 @ 23:41)  Vancomycin Level, Trough: 8.6 ug/mL (04-11 @ 06:28)    CSF Analysis: [] N/A      Allergies    penicillin (Rash)    Intolerances      MEDICATIONS:  Antibiotics:  piperacillin/tazobactam IVPB. 4.5 Gram(s) IV Intermittent every 6 hours  vancomycin  IVPB 1500 milliGRAM(s) IV Intermittent <User Schedule>    Neuro:  acetaminophen   Tablet .. 650 milliGRAM(s) Oral every 6 hours PRN  gabapentin 600 milliGRAM(s) Oral at bedtime  HYDROmorphone  Injectable 0.5 milliGRAM(s) IV Push every 3 hours PRN  metoclopramide Injectable 10 milliGRAM(s) IV Push every 4 hours PRN  ondansetron Injectable 4 milliGRAM(s) IV Push every 6 hours PRN  pregabalin 100 milliGRAM(s) Oral two times a day  traMADol 25 milliGRAM(s) Oral every 4 hours PRN  traMADol 50 milliGRAM(s) Oral every 4 hours PRN    Anticoagulation:  enoxaparin Injectable 40 milliGRAM(s) SubCutaneous at bedtime    OTHER:  amiodarone    Tablet 400 milliGRAM(s) Oral every 12 hours  anastrozole 1 milliGRAM(s) Oral daily  atorvastatin 40 milliGRAM(s) Oral at bedtime  benzocaine 15 mG/menthol 3.6 mG Lozenge 1 Lozenge Oral every 1 hour PRN  dextrose 40% Gel 15 Gram(s) Oral once PRN  dextrose 50% Injectable 12.5 Gram(s) IV Push once  dextrose 50% Injectable 25 Gram(s) IV Push once  dextrose 50% Injectable 25 Gram(s) IV Push once  docusate sodium 100 milliGRAM(s) Oral three times a day  famotidine    Tablet 20 milliGRAM(s) Oral daily  glucagon  Injectable 1 milliGRAM(s) IntraMuscular once PRN  guaiFENesin    Syrup 100 milliGRAM(s) Oral every 6 hours PRN  insulin lispro (HumaLOG) corrective regimen sliding scale   SubCutaneous Before meals and at bedtime  magnesium hydroxide Suspension 30 milliLiter(s) Oral every 12 hours PRN  metoprolol succinate ER 25 milliGRAM(s) Oral daily  senna 2 Tablet(s) Oral at bedtime    IVF:  cholecalciferol 400 Unit(s) Oral daily  sodium chloride 0.9%. 1000 milliLiter(s) IV Continuous <Continuous>    CULTURES:    RADIOLOGY & ADDITIONAL TESTS:          CERVICALSPINE M53.2X2  No pertinent family history in first degree relatives  Family history of colon cancer in mother (Mother)  Handoff  MEWS Score  Anxiety  History of breast cancer  Hodgkin disease  HLD (hyperlipidemia)  HTN (hypertension)  Breast CA  Cervical disc herniation  Carpal tunnel syndrome  Cervical disc disease  Hodgkin disease  Breast cancer  Cervical spine instability  Cervical spine instability  Degenerative cervical spinal stenosis  Aspiration pneumonia of left lower lobe, unspecified aspiration pneumonia type  Other dysphagia  HLD (hyperlipidemia)  Dysphagia  PNA (pneumonia)  Cervical disc disease  Atrial fibrillation  Graft, bone, iliac crest  Fusion of 7 to 12 spinal segments by posterior approach  Revision of anterior cervical discectomy with fusion (ACDF)  History of carpal tunnel surgery  History of tonsillectomy  S/P cervical spinal fusion  History of Mohs micrographic surgery for skin cancer  History of lumpectomy of right breast  H/O lumpectomy  No significant past surgical history  History of lumpectomy  S/P appendectomy      ASSESSMENT:  68y Female s/p revision of cervical hardware.  POD#8 S/P revision of anterior cervical discectomy with fusion of C4-C6 with extension to C3, as well as C2-T2 posterior fusion with right iliac crest autograft.  Post op - patient with difficulty swallowing.          PLAN:  NEURO:    CARDIOVASCULAR:    PULMONARY:    RENAL:    GI:    HEME:    ID:    ENDO:    DVT PROPHYLAXIS:  [] Venodynes                                [] Heparin/Lovenox    DISPOSITION:    Assessment:  Present when checked    []  GCS  E   V  M     Heart Failure: []Acute, [] acute on chronic , []chronic  Heart Failure:  [] Diastolic (HFpEF), [] Systolic (HFrEF), []Combined (HFpEF and HFrEF), [] RHF, [] Pulm HTN, [] Other    [] SUSU, [] ATN, [] AIN, [] other  [] CKD1, [] CKD2, [] CKD 3, [] CKD 4, [] CKD 5, []ESRD    Encephalopathy: [] Metabolic, [] Hepatic, [] toxic, [] Neurological, [] Other    Abnormal Nurtitional Status: [] malnurtition (see nutrition note), [ ]underweight: BMI < 19, [] morbid obesity: BMI >40, [] Cachexia    [] Sepsis  [] hypovolemic shock,[] cardiogenic shock, [] hemorrhagic shock, [] neuogenic shock  [] Acute Respiratory Failure  []Cerebral edema, [] Brain compression/ herniation,   [] Functional quadriplegia  [] Acute blood loss anemia HPI:  This is a 67 y/o female, well known to Dr. Daley with PMH of non-hodkins lymphoma (xrt in ) and breast ca ('s), HTN, HLD, osteoporosis.  In 10/18, patient was evaluated for upper extremity weakness and underwent a carpal tunnel release and ulnar transposition without relief of symptoms.   in , patient was admitted to [facility] for b/l UE weakness and difficulty walking.  Her work up revealed cervical stenosis with compression.  However, at the time, her bone scan also was significant for uptake in her right acetabulum and at the time and the evaluating spine surgeon requested this lesion be addressed prior to any cervical surgery.  A CT of the region was negative and the patient was sent home for follow up. on 18, patient was seen in the [facility] ER again, this time with worsening symptoms and she was emergently taken to the OR for a three level ACDF C4-C6.  She was discharged after the surgery with an uncomplicated post op course.     Since this surgery, patient has dramatic improvements in her walking, neuropathy and radicular pain.  Follow up imaging with X-ray lead to further evaluation with CT which revealed possible loosening of hardware.  Clinically, patient still has left UE weakness that she thinks may be from her history of broken clavicle. No bowel, bladder dysfunction     She is here for definitive surgical evaluation. (2019 06:51)    OVERNIGHT EVENTS:  Pt with productive white sputum and cough. Pain controlled. Still difficulty swallowing saliva, but voice less hoarse.    Hospital course:   POD#0: S/P revision of anterior cervical discectomy with fusion of C4-C6 with extension to C3, as well as C2-T2 posterior fusion with right iliac crest autograft.   POD#1: CESARIO overnight. Drains x3 remain in place, [place] J collar and prevena dressing in place. Esposito removed this am, f/u TOV. Reports some throat tightness with mucous, but denies pain, no issues talking or swallowing (tolerating liquid diet)  POD#2 Pt with secrections and difficulty swallowing. continue decadron 4q6. Robitussin started.    Pt complaining muscle spasm and pain down BUE L>R  : no acute events overnight, neuro stable. Transferred to CCU for rapid Afib, started on Amiodarone drip.  4/10: Transitioned to PO Amiodarone, off IV drip, on tele, pending transfer back to Neurosurgery. Posterior MASOOD and HMV remains.  : Posterior drain removed. Pt on Vanco/zosyn for aspiration PNA   Repeat Fees. 2nd posterior drain removed. sputum culture sent  : patient requiring supplemental O2 while at rest and during PT.  had one bout of nausea overnight.  Over all, appears comfortable.    Vital Signs Last 24 Hrs  T(C): 36.9 (2019 09:31), Max: 37.2 (2019 01:44)  T(F): 98.5 (2019 09:31), Max: 99 (2019 01:44)  HR: 75 (2019 09:) (62 - 91)  BP: 95/58 (2019 09:31) (90/53 - 182/82)  BP(mean): --  RR: 16 (2019 09:31) (16 - 18)  SpO2: 98% (2019 09:31) (95% - 98%)    I&O's Summary    2019 07:01  -  2019 07:00  --------------------------------------------------------  IN: 3640 mL / OUT: 700 mL / NET: 2940 mL        PHYSICAL EXAM:  Lungs: Clear b/l  Heart: S1, S2. NSR.  Abd: Soft, NT/ND. +BS  AAOX3. Verbal function intact  Cranial Nerves: II-XII intact  Neuro: CNs II-XII intact. Left  4/5, proximal 3/5. RUE and LEs b/l 5/5. Sensation intact throughout. Following commands. Speech clear.   Sensation: intact to touch in all extremitie  Incision/Wound: C/D/I hard collar in place  Back: Right iliac crest graft site C/D/I    TUBES/LINES:  none      DIET:  [] NPO  [x] Mechanical- honey thick  [] Tube feeds    LABS: pending                        10.2   21.97 )-----------( 298      ( 2019 15:33 )             32.2     Urinalysis Basic - ( 2019 15:28 )    Color: Yellow / Appearance: Clear / S.025 / pH: x  Gluc: x / Ketone: Trace mg/dL  / Bili: Small / Urobili: 1.0 E.U./dL   Blood: x / Protein: 30 mg/dL / Nitrite: NEGATIVE   Leuk Esterase: Moderate / RBC: < 5 /HPF / WBC Many /HPF   Sq Epi: x / Non Sq Epi: 5-10 /HPF / Bacteria: Present /HPF          146<H>  |  107  |  10  ----------------------------<  120<H>  3.1<L>   |  24  |  0.54    Ca    8.6      2019 15:33          POCT Blood Glucose.: 108 mg/dL (2019 11:37)  POCT Blood Glucose.: 102 mg/dL (2019 06:57)  POCT Blood Glucose.: 134 mg/dL (2019 22:13)  POCT Blood Glucose.: 115 mg/dL (2019 17:09)      Drug Levels: [] N/A  Vancomycin Level, Trough: 26.3 ug/mL ( @ 06:36)  Vancomycin Level, Trough: 10.7 ug/mL ( @ 23:41)  Vancomycin Level, Trough: 8.6 ug/mL ( @ 06:28)    CSF Analysis: [] N/A      Allergies    penicillin (Rash)    Intolerances      MEDICATIONS:  Antibiotics:  piperacillin/tazobactam IVPB. 4.5 Gram(s) IV Intermittent every 6 hours  vancomycin  IVPB 1500 milliGRAM(s) IV Intermittent <User Schedule>    Neuro:  acetaminophen   Tablet .. 650 milliGRAM(s) Oral every 6 hours PRN  gabapentin 600 milliGRAM(s) Oral at bedtime  HYDROmorphone  Injectable 0.5 milliGRAM(s) IV Push every 3 hours PRN  metoclopramide Injectable 10 milliGRAM(s) IV Push every 4 hours PRN  ondansetron Injectable 4 milliGRAM(s) IV Push every 6 hours PRN  pregabalin 100 milliGRAM(s) Oral two times a day  traMADol 25 milliGRAM(s) Oral every 4 hours PRN  traMADol 50 milliGRAM(s) Oral every 4 hours PRN    Anticoagulation:  enoxaparin Injectable 40 milliGRAM(s) SubCutaneous at bedtime    OTHER:  amiodarone    Tablet 400 milliGRAM(s) Oral every 12 hours  anastrozole 1 milliGRAM(s) Oral daily  atorvastatin 40 milliGRAM(s) Oral at bedtime  benzocaine 15 mG/menthol 3.6 mG Lozenge 1 Lozenge Oral every 1 hour PRN  dextrose 40% Gel 15 Gram(s) Oral once PRN  dextrose 50% Injectable 12.5 Gram(s) IV Push once  dextrose 50% Injectable 25 Gram(s) IV Push once  dextrose 50% Injectable 25 Gram(s) IV Push once  docusate sodium 100 milliGRAM(s) Oral three times a day  famotidine    Tablet 20 milliGRAM(s) Oral daily  glucagon  Injectable 1 milliGRAM(s) IntraMuscular once PRN  guaiFENesin    Syrup 100 milliGRAM(s) Oral every 6 hours PRN  insulin lispro (HumaLOG) corrective regimen sliding scale   SubCutaneous Before meals and at bedtime  magnesium hydroxide Suspension 30 milliLiter(s) Oral every 12 hours PRN  metoprolol succinate ER 25 milliGRAM(s) Oral daily  senna 2 Tablet(s) Oral at bedtime    IVF:  cholecalciferol 400 Unit(s) Oral daily  sodium chloride 0.9%. 1000 milliLiter(s) IV Continuous <Continuous>    CULTURES:    RADIOLOGY & ADDITIONAL TESTS:          CERVICALSPINE M53.2X2  No pertinent family history in first degree relatives  Family history of colon cancer in mother (Mother)  Handoff  MEWS Score  Anxiety  History of breast cancer  Hodgkin disease  HLD (hyperlipidemia)  HTN (hypertension)  Breast CA  Cervical disc herniation  Carpal tunnel syndrome  Cervical disc disease  Hodgkin disease  Breast cancer  Cervical spine instability  Cervical spine instability  Degenerative cervical spinal stenosis  Aspiration pneumonia of left lower lobe, unspecified aspiration pneumonia type  Other dysphagia  HLD (hyperlipidemia)  Dysphagia  PNA (pneumonia)  Cervical disc disease  Atrial fibrillation  Graft, bone, iliac crest  Fusion of 7 to 12 spinal segments by posterior approach  Revision of anterior cervical discectomy with fusion (ACDF)  History of carpal tunnel surgery  History of tonsillectomy  S/P cervical spinal fusion  History of Mohs micrographic surgery for skin cancer  History of lumpectomy of right breast  H/O lumpectomy  No significant past surgical history  History of lumpectomy  S/P appendectomy      ASSESSMENT:  68y Female s/p revision of cervical hardware.  POD#8 S/P revision of anterior cervical discectomy with fusion of C4-C6 with extension to C3, as well as C2-T2 posterior fusion with right iliac crest autograft.  Post op - patient with difficulty swallowing, FEES with some aspiration, patient refusing PEG.    However, today's discussion with Dr. Daley, patient agrees to ENT evaluation of vocal cords on monday and possible PEG on monday if she continues with VC dysfunction.          PLAN:  NEURO:  - cont neurochecks  - hard collar may be off to eat, but on at all other times    CARDIOVASCULAR:  - BP stable    PULMONARY:  - encourage IS  - continue abx for pna  - cxr today     RENAL:  - follow and replete electrolytes  - trend labs in AM    GI:  - honey thick liquids  - aspiration precautions  - Nutrition consult for calorie consults    HEME:  - h&h stable    ID:  - Leukocytosis - trend   -continue vanc and zosyn ( stop date this monday)  - repeat UA in AM    ENDO:  - BS WNL    DVT PROPHYLAXIS:  [x] Venodynes                                [x] Heparin/Lovenox    DISPOSITION:    Assessment:  Present when checked    []  GCS  E   V  M     Heart Failure: []Acute, [] acute on chronic , []chronic  Heart Failure:  [] Diastolic (HFpEF), [] Systolic (HFrEF), []Combined (HFpEF and HFrEF), [] RHF, [] Pulm HTN, [] Other    [] SUSU, [] ATN, [] AIN, [] other  [] CKD1, [] CKD2, [] CKD 3, [] CKD 4, [] CKD 5, []ESRD    Encephalopathy: [] Metabolic, [] Hepatic, [] toxic, [] Neurological, [] Other    Abnormal Nurtitional Status: [] malnurtition (see nutrition note), [ ]underweight: BMI < 19, [] morbid obesity: BMI >40, [] Cachexia    [] Sepsis  [] hypovolemic shock,[] cardiogenic shock, [] hemorrhagic shock, [] neuogenic shock  [] Acute Respiratory Failure  []Cerebral edema, [] Brain compression/ herniation,   [] Functional quadriplegia  [] Acute blood loss anemia HPI:  This is a 69 y/o female, well known to Dr. Daley with PMH of non-hodkins lymphoma (xrt in ) and breast ca ('s), HTN, HLD, osteoporosis.  In 10/18, patient was evaluated for upper extremity weakness and underwent a carpal tunnel release and ulnar transposition without relief of symptoms.   in , patient was admitted to [facility] for b/l UE weakness and difficulty walking.  Her work up revealed cervical stenosis with compression.  However, at the time, her bone scan also was significant for uptake in her right acetabulum and at the time and the evaluating spine surgeon requested this lesion be addressed prior to any cervical surgery.  A CT of the region was negative and the patient was sent home for follow up. on 18, patient was seen in the [facility] ER again, this time with worsening symptoms and she was emergently taken to the OR for a three level ACDF C4-C6.  She was discharged after the surgery with an uncomplicated post op course.     Since this surgery, patient has dramatic improvements in her walking, neuropathy and radicular pain.  Follow up imaging with X-ray lead to further evaluation with CT which revealed possible loosening of hardware.  Clinically, patient still has left UE weakness that she thinks may be from her history of broken clavicle. No bowel, bladder dysfunction     She is here for definitive surgical evaluation. (2019 06:51)    OVERNIGHT EVENTS:  Pt with productive white sputum and cough. Pain controlled. Still difficulty swallowing saliva, but voice less hoarse.    Hospital course:   POD#0: S/P revision of anterior cervical discectomy with fusion of C4-C6 with extension to C3, as well as C2-T2 posterior fusion with right iliac crest autograft.   POD#1: CESARIO overnight. Drains x3 remain in place, [place] J collar and prevena dressing in place. Esposito removed this am, f/u TOV. Reports some throat tightness with mucous, but denies pain, no issues talking or swallowing (tolerating liquid diet)  POD#2 Pt with secrections and difficulty swallowing. continue decadron 4q6. Robitussin started.    Pt complaining muscle spasm and pain down BUE L>R  : no acute events overnight, neuro stable. Transferred to CCU for rapid Afib, started on Amiodarone drip.  4/10: Transitioned to PO Amiodarone, off IV drip, on tele, pending transfer back to Neurosurgery. Posterior MASOOD and HMV remains.  : Posterior drain removed. Pt on Vanco/zosyn for aspiration PNA   Repeat Fees. 2nd posterior drain removed. sputum culture sent  : patient requiring supplemental O2 while at rest and during PT.  had one bout of nausea overnight.  Over all, appears comfortable.    Vital Signs Last 24 Hrs  T(C): 36.9 (2019 09:31), Max: 37.2 (2019 01:44)  T(F): 98.5 (2019 09:31), Max: 99 (2019 01:44)  HR: 75 (2019 09:) (62 - 91)  BP: 95/58 (2019 09:31) (90/53 - 182/82)  BP(mean): --  RR: 16 (2019 09:31) (16 - 18)  SpO2: 98% (2019 09:31) (95% - 98%)    I&O's Summary    2019 07:01  -  2019 07:00  --------------------------------------------------------  IN: 3640 mL / OUT: 700 mL / NET: 2940 mL        PHYSICAL EXAM:  Lungs: Clear b/l  Heart: S1, S2. NSR.  Abd: Soft, NT/ND. +BS  AAOX3. Verbal function intact  Cranial Nerves: II-XII intact  Neuro: CNs II-XII intact. Left  4/5, proximal 3/5. RUE and LEs b/l 5/5. Sensation intact throughout. Following commands. Speech clear.   Sensation: intact to touch in all extremitie  Incision/Wound: C/D/I hard collar in place  Back: Right iliac crest graft site C/D/I    TUBES/LINES:  none      DIET:  [] NPO  [x] Mechanical- honey thick  [] Tube feeds    LABS: pending                        10.2   21.97 )-----------( 298      ( 2019 15:33 )             32.2     Urinalysis Basic - ( 2019 15:28 )    Color: Yellow / Appearance: Clear / S.025 / pH: x  Gluc: x / Ketone: Trace mg/dL  / Bili: Small / Urobili: 1.0 E.U./dL   Blood: x / Protein: 30 mg/dL / Nitrite: NEGATIVE   Leuk Esterase: Moderate / RBC: < 5 /HPF / WBC Many /HPF   Sq Epi: x / Non Sq Epi: 5-10 /HPF / Bacteria: Present /HPF          146<H>  |  107  |  10  ----------------------------<  120<H>  3.1<L>   |  24  |  0.54    Ca    8.6      2019 15:33          POCT Blood Glucose.: 108 mg/dL (2019 11:37)  POCT Blood Glucose.: 102 mg/dL (2019 06:57)  POCT Blood Glucose.: 134 mg/dL (2019 22:13)  POCT Blood Glucose.: 115 mg/dL (2019 17:09)      Drug Levels: [] N/A  Vancomycin Level, Trough: 26.3 ug/mL ( @ 06:36)  Vancomycin Level, Trough: 10.7 ug/mL ( @ 23:41)  Vancomycin Level, Trough: 8.6 ug/mL ( @ 06:28)    CSF Analysis: [] N/A      Allergies    penicillin (Rash)    Intolerances      MEDICATIONS:  Antibiotics:  piperacillin/tazobactam IVPB. 4.5 Gram(s) IV Intermittent every 6 hours  vancomycin  IVPB 1500 milliGRAM(s) IV Intermittent <User Schedule>    Neuro:  acetaminophen   Tablet .. 650 milliGRAM(s) Oral every 6 hours PRN  gabapentin 600 milliGRAM(s) Oral at bedtime  HYDROmorphone  Injectable 0.5 milliGRAM(s) IV Push every 3 hours PRN  metoclopramide Injectable 10 milliGRAM(s) IV Push every 4 hours PRN  ondansetron Injectable 4 milliGRAM(s) IV Push every 6 hours PRN  pregabalin 100 milliGRAM(s) Oral two times a day  traMADol 25 milliGRAM(s) Oral every 4 hours PRN  traMADol 50 milliGRAM(s) Oral every 4 hours PRN    Anticoagulation:  enoxaparin Injectable 40 milliGRAM(s) SubCutaneous at bedtime    OTHER:  amiodarone    Tablet 400 milliGRAM(s) Oral every 12 hours  anastrozole 1 milliGRAM(s) Oral daily  atorvastatin 40 milliGRAM(s) Oral at bedtime  benzocaine 15 mG/menthol 3.6 mG Lozenge 1 Lozenge Oral every 1 hour PRN  dextrose 40% Gel 15 Gram(s) Oral once PRN  dextrose 50% Injectable 12.5 Gram(s) IV Push once  dextrose 50% Injectable 25 Gram(s) IV Push once  dextrose 50% Injectable 25 Gram(s) IV Push once  docusate sodium 100 milliGRAM(s) Oral three times a day  famotidine    Tablet 20 milliGRAM(s) Oral daily  glucagon  Injectable 1 milliGRAM(s) IntraMuscular once PRN  guaiFENesin    Syrup 100 milliGRAM(s) Oral every 6 hours PRN  insulin lispro (HumaLOG) corrective regimen sliding scale   SubCutaneous Before meals and at bedtime  magnesium hydroxide Suspension 30 milliLiter(s) Oral every 12 hours PRN  metoprolol succinate ER 25 milliGRAM(s) Oral daily  senna 2 Tablet(s) Oral at bedtime    IVF:  cholecalciferol 400 Unit(s) Oral daily  sodium chloride 0.9%. 1000 milliLiter(s) IV Continuous <Continuous>    CULTURES:    RADIOLOGY & ADDITIONAL TESTS:          CERVICALSPINE M53.2X2  No pertinent family history in first degree relatives  Family history of colon cancer in mother (Mother)  Handoff  MEWS Score  Anxiety  History of breast cancer  Hodgkin disease  HLD (hyperlipidemia)  HTN (hypertension)  Breast CA  Cervical disc herniation  Carpal tunnel syndrome  Cervical disc disease  Hodgkin disease  Breast cancer  Cervical spine instability  Cervical spine instability  Degenerative cervical spinal stenosis  Aspiration pneumonia of left lower lobe, unspecified aspiration pneumonia type  Other dysphagia  HLD (hyperlipidemia)  Dysphagia  PNA (pneumonia)  Cervical disc disease  Atrial fibrillation  Graft, bone, iliac crest  Fusion of 7 to 12 spinal segments by posterior approach  Revision of anterior cervical discectomy with fusion (ACDF)  History of carpal tunnel surgery  History of tonsillectomy  S/P cervical spinal fusion  History of Mohs micrographic surgery for skin cancer  History of lumpectomy of right breast  H/O lumpectomy  No significant past surgical history  History of lumpectomy  S/P appendectomy      ASSESSMENT:  68y Female s/p revision of cervical hardware.  POD#8 S/P revision of anterior cervical discectomy with fusion of C4-C6 with extension to C3, as well as C2-T2 posterior fusion with right iliac crest autograft.  Post op - patient with difficulty swallowing, FEES with some aspiration, patient refusing PEG.    However, today's discussion with Dr. Daley, patient agrees to ENT evaluation of vocal cords on monday and possible PEG on monday if she continues with VC dysfunction.          PLAN:  NEURO:  - cont neurochecks  - hard collar may be off to eat, but on at all other times    CARDIOVASCULAR:  - BP stable    PULMONARY:  - encourage IS  - continue abx for pna  - cxr today     RENAL:  - follow and replete electrolytes  - trend labs in AM    GI:  - honey thick liquids  - aspiration precautions  - Nutrition consult for calorie consults    HEME:  - h&h stable    ID:  - Leukocytosis - trend   -continue vanc and zosyn ( stop date this monday)  - vanc trough tomorrow AM   - repeat UA in AM    ENDO:  - BS WNL    DVT PROPHYLAXIS:  [x] Venodynes                                [x] Heparin/Lovenox    DISPOSITION:    Assessment:  Present when checked    []  GCS  E   V  M     Heart Failure: []Acute, [] acute on chronic , []chronic  Heart Failure:  [] Diastolic (HFpEF), [] Systolic (HFrEF), []Combined (HFpEF and HFrEF), [] RHF, [] Pulm HTN, [] Other    [] SUSU, [] ATN, [] AIN, [] other  [] CKD1, [] CKD2, [] CKD 3, [] CKD 4, [] CKD 5, []ESRD    Encephalopathy: [] Metabolic, [] Hepatic, [] toxic, [] Neurological, [] Other    Abnormal Nurtitional Status: [] malnurtition (see nutrition note), [ ]underweight: BMI < 19, [] morbid obesity: BMI >40, [] Cachexia    [] Sepsis  [] hypovolemic shock,[] cardiogenic shock, [] hemorrhagic shock, [] neuogenic shock  [] Acute Respiratory Failure  []Cerebral edema, [] Brain compression/ herniation,   [] Functional quadriplegia  [] Acute blood loss anemia

## 2019-04-14 LAB
ANION GAP SERPL CALC-SCNC: 10 MMOL/L — SIGNIFICANT CHANGE UP (ref 5–17)
APPEARANCE UR: CLEAR — SIGNIFICANT CHANGE UP
APTT BLD: 27.7 SEC — SIGNIFICANT CHANGE UP (ref 27.5–36.3)
BILIRUB UR-MCNC: NEGATIVE — SIGNIFICANT CHANGE UP
BUN SERPL-MCNC: 8 MG/DL — SIGNIFICANT CHANGE UP (ref 7–23)
CALCIUM SERPL-MCNC: 8.7 MG/DL — SIGNIFICANT CHANGE UP (ref 8.4–10.5)
CHLORIDE SERPL-SCNC: 107 MMOL/L — SIGNIFICANT CHANGE UP (ref 96–108)
CO2 SERPL-SCNC: 27 MMOL/L — SIGNIFICANT CHANGE UP (ref 22–31)
COLOR SPEC: YELLOW — SIGNIFICANT CHANGE UP
CREAT SERPL-MCNC: 0.49 MG/DL — LOW (ref 0.5–1.3)
DIFF PNL FLD: NEGATIVE — SIGNIFICANT CHANGE UP
GLUCOSE BLDC GLUCOMTR-MCNC: 108 MG/DL — HIGH (ref 70–99)
GLUCOSE BLDC GLUCOMTR-MCNC: 116 MG/DL — HIGH (ref 70–99)
GLUCOSE BLDC GLUCOMTR-MCNC: 117 MG/DL — HIGH (ref 70–99)
GLUCOSE BLDC GLUCOMTR-MCNC: 122 MG/DL — HIGH (ref 70–99)
GLUCOSE SERPL-MCNC: 107 MG/DL — HIGH (ref 70–99)
GLUCOSE UR QL: NEGATIVE — SIGNIFICANT CHANGE UP
HCT VFR BLD CALC: 31.3 % — LOW (ref 34.5–45)
HGB BLD-MCNC: 9.8 G/DL — LOW (ref 11.5–15.5)
INR BLD: 1.2 — HIGH (ref 0.88–1.16)
KETONES UR-MCNC: ABNORMAL MG/DL
LEUKOCYTE ESTERASE UR-ACNC: ABNORMAL
MAGNESIUM SERPL-MCNC: 1.9 MG/DL — SIGNIFICANT CHANGE UP (ref 1.6–2.6)
MCHC RBC-ENTMCNC: 29.9 PG — SIGNIFICANT CHANGE UP (ref 27–34)
MCHC RBC-ENTMCNC: 31.3 GM/DL — LOW (ref 32–36)
MCV RBC AUTO: 95.4 FL — SIGNIFICANT CHANGE UP (ref 80–100)
NITRITE UR-MCNC: NEGATIVE — SIGNIFICANT CHANGE UP
NRBC # BLD: 0 /100 WBCS — SIGNIFICANT CHANGE UP (ref 0–0)
PH UR: 6 — SIGNIFICANT CHANGE UP (ref 5–8)
PHOSPHATE SERPL-MCNC: 2.7 MG/DL — SIGNIFICANT CHANGE UP (ref 2.5–4.5)
PLATELET # BLD AUTO: 288 K/UL — SIGNIFICANT CHANGE UP (ref 150–400)
POTASSIUM SERPL-MCNC: 2.6 MMOL/L — CRITICAL LOW (ref 3.5–5.3)
POTASSIUM SERPL-SCNC: 2.6 MMOL/L — CRITICAL LOW (ref 3.5–5.3)
PROT UR-MCNC: ABNORMAL MG/DL
PROTHROM AB SERPL-ACNC: 13.6 SEC — HIGH (ref 10–12.9)
RBC # BLD: 3.28 M/UL — LOW (ref 3.8–5.2)
RBC # FLD: 14.7 % — HIGH (ref 10.3–14.5)
SODIUM SERPL-SCNC: 144 MMOL/L — SIGNIFICANT CHANGE UP (ref 135–145)
SP GR SPEC: >=1.03 — SIGNIFICANT CHANGE UP (ref 1–1.03)
UROBILINOGEN FLD QL: 0.2 E.U./DL — SIGNIFICANT CHANGE UP
VANCOMYCIN TROUGH SERPL-MCNC: 17.2 UG/ML — SIGNIFICANT CHANGE UP (ref 10–20)
WBC # BLD: 18.95 K/UL — HIGH (ref 3.8–10.5)
WBC # FLD AUTO: 18.95 K/UL — HIGH (ref 3.8–10.5)

## 2019-04-14 RX ORDER — SODIUM CHLORIDE 9 MG/ML
1000 INJECTION INTRAMUSCULAR; INTRAVENOUS; SUBCUTANEOUS
Qty: 0 | Refills: 0 | Status: DISCONTINUED | OUTPATIENT
Start: 2019-04-14 | End: 2019-04-15

## 2019-04-14 RX ORDER — POTASSIUM CHLORIDE 20 MEQ
40 PACKET (EA) ORAL EVERY 4 HOURS
Qty: 0 | Refills: 0 | Status: COMPLETED | OUTPATIENT
Start: 2019-04-14 | End: 2019-04-14

## 2019-04-14 RX ORDER — POTASSIUM CHLORIDE 20 MEQ
10 PACKET (EA) ORAL
Qty: 0 | Refills: 0 | Status: COMPLETED | OUTPATIENT
Start: 2019-04-14 | End: 2019-04-15

## 2019-04-14 RX ORDER — POTASSIUM CHLORIDE 20 MEQ
10 PACKET (EA) ORAL
Qty: 0 | Refills: 0 | Status: COMPLETED | OUTPATIENT
Start: 2019-04-14 | End: 2019-04-14

## 2019-04-14 RX ADMIN — PIPERACILLIN AND TAZOBACTAM 200 GRAM(S): 4; .5 INJECTION, POWDER, LYOPHILIZED, FOR SOLUTION INTRAVENOUS at 19:33

## 2019-04-14 RX ADMIN — ONDANSETRON 4 MILLIGRAM(S): 8 TABLET, FILM COATED ORAL at 19:31

## 2019-04-14 RX ADMIN — Medication 25 MILLIGRAM(S): at 09:05

## 2019-04-14 RX ADMIN — Medication 100 MILLIEQUIVALENT(S): at 12:20

## 2019-04-14 RX ADMIN — Medication 300 MILLIGRAM(S): at 13:49

## 2019-04-14 RX ADMIN — ENOXAPARIN SODIUM 40 MILLIGRAM(S): 100 INJECTION SUBCUTANEOUS at 21:37

## 2019-04-14 RX ADMIN — Medication 100 MILLIGRAM(S): at 09:12

## 2019-04-14 RX ADMIN — PIPERACILLIN AND TAZOBACTAM 200 GRAM(S): 4; .5 INJECTION, POWDER, LYOPHILIZED, FOR SOLUTION INTRAVENOUS at 13:26

## 2019-04-14 RX ADMIN — Medication 100 MILLIEQUIVALENT(S): at 21:37

## 2019-04-14 RX ADMIN — ATORVASTATIN CALCIUM 40 MILLIGRAM(S): 80 TABLET, FILM COATED ORAL at 21:39

## 2019-04-14 RX ADMIN — AMIODARONE HYDROCHLORIDE 400 MILLIGRAM(S): 400 TABLET ORAL at 21:39

## 2019-04-14 RX ADMIN — Medication 400 UNIT(S): at 09:13

## 2019-04-14 RX ADMIN — Medication 100 MILLIEQUIVALENT(S): at 22:40

## 2019-04-14 RX ADMIN — Medication 100 MILLIEQUIVALENT(S): at 11:30

## 2019-04-14 RX ADMIN — AMIODARONE HYDROCHLORIDE 400 MILLIGRAM(S): 400 TABLET ORAL at 09:11

## 2019-04-14 RX ADMIN — Medication 300 MILLIGRAM(S): at 00:32

## 2019-04-14 RX ADMIN — PIPERACILLIN AND TAZOBACTAM 200 GRAM(S): 4; .5 INJECTION, POWDER, LYOPHILIZED, FOR SOLUTION INTRAVENOUS at 05:42

## 2019-04-14 RX ADMIN — PIPERACILLIN AND TAZOBACTAM 200 GRAM(S): 4; .5 INJECTION, POWDER, LYOPHILIZED, FOR SOLUTION INTRAVENOUS at 00:32

## 2019-04-14 RX ADMIN — Medication 100 MILLIEQUIVALENT(S): at 10:10

## 2019-04-14 NOTE — PROGRESS NOTE ADULT - SUBJECTIVE AND OBJECTIVE BOX
HPI:  This is a 67 y/o female, well known to Dr. Daley with PMH of non-hodkins lymphoma (xrt in 1981) and breast ca (2000's), HTN, HLD, osteoporosis.  In 10/18, patient was evaluated for upper extremity weakness and underwent a carpal tunnel release and ulnar transposition without relief of symptoms.   in 11/18, patient was admitted to [facility] for b/l UE weakness and difficulty walking.  Her work up revealed cervical stenosis with compression.  However, at the time, her bone scan also was significant for uptake in her right acetabulum and at the time and the evaluating spine surgeon requested this lesion be addressed prior to any cervical surgery.  A CT of the region was negative and the patient was sent home for follow up. on 12/4/18, patient was seen in the [facility] ER again, this time with worsening symptoms and she was emergently taken to the OR for a three level ACDF C4-C6.  She was discharged after the surgery with an uncomplicated post op course.     Since this surgery, patient has dramatic improvements in her walking, neuropathy and radicular pain.  Follow up imaging with X-ray lead to further evaluation with CT which revealed possible loosening of hardware.  Clinically, patient still has left UE weakness that she thinks may be from her history of broken clavicle. No bowel, bladder dysfunction     She is here for definitive surgical evaluation. (05 Apr 2019 06:51)    OVERNIGHT EVENTS:  Pt still coughing. Voice improving. Pt frustrated with having to eat food with thickner.      Hospital course:   POD#0: S/P revision of anterior cervical discectomy with fusion of C4-C6 with extension to C3, as well as C2-T2 posterior fusion with right iliac crest autograft.   POD#1: CEASRIO overnight. Drains x3 remain in place, [place] J collar and prevena dressing in place. Esposito removed this am, f/u TOV. Reports some throat tightness with mucous, but denies pain, no issues talking or swallowing (tolerating liquid diet)  POD#2 Pt with secrections and difficulty swallowing. continue decadron 4q6. Robitussin started.   4/8 Pt complaining muscle spasm and pain down BUE L>R  4/9: no acute events overnight, neuro stable. Transferred to CCU for rapid Afib, started on Amiodarone drip.  4/10: Transitioned to PO Amiodarone, off IV drip, on tele, pending transfer back to Neurosurgery. Posterior MASOOD and HMV remains.  4/11: Posterior drain removed. Pt on Vanco/zosyn for aspiration PNA  4/12 Repeat Fees. 2nd posterior drain removed. sputum culture sent  4/13: patient requiring supplemental O2 while at rest and during PT.  had one bout of nausea overnight.  Over all, appears comfortable.  4/14 calorie count. Pt pre-op for possible PEG on Monday. hypokalemia repleted. Con't vanco/zosyn    Vital Signs Last 24 Hrs  T(C): 37.4 (14 Apr 2019 10:15), Max: 37.6 (14 Apr 2019 00:30)  T(F): 99.4 (14 Apr 2019 10:15), Max: 99.6 (14 Apr 2019 00:30)  HR: 80 (14 Apr 2019 10:15) (73 - 83)  BP: 106/55 (14 Apr 2019 10:15) (98/55 - 128/70)  BP(mean): --  RR: 17 (14 Apr 2019 10:15) (16 - 17)  SpO2: 95% (14 Apr 2019 10:15) (95% - 98%)    I&O's Summary    13 Apr 2019 07:01  -  14 Apr 2019 07:00  --------------------------------------------------------  IN: 1100 mL / OUT: 300 mL / NET: 800 mL    14 Apr 2019 07:01  -  14 Apr 2019 12:46  --------------------------------------------------------  IN: 0 mL / OUT: 200 mL / NET: -200 mL        PHYSICAL EXAM:  Neurological:  Lungs: Clear b/l  Heart: S1, S2. NSR.  Abd: Soft, NT/ND. +BS  AAOX3. Verbal function intact  Cranial Nerves: II-XII intact  Neuro: CNs II-XII intact. Left  4/5, proximal 3/5. RUE and LEs b/l 5/5. Sensation intact throughout. Following commands. Speech clear.   Sensation: intact to touch in all extremitie  Incision/Wound:C/D/I healing well    TUBES/LINES:  [] Esposito  [] Lumbar Drain  [] Wound Drains  [] Others      DIET:  [] NPO  [x Mechanical  [] Tube feeds    LABS:                        9.8    18.95 )-----------( 288      ( 14 Apr 2019 08:14 )             31.3     04-14    144  |  107  |  8   ----------------------------<  107<H>  2.6<LL>   |  27  |  0.49<L>    Ca    8.7      14 Apr 2019 08:14  Phos  2.7     04-14  Mg     1.9     04-14      PT/INR - ( 14 Apr 2019 08:14 )   PT: 13.6 sec;   INR: 1.20          PTT - ( 14 Apr 2019 08:14 )  PTT:27.7 sec  Urinalysis Basic - ( 14 Apr 2019 11:25 )    Color: Yellow / Appearance: Clear / SG: >=1.030 / pH: x  Gluc: x / Ketone: Trace mg/dL  / Bili: Negative / Urobili: 0.2 E.U./dL   Blood: x / Protein: Trace mg/dL / Nitrite: NEGATIVE   Leuk Esterase: Trace / RBC: < 5 /HPF / WBC > 10 /HPF   Sq Epi: x / Non Sq Epi: 5-10 /HPF / Bacteria: Present /HPF          CAPILLARY BLOOD GLUCOSE      POCT Blood Glucose.: 116 mg/dL (14 Apr 2019 12:00)  POCT Blood Glucose.: 117 mg/dL (14 Apr 2019 06:52)  POCT Blood Glucose.: 119 mg/dL (13 Apr 2019 22:04)  POCT Blood Glucose.: 120 mg/dL (13 Apr 2019 16:57)      Drug Levels: [] N/A  Vancomycin Level, Trough: 26.3 ug/mL (04-13 @ 06:36)  Vancomycin Level, Trough: 10.7 ug/mL (04-12 @ 23:41)  Vancomycin Level, Trough: 8.6 ug/mL (04-11 @ 06:28)    CSF Analysis: [] N/A      Allergies    penicillin (Rash)    Intolerances      MEDICATIONS:  Antibiotics:  piperacillin/tazobactam IVPB. 4.5 Gram(s) IV Intermittent every 6 hours  vancomycin  IVPB 1500 milliGRAM(s) IV Intermittent <User Schedule>    Neuro:  acetaminophen   Tablet .. 650 milliGRAM(s) Oral every 6 hours PRN  gabapentin 600 milliGRAM(s) Oral at bedtime  metoclopramide Injectable 10 milliGRAM(s) IV Push every 4 hours PRN  ondansetron Injectable 4 milliGRAM(s) IV Push every 6 hours PRN  pregabalin 100 milliGRAM(s) Oral two times a day    Anticoagulation:  enoxaparin Injectable 40 milliGRAM(s) SubCutaneous at bedtime    OTHER:  amiodarone    Tablet 400 milliGRAM(s) Oral every 12 hours  anastrozole 1 milliGRAM(s) Oral daily  atorvastatin 40 milliGRAM(s) Oral at bedtime  benzocaine 15 mG/menthol 3.6 mG Lozenge 1 Lozenge Oral every 1 hour PRN  dextrose 40% Gel 15 Gram(s) Oral once PRN  dextrose 50% Injectable 12.5 Gram(s) IV Push once  dextrose 50% Injectable 25 Gram(s) IV Push once  dextrose 50% Injectable 25 Gram(s) IV Push once  docusate sodium Liquid 100 milliGRAM(s) Oral three times a day  famotidine    Tablet 20 milliGRAM(s) Oral daily  glucagon  Injectable 1 milliGRAM(s) IntraMuscular once PRN  guaiFENesin    Syrup 100 milliGRAM(s) Oral every 6 hours PRN  insulin lispro (HumaLOG) corrective regimen sliding scale   SubCutaneous Before meals and at bedtime  magnesium hydroxide Suspension 30 milliLiter(s) Oral every 12 hours PRN  metoprolol succinate ER 25 milliGRAM(s) Oral daily  senna 2 Tablet(s) Oral at bedtime    IVF:  cholecalciferol 400 Unit(s) Oral daily  potassium chloride   Powder 40 milliEquivalent(s) Oral every 4 hours  potassium chloride  10 mEq/100 mL IVPB 10 milliEquivalent(s) IV Intermittent every 1 hour  sodium chloride 0.9% Bolus 500 milliLiter(s) IV Bolus once  sodium chloride 0.9%. 1000 milliLiter(s) IV Continuous <Continuous>    CULTURES:  Culture Results:   Moderate Gram Negative Rods Identification and susceptibility to follow.  Accompanied by normal respiratory enedelia including Numerous Yeast (04-13 @ 07:57)    RADIOLOGY & ADDITIONAL TESTS:  < from: Xray Chest 1 View-PORTABLE IMMEDIATE (04.09.19 @ 09:07) >  Persistent left lower lobe consolidation which may represent   atelectasis or pneumonia.      < end of copied text >      ASSESSMENT:  68y Female s/p revision of cervical hardware.  POD#8 S/P revision of anterior cervical discectomy with fusion of C4-C6 with extension to C3, as well as C2-T2 posterior fusion with right iliac crest autograft.  Post op - patient with difficulty swallowing, FEES with some aspiration, patient refusing PEG.    However, today's discussion with Dr. Daley, patient agrees to ENT evaluation of vocal cords on monday and possible PEG on monday if she continues with VC dysfunction.      CERVICALSPINE M53.2X2  No pertinent family history in first degree relatives  Family history of colon cancer in mother (Mother)  Handoff  MEWS Score  Anxiety  History of breast cancer  Hodgkin disease  HLD (hyperlipidemia)  HTN (hypertension)  Breast CA  Cervical disc herniation  Carpal tunnel syndrome  Cervical disc disease  Hodgkin disease  Breast cancer  Cervical spine instability  Cervical spine instability  Degenerative cervical spinal stenosis  Hypoxemia  Elevated hemidiaphragm  Aspiration pneumonia of left lower lobe, unspecified aspiration pneumonia type  Other dysphagia  HLD (hyperlipidemia)  Dysphagia  PNA (pneumonia)  Cervical disc disease  Atrial fibrillation  Graft, bone, iliac crest  Fusion of 7 to 12 spinal segments by posterior approach  Revision of anterior cervical discectomy with fusion (ACDF)  History of carpal tunnel surgery  History of tonsillectomy  S/P cervical spinal fusion  History of Mohs micrographic surgery for skin cancer  History of lumpectomy of right breast  H/O lumpectomy  No significant past surgical history  History of lumpectomy  S/P appendectomy      PLAN:  -NPO p MN for possible PEG Mon  -Vanco/Zosyn for aspiration PNA grm+cocci  -daily CXR  -[place] J collar on at all times  -encourage IS and OOB  -lovenox for dvt prophyalxis  -vanco trough today  -con't IVF  -replace K+  -D/W     DVT PROPHYLAXIS:  [x] Venodynes                                [x] Heparin/Lovenox    DISPOSITION: home pending progress

## 2019-04-15 LAB
-  AMPICILLIN/SULBACTAM: SIGNIFICANT CHANGE UP
-  AMPICILLIN: SIGNIFICANT CHANGE UP
-  CEFAZOLIN: SIGNIFICANT CHANGE UP
-  CEFTRIAXONE: SIGNIFICANT CHANGE UP
-  CIPROFLOXACIN: SIGNIFICANT CHANGE UP
-  GENTAMICIN: SIGNIFICANT CHANGE UP
-  PIPERACILLIN/TAZOBACTAM: SIGNIFICANT CHANGE UP
-  TOBRAMYCIN: SIGNIFICANT CHANGE UP
-  TRIMETHOPRIM/SULFAMETHOXAZOLE: SIGNIFICANT CHANGE UP
ANION GAP SERPL CALC-SCNC: 13 MMOL/L — SIGNIFICANT CHANGE UP (ref 5–17)
BUN SERPL-MCNC: 4 MG/DL — LOW (ref 7–23)
CALCIUM SERPL-MCNC: 8.7 MG/DL — SIGNIFICANT CHANGE UP (ref 8.4–10.5)
CHLORIDE SERPL-SCNC: 101 MMOL/L — SIGNIFICANT CHANGE UP (ref 96–108)
CO2 SERPL-SCNC: 25 MMOL/L — SIGNIFICANT CHANGE UP (ref 22–31)
CREAT SERPL-MCNC: 0.41 MG/DL — LOW (ref 0.5–1.3)
CULTURE RESULTS: SIGNIFICANT CHANGE UP
GLUCOSE BLDC GLUCOMTR-MCNC: 106 MG/DL — HIGH (ref 70–99)
GLUCOSE BLDC GLUCOMTR-MCNC: 124 MG/DL — HIGH (ref 70–99)
GLUCOSE BLDC GLUCOMTR-MCNC: 99 MG/DL — SIGNIFICANT CHANGE UP (ref 70–99)
GLUCOSE SERPL-MCNC: 114 MG/DL — HIGH (ref 70–99)
HCT VFR BLD CALC: 33.4 % — LOW (ref 34.5–45)
HGB BLD-MCNC: 10.8 G/DL — LOW (ref 11.5–15.5)
INR BLD: 1.19 — HIGH (ref 0.88–1.16)
MAGNESIUM SERPL-MCNC: 1.8 MG/DL — SIGNIFICANT CHANGE UP (ref 1.6–2.6)
MCHC RBC-ENTMCNC: 30.6 PG — SIGNIFICANT CHANGE UP (ref 27–34)
MCHC RBC-ENTMCNC: 32.3 GM/DL — SIGNIFICANT CHANGE UP (ref 32–36)
MCV RBC AUTO: 94.6 FL — SIGNIFICANT CHANGE UP (ref 80–100)
METHOD TYPE: SIGNIFICANT CHANGE UP
NRBC # BLD: 0 /100 WBCS — SIGNIFICANT CHANGE UP (ref 0–0)
ORGANISM # SPEC MICROSCOPIC CNT: SIGNIFICANT CHANGE UP
ORGANISM # SPEC MICROSCOPIC CNT: SIGNIFICANT CHANGE UP
PHOSPHATE SERPL-MCNC: 2 MG/DL — LOW (ref 2.5–4.5)
PLATELET # BLD AUTO: 337 K/UL — SIGNIFICANT CHANGE UP (ref 150–400)
POTASSIUM SERPL-MCNC: 2.8 MMOL/L — CRITICAL LOW (ref 3.5–5.3)
POTASSIUM SERPL-SCNC: 2.8 MMOL/L — CRITICAL LOW (ref 3.5–5.3)
PROTHROM AB SERPL-ACNC: 13.5 SEC — HIGH (ref 10–12.9)
RBC # BLD: 3.53 M/UL — LOW (ref 3.8–5.2)
RBC # FLD: 14.2 % — SIGNIFICANT CHANGE UP (ref 10.3–14.5)
SODIUM SERPL-SCNC: 139 MMOL/L — SIGNIFICANT CHANGE UP (ref 135–145)
SPECIMEN SOURCE: SIGNIFICANT CHANGE UP
WBC # BLD: 20.5 K/UL — HIGH (ref 3.8–10.5)
WBC # FLD AUTO: 20.5 K/UL — HIGH (ref 3.8–10.5)

## 2019-04-15 PROCEDURE — 99232 SBSQ HOSP IP/OBS MODERATE 35: CPT | Mod: GC

## 2019-04-15 PROCEDURE — 71045 X-RAY EXAM CHEST 1 VIEW: CPT | Mod: 26

## 2019-04-15 RX ORDER — POTASSIUM CHLORIDE 20 MEQ
10 PACKET (EA) ORAL
Qty: 0 | Refills: 0 | Status: COMPLETED | OUTPATIENT
Start: 2019-04-15 | End: 2019-04-15

## 2019-04-15 RX ORDER — POTASSIUM CHLORIDE 20 MEQ
40 PACKET (EA) ORAL EVERY 4 HOURS
Qty: 0 | Refills: 0 | Status: COMPLETED | OUTPATIENT
Start: 2019-04-15 | End: 2019-04-15

## 2019-04-15 RX ORDER — SERTRALINE 25 MG/1
50 TABLET, FILM COATED ORAL DAILY
Qty: 0 | Refills: 0 | Status: DISCONTINUED | OUTPATIENT
Start: 2019-04-15 | End: 2019-04-16

## 2019-04-15 RX ORDER — POTASSIUM PHOSPHATE, MONOBASIC POTASSIUM PHOSPHATE, DIBASIC 236; 224 MG/ML; MG/ML
30 INJECTION, SOLUTION INTRAVENOUS ONCE
Qty: 0 | Refills: 0 | Status: COMPLETED | OUTPATIENT
Start: 2019-04-15 | End: 2019-04-15

## 2019-04-15 RX ORDER — DOCUSATE SODIUM 100 MG
100 CAPSULE ORAL THREE TIMES A DAY
Qty: 0 | Refills: 0 | Status: DISCONTINUED | OUTPATIENT
Start: 2019-04-15 | End: 2019-04-16

## 2019-04-15 RX ORDER — DEXTROSE MONOHYDRATE, SODIUM CHLORIDE, AND POTASSIUM CHLORIDE 50; .745; 4.5 G/1000ML; G/1000ML; G/1000ML
1000 INJECTION, SOLUTION INTRAVENOUS
Qty: 0 | Refills: 0 | Status: DISCONTINUED | OUTPATIENT
Start: 2019-04-15 | End: 2019-04-16

## 2019-04-15 RX ORDER — LANOLIN ALCOHOL/MO/W.PET/CERES
3 CREAM (GRAM) TOPICAL AT BEDTIME
Qty: 0 | Refills: 0 | Status: DISCONTINUED | OUTPATIENT
Start: 2019-04-15 | End: 2019-04-16

## 2019-04-15 RX ADMIN — Medication 100 MILLIEQUIVALENT(S): at 16:03

## 2019-04-15 RX ADMIN — POTASSIUM PHOSPHATE, MONOBASIC POTASSIUM PHOSPHATE, DIBASIC 83.33 MILLIMOLE(S): 236; 224 INJECTION, SOLUTION INTRAVENOUS at 12:28

## 2019-04-15 RX ADMIN — Medication 100 MILLIGRAM(S): at 17:39

## 2019-04-15 RX ADMIN — PIPERACILLIN AND TAZOBACTAM 200 GRAM(S): 4; .5 INJECTION, POWDER, LYOPHILIZED, FOR SOLUTION INTRAVENOUS at 23:41

## 2019-04-15 RX ADMIN — Medication 100 MILLIEQUIVALENT(S): at 00:02

## 2019-04-15 RX ADMIN — DEXTROSE MONOHYDRATE, SODIUM CHLORIDE, AND POTASSIUM CHLORIDE 75 MILLILITER(S): 50; .745; 4.5 INJECTION, SOLUTION INTRAVENOUS at 12:28

## 2019-04-15 RX ADMIN — Medication 100 MILLIEQUIVALENT(S): at 10:57

## 2019-04-15 RX ADMIN — SERTRALINE 50 MILLIGRAM(S): 25 TABLET, FILM COATED ORAL at 17:43

## 2019-04-15 RX ADMIN — AMIODARONE HYDROCHLORIDE 400 MILLIGRAM(S): 400 TABLET ORAL at 17:39

## 2019-04-15 RX ADMIN — PIPERACILLIN AND TAZOBACTAM 200 GRAM(S): 4; .5 INJECTION, POWDER, LYOPHILIZED, FOR SOLUTION INTRAVENOUS at 06:32

## 2019-04-15 RX ADMIN — Medication 400 UNIT(S): at 12:29

## 2019-04-15 RX ADMIN — ANASTROZOLE 1 MILLIGRAM(S): 1 TABLET ORAL at 12:28

## 2019-04-15 RX ADMIN — Medication 300 MILLIGRAM(S): at 01:19

## 2019-04-15 RX ADMIN — PIPERACILLIN AND TAZOBACTAM 200 GRAM(S): 4; .5 INJECTION, POWDER, LYOPHILIZED, FOR SOLUTION INTRAVENOUS at 12:28

## 2019-04-15 RX ADMIN — Medication 100 MILLIEQUIVALENT(S): at 10:01

## 2019-04-15 RX ADMIN — Medication 40 MILLIEQUIVALENT(S): at 08:53

## 2019-04-15 RX ADMIN — Medication 25 MILLIGRAM(S): at 08:53

## 2019-04-15 RX ADMIN — Medication 100 MILLIGRAM(S): at 12:29

## 2019-04-15 RX ADMIN — Medication 300 MILLIGRAM(S): at 13:19

## 2019-04-15 RX ADMIN — PIPERACILLIN AND TAZOBACTAM 200 GRAM(S): 4; .5 INJECTION, POWDER, LYOPHILIZED, FOR SOLUTION INTRAVENOUS at 00:01

## 2019-04-15 RX ADMIN — Medication 100 MILLIEQUIVALENT(S): at 13:55

## 2019-04-15 RX ADMIN — Medication 100 MILLIEQUIVALENT(S): at 08:53

## 2019-04-15 RX ADMIN — FAMOTIDINE 20 MILLIGRAM(S): 10 INJECTION INTRAVENOUS at 12:29

## 2019-04-15 RX ADMIN — AMIODARONE HYDROCHLORIDE 400 MILLIGRAM(S): 400 TABLET ORAL at 08:53

## 2019-04-15 RX ADMIN — PIPERACILLIN AND TAZOBACTAM 200 GRAM(S): 4; .5 INJECTION, POWDER, LYOPHILIZED, FOR SOLUTION INTRAVENOUS at 18:15

## 2019-04-15 RX ADMIN — Medication 100 MILLIEQUIVALENT(S): at 15:04

## 2019-04-15 NOTE — CONSULT NOTE ADULT - REASON FOR ADMISSION
Cervical hardware failure

## 2019-04-15 NOTE — PROGRESS NOTE ADULT - SUBJECTIVE AND OBJECTIVE BOX
HPI:  This is a 67 y/o female, well known to Dr. Daley with PMH of non-hodkins lymphoma (xrt in 1981) and breast ca (2000's), HTN, HLD, osteoporosis.  In 10/18, patient was evaluated for upper extremity weakness and underwent a carpal tunnel release and ulnar transposition without relief of symptoms.   in 11/18, patient was admitted to [facility] for b/l UE weakness and difficulty walking.  Her work up revealed cervical stenosis with compression.  However, at the time, her bone scan also was significant for uptake in her right acetabulum and at the time and the evaluating spine surgeon requested this lesion be addressed prior to any cervical surgery.  A CT of the region was negative and the patient was sent home for follow up. on 12/4/18, patient was seen in the [facility] ER again, this time with worsening symptoms and she was emergently taken to the OR for a three level ACDF C4-C6.  She was discharged after the surgery with an uncomplicated post op course.     Since this surgery, patient has dramatic improvements in her walking, neuropathy and radicular pain.  Follow up imaging with X-ray lead to further evaluation with CT which revealed possible loosening of hardware.  Clinically, patient still has left UE weakness that she thinks may be from her history of broken clavicle. No bowel, bladder dysfunction    OVERNIGHT EVENTS: No events overnight. Patient complains about her persistent cough and the discomfort it causes.    Hospital course:   POD#0: S/P revision of anterior cervical discectomy with fusion of C4-C6 with extension to C3, as well as C2-T2 posterior fusion with right iliac crest autograft.   POD#1: CESARIO overnight. Drains x3 remain in place, [place] J collar and prevena dressing in place. Esposito removed this am, f/u TOV. Reports some throat tightness with mucous, but denies pain, no issues talking or swallowing (tolerating liquid diet)  POD#2 Pt with secrections and difficulty swallowing. Continue decadron 4q6. Robitussin started.   4/8 Pt complaining muscle spasm and pain down BUE L>R  4/9: no acute events overnight, neuro stable. Transferred to CCU for rapid Afib, started on Amiodarone drip.  4/10: Transitioned to PO Amiodarone, off IV drip, on tele, pending transfer back to Neurosurgery. Posterior MASOOD and HMV remains.  4/11: Posterior drain removed. Pt on Vanco/zosyn for aspiration PNA  4/12 Repeat Fees. 2nd posterior drain removed. Sputum culture sent  4/13: patient requiring supplemental O2 while at rest and during physical therapy had one bout of nausea overnight.  Over all, appears comfortable.  4/14 calorie count. Pt pre-op for possible PEG on Monday. Hypokalemia repleted. Con't vanco/zosyn for aspiration pneumonia. Daily CXR.      Vital Signs Last 24 Hrs  T(C): 37.1 (15 Apr 2019 05:01), Max: 37.6 (14 Apr 2019 16:35)  T(F): 98.7 (15 Apr 2019 05:01), Max: 99.6 (14 Apr 2019 16:35)  HR: 81 (15 Apr 2019 05:01) (70 - 90)  BP: 135/69 (15 Apr 2019 05:01) (106/55 - 142/82)  BP(mean): --  RR: 16 (15 Apr 2019 05:01) (16 - 18)  SpO2: 94% (15 Apr 2019 05:01) (93% - 98%)    I&O's Summary    13 Apr 2019 07:01  -  14 Apr 2019 07:00  --------------------------------------------------------  IN: 1100 mL / OUT: 300 mL / NET: 800 mL    14 Apr 2019 07:01  -  15 Apr 2019 05:44  --------------------------------------------------------  IN: 3210 mL / OUT: 1900 mL / NET: 1310 mL        PHYSICAL EXAM:  Gen: NAD, AAOx3  HEENT: PERRL. EOMI.  Neck: +[place] J collar. Left anterior incision C/D/I. Posterior cervical incision w/ dressing C/D/I,  Lungs: Clear b/l  Heart: S1, S2. NSR.  Abd: Soft, NT/ND. +BS  Back: Right iliac crest graft site C/D/I.  Neuro: CNs II-XII intact. Left  4/5, proximal 3/5. RUE and LEs b/l 5/5. Sensation intact throughout. Following commands. Speech clear.     TUBES/LINES:  [] Esposito  [] Lumbar Drain  [] Wound Drains  [] Others      DIET:  [x] NPO  [] Mechanical  [] Tube feeds    LABS:                        9.8    18.95 )-----------( 288      ( 14 Apr 2019 08:14 )             31.3     04-14    144  |  107  |  8   ----------------------------<  107<H>  2.6<LL>   |  27  |  0.49<L>    Ca    8.7      14 Apr 2019 08:14  Phos  2.7     04-14  Mg     1.9     04-14      PT/INR - ( 14 Apr 2019 08:14 )   PT: 13.6 sec;   INR: 1.20          PTT - ( 14 Apr 2019 08:14 )  PTT:27.7 sec  Urinalysis Basic - ( 14 Apr 2019 11:25 )    Color: Yellow / Appearance: Clear / SG: >=1.030 / pH: x  Gluc: x / Ketone: Trace mg/dL  / Bili: Negative / Urobili: 0.2 E.U./dL   Blood: x / Protein: Trace mg/dL / Nitrite: NEGATIVE   Leuk Esterase: Trace / RBC: < 5 /HPF / WBC > 10 /HPF   Sq Epi: x / Non Sq Epi: 5-10 /HPF / Bacteria: Present /HPF          CAPILLARY BLOOD GLUCOSE      POCT Blood Glucose.: 122 mg/dL (14 Apr 2019 21:45)  POCT Blood Glucose.: 108 mg/dL (14 Apr 2019 17:19)  POCT Blood Glucose.: 116 mg/dL (14 Apr 2019 12:00)  POCT Blood Glucose.: 117 mg/dL (14 Apr 2019 06:52)      Drug Levels: [] N/A  Vancomycin Level, Trough: 17.2 ug/mL (04-14 @ 12:35)  Vancomycin Level, Trough: 26.3 ug/mL (04-13 @ 06:36)  Vancomycin Level, Trough: 10.7 ug/mL (04-12 @ 23:41)    CSF Analysis: [] N/A      Allergies    penicillin (Rash)    Intolerances      MEDICATIONS:  Antibiotics:  piperacillin/tazobactam IVPB. 4.5 Gram(s) IV Intermittent every 6 hours  vancomycin  IVPB 1500 milliGRAM(s) IV Intermittent <User Schedule>    Neuro:  acetaminophen   Tablet .. 650 milliGRAM(s) Oral every 6 hours PRN  gabapentin 600 milliGRAM(s) Oral at bedtime  metoclopramide Injectable 10 milliGRAM(s) IV Push every 4 hours PRN  ondansetron Injectable 4 milliGRAM(s) IV Push every 6 hours PRN  pregabalin 100 milliGRAM(s) Oral two times a day    Anticoagulation:  enoxaparin Injectable 40 milliGRAM(s) SubCutaneous at bedtime    OTHER:  amiodarone    Tablet 400 milliGRAM(s) Oral every 12 hours  anastrozole 1 milliGRAM(s) Oral daily  atorvastatin 40 milliGRAM(s) Oral at bedtime  benzocaine 15 mG/menthol 3.6 mG Lozenge 1 Lozenge Oral every 1 hour PRN  dextrose 40% Gel 15 Gram(s) Oral once PRN  dextrose 50% Injectable 12.5 Gram(s) IV Push once  dextrose 50% Injectable 25 Gram(s) IV Push once  dextrose 50% Injectable 25 Gram(s) IV Push once  docusate sodium Liquid 100 milliGRAM(s) Oral three times a day  famotidine    Tablet 20 milliGRAM(s) Oral daily  glucagon  Injectable 1 milliGRAM(s) IntraMuscular once PRN  guaiFENesin    Syrup 100 milliGRAM(s) Oral every 6 hours PRN  insulin lispro (HumaLOG) corrective regimen sliding scale   SubCutaneous Before meals and at bedtime  magnesium hydroxide Suspension 30 milliLiter(s) Oral every 12 hours PRN  metoprolol succinate ER 25 milliGRAM(s) Oral daily  senna 2 Tablet(s) Oral at bedtime    IVF:  cholecalciferol 400 Unit(s) Oral daily  sodium chloride 0.9%. 1000 milliLiter(s) IV Continuous <Continuous>    CULTURES:  Culture Results:   Moderate Gram Negative Rods Identification and susceptibility to follow.  Accompanied by normal respiratory enedelia including Numerous Yeast (04-13 @ 07:57)    RADIOLOGY & ADDITIONAL TESTS:      ASSESSMENT:  68y Female w/ PMH of HTN, HLD, h/o Breast Ca, h/o Hodgkins Lymphoma, h/o ACDF C4-C6 now s/p ACDF C3-C4 extension to C6, PSF C2-T2, right iliac crest bone graft 4/5.     CERVICALSPINE M53.2X2  No pertinent family history in first degree relatives  Family history of colon cancer in mother (Mother)  Handoff  MEWS Score  Anxiety  History of breast cancer  Hodgkin disease  HLD (hyperlipidemia)  HTN (hypertension)  Breast CA  Cervical disc herniation  Carpal tunnel syndrome  Cervical disc disease  Hodgkin disease  Breast cancer  Cervical spine instability  Cervical spine instability  Degenerative cervical spinal stenosis  Hypoxemia  Elevated hemidiaphragm  Aspiration pneumonia of left lower lobe, unspecified aspiration pneumonia type  Other dysphagia  HLD (hyperlipidemia)  Dysphagia  PNA (pneumonia)  Cervical disc disease  Atrial fibrillation  Graft, bone, iliac crest  Fusion of 7 to 12 spinal segments by posterior approach  Revision of anterior cervical discectomy with fusion (ACDF)  History of carpal tunnel surgery  History of tonsillectomy  S/P cervical spinal fusion  History of Mohs micrographic surgery for skin cancer  History of lumpectomy of right breast  H/O lumpectomy  No significant past surgical history  History of lumpectomy  S/P appendectomy      PLAN:  -NPO/IVF @ midnight for possible PEG placement tomorrow, ENT for vocal cord analysis pre procedure,  -Recent rapid Afib, now on Amiodarone,  -Cont Vanc/Zosyn for aspiration pneumonia, trending leukocytosis,  -SQL/SCDs for dvt prophylaxis,  -Pain meds PRN, cont Neuro checks,  -[place] J collar at all times  -D/w Dr. Daley

## 2019-04-15 NOTE — CONSULT NOTE ADULT - ASSESSMENT
69 y/o female h/o NHL s/p XRT, now s/p Fusion of 7 to 12 spinal segments by posterior approach, Revision of anterior cervical discectomy with fusion (ACDF), with aspiration pneumonia, aspiration risk on speech eval, meeting <50% nutritional needs on recommendaded dysphagia 3 soft-honey fluid diet    WILL DISCUSS WITH CHIEF RESIDENT AND ATTENDING 67 y/o female h/o NHL s/p XRT, now s/p Fusion of 7 to 12 spinal segments by posterior approach, Revision of anterior cervical discectomy with fusion (ACDF), with aspiration pneumonia, aspiration risk on speech eval, meeting <50% nutritional needs on recommendaded dysphagia 3 soft-honey fluid diet    Added on to OR tuesday 4/16 for PEG tube  NPO AMN  Hold PM Lovenox dose  Replete electrolytes K>4    Remainder of care per primary team    Discussed with Chief Resident and Attending  4C will follow

## 2019-04-15 NOTE — PROGRESS NOTE ADULT - SUBJECTIVE AND OBJECTIVE BOX
Neurology Follow up note    Name  HILARIA KUMAR    HPI:  This is a 67 y/o female, well known to Dr. Daley with PMH of non-hodkins lymphoma (xrt in 1981) and breast ca (2000's), HTN, HLD, osteoporosis.  In 10/18, patient was evaluated for upper extremity weakness and underwent a carpal tunnel release and ulnar transposition without relief of symptoms.   in 11/18, patient was admitted to [facility] for b/l UE weakness and difficulty walking.  Her work up revealed cervical stenosis with compression.  However, at the time, her bone scan also was significant for uptake in her right acetabulum and at the time and the evaluating spine surgeon requested this lesion be addressed prior to any cervical surgery.  A CT of the region was negative and the patient was sent home for follow up. on 12/4/18, patient was seen in the [facility] ER again, this time with worsening symptoms and she was emergently taken to the OR for a three level ACDF C4-C6.  She was discharged after the surgery with an uncomplicated post op course.     Since this surgery, patient has dramatic improvements in her walking, neuropathy and radicular pain.  Follow up imaging with X-ray lead to further evaluation with CT which revealed possible loosening of hardware.  Clinically, patient still has left UE weakness that she thinks may be from her history of broken clavicle. No bowel, bladder dysfunction     She is here for definitive surgical evaluation. (05 Apr 2019 06:51)      Interval History - continuing to have dysphagia         REVIEW OF SYSTEMS    Vital Signs Last 24 Hrs  T(C): 37.1 (15 Apr 2019 08:07), Max: 37.6 (14 Apr 2019 16:35)  T(F): 98.8 (15 Apr 2019 08:07), Max: 99.6 (14 Apr 2019 16:35)  HR: 79 (15 Apr 2019 08:07) (70 - 90)  BP: 144/76 (15 Apr 2019 08:07) (106/55 - 144/76)  BP(mean): --  RR: 17 (15 Apr 2019 08:07) (16 - 18)  SpO2: 97% (15 Apr 2019 08:07) (93% - 98%)    Physical Exam-     Mental Status- awake and alert    Cranial Nerves- full EOM    Gait and station- n/a    Motor- moves all 4 extremities - proximal LUE weakness    Reflexes- no tremors     Sensation- no sensory level     Coordination-    Vascular -    Medications  acetaminophen   Tablet .. 650 milliGRAM(s) Oral every 6 hours PRN  amiodarone    Tablet 400 milliGRAM(s) Oral every 12 hours  anastrozole 1 milliGRAM(s) Oral daily  atorvastatin 40 milliGRAM(s) Oral at bedtime  benzocaine 15 mG/menthol 3.6 mG Lozenge 1 Lozenge Oral every 1 hour PRN  cholecalciferol 400 Unit(s) Oral daily  dextrose 40% Gel 15 Gram(s) Oral once PRN  dextrose 50% Injectable 12.5 Gram(s) IV Push once  dextrose 50% Injectable 25 Gram(s) IV Push once  dextrose 50% Injectable 25 Gram(s) IV Push once  docusate sodium Liquid 100 milliGRAM(s) Oral three times a day  enoxaparin Injectable 40 milliGRAM(s) SubCutaneous at bedtime  famotidine    Tablet 20 milliGRAM(s) Oral daily  gabapentin 600 milliGRAM(s) Oral at bedtime  glucagon  Injectable 1 milliGRAM(s) IntraMuscular once PRN  guaiFENesin    Syrup 100 milliGRAM(s) Oral every 6 hours PRN  insulin lispro (HumaLOG) corrective regimen sliding scale   SubCutaneous Before meals and at bedtime  magnesium hydroxide Suspension 30 milliLiter(s) Oral every 12 hours PRN  metoclopramide Injectable 10 milliGRAM(s) IV Push every 4 hours PRN  metoprolol succinate ER 25 milliGRAM(s) Oral daily  ondansetron Injectable 4 milliGRAM(s) IV Push every 6 hours PRN  piperacillin/tazobactam IVPB. 4.5 Gram(s) IV Intermittent every 6 hours  potassium chloride   Powder 40 milliEquivalent(s) Oral every 4 hours  potassium chloride  10 mEq/100 mL IVPB 10 milliEquivalent(s) IV Intermittent every 1 hour  pregabalin 100 milliGRAM(s) Oral two times a day  senna 2 Tablet(s) Oral at bedtime  sodium chloride 0.9%. 1000 milliLiter(s) IV Continuous <Continuous>  vancomycin  IVPB 1500 milliGRAM(s) IV Intermittent <User Schedule>      Lab      Radiology    Assessment- Cervical radiculopathy    Plan as per NS

## 2019-04-15 NOTE — PROGRESS NOTE ADULT - PROBLEM SELECTOR PLAN 2
Continue vancomycin and Zosyn. Await sputum culture. The patient is afebrile.  The WBC is elevated  and monitor. The source of increased WBC is unknown and she has no change in the CXR

## 2019-04-15 NOTE — CONSULT NOTE ADULT - SUBJECTIVE AND OBJECTIVE BOX
HPI  This is a 67 y/o female,PMH of non-hodkins lymphoma (xrt in ) and R breast ca () s/p lumpectomy/xrt, HTN, HLD, osteoporosis, PSHx C-sections, open appendectomy. Patient has history of dysphagia for many years.    , patient was admitted to [facility] for b/l UE weakness and difficulty walking.  Her work up revealed cervical stenosis with compression.  18, patient was seen in the [facility] ER again, with worsening symptoms and she was emergently taken to the OR for a three level ACDF C4-C6.  She was discharged after the surgery with an uncomplicated post op course.     Patient is now   STATUS POST:  Graft, bone, iliac crest  Fusion of 7 to 12 spinal segments by posterior approach  Revision of anterior cervical discectomy with fusion (ACDF)    Postoperative course was complicated by aspiration pneumonia, on vanc/zosyn, sputum Cx growing GNRs, Afib with RVR on metoprolol and amiodarone.    patient underwent MBS and  FEES:  moderate to severe pharyngoesophageal dysphagia + esophageal dysphagia with dilated esophagus and reduced esophageal peristalsis/dysmotility,  recommended for mechanical soft with sips of thin liquids with very strict safe feeding guidelines d/t silent aspiration of thin & puree.    Now: dysphagia 3 soft-honey fluid diet    With this diet, calorie count was performed and patient meets <50% of nutritional needs      SUBJECTIVE:   No acute events overnight.   Patient complains of cough.  No abdominal pain except when coughing, +nausea  Tolerating minimal diet    ---------------------------------------------------------------    Vital Signs Last 24 Hrs  T(C): 37.1 (15 Apr 2019 08:07), Max: 37.6 (2019 16:35)  T(F): 98.8 (15 Apr 2019 08:07), Max: 99.6 (2019 16:35)  HR: 79 (15 Apr 2019 08:07) (70 - 90)  BP: 144/76 (15 Apr 2019 08:07) (106/55 - 144/76)  BP(mean): --  RR: 17 (15 Apr 2019 08:07) (16 - 18)  SpO2: 97% (15 Apr 2019 08:07) (93% - 98%)    I&O's Summary    2019 07:01  -  15 Apr 2019 07:00  --------------------------------------------------------  IN: 3585 mL / OUT: 2100 mL / NET: 1485 mL        ----------------------------------------------------------------    Physical Exam:  General: NAD, Comfortable in bed     Neuro: A&Ox3  HEENT: cervical collar in place  Respiratory: nonlabored breathing, no respiratory distress    Abd: soft, nontender, nondistended,  well healed midline  and [place] appendectomy scars  Extremities: WWP, nonedematous, SCDs in place    -------------------------------------------------------------------    LABS:                        10.8   20.50 )-----------( 337      ( 15 Apr 2019 06:39 )             33.4     04-15    139  |  101  |  4<L>  ----------------------------<  114<H>  2.8<LL>   |  25  |  0.41<L>    Ca    8.7      15 Apr 2019 06:39  Phos  2.0     04-15  Mg     1.8     04-15      PT/INR - ( 15 Apr 2019 06:39 )   PT: 13.5 sec;   INR: 1.19          PTT - ( 2019 08:14 )  PTT:27.7 sec  Urinalysis Basic - ( 2019 11:25 )    Color: Yellow / Appearance: Clear / SG: >=1.030 / pH: x  Gluc: x / Ketone: Trace mg/dL  / Bili: Negative / Urobili: 0.2 E.U./dL   Blood: x / Protein: Trace mg/dL / Nitrite: NEGATIVE   Leuk Esterase: Trace / RBC: < 5 /HPF / WBC > 10 /HPF   Sq Epi: x / Non Sq Epi: 5-10 /HPF / Bacteria: Present /HPF          RADIOLOGY & ADDITIONAL STUDIES:

## 2019-04-15 NOTE — PROGRESS NOTE ADULT - SUBJECTIVE AND OBJECTIVE BOX
Interval Events: Reviewed  Patient seen and examined at bedside.    Patient is a 68y old  Female who presents with a chief complaint of Cervical hardware failure (15 Apr 2019 09:40)  she is still coughing.  She ate today    PAST MEDICAL & SURGICAL HISTORY:  Anxiety  History of breast cancer: s/p right lumpectomy , RT  Hodgkin disease: 1981, underwent radiation therapy  HLD (hyperlipidemia)  HTN (hypertension)  Cervical disc herniation  Carpal tunnel syndrome: b/l  Cervical disc disease  History of carpal tunnel surgery: 10/2018 left hand  History of tonsillectomy  S/P cervical spinal fusion: 12/15/2018 C4-C6 ACDF  History of Mohs micrographic surgery for skin cancer  History of lumpectomy of right breast: 2013      MEDICATIONS:  Pulmonary:  guaiFENesin    Syrup 100 milliGRAM(s) Oral every 6 hours PRN    Antimicrobials:  piperacillin/tazobactam IVPB. 4.5 Gram(s) IV Intermittent every 6 hours  vancomycin  IVPB 1500 milliGRAM(s) IV Intermittent <User Schedule>    Anticoagulants:    Cardiac:  amiodarone    Tablet 400 milliGRAM(s) Oral every 12 hours  metoprolol succinate ER 25 milliGRAM(s) Oral daily      Allergies    penicillin (Rash)    Intolerances        Vital Signs Last 24 Hrs  T(C): 37.2 (15 Apr 2019 13:46), Max: 37.6 (14 Apr 2019 23:52)  T(F): 98.9 (15 Apr 2019 13:46), Max: 99.6 (14 Apr 2019 23:52)  HR: 82 (15 Apr 2019 13:46) (70 - 82)  BP: 129/75 (15 Apr 2019 16:46) (107/75 - 144/76)  BP(mean): --  RR: 16 (15 Apr 2019 16:46) (16 - 17)  SpO2: 98% (15 Apr 2019 16:46) (94% - 98%)    04-14 @ 07:01  -  04-15 @ 07:00  --------------------------------------------------------  IN: 3585 mL / OUT: 2100 mL / NET: 1485 mL    04-15 @ 07:01  -  04-15 @ 21:26  --------------------------------------------------------  IN: 2274.8 mL / OUT: 2300 mL / NET: -25.2 mL          LABS:      CBC Full  -  ( 15 Apr 2019 06:39 )  WBC Count : 20.50 K/uL  RBC Count : 3.53 M/uL  Hemoglobin : 10.8 g/dL  Hematocrit : 33.4 %  Platelet Count - Automated : 337 K/uL  Mean Cell Volume : 94.6 fl  Mean Cell Hemoglobin : 30.6 pg  Mean Cell Hemoglobin Concentration : 32.3 gm/dL  Auto Neutrophil # : x  Auto Lymphocyte # : x  Auto Monocyte # : x  Auto Eosinophil # : x  Auto Basophil # : x  Auto Neutrophil % : x  Auto Lymphocyte % : x  Auto Monocyte % : x  Auto Eosinophil % : x  Auto Basophil % : x    04-15    139  |  101  |  4<L>  ----------------------------<  114<H>  2.8<LL>   |  25  |  0.41<L>    Ca    8.7      15 Apr 2019 06:39  Phos  2.0     04-15  Mg     1.8     04-15      PT/INR - ( 15 Apr 2019 06:39 )   PT: 13.5 sec;   INR: 1.19          PTT - ( 14 Apr 2019 08:14 )  PTT:27.7 sec      Urinalysis Basic - ( 14 Apr 2019 11:25 )    Color: Yellow / Appearance: Clear / SG: >=1.030 / pH: x  Gluc: x / Ketone: Trace mg/dL  / Bili: Negative / Urobili: 0.2 E.U./dL   Blood: x / Protein: Trace mg/dL / Nitrite: NEGATIVE   Leuk Esterase: Trace / RBC: < 5 /HPF / WBC > 10 /HPF   Sq Epi: x / Non Sq Epi: 5-10 /HPF / Bacteria: Present /HPF    < from: Xray Chest 1 View-PORTABLE IMMEDIATE (04.15.19 @ 10:53) >  EXAM:  XR CHEST PORTABLE IMMED 1V                          PROCEDURE DATE:  04/15/2019          INTERPRETATION:  Portable Chest X-Ray dated 4/15/2019 10:53 AM    Indication: evaluate pneumonia    Prior studies: 4/15/2019 at 6:14 AM    An AP portable view of the chest reveals no significant interval change   in the pulmonary pathology and support devices, as previously reported.    IMPRESSION:  No significant interval change.    < end of copied text >                  RADIOLOGY & ADDITIONAL STUDIES (The following images were personally reviewed):  Esposito:                                     No  Urine output:                       adequate  DVT prophylaxis:                 Yes  Flattus:                                  Yes  Bowel movement:              No

## 2019-04-15 NOTE — CONSULT NOTE ADULT - ATTENDING COMMENTS
Patient seen and examined.  For EGD, PEG 4/16.
Patient seen and examined with house-staff during bedside rounds.  Resident note read, including vitals, physical findings, laboratory data, and radiological reports.   Revisions included below.  Direct personal management at bed side and extensive interpretation of the data.  Plan was outlined and discussed in details with the housestaff.  Decision making of high complexity  Action taken for acute disease activity to reflect the level of care provided:  - medication reconciliation  - review laboratory data

## 2019-04-15 NOTE — PROGRESS NOTE ADULT - PROBLEM SELECTOR PLAN 1
she is still aspirating.  She was seen by ENT and she is NPO after MN and she is scheduled for PEG tomorrow

## 2019-04-16 LAB
ANION GAP SERPL CALC-SCNC: 11 MMOL/L — SIGNIFICANT CHANGE UP (ref 5–17)
BUN SERPL-MCNC: 4 MG/DL — LOW (ref 7–23)
CALCIUM SERPL-MCNC: 8.9 MG/DL — SIGNIFICANT CHANGE UP (ref 8.4–10.5)
CHLORIDE SERPL-SCNC: 103 MMOL/L — SIGNIFICANT CHANGE UP (ref 96–108)
CO2 SERPL-SCNC: 27 MMOL/L — SIGNIFICANT CHANGE UP (ref 22–31)
CREAT SERPL-MCNC: 0.46 MG/DL — LOW (ref 0.5–1.3)
GLUCOSE SERPL-MCNC: 110 MG/DL — HIGH (ref 70–99)
HCT VFR BLD CALC: 31.7 % — LOW (ref 34.5–45)
HGB BLD-MCNC: 10.3 G/DL — LOW (ref 11.5–15.5)
MAGNESIUM SERPL-MCNC: 1.8 MG/DL — SIGNIFICANT CHANGE UP (ref 1.6–2.6)
MCHC RBC-ENTMCNC: 30.7 PG — SIGNIFICANT CHANGE UP (ref 27–34)
MCHC RBC-ENTMCNC: 32.5 GM/DL — SIGNIFICANT CHANGE UP (ref 32–36)
MCV RBC AUTO: 94.3 FL — SIGNIFICANT CHANGE UP (ref 80–100)
NRBC # BLD: 0 /100 WBCS — SIGNIFICANT CHANGE UP (ref 0–0)
PHOSPHATE SERPL-MCNC: 3 MG/DL — SIGNIFICANT CHANGE UP (ref 2.5–4.5)
PLATELET # BLD AUTO: 323 K/UL — SIGNIFICANT CHANGE UP (ref 150–400)
POTASSIUM SERPL-MCNC: 3.4 MMOL/L — LOW (ref 3.5–5.3)
POTASSIUM SERPL-SCNC: 3.4 MMOL/L — LOW (ref 3.5–5.3)
RBC # BLD: 3.36 M/UL — LOW (ref 3.8–5.2)
RBC # FLD: 14 % — SIGNIFICANT CHANGE UP (ref 10.3–14.5)
SODIUM SERPL-SCNC: 141 MMOL/L — SIGNIFICANT CHANGE UP (ref 135–145)
WBC # BLD: 15.41 K/UL — HIGH (ref 3.8–10.5)
WBC # FLD AUTO: 15.41 K/UL — HIGH (ref 3.8–10.5)

## 2019-04-16 PROCEDURE — 99232 SBSQ HOSP IP/OBS MODERATE 35: CPT

## 2019-04-16 PROCEDURE — 43246 EGD PLACE GASTROSTOMY TUBE: CPT | Mod: GC

## 2019-04-16 PROCEDURE — 99232 SBSQ HOSP IP/OBS MODERATE 35: CPT | Mod: GC

## 2019-04-16 PROCEDURE — 99222 1ST HOSP IP/OBS MODERATE 55: CPT | Mod: GC,25

## 2019-04-16 RX ORDER — LANOLIN ALCOHOL/MO/W.PET/CERES
3 CREAM (GRAM) TOPICAL AT BEDTIME
Qty: 0 | Refills: 0 | Status: DISCONTINUED | OUTPATIENT
Start: 2019-04-16 | End: 2019-04-20

## 2019-04-16 RX ORDER — POTASSIUM CHLORIDE 20 MEQ
10 PACKET (EA) ORAL
Qty: 0 | Refills: 0 | Status: DISCONTINUED | OUTPATIENT
Start: 2019-04-16 | End: 2019-04-16

## 2019-04-16 RX ORDER — ENOXAPARIN SODIUM 100 MG/ML
40 INJECTION SUBCUTANEOUS AT BEDTIME
Qty: 0 | Refills: 0 | Status: DISCONTINUED | OUTPATIENT
Start: 2019-04-16 | End: 2019-04-20

## 2019-04-16 RX ORDER — POTASSIUM CHLORIDE 20 MEQ
40 PACKET (EA) ORAL EVERY 4 HOURS
Qty: 0 | Refills: 0 | Status: DISCONTINUED | OUTPATIENT
Start: 2019-04-16 | End: 2019-04-16

## 2019-04-16 RX ORDER — VANCOMYCIN HCL 1 G
1500 VIAL (EA) INTRAVENOUS EVERY 12 HOURS
Qty: 0 | Refills: 0 | Status: DISCONTINUED | OUTPATIENT
Start: 2019-04-16 | End: 2019-04-17

## 2019-04-16 RX ORDER — SENNA PLUS 8.6 MG/1
2 TABLET ORAL AT BEDTIME
Qty: 0 | Refills: 0 | Status: DISCONTINUED | OUTPATIENT
Start: 2019-04-16 | End: 2019-04-20

## 2019-04-16 RX ORDER — TRAMADOL HYDROCHLORIDE 50 MG/1
25 TABLET ORAL EVERY 4 HOURS
Qty: 0 | Refills: 0 | Status: DISCONTINUED | OUTPATIENT
Start: 2019-04-16 | End: 2019-04-20

## 2019-04-16 RX ORDER — HYDROMORPHONE HYDROCHLORIDE 2 MG/ML
0.5 INJECTION INTRAMUSCULAR; INTRAVENOUS; SUBCUTANEOUS ONCE
Qty: 0 | Refills: 0 | Status: DISCONTINUED | OUTPATIENT
Start: 2019-04-16 | End: 2019-04-16

## 2019-04-16 RX ORDER — AMIODARONE HYDROCHLORIDE 400 MG/1
400 TABLET ORAL EVERY 12 HOURS
Qty: 0 | Refills: 0 | Status: DISCONTINUED | OUTPATIENT
Start: 2019-04-16 | End: 2019-04-17

## 2019-04-16 RX ORDER — POTASSIUM CHLORIDE 20 MEQ
40 PACKET (EA) ORAL ONCE
Qty: 0 | Refills: 0 | Status: COMPLETED | OUTPATIENT
Start: 2019-04-16 | End: 2019-04-16

## 2019-04-16 RX ORDER — PIPERACILLIN AND TAZOBACTAM 4; .5 G/20ML; G/20ML
4.5 INJECTION, POWDER, LYOPHILIZED, FOR SOLUTION INTRAVENOUS EVERY 6 HOURS
Qty: 0 | Refills: 0 | Status: DISCONTINUED | OUTPATIENT
Start: 2019-04-16 | End: 2019-04-17

## 2019-04-16 RX ORDER — ACETAMINOPHEN 500 MG
650 TABLET ORAL EVERY 6 HOURS
Qty: 0 | Refills: 0 | Status: DISCONTINUED | OUTPATIENT
Start: 2019-04-16 | End: 2019-04-20

## 2019-04-16 RX ORDER — ENOXAPARIN SODIUM 100 MG/ML
40 INJECTION SUBCUTANEOUS DAILY
Qty: 0 | Refills: 0 | Status: DISCONTINUED | OUTPATIENT
Start: 2019-04-16 | End: 2019-04-16

## 2019-04-16 RX ORDER — TRAMADOL HYDROCHLORIDE 50 MG/1
50 TABLET ORAL EVERY 4 HOURS
Qty: 0 | Refills: 0 | Status: DISCONTINUED | OUTPATIENT
Start: 2019-04-16 | End: 2019-04-20

## 2019-04-16 RX ORDER — ANASTROZOLE 1 MG/1
1 TABLET ORAL DAILY
Qty: 0 | Refills: 0 | Status: DISCONTINUED | OUTPATIENT
Start: 2019-04-16 | End: 2019-04-20

## 2019-04-16 RX ORDER — CHOLECALCIFEROL (VITAMIN D3) 125 MCG
400 CAPSULE ORAL DAILY
Qty: 0 | Refills: 0 | Status: DISCONTINUED | OUTPATIENT
Start: 2019-04-16 | End: 2019-04-20

## 2019-04-16 RX ORDER — SERTRALINE 25 MG/1
50 TABLET, FILM COATED ORAL DAILY
Qty: 0 | Refills: 0 | Status: DISCONTINUED | OUTPATIENT
Start: 2019-04-16 | End: 2019-04-20

## 2019-04-16 RX ORDER — MAGNESIUM OXIDE 400 MG ORAL TABLET 241.3 MG
800 TABLET ORAL ONCE
Qty: 0 | Refills: 0 | Status: COMPLETED | OUTPATIENT
Start: 2019-04-16 | End: 2019-04-16

## 2019-04-16 RX ORDER — ONDANSETRON 8 MG/1
4 TABLET, FILM COATED ORAL EVERY 6 HOURS
Qty: 0 | Refills: 0 | Status: DISCONTINUED | OUTPATIENT
Start: 2019-04-16 | End: 2019-04-20

## 2019-04-16 RX ORDER — BENZOCAINE AND MENTHOL 5; 1 G/100ML; G/100ML
1 LIQUID ORAL
Qty: 0 | Refills: 0 | Status: DISCONTINUED | OUTPATIENT
Start: 2019-04-16 | End: 2019-04-20

## 2019-04-16 RX ORDER — FAMOTIDINE 10 MG/ML
20 INJECTION INTRAVENOUS DAILY
Qty: 0 | Refills: 0 | Status: DISCONTINUED | OUTPATIENT
Start: 2019-04-16 | End: 2019-04-20

## 2019-04-16 RX ORDER — HYDROMORPHONE HYDROCHLORIDE 2 MG/ML
0.5 INJECTION INTRAMUSCULAR; INTRAVENOUS; SUBCUTANEOUS
Qty: 0 | Refills: 0 | Status: DISCONTINUED | OUTPATIENT
Start: 2019-04-16 | End: 2019-04-16

## 2019-04-16 RX ORDER — MORPHINE SULFATE 50 MG/1
2 CAPSULE, EXTENDED RELEASE ORAL ONCE
Qty: 0 | Refills: 0 | Status: DISCONTINUED | OUTPATIENT
Start: 2019-04-16 | End: 2019-04-16

## 2019-04-16 RX ORDER — GABAPENTIN 400 MG/1
600 CAPSULE ORAL AT BEDTIME
Qty: 0 | Refills: 0 | Status: DISCONTINUED | OUTPATIENT
Start: 2019-04-16 | End: 2019-04-20

## 2019-04-16 RX ORDER — MAGNESIUM HYDROXIDE 400 MG/1
30 TABLET, CHEWABLE ORAL EVERY 12 HOURS
Qty: 0 | Refills: 0 | Status: DISCONTINUED | OUTPATIENT
Start: 2019-04-16 | End: 2019-04-20

## 2019-04-16 RX ORDER — ACETAMINOPHEN 500 MG
1000 TABLET ORAL ONCE
Qty: 0 | Refills: 0 | Status: COMPLETED | OUTPATIENT
Start: 2019-04-16 | End: 2019-04-16

## 2019-04-16 RX ORDER — METOPROLOL TARTRATE 50 MG
25 TABLET ORAL DAILY
Qty: 0 | Refills: 0 | Status: DISCONTINUED | OUTPATIENT
Start: 2019-04-16 | End: 2019-04-20

## 2019-04-16 RX ORDER — ATORVASTATIN CALCIUM 80 MG/1
40 TABLET, FILM COATED ORAL AT BEDTIME
Qty: 0 | Refills: 0 | Status: DISCONTINUED | OUTPATIENT
Start: 2019-04-16 | End: 2019-04-20

## 2019-04-16 RX ORDER — DEXTROSE MONOHYDRATE, SODIUM CHLORIDE, AND POTASSIUM CHLORIDE 50; .745; 4.5 G/1000ML; G/1000ML; G/1000ML
1000 INJECTION, SOLUTION INTRAVENOUS
Qty: 0 | Refills: 0 | Status: DISCONTINUED | OUTPATIENT
Start: 2019-04-16 | End: 2019-04-17

## 2019-04-16 RX ADMIN — HYDROMORPHONE HYDROCHLORIDE 0.5 MILLIGRAM(S): 2 INJECTION INTRAMUSCULAR; INTRAVENOUS; SUBCUTANEOUS at 15:15

## 2019-04-16 RX ADMIN — AMIODARONE HYDROCHLORIDE 400 MILLIGRAM(S): 400 TABLET ORAL at 18:37

## 2019-04-16 RX ADMIN — HYDROMORPHONE HYDROCHLORIDE 0.5 MILLIGRAM(S): 2 INJECTION INTRAMUSCULAR; INTRAVENOUS; SUBCUTANEOUS at 12:23

## 2019-04-16 RX ADMIN — ENOXAPARIN SODIUM 40 MILLIGRAM(S): 100 INJECTION SUBCUTANEOUS at 21:22

## 2019-04-16 RX ADMIN — SERTRALINE 50 MILLIGRAM(S): 25 TABLET, FILM COATED ORAL at 18:37

## 2019-04-16 RX ADMIN — Medication 10 MILLIGRAM(S): at 05:37

## 2019-04-16 RX ADMIN — GABAPENTIN 600 MILLIGRAM(S): 400 CAPSULE ORAL at 21:22

## 2019-04-16 RX ADMIN — Medication 300 MILLIGRAM(S): at 00:41

## 2019-04-16 RX ADMIN — ATORVASTATIN CALCIUM 40 MILLIGRAM(S): 80 TABLET, FILM COATED ORAL at 21:21

## 2019-04-16 RX ADMIN — MAGNESIUM OXIDE 400 MG ORAL TABLET 800 MILLIGRAM(S): 241.3 TABLET ORAL at 18:37

## 2019-04-16 RX ADMIN — MORPHINE SULFATE 2 MILLIGRAM(S): 50 CAPSULE, EXTENDED RELEASE ORAL at 11:47

## 2019-04-16 RX ADMIN — PIPERACILLIN AND TAZOBACTAM 200 GRAM(S): 4; .5 INJECTION, POWDER, LYOPHILIZED, FOR SOLUTION INTRAVENOUS at 05:32

## 2019-04-16 RX ADMIN — HYDROMORPHONE HYDROCHLORIDE 0.5 MILLIGRAM(S): 2 INJECTION INTRAMUSCULAR; INTRAVENOUS; SUBCUTANEOUS at 14:52

## 2019-04-16 RX ADMIN — HYDROMORPHONE HYDROCHLORIDE 0.5 MILLIGRAM(S): 2 INJECTION INTRAMUSCULAR; INTRAVENOUS; SUBCUTANEOUS at 18:38

## 2019-04-16 RX ADMIN — Medication 1000 MILLIGRAM(S): at 11:56

## 2019-04-16 RX ADMIN — HYDROMORPHONE HYDROCHLORIDE 0.5 MILLIGRAM(S): 2 INJECTION INTRAMUSCULAR; INTRAVENOUS; SUBCUTANEOUS at 12:27

## 2019-04-16 RX ADMIN — HYDROMORPHONE HYDROCHLORIDE 0.5 MILLIGRAM(S): 2 INJECTION INTRAMUSCULAR; INTRAVENOUS; SUBCUTANEOUS at 23:00

## 2019-04-16 RX ADMIN — Medication 100 MILLIEQUIVALENT(S): at 09:47

## 2019-04-16 RX ADMIN — MORPHINE SULFATE 2 MILLIGRAM(S): 50 CAPSULE, EXTENDED RELEASE ORAL at 12:01

## 2019-04-16 RX ADMIN — Medication 400 MILLIGRAM(S): at 11:47

## 2019-04-16 RX ADMIN — Medication 300 MILLIGRAM(S): at 22:49

## 2019-04-16 RX ADMIN — HYDROMORPHONE HYDROCHLORIDE 0.5 MILLIGRAM(S): 2 INJECTION INTRAMUSCULAR; INTRAVENOUS; SUBCUTANEOUS at 22:42

## 2019-04-16 RX ADMIN — HYDROMORPHONE HYDROCHLORIDE 0.5 MILLIGRAM(S): 2 INJECTION INTRAMUSCULAR; INTRAVENOUS; SUBCUTANEOUS at 19:00

## 2019-04-16 RX ADMIN — Medication 100 MILLIEQUIVALENT(S): at 08:57

## 2019-04-16 RX ADMIN — DEXTROSE MONOHYDRATE, SODIUM CHLORIDE, AND POTASSIUM CHLORIDE 75 MILLILITER(S): 50; .745; 4.5 INJECTION, SOLUTION INTRAVENOUS at 14:54

## 2019-04-16 RX ADMIN — Medication 40 MILLIEQUIVALENT(S): at 18:37

## 2019-04-16 RX ADMIN — PIPERACILLIN AND TAZOBACTAM 200 GRAM(S): 4; .5 INJECTION, POWDER, LYOPHILIZED, FOR SOLUTION INTRAVENOUS at 21:36

## 2019-04-16 NOTE — BRIEF OPERATIVE NOTE - NSICDXBRIEFPOSTOP_GEN_ALL_CORE_FT
POST-OP DIAGNOSIS:  Cervical spine instability 05-Apr-2019 17:25:57  Manuelito Guaman
POST-OP DIAGNOSIS:  Cervical spine instability 05-Apr-2019 17:25:57  Manuelito Guaman

## 2019-04-16 NOTE — PROGRESS NOTE ADULT - PROBLEM SELECTOR PLAN 2
Vision is to continue on the current antibiotic. Duration about 7 days. Chest x-ray was unchanged. The pulmonary status is stable for the procedure. Patient is to be cleared by neurosurgery regarding her spine for the endoscopy

## 2019-04-16 NOTE — PROGRESS NOTE ADULT - PROBLEM SELECTOR PLAN 1
The patient is scheduled today for pack. Patient is n.p.o. The condition is most likely related to vocal cord paralysis. The patient was seen by ENT.

## 2019-04-16 NOTE — PROGRESS NOTE ADULT - SUBJECTIVE AND OBJECTIVE BOX
Chief Complaint/Reason for Consult: afib  INTERVAL HPI: for PEG no afib no rvr  	  MEDICATIONS:                  REVIEW OF SYSTEMS:  [x] As per HPI  CONSTITUTIONAL: No fever, weight loss, or fatigue  RESPIRATORY: No cough, wheezing, chills or hemoptysis; No Shortness of Breath  CARDIOVASCULAR: No chest pain, palpitations, dizziness, or leg swelling  GASTROINTESTINAL: No abdominal or epigastric pain. No nausea, vomiting, or hematemesis; No diarrhea or constipation. No melena or hematochezia.  MUSCULOSKELETAL: No joint pain or swelling; No muscle, back, or extremity pain  [x] All others negative	  [ ] Unable to obtain    PHYSICAL EXAM:  T(C): 37.3 (04-16-19 @ 08:57), Max: 37.3 (04-16-19 @ 08:57)  HR: 85 (04-16-19 @ 09:37) (72 - 85)  BP: 122/68 (04-16-19 @ 09:37) (107/75 - 136/67)  RR: 18 (04-16-19 @ 09:37) (16 - 18)  SpO2: 93% (04-16-19 @ 09:37) (93% - 98%)  Wt(kg): --  I&O's Summary    15 Apr 2019 07:01  -  16 Apr 2019 07:00  --------------------------------------------------------  IN: 2274.8 mL / OUT: 2900 mL / NET: -625.2 mL    16 Apr 2019 07:01  -  16 Apr 2019 11:12  --------------------------------------------------------  IN: 350 mL / OUT: 300 mL / NET: 50 mL      Height (cm): 170.18 (04-16 @ 09:37)  Weight (kg): 68.3 (04-16 @ 09:37)  BMI (kg/m2): 23.6 (04-16 @ 09:37)  BSA (m2): 1.79 (04-16 @ 09:37)    Appearance: Normal	  HEENT:   Normal oral mucosa  Cardiovascular: Normal S1 S2, No JVD, No murmurs, No edema  Respiratory: Lungs clear to auscultation	  Gastrointestinal:  Soft, Non-tender, + BS	  Extremities: Normal range of motion, No clubbing, cyanosis or edema  Vascular: Peripheral pulses palpable 2+ bilaterally    TELEMETRY: 	    ECG:   	  RADIOLOGY:   CXR:  CT:  US:    CARDIAC TESTING:  Echocardiogram:  Catheterization:  Stress Test:      LABS:	 	    CARDIAC MARKERS:                                  10.3   15.41 )-----------( 323      ( 16 Apr 2019 05:39 )             31.7     04-16    141  |  103  |  4<L>  ----------------------------<  110<H>  3.4<L>   |  27  |  0.46<L>    Ca    8.9      16 Apr 2019 05:39  Phos  3.0     04-16  Mg     1.8     04-16      proBNP:   Lipid Profile:   HgA1c:   TSH:     ASSESSMENT/PLAN: 	    #AFIB    on amio load 400 mg BID 4/10/19; c/w amio 400 mg BID X 7 days (day 7today)  TOMORROW start amio 200 mg daily.     neurosurgery to decide if safe for patient to be transitioned to eliquis, follow up recs    - Echo 4/8/19 revealed EF 65%, trace MR, moderate TR, mild pulmHTN, PAP 48mmHg, mild WA.    If NPO: hold amiodarone po, start MTP 2.5mg IV q6hr and titrate to 5mg is HR not at goal (60-80bpm),   can augment with PRN MTP IVP 2.5mg RVR  aggressive IV hydration  If return to RVR with hypotension, then rebolus Amiodarone 150mg IV over 10min and run Amio gtt over 24 hours

## 2019-04-16 NOTE — BRIEF OPERATIVE NOTE - NSICDXBRIEFPROCEDURE_GEN_ALL_CORE_FT
PROCEDURES:  Gastrostomy tube placement 16-Apr-2019 11:26:58  Leelee Ibarra
PROCEDURES:  Graft, bone, iliac crest 05-Apr-2019 17:29:04 Right Manuelito Guaman  Fusion of 7 to 12 spinal segments by posterior approach 05-Apr-2019 17:28:21 C2-T2 PSF with right iliac crest autograft Manuelito Guaman  Revision of anterior cervical discectomy with fusion (ACDF) 05-Apr-2019 17:26:53 ACDF C3-C4, extension of fusion C3-C6 Manuelito Guaman

## 2019-04-16 NOTE — PROGRESS NOTE ADULT - SUBJECTIVE AND OBJECTIVE BOX
Neurology Follow up note    Name  HILARIA KUMAR    HPI:  This is a 67 y/o female, well known to Dr. Daley with PMH of non-hodkins lymphoma (xrt in 1981) and breast ca (2000's), HTN, HLD, osteoporosis.  In 10/18, patient was evaluated for upper extremity weakness and underwent a carpal tunnel release and ulnar transposition without relief of symptoms.   in 11/18, patient was admitted to [facility] for b/l UE weakness and difficulty walking.  Her work up revealed cervical stenosis with compression.  However, at the time, her bone scan also was significant for uptake in her right acetabulum and at the time and the evaluating spine surgeon requested this lesion be addressed prior to any cervical surgery.  A CT of the region was negative and the patient was sent home for follow up. on 12/4/18, patient was seen in the [facility] ER again, this time with worsening symptoms and she was emergently taken to the OR for a three level ACDF C4-C6.  She was discharged after the surgery with an uncomplicated post op course.     Since this surgery, patient has dramatic improvements in her walking, neuropathy and radicular pain.  Follow up imaging with X-ray lead to further evaluation with CT which revealed possible loosening of hardware.  Clinically, patient still has left UE weakness that she thinks may be from her history of broken clavicle. No bowel, bladder dysfunction     She is here for definitive surgical evaluation. (05 Apr 2019 06:51)      Interval History - dysphagia and LUE weakness        REVIEW OF SYSTEMS    Vital Signs Last 24 Hrs  T(C): 36.1 (16 Apr 2019 11:26), Max: 37.3 (16 Apr 2019 08:57)  T(F): 97 (16 Apr 2019 11:26), Max: 99.1 (16 Apr 2019 08:57)  HR: 78 (16 Apr 2019 11:56) (72 - 85)  BP: 133/70 (16 Apr 2019 11:56) (108/59 - 153/82)  BP(mean): 95 (16 Apr 2019 11:56) (95 - 105)  RR: 34 (16 Apr 2019 11:56) (16 - 37)  SpO2: 93% (16 Apr 2019 11:56) (93% - 98%)    Physical Exam-     Mental Status- awake and alert    Cranial Nerves- full EOM    Gait and station- n/a    Motor- moves all 4 extremities- proximal LUE weakness    Reflexes- decreased    Sensation- no sensory level    Coordination- no tremors    Vascular - no bruits    Medications      Lab      Radiology    Assessment- Cervical radiculopathy    Plan PEG tube

## 2019-04-16 NOTE — PRE-OP CHECKLIST - 1.
10;10 received patient a+O and in no apparent distress, IV sites No s/s of infiltration. buttocks examined and signs of redness noted around the anal area otherwise clean dry and intact. 10;10 received patient a+Ox3 and in no apparent distress, IV sites No s/s of infiltration. buttocks examined and signs of redness noted around the anal area otherwise, c  skin clean dry and intact.

## 2019-04-16 NOTE — PROGRESS NOTE ADULT - ASSESSMENT
ENT Progress note    HPI: 68F s/p ACDF repair with Dr. Daley, approach done by Dr. Orozco on 4/5/19. Since surgery has had dysphagia and underwent PEG placement today 4/16/19. No respiratory distress.     Allergies: penicillin (Rash)    PAST MEDICAL & SURGICAL HISTORY:  Anxiety  History of breast cancer: s/p right lumpectomy , RT  Hodgkin disease: 1981, underwent radiation therapy  HLD (hyperlipidemia)  HTN (hypertension)  Cervical disc herniation  Carpal tunnel syndrome: b/l  Cervical disc disease  History of carpal tunnel surgery: 10/2018 left hand  History of tonsillectomy  S/P cervical spinal fusion: 12/15/2018 C4-C6 ACDF  History of Mohs micrographic surgery for skin cancer  History of lumpectomy of right breast: 2013    FAMILY HISTORY:  Family history of colon cancer in mother (Mother)      MEDICATIONS:  Hematologic/Anticoagulation:  enoxaparin Injectable 40 milliGRAM(s) SubCutaneous at bedtime    Pain medications/Neuro:  acetaminophen   Tablet .. 650 milliGRAM(s) Oral every 6 hours PRN  gabapentin 600 milliGRAM(s) Oral at bedtime  HYDROmorphone  Injectable 0.5 milliGRAM(s) IV Push every 2 hours PRN  melatonin 3 milliGRAM(s) Oral at bedtime PRN  ondansetron Injectable 4 milliGRAM(s) IV Push every 6 hours PRN  sertraline 50 milliGRAM(s) Oral daily  traMADol 25 milliGRAM(s) Oral every 4 hours PRN  traMADol 50 milliGRAM(s) Oral every 4 hours PRN    IV fluids:  cholecalciferol 400 Unit(s) Oral daily  sodium chloride 0.9% with potassium chloride 20 mEq/L 1000 milliLiter(s) IV Continuous <Continuous>    Endocrine Medications:   atorvastatin 40 milliGRAM(s) Oral at bedtime    All other standing medications:   amiodarone    Tablet 400 milliGRAM(s) Oral every 12 hours  anastrozole 1 milliGRAM(s) Oral daily  famotidine    Tablet 20 milliGRAM(s) Oral daily  metoprolol succinate ER 25 milliGRAM(s) Oral daily  senna 2 Tablet(s) Oral at bedtime    All other PRN medicatons:  benzocaine 15 mG/menthol 3.6 mG Lozenge 1 Lozenge Oral every 1 hour PRN  guaiFENesin    Syrup 100 milliGRAM(s) Oral every 6 hours PRN  magnesium hydroxide Suspension 30 milliLiter(s) Oral every 12 hours PRN    Vital Signs Last 24 Hrs  T(C): 36.7 (16 Apr 2019 16:40), Max: 37.3 (16 Apr 2019 08:57)  T(F): 98 (16 Apr 2019 16:40), Max: 99.1 (16 Apr 2019 08:57)  HR: 82 (16 Apr 2019 16:40) (72 - 85)  BP: 123/62 (16 Apr 2019 16:40) (106/65 - 153/82)  BP(mean): 80 (16 Apr 2019 13:00) (80 - 105)  RR: 16 (16 Apr 2019 16:40) (16 - 37)  SpO2: 95% (16 Apr 2019 16:40) (93% - 98%)    LABS:                        10.3   15.41 )-----------( 323      ( 16 Apr 2019 05:39 )             31.7       141  |  103  |  4<L>  ----------------------------<  110<H>  3.4<L>   |  27  |  0.46<L>    Ca    8.9      16 Apr 2019 05:39  Phos  3.0     04-16  Mg     1.8     04-16      Coagulation Studies-  PT/INR - ( 15 Apr 2019 06:39 )   PT: 13.5 sec;   INR: 1.19         PHYSICAL EXAM:  Has neck brace in place  breathing comfortably on room air, no stridor  voice strong  tolerating secretions  FN intact and symmetric    LARYNGOSCOPY EXAM:     -Verbal consent was obtained from patient prior to procedure.    Indication:    Anesthesia: Afrin spray was applied to the nasal cavities.    Flexible laryngoscopy was performed and revealed the following:    -- Nasopharynx had no mass or exudate.    -- Base of tongue was symmetric and not enlarged.    -- Vallecula was clear    -- Epiglottis, both aryepiglottic folds and both false vocal folds were normal    -- Arytenoids both without edema and erythema     -- True vocal folds were fully mobile and without lesions.     -- Post cricoid area was clear.    -- Interarytenoid edema was absent    The patient tolerated the procedure well.    IMPRESSION  - no evidence of vocal cord paralysis    PLAN  - continue swallowing rehabilitation with SLP  - continue tube feeds         [facility] at [phone] with any questions/concerns.

## 2019-04-16 NOTE — CHART NOTE - NSCHARTNOTEFT_GEN_A_CORE
STATUS POST:  PEG tube placement    SUBJECTIVE: Pt seen at bedside, had abdominal pain (on PEG side, not diffuse) in PACU but resolved after pain meds. Pain now under controlled, no nausea/vomiting.    Vital Signs Last 24 Hrs  T(C): 36.5 (16 Apr 2019 14:11), Max: 37.3 (16 Apr 2019 08:57)  T(F): 97.7 (16 Apr 2019 14:11), Max: 99.1 (16 Apr 2019 08:57)  HR: 77 (16 Apr 2019 14:11) (72 - 85)  BP: 109/54 (16 Apr 2019 14:11) (106/65 - 153/82)  BP(mean): 80 (16 Apr 2019 13:00) (80 - 105)  RR: 17 (16 Apr 2019 14:11) (16 - 37)  SpO2: 95% (16 Apr 2019 14:11) (93% - 98%)  I&O's Summary    15 Apr 2019 07:01  -  16 Apr 2019 07:00  --------------------------------------------------------  IN: 2274.8 mL / OUT: 2900 mL / NET: -625.2 mL    16 Apr 2019 07:01  -  16 Apr 2019 14:15  --------------------------------------------------------  IN: 450 mL / OUT: 300 mL / NET: 150 mL      I&O's Detail    15 Apr 2019 07:01  -  16 Apr 2019 07:00  --------------------------------------------------------  IN:    IV PiggyBack: 600 mL    sodium chloride 0.9%: 300 mL    sodium chloride 0.9% with potassium chloride 20 mEq/L: 425 mL    Solution: 499.8 mL    Solution: 450 mL  Total IN: 2274.8 mL    OUT:    Voided: 2900 mL  Total OUT: 2900 mL    Total NET: -625.2 mL      16 Apr 2019 07:01  -  16 Apr 2019 14:15  --------------------------------------------------------  IN:    IV PiggyBack: 200 mL    sodium chloride 0.9% with potassium chloride 20 mEq/L: 150 mL    Solution: 100 mL  Total IN: 450 mL    OUT:    Voided: 300 mL  Total OUT: 300 mL    Total NET: 150 mL            LABS:                        10.3   15.41 )-----------( 323      ( 16 Apr 2019 05:39 )             31.7     04-16    141  |  103  |  4<L>  ----------------------------<  110<H>  3.4<L>   |  27  |  0.46<L>    Ca    8.9      16 Apr 2019 05:39  Phos  3.0     04-16  Mg     1.8     04-16      PT/INR - ( 15 Apr 2019 06:39 )   PT: 13.5 sec;   INR: 1.19                Physical exam :    Abdomen is soft, non tender, mildly dilated, PEG tube intact, no leak      A/P: 68y Female s/p PEG tube placement.    - Diet: NPO except meds through the PEG, Tube feeds to start tomorrow at 11am  - The rest of plan as per primary team

## 2019-04-16 NOTE — PROGRESS NOTE ADULT - SUBJECTIVE AND OBJECTIVE BOX
Subjective:  Doing well, NPO for PEG today      Vital Signs Last 24 Hrs  T(C): 37.1 (16 Apr 2019 04:45), Max: 37.2 (15 Apr 2019 13:46)  T(F): 98.7 (16 Apr 2019 04:45), Max: 98.9 (15 Apr 2019 13:46)  HR: 79 (16 Apr 2019 04:45) (72 - 82)  BP: 136/67 (16 Apr 2019 04:45) (107/75 - 144/76)  BP(mean): --  RR: 18 (16 Apr 2019 04:45) (16 - 18)  SpO2: 98% (16 Apr 2019 04:45) (96% - 98%)    Physical Exam:  General: NAD, resting comfortably in bed  Pulmonary: Nonlabored breathing, no respiratory distress  Abdominal: soft, NT/ND      LABS:                        10.3   15.41 )-----------( 323      ( 16 Apr 2019 05:39 )             31.7     04-16    141  |  103  |  4<L>  ----------------------------<  110<H>  3.4<L>   |  27  |  0.46<L>    Ca    8.9      16 Apr 2019 05:39  Phos  2.0     04-15  Mg     1.8     04-15      PT/INR - ( 15 Apr 2019 06:39 )   PT: 13.5 sec;   INR: 1.19          PTT - ( 14 Apr 2019 08:14 )  PTT:27.7 sec  CAPILLARY BLOOD GLUCOSE      POCT Blood Glucose.: 99 mg/dL (15 Apr 2019 16:59)  POCT Blood Glucose.: 106 mg/dL (15 Apr 2019 11:53)    Urinalysis Basic - ( 14 Apr 2019 11:25 )    Color: Yellow / Appearance: Clear / SG: >=1.030 / pH: x  Gluc: x / Ketone: Trace mg/dL  / Bili: Negative / Urobili: 0.2 E.U./dL   Blood: x / Protein: Trace mg/dL / Nitrite: NEGATIVE   Leuk Esterase: Trace / RBC: < 5 /HPF / WBC > 10 /HPF   Sq Epi: x / Non Sq Epi: 5-10 /HPF / Bacteria: Present /HPF

## 2019-04-16 NOTE — CHART NOTE - NSCHARTNOTEFT_GEN_A_CORE
Admitting Diagnosis:   Patient is a 68y old  Female who presents with a chief complaint of Cervical hardware failure (16 Apr 2019 12:24)      PAST MEDICAL & SURGICAL HISTORY:  Anxiety  History of breast cancer: s/p right lumpectomy , RT  Hodgkin disease: 1981, underwent radiation therapy  HLD (hyperlipidemia)  HTN (hypertension)  Cervical disc herniation  Carpal tunnel syndrome: b/l  Cervical disc disease  History of carpal tunnel surgery: 10/2018 left hand  History of tonsillectomy  S/P cervical spinal fusion: 12/15/2018 C4-C6 ACDF  History of Mohs micrographic surgery for skin cancer  History of lumpectomy of right breast: 2013      Current Nutrition Order:   Diet, NPO (04-16-19 @ 13:45)    PO Intake: Good (%) [   ]  Fair (50-75%) [  x ] Poor (<25%) [   ]    GI Issues: No apparent GI distress per EMR, last BM 4/15    Pain: Unable to assess pain at this time     Skin Integrity: No edema, +surgical incision     Labs:   04-16    141  |  103  |  4<L>  ----------------------------<  110<H>  3.4<L>   |  27  |  0.46<L>    Ca    8.9      16 Apr 2019 05:39  Phos  3.0     04-16  Mg     1.8     04-16      CAPILLARY BLOOD GLUCOSE      POCT Blood Glucose.: 99 mg/dL (15 Apr 2019 16:59)      Medications:  MEDICATIONS  (STANDING):  amiodarone    Tablet 400 milliGRAM(s) Oral every 12 hours  anastrozole 1 milliGRAM(s) Oral daily  atorvastatin 40 milliGRAM(s) Oral at bedtime  cholecalciferol 400 Unit(s) Oral daily  enoxaparin Injectable 40 milliGRAM(s) SubCutaneous at bedtime  enoxaparin Injectable 40 milliGRAM(s) SubCutaneous daily  famotidine    Tablet 20 milliGRAM(s) Oral daily  gabapentin 600 milliGRAM(s) Oral at bedtime  metoprolol succinate ER 25 milliGRAM(s) Oral daily  senna 2 Tablet(s) Oral at bedtime  sertraline 50 milliGRAM(s) Oral daily  sodium chloride 0.9% with potassium chloride 20 mEq/L 1000 milliLiter(s) (75 mL/Hr) IV Continuous <Continuous>    MEDICATIONS  (PRN):  acetaminophen   Tablet .. 650 milliGRAM(s) Oral every 6 hours PRN Temp greater or equal to 38C (100.4F), Mild Pain (1 - 3)  benzocaine 15 mG/menthol 3.6 mG Lozenge 1 Lozenge Oral every 1 hour PRN Sore Throat  guaiFENesin    Syrup 100 milliGRAM(s) Oral every 6 hours PRN Cough  HYDROmorphone  Injectable 0.5 milliGRAM(s) IV Push every 2 hours PRN Severe Pain (7 - 10)  magnesium hydroxide Suspension 30 milliLiter(s) Oral every 12 hours PRN Constipation  melatonin 3 milliGRAM(s) Oral at bedtime PRN Insomnia  ondansetron Injectable 4 milliGRAM(s) IV Push every 6 hours PRN Nausea  traMADol 25 milliGRAM(s) Oral every 4 hours PRN Moderate Pain (4 - 6)  traMADol 50 milliGRAM(s) Oral every 4 hours PRN Severe Pain (7 - 10)      Weight:  68.3kg- admit wt    Weight Change: No new wts to assess, Request obtain updated wt     Nutrition Focused Physical Exam: Completed [   ]  Not Pertinent [  x ]    Estimated energy needs:   Height: 5'7" Weight: 150.5 lbs,  lbs+/-10%, %%, BMI 23.6,   UBW used to calculate EER as per pt's current body weight is within % IBW   Estimated nutrient needs based on [facility] SOC for maintenance in older adults.  20-25kcal/kg= 1350-1687kcal  1.0-1.2gms pro/kg= 67.5-81gms pro  30-35ml/kg= 2025-2362ml    Subjective:   Pt seen for follow up. 68 y.o F from home with PMHx of non-Hodgkin lymphoma s/p XRT 1981 and Breast CA (2000’s), HTN, HLD, OP. Pt admitted and underwent ACDF C3-C4 extension to C6, PSF C2-T2 4/4. Pt transferred to [facility] for Afib with RVR in setting of aspiration PNA. Pt c/o anorexia, dysphagia xfew days PTA. Pt previously on mechanical soft diet with thin liquids 4/8, however, pt w/ continued complaints of dysphagia and excessive coughing, exacerbated during meal time per pt. Per SLP 4/12: "if GOC are c/w full oral intake, consider smooth/moist purees and small sips of thin liquid. If GOC are to avoid prandial aspiration & optimize nutritional intake consider long-term alternative means of nutrition/hydration & medication". Pt now NPO for PEG today. Per MD, pt will continue to eat PO, and requested EN to meet ~75% EER. Recs below. RD to follow up per protocol.     Previous Nutrition Diagnosis:  Inadequate energy intake R/T anorexia, dysphagia AEB pt with poor <45% to fair 50-74% PO intake of meals    Active [ x  ]  Resolved [   ]    If resolved, new PES:     Goal: Pt to receive >75% EER     Recommendations:  1. As medically feasible, recommend initiate EN: Jevity 1.2 via PEG, starting at 10ml/hr and increasing by 10ml per hour or as tolerated by pt until goal rate has been met. Goal rate of 47ml/hr x24hrs providing 1128ml TV, 1354kcal, 63gms pro, and 910ml water. Additional fluids per team to meet needs (receiving IVF). Monitor for s/s intolerance, maintain aspiration precautions at all times.   2. Monitor lytes and replete PRN  3. Updated wt  4. Per MD, PO intake as tolerated by pt (regular diet, texture per SLP: moist puree w/ thin liquids).  *d/w team     Education: Unable to educate as pt not on floor during time of assessment     Risk Level: High [ x  ] Moderate [   ] Low [   ] Admitting Diagnosis:   Patient is a 68y old  Female who presents with a chief complaint of Cervical hardware failure (16 Apr 2019 12:24)      PAST MEDICAL & SURGICAL HISTORY:  Anxiety  History of breast cancer: s/p right lumpectomy , RT  Hodgkin disease: 1981, underwent radiation therapy  HLD (hyperlipidemia)  HTN (hypertension)  Cervical disc herniation  Carpal tunnel syndrome: b/l  Cervical disc disease  History of carpal tunnel surgery: 10/2018 left hand  History of tonsillectomy  S/P cervical spinal fusion: 12/15/2018 C4-C6 ACDF  History of Mohs micrographic surgery for skin cancer  History of lumpectomy of right breast: 2013      Current Nutrition Order:   Diet, NPO (04-16-19 @ 13:45)    PO Intake: Good (%) [   ]  Fair (50-75%) [  x ] Poor (<25%) [   ]    GI Issues: No apparent GI distress per EMR, last BM 4/15    Pain: Unable to assess pain at this time     Skin Integrity: No edema, +surgical incision     Labs:   04-16    141  |  103  |  4<L>  ----------------------------<  110<H>  3.4<L>   |  27  |  0.46<L>    Ca    8.9      16 Apr 2019 05:39  Phos  3.0     04-16  Mg     1.8     04-16      CAPILLARY BLOOD GLUCOSE      POCT Blood Glucose.: 99 mg/dL (15 Apr 2019 16:59)      Medications:  MEDICATIONS  (STANDING):  amiodarone    Tablet 400 milliGRAM(s) Oral every 12 hours  anastrozole 1 milliGRAM(s) Oral daily  atorvastatin 40 milliGRAM(s) Oral at bedtime  cholecalciferol 400 Unit(s) Oral daily  enoxaparin Injectable 40 milliGRAM(s) SubCutaneous at bedtime  enoxaparin Injectable 40 milliGRAM(s) SubCutaneous daily  famotidine    Tablet 20 milliGRAM(s) Oral daily  gabapentin 600 milliGRAM(s) Oral at bedtime  metoprolol succinate ER 25 milliGRAM(s) Oral daily  senna 2 Tablet(s) Oral at bedtime  sertraline 50 milliGRAM(s) Oral daily  sodium chloride 0.9% with potassium chloride 20 mEq/L 1000 milliLiter(s) (75 mL/Hr) IV Continuous <Continuous>    MEDICATIONS  (PRN):  acetaminophen   Tablet .. 650 milliGRAM(s) Oral every 6 hours PRN Temp greater or equal to 38C (100.4F), Mild Pain (1 - 3)  benzocaine 15 mG/menthol 3.6 mG Lozenge 1 Lozenge Oral every 1 hour PRN Sore Throat  guaiFENesin    Syrup 100 milliGRAM(s) Oral every 6 hours PRN Cough  HYDROmorphone  Injectable 0.5 milliGRAM(s) IV Push every 2 hours PRN Severe Pain (7 - 10)  magnesium hydroxide Suspension 30 milliLiter(s) Oral every 12 hours PRN Constipation  melatonin 3 milliGRAM(s) Oral at bedtime PRN Insomnia  ondansetron Injectable 4 milliGRAM(s) IV Push every 6 hours PRN Nausea  traMADol 25 milliGRAM(s) Oral every 4 hours PRN Moderate Pain (4 - 6)  traMADol 50 milliGRAM(s) Oral every 4 hours PRN Severe Pain (7 - 10)      Weight:  68.3kg- admit wt    Weight Change: No new wts to assess, Request obtain updated wt     Nutrition Focused Physical Exam: Completed [   ]  Not Pertinent [  x ]    Estimated energy needs:   Height: 5'7" Weight: 150.5 lbs,  lbs+/-10%, %%, BMI 23.6,   UBW used to calculate EER as per pt's current body weight is within % IBW   Estimated nutrient needs based on [facility] SOC for maintenance in older adults.  20-25kcal/kg= 1350-1687kcal  1.0-1.2gms pro/kg= 67.5-81gms pro  30-35ml/kg= 2025-2362ml    Subjective:   Pt seen for follow up. 68 y.o F from home with PMHx of non-Hodgkin lymphoma s/p XRT 1981 and Breast CA (2000’s), HTN, HLD, OP. Pt admitted and underwent ACDF C3-C4 extension to C6, PSF C2-T2 4/4. Pt transferred to [facility] for Afib with RVR in setting of aspiration PNA. Pt c/o anorexia, dysphagia xfew days PTA. Pt previously on mechanical soft diet with thin liquids 4/8, however, pt w/ continued complaints of dysphagia and excessive coughing, exacerbated during meal time per pt. Per SLP 4/12: "if GOC are c/w full oral intake, consider smooth/moist purees and small sips of thin liquid. If GOC are to avoid prandial aspiration & optimize nutritional intake consider long-term alternative means of nutrition/hydration & medication". Pt now NPO for PEG today. Per MD, pt will continue to eat PO, and requested EN to meet ~75% EER. Recs below. RD to follow up per protocol.     Previous Nutrition Diagnosis:  Inadequate energy intake R/T anorexia, dysphagia AEB pt with poor <45% to fair 50-74% PO intake of meals    Active [ x  ]  Resolved [   ]    If resolved, new PES:     Goal: Pt to receive >75% EER     Recommendations:  1.   2. Monitor lytes and replete PRN  3. Updated wt  4. Per MD, PO intake as tolerated by pt (regular diet, texture per SLP: moist puree w/ thin liquids).  *d/w team     Education: Unable to educate as pt not on floor during time of assessment     Risk Level: High [ x  ] Moderate [   ] Low [   ] Admitting Diagnosis:   Patient is a 68y old  Female who presents with a chief complaint of Cervical hardware failure (16 Apr 2019 12:24)      PAST MEDICAL & SURGICAL HISTORY:  Anxiety  History of breast cancer: s/p right lumpectomy , RT  Hodgkin disease: 1981, underwent radiation therapy  HLD (hyperlipidemia)  HTN (hypertension)  Cervical disc herniation  Carpal tunnel syndrome: b/l  Cervical disc disease  History of carpal tunnel surgery: 10/2018 left hand  History of tonsillectomy  S/P cervical spinal fusion: 12/15/2018 C4-C6 ACDF  History of Mohs micrographic surgery for skin cancer  History of lumpectomy of right breast: 2013      Current Nutrition Order:   Diet, NPO (04-16-19 @ 13:45)    PO Intake: Good (%) [   ]  Fair (50-75%) [  x ] Poor (<25%) [   ]    GI Issues: No apparent GI distress per EMR, last BM 4/15    Pain: Unable to assess pain at this time     Skin Integrity: No edema, +surgical incision     Labs:   04-16    141  |  103  |  4<L>  ----------------------------<  110<H>  3.4<L>   |  27  |  0.46<L>    Ca    8.9      16 Apr 2019 05:39  Phos  3.0     04-16  Mg     1.8     04-16      CAPILLARY BLOOD GLUCOSE      POCT Blood Glucose.: 99 mg/dL (15 Apr 2019 16:59)      Medications:  MEDICATIONS  (STANDING):  amiodarone    Tablet 400 milliGRAM(s) Oral every 12 hours  anastrozole 1 milliGRAM(s) Oral daily  atorvastatin 40 milliGRAM(s) Oral at bedtime  cholecalciferol 400 Unit(s) Oral daily  enoxaparin Injectable 40 milliGRAM(s) SubCutaneous at bedtime  enoxaparin Injectable 40 milliGRAM(s) SubCutaneous daily  famotidine    Tablet 20 milliGRAM(s) Oral daily  gabapentin 600 milliGRAM(s) Oral at bedtime  metoprolol succinate ER 25 milliGRAM(s) Oral daily  senna 2 Tablet(s) Oral at bedtime  sertraline 50 milliGRAM(s) Oral daily  sodium chloride 0.9% with potassium chloride 20 mEq/L 1000 milliLiter(s) (75 mL/Hr) IV Continuous <Continuous>    MEDICATIONS  (PRN):  acetaminophen   Tablet .. 650 milliGRAM(s) Oral every 6 hours PRN Temp greater or equal to 38C (100.4F), Mild Pain (1 - 3)  benzocaine 15 mG/menthol 3.6 mG Lozenge 1 Lozenge Oral every 1 hour PRN Sore Throat  guaiFENesin    Syrup 100 milliGRAM(s) Oral every 6 hours PRN Cough  HYDROmorphone  Injectable 0.5 milliGRAM(s) IV Push every 2 hours PRN Severe Pain (7 - 10)  magnesium hydroxide Suspension 30 milliLiter(s) Oral every 12 hours PRN Constipation  melatonin 3 milliGRAM(s) Oral at bedtime PRN Insomnia  ondansetron Injectable 4 milliGRAM(s) IV Push every 6 hours PRN Nausea  traMADol 25 milliGRAM(s) Oral every 4 hours PRN Moderate Pain (4 - 6)  traMADol 50 milliGRAM(s) Oral every 4 hours PRN Severe Pain (7 - 10)      Weight:  68.3kg- admit wt  72.6kg- 4/16    Weight Change: Wt appears fairly stable. Continue to trend.      Nutrition Focused Physical Exam: Completed [   ]  Not Pertinent [  x ]    Estimated energy needs:   Height: 5'7" Weight: 150.5 lbs,  lbs+/-10%, %%, BMI 23.6,   UBW used to calculate EER as per pt's current body weight is within % IBW   Estimated nutrient needs based on [facility] SOC for maintenance in older adults.  20-25kcal/kg= 1350-1687kcal  1.0-1.2gms pro/kg= 67.5-81gms pro  30-35ml/kg= 2025-2362ml    Subjective:   Pt seen for follow up. 68 y.o F from home with PMHx of non-Hodgkin lymphoma s/p XRT 1981 and Breast CA (2000’s), HTN, HLD, OP. Pt admitted and underwent ACDF C3-C4 extension to C6, PSF C2-T2 4/4. Pt transferred to [facility] for Afib with RVR in setting of aspiration PNA. Pt c/o anorexia, dysphagia xfew days PTA. Pt previously on mechanical soft diet with thin liquids 4/8, however, pt w/ continued complaints of dysphagia and excessive coughing, exacerbated during meal time per pt. Per SLP 4/12: "if GOC are c/w full oral intake, consider smooth/moist purees and small sips of thin liquid. If GOC are to avoid prandial aspiration & optimize nutritional intake consider long-term alternative means of nutrition/hydration & medication". Pt now NPO for PEG today. Per MD, pt will continue to eat PO, and requested EN to meet ~75% EER. Recs below. RD to follow up per protocol.     Previous Nutrition Diagnosis:  Inadequate energy intake R/T anorexia, dysphagia AEB pt with poor <45% to fair 50-74% PO intake of meals    Active [ x  ]  Resolved [   ]    If resolved, new PES:     Goal: Pt to receive >75% EER     Recommendations:  1.   2. Monitor lytes and replete PRN  3. Updated wt  4. Per MD, PO intake as tolerated by pt (regular diet, texture per SLP: moist puree w/ thin liquids).  *d/w team     Education: Unable to educate as pt not on floor during time of assessment     Risk Level: High [ x  ] Moderate [   ] Low [   ] Admitting Diagnosis:   Patient is a 68y old  Female who presents with a chief complaint of Cervical hardware failure (16 Apr 2019 12:24)      PAST MEDICAL & SURGICAL HISTORY:  Anxiety  History of breast cancer: s/p right lumpectomy , RT  Hodgkin disease: 1981, underwent radiation therapy  HLD (hyperlipidemia)  HTN (hypertension)  Cervical disc herniation  Carpal tunnel syndrome: b/l  Cervical disc disease  History of carpal tunnel surgery: 10/2018 left hand  History of tonsillectomy  S/P cervical spinal fusion: 12/15/2018 C4-C6 ACDF  History of Mohs micrographic surgery for skin cancer  History of lumpectomy of right breast: 2013      Current Nutrition Order:   Diet, NPO (04-16-19 @ 13:45)    PO Intake: Good (%) [   ]  Fair (50-75%) [  x ] Poor (<25%) [   ]    GI Issues: No apparent GI distress per EMR, last BM 4/15    Pain: Unable to assess pain at this time     Skin Integrity: No edema, +surgical incision     Labs:   04-16    141  |  103  |  4<L>  ----------------------------<  110<H>  3.4<L>   |  27  |  0.46<L>    Ca    8.9      16 Apr 2019 05:39  Phos  3.0     04-16  Mg     1.8     04-16      CAPILLARY BLOOD GLUCOSE      POCT Blood Glucose.: 99 mg/dL (15 Apr 2019 16:59)      Medications:  MEDICATIONS  (STANDING):  amiodarone    Tablet 400 milliGRAM(s) Oral every 12 hours  anastrozole 1 milliGRAM(s) Oral daily  atorvastatin 40 milliGRAM(s) Oral at bedtime  cholecalciferol 400 Unit(s) Oral daily  enoxaparin Injectable 40 milliGRAM(s) SubCutaneous at bedtime  enoxaparin Injectable 40 milliGRAM(s) SubCutaneous daily  famotidine    Tablet 20 milliGRAM(s) Oral daily  gabapentin 600 milliGRAM(s) Oral at bedtime  metoprolol succinate ER 25 milliGRAM(s) Oral daily  senna 2 Tablet(s) Oral at bedtime  sertraline 50 milliGRAM(s) Oral daily  sodium chloride 0.9% with potassium chloride 20 mEq/L 1000 milliLiter(s) (75 mL/Hr) IV Continuous <Continuous>    MEDICATIONS  (PRN):  acetaminophen   Tablet .. 650 milliGRAM(s) Oral every 6 hours PRN Temp greater or equal to 38C (100.4F), Mild Pain (1 - 3)  benzocaine 15 mG/menthol 3.6 mG Lozenge 1 Lozenge Oral every 1 hour PRN Sore Throat  guaiFENesin    Syrup 100 milliGRAM(s) Oral every 6 hours PRN Cough  HYDROmorphone  Injectable 0.5 milliGRAM(s) IV Push every 2 hours PRN Severe Pain (7 - 10)  magnesium hydroxide Suspension 30 milliLiter(s) Oral every 12 hours PRN Constipation  melatonin 3 milliGRAM(s) Oral at bedtime PRN Insomnia  ondansetron Injectable 4 milliGRAM(s) IV Push every 6 hours PRN Nausea  traMADol 25 milliGRAM(s) Oral every 4 hours PRN Moderate Pain (4 - 6)  traMADol 50 milliGRAM(s) Oral every 4 hours PRN Severe Pain (7 - 10)      Weight:  68.3kg- admit wt  72.6kg- 4/16    Weight Change: Wt appears fairly stable. Continue to trend.      Nutrition Focused Physical Exam: Completed [   ]  Not Pertinent [  x ]    Estimated energy needs:   Height: 5'7" Weight: 150.5 lbs,  lbs+/-10%, %%, BMI 23.6,   UBW used to calculate EER as per pt's current body weight is within % IBW   Estimated nutrient needs based on [facility] SOC for maintenance in older adults.  20-25kcal/kg= 1350-1687kcal  1.0-1.2gms pro/kg= 67.5-81gms pro  30-35ml/kg= 2025-2362ml    Subjective:   Pt seen for follow up. 68 y.o F from home with PMHx of non-Hodgkin lymphoma s/p XRT 1981 and Breast CA (2000’s), HTN, HLD, OP. Pt admitted and underwent ACDF C3-C4 extension to C6, PSF C2-T2 4/4. Pt transferred to [facility] for Afib with RVR in setting of aspiration PNA. Pt c/o anorexia, dysphagia xfew days PTA. Pt previously on mechanical soft diet with thin liquids 4/8, however, pt w/ continued complaints of dysphagia and excessive coughing, exacerbated during meal time per pt. Per SLP 4/12: "if GOC are c/w full oral intake, consider smooth/moist purees and small sips of thin liquid. If GOC are to avoid prandial aspiration & optimize nutritional intake consider long-term alternative means of nutrition/hydration & medication". Pt now NPO for PEG today. Per MD, pt will continue to eat PO, and requested EN to meet ~75% EER. Recs below. Of note: pt is at risk for refeeding syndrome, follow protocol carefully. RD to follow up per protocol.     Previous Nutrition Diagnosis:  Inadequate energy intake R/T anorexia, dysphagia AEB pt with poor <45% to fair 50-74% PO intake of meals    Active [ x  ]  Resolved [   ]    If resolved, new PES:     Goal: Pt to receive >75% EER     Recommendations:  1. Pt is at risk for refeeding syndrome. Before TF begins, restore fluid balance and replete lytes. Monitor BNP, Mg, P. Do not advance TF if lytes are low, lytes should be corrected first. Begin trickle feeds of Jevity 1.2 (route per MD) advancing to no more than 30ml/hr in the first 3 days (30ml/hr provides 11.9kcal/kg/day), advance as tolerated by pt to no more than 45ml/hr on day 4 and day 5 (45ml/hr provides 17.8kcal/kg/day). Ultimate TF goal of Jevity 1.2 @47ml/hr x24hrs providing 1128mlTV, 1354kcal, 63gms pro, and 910ml water. Monitor s/s intolerance, maintain aspiration precautions at all times. Fluids per team.   2. Add thiamine and MVI.   2. Monitor lytes and replete PRN  3. Updated wt  4. Per MD, PO intake as tolerated by pt (regular diet, texture per SLP: moist puree w/ thin liquids).  *d/w team     Education: Unable to educate as pt not on floor during time of assessment     Risk Level: High [ x  ] Moderate [   ] Low [   ] Admitting Diagnosis:   Patient is a 68y old  Female who presents with a chief complaint of Cervical hardware failure (16 Apr 2019 12:24)      PAST MEDICAL & SURGICAL HISTORY:  Anxiety  History of breast cancer: s/p right lumpectomy , RT  Hodgkin disease: 1981, underwent radiation therapy  HLD (hyperlipidemia)  HTN (hypertension)  Cervical disc herniation  Carpal tunnel syndrome: b/l  Cervical disc disease  History of carpal tunnel surgery: 10/2018 left hand  History of tonsillectomy  S/P cervical spinal fusion: 12/15/2018 C4-C6 ACDF  History of Mohs micrographic surgery for skin cancer  History of lumpectomy of right breast: 2013      Current Nutrition Order:   Diet, NPO (04-16-19 @ 13:45)    PO Intake: Good (%) [   ]  Fair (50-75%) [  x ] Poor (<25%) [   ]    GI Issues: No apparent GI distress per EMR, last BM 4/15    Pain: Unable to assess pain at this time     Skin Integrity: No edema, +surgical incision     Labs:   04-16    141  |  103  |  4<L>  ----------------------------<  110<H>  3.4<L>   |  27  |  0.46<L>    Ca    8.9      16 Apr 2019 05:39  Phos  3.0     04-16  Mg     1.8     04-16      CAPILLARY BLOOD GLUCOSE      POCT Blood Glucose.: 99 mg/dL (15 Apr 2019 16:59)      Medications:  MEDICATIONS  (STANDING):  amiodarone    Tablet 400 milliGRAM(s) Oral every 12 hours  anastrozole 1 milliGRAM(s) Oral daily  atorvastatin 40 milliGRAM(s) Oral at bedtime  cholecalciferol 400 Unit(s) Oral daily  enoxaparin Injectable 40 milliGRAM(s) SubCutaneous at bedtime  enoxaparin Injectable 40 milliGRAM(s) SubCutaneous daily  famotidine    Tablet 20 milliGRAM(s) Oral daily  gabapentin 600 milliGRAM(s) Oral at bedtime  metoprolol succinate ER 25 milliGRAM(s) Oral daily  senna 2 Tablet(s) Oral at bedtime  sertraline 50 milliGRAM(s) Oral daily  sodium chloride 0.9% with potassium chloride 20 mEq/L 1000 milliLiter(s) (75 mL/Hr) IV Continuous <Continuous>    MEDICATIONS  (PRN):  acetaminophen   Tablet .. 650 milliGRAM(s) Oral every 6 hours PRN Temp greater or equal to 38C (100.4F), Mild Pain (1 - 3)  benzocaine 15 mG/menthol 3.6 mG Lozenge 1 Lozenge Oral every 1 hour PRN Sore Throat  guaiFENesin    Syrup 100 milliGRAM(s) Oral every 6 hours PRN Cough  HYDROmorphone  Injectable 0.5 milliGRAM(s) IV Push every 2 hours PRN Severe Pain (7 - 10)  magnesium hydroxide Suspension 30 milliLiter(s) Oral every 12 hours PRN Constipation  melatonin 3 milliGRAM(s) Oral at bedtime PRN Insomnia  ondansetron Injectable 4 milliGRAM(s) IV Push every 6 hours PRN Nausea  traMADol 25 milliGRAM(s) Oral every 4 hours PRN Moderate Pain (4 - 6)  traMADol 50 milliGRAM(s) Oral every 4 hours PRN Severe Pain (7 - 10)      Weight:  68.3kg- admit wt  72.6kg- 4/16    Weight Change: Wt appears fairly stable. Continue to trend.      Nutrition Focused Physical Exam: Completed [   ]  Not Pertinent [  x ]    Estimated energy needs:   Height: 5'7" Weight: 150.5 lbs,  lbs+/-10%, %%, BMI 23.6,   UBW used to calculate EER as per pt's current body weight is within % IBW   Estimated nutrient needs based on [facility] SOC for maintenance in older adults.  20-25kcal/kg= 1350-1687kcal  1.0-1.2gms pro/kg= 67.5-81gms pro  30-35ml/kg= 2025-2362ml    Subjective:   Pt seen for follow up. 68 y.o F from home with PMHx of non-Hodgkin lymphoma s/p XRT 1981 and Breast CA (2000’s), HTN, HLD, OP. Pt admitted and underwent ACDF C3-C4 extension to C6, PSF C2-T2 4/4. Pt transferred to [facility] for Afib with RVR in setting of aspiration PNA. Pt c/o anorexia, dysphagia xfew days PTA. Pt previously on mechanical soft diet with thin liquids 4/8, however, pt w/ continued complaints of dysphagia and excessive coughing, exacerbated during meal time per pt. Per SLP 4/12: "if GOC are c/w full oral intake, consider smooth/moist purees and small sips of thin liquid. If GOC are to avoid prandial aspiration & optimize nutritional intake consider long-term alternative means of nutrition/hydration & medication". Pt now NPO for PEG today. Per MD, pt will continue to eat PO, and requested EN to meet ~75% EER. Recs below. Of note: pt is at risk for refeeding syndrome, follow protocol carefully. RD to follow up per protocol.     Previous Nutrition Diagnosis:  Inadequate energy intake R/T anorexia, dysphagia AEB pt with poor <45% to fair 50-74% PO intake of meals    Active [ x  ]  Resolved [   ]    If resolved, new PES:     Goal: Pt to receive >75% EER     Recommendations:  1. Pt is at risk for refeeding syndrome 2/2 little to no intake >10 days and low baseline levels of K. Before TF begins, restore fluid balance and replete lytes. Monitor BNP, Mg, P daily. Do not advance TF if lytes are low, lytes should be corrected first. Begin feeds of Jevity 1.2 (route per MD) @10ml/hr advancing as tolerated to 23ml/hr on day 1 (half of goal rate). On day 2, advance as tolerated to goal rate of Jevity 1.2 @47ml/hr x24hrs providing 1128mlTV, 1354kcal, 63gms pro, and 910ml water. Monitor s/s intolerance, maintain aspiration precautions at all times. Fluids per team.   2. Add thiamine, B-complex, and MVI.   3. Monitor lytes Q6hrs and replete PRN  4. Per MD, PO intake as tolerated by pt (regular diet, texture per SLP: moist puree w/ thin liquids).  *d/w team     Education: Unable to educate as pt not on floor during time of assessment     Risk Level: High [ x  ] Moderate [   ] Low [   ]

## 2019-04-16 NOTE — BRIEF OPERATIVE NOTE - OPERATION/FINDINGS
EGD was done under general anesthesia, copious amount of mucus and saliva in the esophagus. Mild gastritis seen. PEG tube placement done and intact post operatively. No complications.
Anterior cervical discectomy and fusion of C3-C4 with interbody and extension of previous fusion with plate to c6, followed by posterior C2-T2 hardware fusion with right iliac crest bone autograft. Posterior cervical incision with Provena dressing. Right iliac crest with Auqacel dressing. Left anterior cervical incision with dermabond.

## 2019-04-16 NOTE — BRIEF OPERATIVE NOTE - NSICDXBRIEFPREOP_GEN_ALL_CORE_FT
PRE-OP DIAGNOSIS:  Cervical spine instability 05-Apr-2019 17:26:11  Manuelito Guaman  Degenerative cervical spinal stenosis 05-Apr-2019 17:24:20  Manuelito Guaman
PRE-OP DIAGNOSIS:  Cervical spine instability 05-Apr-2019 17:26:11  Manuelito Guaman

## 2019-04-16 NOTE — PROGRESS NOTE ADULT - ASSESSMENT
69 y/o female h/o NHL s/p XRT, now s/p Fusion of 7 to 12 spinal segments by posterior approach, Revision of anterior cervical discectomy with fusion (ACDF), with aspiration pneumonia, aspiration risk on speech eval, meeting <50% nutritional needs on recommended dysphagia 3 soft-honey fluid diet    Recs:    - Scheduled for PEG tube today  - Keep NPO

## 2019-04-17 LAB
ANION GAP SERPL CALC-SCNC: 11 MMOL/L — SIGNIFICANT CHANGE UP (ref 5–17)
BUN SERPL-MCNC: 5 MG/DL — LOW (ref 7–23)
CALCIUM SERPL-MCNC: 9.1 MG/DL — SIGNIFICANT CHANGE UP (ref 8.4–10.5)
CHLORIDE SERPL-SCNC: 101 MMOL/L — SIGNIFICANT CHANGE UP (ref 96–108)
CO2 SERPL-SCNC: 26 MMOL/L — SIGNIFICANT CHANGE UP (ref 22–31)
CREAT SERPL-MCNC: 0.49 MG/DL — LOW (ref 0.5–1.3)
GLUCOSE SERPL-MCNC: 103 MG/DL — HIGH (ref 70–99)
HCT VFR BLD CALC: 32.9 % — LOW (ref 34.5–45)
HGB BLD-MCNC: 10.3 G/DL — LOW (ref 11.5–15.5)
MAGNESIUM SERPL-MCNC: 1.8 MG/DL — SIGNIFICANT CHANGE UP (ref 1.6–2.6)
MCHC RBC-ENTMCNC: 30 PG — SIGNIFICANT CHANGE UP (ref 27–34)
MCHC RBC-ENTMCNC: 31.3 GM/DL — LOW (ref 32–36)
MCV RBC AUTO: 95.9 FL — SIGNIFICANT CHANGE UP (ref 80–100)
NRBC # BLD: 0 /100 WBCS — SIGNIFICANT CHANGE UP (ref 0–0)
PHOSPHATE SERPL-MCNC: 2.7 MG/DL — SIGNIFICANT CHANGE UP (ref 2.5–4.5)
PLATELET # BLD AUTO: 329 K/UL — SIGNIFICANT CHANGE UP (ref 150–400)
POTASSIUM SERPL-MCNC: 3.7 MMOL/L — SIGNIFICANT CHANGE UP (ref 3.5–5.3)
POTASSIUM SERPL-SCNC: 3.7 MMOL/L — SIGNIFICANT CHANGE UP (ref 3.5–5.3)
RBC # BLD: 3.43 M/UL — LOW (ref 3.8–5.2)
RBC # FLD: 14.3 % — SIGNIFICANT CHANGE UP (ref 10.3–14.5)
SODIUM SERPL-SCNC: 138 MMOL/L — SIGNIFICANT CHANGE UP (ref 135–145)
WBC # BLD: 16.97 K/UL — HIGH (ref 3.8–10.5)
WBC # FLD AUTO: 16.97 K/UL — HIGH (ref 3.8–10.5)

## 2019-04-17 PROCEDURE — 74178 CT ABD&PLV WO CNTR FLWD CNTR: CPT | Mod: 26

## 2019-04-17 PROCEDURE — 99232 SBSQ HOSP IP/OBS MODERATE 35: CPT | Mod: GC

## 2019-04-17 PROCEDURE — 99232 SBSQ HOSP IP/OBS MODERATE 35: CPT

## 2019-04-17 RX ORDER — CIPROFLOXACIN LACTATE 400MG/40ML
500 VIAL (ML) INTRAVENOUS EVERY 12 HOURS
Qty: 0 | Refills: 0 | Status: DISCONTINUED | OUTPATIENT
Start: 2019-04-17 | End: 2019-04-20

## 2019-04-17 RX ORDER — POTASSIUM CHLORIDE 20 MEQ
60 PACKET (EA) ORAL ONCE
Qty: 0 | Refills: 0 | Status: COMPLETED | OUTPATIENT
Start: 2019-04-17 | End: 2019-04-17

## 2019-04-17 RX ORDER — AMIODARONE HYDROCHLORIDE 400 MG/1
200 TABLET ORAL DAILY
Qty: 0 | Refills: 0 | Status: DISCONTINUED | OUTPATIENT
Start: 2019-04-17 | End: 2019-04-20

## 2019-04-17 RX ADMIN — Medication 500 MILLIGRAM(S): at 18:42

## 2019-04-17 RX ADMIN — PIPERACILLIN AND TAZOBACTAM 200 GRAM(S): 4; .5 INJECTION, POWDER, LYOPHILIZED, FOR SOLUTION INTRAVENOUS at 03:45

## 2019-04-17 RX ADMIN — SERTRALINE 50 MILLIGRAM(S): 25 TABLET, FILM COATED ORAL at 14:01

## 2019-04-17 RX ADMIN — ATORVASTATIN CALCIUM 40 MILLIGRAM(S): 80 TABLET, FILM COATED ORAL at 21:24

## 2019-04-17 RX ADMIN — PIPERACILLIN AND TAZOBACTAM 200 GRAM(S): 4; .5 INJECTION, POWDER, LYOPHILIZED, FOR SOLUTION INTRAVENOUS at 09:07

## 2019-04-17 RX ADMIN — Medication 25 MILLIGRAM(S): at 05:58

## 2019-04-17 RX ADMIN — ENOXAPARIN SODIUM 40 MILLIGRAM(S): 100 INJECTION SUBCUTANEOUS at 21:24

## 2019-04-17 RX ADMIN — AMIODARONE HYDROCHLORIDE 400 MILLIGRAM(S): 400 TABLET ORAL at 05:58

## 2019-04-17 RX ADMIN — DEXTROSE MONOHYDRATE, SODIUM CHLORIDE, AND POTASSIUM CHLORIDE 75 MILLILITER(S): 50; .745; 4.5 INJECTION, SOLUTION INTRAVENOUS at 07:55

## 2019-04-17 RX ADMIN — TRAMADOL HYDROCHLORIDE 50 MILLIGRAM(S): 50 TABLET ORAL at 16:45

## 2019-04-17 RX ADMIN — Medication 400 UNIT(S): at 14:01

## 2019-04-17 RX ADMIN — ANASTROZOLE 1 MILLIGRAM(S): 1 TABLET ORAL at 14:01

## 2019-04-17 RX ADMIN — FAMOTIDINE 20 MILLIGRAM(S): 10 INJECTION INTRAVENOUS at 14:01

## 2019-04-17 RX ADMIN — TRAMADOL HYDROCHLORIDE 50 MILLIGRAM(S): 50 TABLET ORAL at 03:53

## 2019-04-17 RX ADMIN — SENNA PLUS 2 TABLET(S): 8.6 TABLET ORAL at 21:24

## 2019-04-17 RX ADMIN — GABAPENTIN 600 MILLIGRAM(S): 400 CAPSULE ORAL at 21:24

## 2019-04-17 RX ADMIN — AMIODARONE HYDROCHLORIDE 400 MILLIGRAM(S): 400 TABLET ORAL at 18:44

## 2019-04-17 RX ADMIN — Medication 60 MILLIEQUIVALENT(S): at 14:01

## 2019-04-17 RX ADMIN — AMIODARONE HYDROCHLORIDE 200 MILLIGRAM(S): 400 TABLET ORAL at 21:24

## 2019-04-17 RX ADMIN — TRAMADOL HYDROCHLORIDE 50 MILLIGRAM(S): 50 TABLET ORAL at 04:30

## 2019-04-17 NOTE — PROGRESS NOTE ADULT - SUBJECTIVE AND OBJECTIVE BOX
HPI:  This is a 67 y/o female, well known to Dr. Daley with PMH of non-hodkins lymphoma (xrt in 1981) and breast ca (2000's), HTN, HLD, osteoporosis.  In 10/18, patient was evaluated for upper extremity weakness and underwent a carpal tunnel release and ulnar transposition without relief of symptoms.   in 11/18, patient was admitted to [facility] for b/l UE weakness and difficulty walking.  Her work up revealed cervical stenosis with compression.  However, at the time, her bone scan also was significant for uptake in her right acetabulum and at the time and the evaluating spine surgeon requested this lesion be addressed prior to any cervical surgery.  A CT of the region was negative and the patient was sent home for follow up. on 12/4/18, patient was seen in the [facility] ER again, this time with worsening symptoms and she was emergently taken to the OR for a three level ACDF C4-C6.  She was discharged after the surgery with an uncomplicated post op course.     Since this surgery, patient has dramatic improvements in her walking, neuropathy and radicular pain.  Follow up imaging with X-ray lead to further evaluation with CT which revealed possible loosening of hardware.  Clinically, patient still has left UE weakness that she thinks may be from her history of broken clavicle. No bowel, bladder dysfunction     She is here for definitive surgical evaluation. (05 Apr 2019 06:51)    OVERNIGHT EVENTS:  Vital Signs Last 24 Hrs  T(C): 36.8 (17 Apr 2019 04:38), Max: 37.3 (16 Apr 2019 08:57)  T(F): 98.3 (17 Apr 2019 04:38), Max: 99.1 (16 Apr 2019 08:57)  HR: 82 (17 Apr 2019 04:38) (76 - 85)  BP: 145/69 (17 Apr 2019 04:38) (105/62 - 153/82)  BP(mean): 80 (16 Apr 2019 13:00) (80 - 105)  RR: 18 (17 Apr 2019 04:38) (16 - 37)  SpO2: 95% (17 Apr 2019 04:38) (93% - 98%)    I&O's Summary    15 Apr 2019 07:01  -  16 Apr 2019 07:00  --------------------------------------------------------  IN: 2274.8 mL / OUT: 2900 mL / NET: -625.2 mL    16 Apr 2019 07:01  -  17 Apr 2019 06:48  --------------------------------------------------------  IN: 1875 mL / OUT: 900 mL / NET: 975 mL    Hospital course:   POD#0: S/P revision of anterior cervical discectomy with fusion of C4-C6 with extension to C3, as well as C2-T2 posterior fusion with right iliac crest autograft.   POD#1: CESARIO overnight. Drains x3 remain in place, [place] J collar and prevena dressing in place. Cate removed this am, f/u TOV. Reports some throat tightness with mucous, but denies pain, no issues talking or swallowing (tolerating liquid diet)  POD#2 Pt with secrections and difficulty swallowing. Continue decadron 4q6. Robitussin started.   4/8 Pt complaining muscle spasm and pain down BUE L>R  4/9: no acute events overnight, neuro stable. Transferred to CCU for rapid Afib, started on Amiodarone drip.  4/10: Transitioned to PO Amiodarone, off IV drip, on tele, pending transfer back to Neurosurgery. Posterior MASOOD and HMV remains.  4/11: Posterior drain removed. Pt on Vanco/zosyn for aspiration PNA  4/12 Repeat Fees. 2nd posterior drain removed. Sputum culture sent  4/13: patient requiring supplemental O2 while at rest and during physical therapy had one bout of nausea overnight.  Over all, appears comfortable.  4/14 calorie count. Pt pre-op for possible PEG on Monday. Hypokalemia repleted. Con't vanco/zosyn for aspiration pneumonia. Daily CXR.  4/16 PeG inserted  Able to take PO as tolerated  Continue IV antbix  4/17 uneventful night IV antibx for pneumonia  Start feeds at 11AM            PHYSICAL EXAM:  Gen: NAD, AAOx3  HEENT: PERRL. EOMI.  Neck: +Andreafski J collar. Left anterior incision C/D/I. Posterior cervical incision w/ dressing C/D/I,  Lungs: Clear b/l  Heart: S1, S2. NSR.  Abd: Soft, NT/ND. +BS  Back: Right iliac crest graft site C/D/I.  Neuro: CNs II-XII intact. Left  4/5, proximal 3/5. RUE and LEs b/l 5/5. Sensation intact throughout. Following commands. Speech clear.           DIET: as tolerated  [LABS:                        10.3   15.41 )-----------( 323      ( 16 Apr 2019 05:39 )             31.7     04-16    141  |  103  |  4<L>  ----------------------------<  110<H>  3.4<L>   |  27  |  0.46<L>    Ca    8.9      16 Apr 2019 05:39  Phos  3.0     04-16  Mg     1.8     04-16          CAPILLARY BLOOD GLUCOSE      Drug Levels: [] N/A  Vancomycin Level, Trough: 17.2 ug/mL (04-14 @ 12:35)  Vancomycin Level, Trough: 26.3 ug/mL (04-13 @ 06:36)  Vancomycin Level, Trough: 10.7 ug/mL (04-12 @ 23:41)    CSF Analysis: [] N/A      Allergies    penicillin (Rash)    Intolerances      MEDICATIONS:  Antibiotics:  piperacillin/tazobactam IVPB. 4.5 Gram(s) IV Intermittent every 6 hours  vancomycin  IVPB 1500 milliGRAM(s) IV Intermittent every 12 hours    Neuro:  acetaminophen   Tablet .. 650 milliGRAM(s) Oral every 6 hours PRN  gabapentin 600 milliGRAM(s) Oral at bedtime  melatonin 3 milliGRAM(s) Oral at bedtime PRN  ondansetron Injectable 4 milliGRAM(s) IV Push every 6 hours PRN  sertraline 50 milliGRAM(s) Oral daily  traMADol 25 milliGRAM(s) Oral every 4 hours PRN  traMADol 50 milliGRAM(s) Oral every 4 hours PRN    Anticoagulation:  enoxaparin Injectable 40 milliGRAM(s) SubCutaneous at bedtime    OTHER:  amiodarone    Tablet 400 milliGRAM(s) Oral every 12 hours  anastrozole 1 milliGRAM(s) Oral daily  atorvastatin 40 milliGRAM(s) Oral at bedtime  benzocaine 15 mG/menthol 3.6 mG Lozenge 1 Lozenge Oral every 1 hour PRN  famotidine    Tablet 20 milliGRAM(s) Oral daily  guaiFENesin    Syrup 100 milliGRAM(s) Oral every 6 hours PRN  magnesium hydroxide Suspension 30 milliLiter(s) Oral every 12 hours PRN  metoprolol succinate ER 25 milliGRAM(s) Oral daily  senna 2 Tablet(s) Oral at bedtime    IVF:  cholecalciferol 400 Unit(s) Oral daily  sodium chloride 0.9% with potassium chloride 20 mEq/L 1000 milliLiter(s) IV Continuous <Continuous>    CULTURES:  Culture Results:   Moderate Enterobacter cloacae complex  Accompanied by normal respiratory enedelia including Numerous Yeast (04-13 @ 07:57)    RADIOLOGY & ADDITIONAL TESTS:      ASSESSMENT:  68y Female w/ PMH of HTN, HLD, h/o Breast Ca, h/o Hodgkins Lymphoma, h/o ACDF C4-C6 now s/p ACDF C3-C4 extension to C6, PSF C2-T2, right iliac crest bone graft 4/5.   4/16 s/p PEG, ENT assessed Vocal cords which are intact    PLAN:  NEURO:    Monitor neuro status  OT/PT  Pain Managment  Bowel regime  [facility] int [facility]    CARDIOVASCULAR:    Monitor BP  VSS  Continue telemetry  Continue Amiodarone as per Cardiology    PULMONARY:    Chest PT  Yankower for excretions  Incentive Spirometry    RENAL:    Monitor I/O  F/U electrolytes      GI:  Advance diet as tolerated  PEG feeds today  Nutritional consult    HEME:    ID:  affebrile  Continue IV antibx as per Dr Pina  CXR stable        DVT PROPHYLAXIS:  [x] Venodynes                                [] Heparin/Lovenox    FALL RISK:  [] Low Risk                                    [] Impulsive  Assessment:  Present when checked    []  GCS  E   V  M     Heart Failure: []Acute, [] acute on chronic , []chronic  Heart Failure:  [] Diastolic (HFpEF), [] Systolic (HFrEF), []Combined (HFpEF and HFrEF), [] RHF, [] Pulm HTN, [] Other    [] SUSU, [] ATN, [] AIN, [] other  [] CKD1, [] CKD2, [] CKD 3, [] CKD 4, [] CKD 5, []ESRD    Encephalopathy: [] Metabolic, [] Hepatic, [] toxic, [] Neurological, [] Other    Abnormal Nurtitional Status: [x] malnurtition (see nutrition note), [ ]underweight: BMI < 19, [] morbid obesity: BMI >40, [] Cachexia    [] Sepsis  [] hypovolemic shock,[] cardiogenic shock, [] hemorrhagic shock, [] neuogenic shock  x[] Acute Respiratory Failure  []Cerebral edema, [] Brain compression/ herniation,   [] Functional quadriplegia  [] Acute blood loss anemia

## 2019-04-17 NOTE — PROGRESS NOTE ADULT - SUBJECTIVE AND OBJECTIVE BOX
INTERVAL HISTORY:  	overnight had episode of bradycardia and drop beats   MEDICATIONS:  amiodarone    Tablet 400 milliGRAM(s) Oral every 12 hours  metoprolol succinate ER 25 milliGRAM(s) Oral daily      guaiFENesin    Syrup 100 milliGRAM(s) Oral every 6 hours PRN    acetaminophen   Tablet .. 650 milliGRAM(s) Oral every 6 hours PRN  gabapentin 600 milliGRAM(s) Oral at bedtime  melatonin 3 milliGRAM(s) Oral at bedtime PRN  ondansetron Injectable 4 milliGRAM(s) IV Push every 6 hours PRN  sertraline 50 milliGRAM(s) Oral daily  traMADol 25 milliGRAM(s) Oral every 4 hours PRN  traMADol 50 milliGRAM(s) Oral every 4 hours PRN    famotidine    Tablet 20 milliGRAM(s) Oral daily  magnesium hydroxide Suspension 30 milliLiter(s) Oral every 12 hours PRN  senna 2 Tablet(s) Oral at bedtime    atorvastatin 40 milliGRAM(s) Oral at bedtime    anastrozole 1 milliGRAM(s) Oral daily  benzocaine 15 mG/menthol 3.6 mG Lozenge 1 Lozenge Oral every 1 hour PRN  cholecalciferol 400 Unit(s) Oral daily  enoxaparin Injectable 40 milliGRAM(s) SubCutaneous at bedtime  potassium chloride   Powder 60 milliEquivalent(s) Enteral Tube once  sodium chloride 0.9% with potassium chloride 20 mEq/L 1000 milliLiter(s) IV Continuous <Continuous>           PHYSICAL EXAM:  T(C): 36.8 (04-17-19 @ 08:45), Max: 36.9 (04-16-19 @ 21:19)  HR: 83 (04-17-19 @ 08:45) (76 - 83)  BP: 143/63 (04-17-19 @ 08:45) (105/62 - 153/82)  RR: 17 (04-17-19 @ 08:45) (16 - 37)  SpO2: 91% (04-17-19 @ 08:45) (91% - 98%)  Wt(kg): --  I&O's Summary    16 Apr 2019 07:01  -  17 Apr 2019 07:00  --------------------------------------------------------  IN: 2250 mL / OUT: 900 mL / NET: 1350 mL          Gen: cough during exam   Neck: cervical collar in place   Cardiovascular: Normal S1 S2, No JVD, No murmurs, No edema  Respiratory: b/l rhonchi, scattered wheezing  Abd:   Soft, Non-tender, + BS	  Extremities: Normal range of motion, No clubbing, cyanosis or edema        TELEMETRY: 	  episode of mobitz II   ECG:  	  RADIOLOGY:   DIAGNOSTIC TESTING:  [ ] Echocardiogram:   [ ]  Catheterization:  [ ] Stress Test:    OTHER: 	    LABS:	 	    CARDIAC MARKERS:                                  10.3   16.97 )-----------( 329      ( 17 Apr 2019 06:53 )             32.9     04-17    138  |  101  |  5<L>  ----------------------------<  103<H>  3.7   |  26  |  0.49<L>    Ca    9.1      17 Apr 2019 06:53  Phos  2.7     04-17  Mg     1.8     04-17      echo:Normal left ventricular size and wall thickness.The left ventricular wall   motion is normal.The left ventricular ejection fraction is 65%.The left   atrial   size is normal. Right atrial size is normal.The right ventricle is   normal in   size and function.There is trace mitral regurgitation.There is moderate   tricuspid regurgitation.There is mild pulmonary hypertension. The   pulmonary   artery systolic pressure is estimated to be 48 mmHg.  There is mild   pulmonic   regurgitation.No aortic root dilatation.The inferior vena cava is normal   in   size (<2.1 cm) with normal inspiratory collapse (>50%) consistent with   normal   right atrial pressure.  There is no pericardial effusion.      ASSESSMENT/PLAN: 	    69yo F with PMHx of non-hodgkins lymphoma (radiation therapy in 1981), breast CA (2000s), HTN, HLD, osteoporosis, carpal tunnel release 10/2018, s/p emergent 3 level anterior cervical discectomy and fusion C4-C6 who was admitted on 4/5 for elective ACDF C3-C4 extension to C6, PSF C2-T2, iliac crest bone graft POD #5. Course complicated by hoarse voice evaluated by ENT with NTD, dysphagia for which speech and swallow is following, PNA being tx'd by abx and afib with RVR for which she is currently in NSR s/p amio gtt which was completed and transitioned to amio load     now with two intermittent  episodesof Mobitz II, recommend decreasing amio to 200mg daily, continue toprol 25mg daily, patients [id number] score 3 should be on anticoagulation once cleared from surgical stand point can start eliquis 5mg bid    will d/w Dr. Velez

## 2019-04-17 NOTE — PROGRESS NOTE ADULT - SUBJECTIVE AND OBJECTIVE BOX
Neurology Follow up note    Name  HILARIA KUMAR    HPI:  This is a 67 y/o female, well known to Dr. Daley with PMH of non-hodkins lymphoma (xrt in 1981) and breast ca (2000's), HTN, HLD, osteoporosis.  In 10/18, patient was evaluated for upper extremity weakness and underwent a carpal tunnel release and ulnar transposition without relief of symptoms.   in 11/18, patient was admitted to [facility] for b/l UE weakness and difficulty walking.  Her work up revealed cervical stenosis with compression.  However, at the time, her bone scan also was significant for uptake in her right acetabulum and at the time and the evaluating spine surgeon requested this lesion be addressed prior to any cervical surgery.  A CT of the region was negative and the patient was sent home for follow up. on 12/4/18, patient was seen in the [facility] ER again, this time with worsening symptoms and she was emergently taken to the OR for a three level ACDF C4-C6.  She was discharged after the surgery with an uncomplicated post op course.     Since this surgery, patient has dramatic improvements in her walking, neuropathy and radicular pain.  Follow up imaging with X-ray lead to further evaluation with CT which revealed possible loosening of hardware.  Clinically, patient still has left UE weakness that she thinks may be from her history of broken clavicle. No bowel, bladder dysfunction     She is here for definitive surgical evaluation. (05 Apr 2019 06:51)      Interval History - neck pain and weakness in the LUE        REVIEW OF SYSTEMS    Vital Signs Last 24 Hrs  T(C): 36.8 (17 Apr 2019 04:38), Max: 37.3 (16 Apr 2019 08:57)  T(F): 98.3 (17 Apr 2019 04:38), Max: 99.1 (16 Apr 2019 08:57)  HR: 82 (17 Apr 2019 04:38) (76 - 85)  BP: 145/69 (17 Apr 2019 04:38) (105/62 - 153/82)  BP(mean): 80 (16 Apr 2019 13:00) (80 - 105)  RR: 18 (17 Apr 2019 04:38) (16 - 37)  SpO2: 95% (17 Apr 2019 04:38) (93% - 98%)    Physical Exam-     Mental Status- awake and alert    Cranial Nerves- full EOM    Gait and station- n/a    Motor- moves all 4 extremities- proximal LUE weakness    Reflexes- decreased    Sensation- no sensory level    Coordination- no tremors    Vascular - no bruits    Medications  acetaminophen   Tablet .. 650 milliGRAM(s) Oral every 6 hours PRN  amiodarone    Tablet 400 milliGRAM(s) Oral every 12 hours  anastrozole 1 milliGRAM(s) Oral daily  atorvastatin 40 milliGRAM(s) Oral at bedtime  benzocaine 15 mG/menthol 3.6 mG Lozenge 1 Lozenge Oral every 1 hour PRN  cholecalciferol 400 Unit(s) Oral daily  enoxaparin Injectable 40 milliGRAM(s) SubCutaneous at bedtime  famotidine    Tablet 20 milliGRAM(s) Oral daily  gabapentin 600 milliGRAM(s) Oral at bedtime  guaiFENesin    Syrup 100 milliGRAM(s) Oral every 6 hours PRN  magnesium hydroxide Suspension 30 milliLiter(s) Oral every 12 hours PRN  melatonin 3 milliGRAM(s) Oral at bedtime PRN  metoprolol succinate ER 25 milliGRAM(s) Oral daily  ondansetron Injectable 4 milliGRAM(s) IV Push every 6 hours PRN  piperacillin/tazobactam IVPB. 4.5 Gram(s) IV Intermittent every 6 hours  senna 2 Tablet(s) Oral at bedtime  sertraline 50 milliGRAM(s) Oral daily  sodium chloride 0.9% with potassium chloride 20 mEq/L 1000 milliLiter(s) IV Continuous <Continuous>  traMADol 25 milliGRAM(s) Oral every 4 hours PRN  traMADol 50 milliGRAM(s) Oral every 4 hours PRN  vancomycin  IVPB 1500 milliGRAM(s) IV Intermittent every 12 hours      Lab      Radiology    Assessment- Cervical radiculopathy    Plan as per NS

## 2019-04-17 NOTE — PROGRESS NOTE ADULT - SUBJECTIVE AND OBJECTIVE BOX
Interval Events: Reviewed  Patient seen and examined at bedside.    Patient is a 68y old  Female who presents with a chief complaint of Cervical hardware failure (17 Apr 2019 10:21)  she is doing well and the cough is less    PAST MEDICAL & SURGICAL HISTORY:  Anxiety  History of breast cancer: s/p right lumpectomy , RT  Hodgkin disease: 1981, underwent radiation therapy  HLD (hyperlipidemia)  HTN (hypertension)  Cervical disc herniation  Carpal tunnel syndrome: b/l  Cervical disc disease  History of carpal tunnel surgery: 10/2018 left hand  History of tonsillectomy  S/P cervical spinal fusion: 12/15/2018 C4-C6 ACDF  History of Mohs micrographic surgery for skin cancer  History of lumpectomy of right breast: 2013      MEDICATIONS:  Pulmonary:  guaiFENesin    Syrup 100 milliGRAM(s) Oral every 6 hours PRN    Antimicrobials:  ciprofloxacin     Tablet 500 milliGRAM(s) Oral every 12 hours    Anticoagulants:  enoxaparin Injectable 40 milliGRAM(s) SubCutaneous at bedtime    Cardiac:  amiodarone    Tablet 400 milliGRAM(s) Oral every 12 hours  metoprolol succinate ER 25 milliGRAM(s) Oral daily      Allergies    penicillin (Rash)    Intolerances        Vital Signs Last 24 Hrs  T(C): 36.8 (17 Apr 2019 08:45), Max: 36.9 (16 Apr 2019 21:19)  T(F): 98.2 (17 Apr 2019 08:45), Max: 98.4 (16 Apr 2019 21:19)  HR: 83 (17 Apr 2019 08:45) (76 - 83)  BP: 143/63 (17 Apr 2019 08:45) (105/62 - 153/82)  BP(mean): 80 (16 Apr 2019 13:00) (80 - 105)  RR: 17 (17 Apr 2019 08:45) (16 - 37)  SpO2: 91% (17 Apr 2019 08:45) (91% - 95%)    04-16 @ 07:01  -  04-17 @ 07:00  --------------------------------------------------------  IN: 2250 mL / OUT: 900 mL / NET: 1350 mL    04-17 @ 07:01  -  04-17 @ 11:33  --------------------------------------------------------  IN: 0 mL / OUT: 400 mL / NET: -400 mL          LABS:      CBC Full  -  ( 17 Apr 2019 06:53 )  WBC Count : 16.97 K/uL  RBC Count : 3.43 M/uL  Hemoglobin : 10.3 g/dL  Hematocrit : 32.9 %  Platelet Count - Automated : 329 K/uL  Mean Cell Volume : 95.9 fl  Mean Cell Hemoglobin : 30.0 pg  Mean Cell Hemoglobin Concentration : 31.3 gm/dL  Auto Neutrophil # : x  Auto Lymphocyte # : x  Auto Monocyte # : x  Auto Eosinophil # : x  Auto Basophil # : x  Auto Neutrophil % : x  Auto Lymphocyte % : x  Auto Monocyte % : x  Auto Eosinophil % : x  Auto Basophil % : x    04-17    138  |  101  |  5<L>  ----------------------------<  103<H>  3.7   |  26  |  0.49<L>    Ca    9.1      17 Apr 2019 06:53  Phos  2.7     04-17  Mg     1.8     04-17                          RADIOLOGY & ADDITIONAL STUDIES (The following images were personally reviewed):  Esposito:                                     No  Urine output:                       adequate  DVT prophylaxis:                 Yes  Flattus:                                  Yes  Bowel movement:              No

## 2019-04-17 NOTE — PROGRESS NOTE ADULT - SUBJECTIVE AND OBJECTIVE BOX
Subjective:  Pain controlled with analgesia, denies nausea or vomiting      Vital Signs Last 24 Hrs  T(C): 36.8 (17 Apr 2019 04:38), Max: 37.3 (16 Apr 2019 08:57)  T(F): 98.3 (17 Apr 2019 04:38), Max: 99.1 (16 Apr 2019 08:57)  HR: 82 (17 Apr 2019 04:38) (76 - 85)  BP: 145/69 (17 Apr 2019 04:38) (105/62 - 153/82)  BP(mean): 80 (16 Apr 2019 13:00) (80 - 105)  RR: 18 (17 Apr 2019 04:38) (16 - 37)  SpO2: 95% (17 Apr 2019 04:38) (93% - 98%)    Physical Exam:  General: NAD, resting comfortably in bed  Pulmonary: Nonlabored breathing, no respiratory distress  Abdominal:   PEG in place with no surrounding erythema or swelling  Soft, ND,NT    LABS:                        10.3   15.41 )-----------( 323      ( 16 Apr 2019 05:39 )             31.7     04-16    141  |  103  |  4<L>  ----------------------------<  110<H>  3.4<L>   |  27  |  0.46<L>    Ca    8.9      16 Apr 2019 05:39  Phos  3.0     04-16  Mg     1.8     04-16      PT/INR - ( 15 Apr 2019 06:39 )   PT: 13.5 sec;   INR: 1.19            CAPILLARY BLOOD GLUCOSE

## 2019-04-17 NOTE — PROGRESS NOTE ADULT - PROBLEM SELECTOR PLAN 2
Vision is to continue on the current antibiotic. Duration about 7 days. Chest x-ray was unchanged. The pulmonary status is stable for the procedure.Can change to oral antibiotics.

## 2019-04-17 NOTE — PROGRESS NOTE ADULT - ASSESSMENT
69 y/o female h/o NHL s/p XRT, now s/p Fusion of 7 to 12 spinal segments by posterior approach, Revision of anterior cervical discectomy with fusion (ACDF), with aspiration pneumonia, aspiration risk on speech eval, Day 1 S/p EGD and PEG    Recs:  - Can use PEG for meds  - Please start feeding through PEG at 11am, 20ml/hr, check residual and advance by 10ml after 4 hours till goal is reached  - Please keep abdominal binder to avoid inadvertent PEG dislodgment  - Surgery 4C will follow

## 2019-04-18 ENCOUNTER — TRANSCRIPTION ENCOUNTER (OUTPATIENT)
Age: 69
End: 2019-04-18

## 2019-04-18 DIAGNOSIS — N28.89 OTHER SPECIFIED DISORDERS OF KIDNEY AND URETER: ICD-10-CM

## 2019-04-18 LAB
ANION GAP SERPL CALC-SCNC: 11 MMOL/L — SIGNIFICANT CHANGE UP (ref 5–17)
BUN SERPL-MCNC: 5 MG/DL — LOW (ref 7–23)
CALCIUM SERPL-MCNC: 9.4 MG/DL — SIGNIFICANT CHANGE UP (ref 8.4–10.5)
CHLORIDE SERPL-SCNC: 97 MMOL/L — SIGNIFICANT CHANGE UP (ref 96–108)
CO2 SERPL-SCNC: 29 MMOL/L — SIGNIFICANT CHANGE UP (ref 22–31)
CREAT SERPL-MCNC: 0.4 MG/DL — LOW (ref 0.5–1.3)
GLUCOSE SERPL-MCNC: 138 MG/DL — HIGH (ref 70–99)
POTASSIUM SERPL-MCNC: 3.5 MMOL/L — SIGNIFICANT CHANGE UP (ref 3.5–5.3)
POTASSIUM SERPL-SCNC: 3.5 MMOL/L — SIGNIFICANT CHANGE UP (ref 3.5–5.3)
SODIUM SERPL-SCNC: 137 MMOL/L — SIGNIFICANT CHANGE UP (ref 135–145)

## 2019-04-18 PROCEDURE — 99232 SBSQ HOSP IP/OBS MODERATE 35: CPT

## 2019-04-18 PROCEDURE — 99232 SBSQ HOSP IP/OBS MODERATE 35: CPT | Mod: GC

## 2019-04-18 RX ORDER — PREGABALIN 225 MG/1
1000 CAPSULE ORAL DAILY
Qty: 0 | Refills: 0 | Status: DISCONTINUED | OUTPATIENT
Start: 2019-04-18 | End: 2019-04-20

## 2019-04-18 RX ORDER — POTASSIUM CHLORIDE 20 MEQ
40 PACKET (EA) ORAL DAILY
Qty: 0 | Refills: 0 | Status: DISCONTINUED | OUTPATIENT
Start: 2019-04-18 | End: 2019-04-20

## 2019-04-18 RX ORDER — FUROSEMIDE 40 MG
20 TABLET ORAL EVERY 12 HOURS
Qty: 0 | Refills: 0 | Status: DISCONTINUED | OUTPATIENT
Start: 2019-04-18 | End: 2019-04-18

## 2019-04-18 RX ORDER — THIAMINE MONONITRATE (VIT B1) 100 MG
100 TABLET ORAL DAILY
Qty: 0 | Refills: 0 | Status: DISCONTINUED | OUTPATIENT
Start: 2019-04-18 | End: 2019-04-20

## 2019-04-18 RX ORDER — FUROSEMIDE 40 MG
20 TABLET ORAL EVERY 12 HOURS
Qty: 0 | Refills: 0 | Status: DISCONTINUED | OUTPATIENT
Start: 2019-04-18 | End: 2019-04-20

## 2019-04-18 RX ADMIN — Medication 500 MILLIGRAM(S): at 17:55

## 2019-04-18 RX ADMIN — SERTRALINE 50 MILLIGRAM(S): 25 TABLET, FILM COATED ORAL at 11:29

## 2019-04-18 RX ADMIN — Medication 400 UNIT(S): at 11:28

## 2019-04-18 RX ADMIN — TRAMADOL HYDROCHLORIDE 50 MILLIGRAM(S): 50 TABLET ORAL at 17:56

## 2019-04-18 RX ADMIN — Medication 40 MILLIEQUIVALENT(S): at 14:54

## 2019-04-18 RX ADMIN — FAMOTIDINE 20 MILLIGRAM(S): 10 INJECTION INTRAVENOUS at 11:28

## 2019-04-18 RX ADMIN — ATORVASTATIN CALCIUM 40 MILLIGRAM(S): 80 TABLET, FILM COATED ORAL at 22:50

## 2019-04-18 RX ADMIN — Medication 100 MILLIGRAM(S): at 11:28

## 2019-04-18 RX ADMIN — Medication 20 MILLIGRAM(S): at 14:54

## 2019-04-18 RX ADMIN — ENOXAPARIN SODIUM 40 MILLIGRAM(S): 100 INJECTION SUBCUTANEOUS at 21:17

## 2019-04-18 RX ADMIN — PREGABALIN 1000 MICROGRAM(S): 225 CAPSULE ORAL at 11:28

## 2019-04-18 RX ADMIN — TRAMADOL HYDROCHLORIDE 50 MILLIGRAM(S): 50 TABLET ORAL at 18:55

## 2019-04-18 RX ADMIN — Medication 500 MILLIGRAM(S): at 05:23

## 2019-04-18 RX ADMIN — AMIODARONE HYDROCHLORIDE 200 MILLIGRAM(S): 400 TABLET ORAL at 05:23

## 2019-04-18 RX ADMIN — Medication 25 MILLIGRAM(S): at 05:23

## 2019-04-18 RX ADMIN — ANASTROZOLE 1 MILLIGRAM(S): 1 TABLET ORAL at 11:28

## 2019-04-18 NOTE — PROGRESS NOTE ADULT - PROBLEM SELECTOR PLAN 1
S/P PEG and to start tube feed today Still she is taking p.o. with meds.  Advance feed and change to bolus

## 2019-04-18 NOTE — PROGRESS NOTE ADULT - SUBJECTIVE AND OBJECTIVE BOX
INTERVAL HISTORY:  c/o sob, productive cough,  no events on tele   	  MEDICATIONS:  amiodarone    Tablet 200 milliGRAM(s) Oral daily  furosemide    Tablet 20 milliGRAM(s) Oral every 12 hours  metoprolol succinate ER 25 milliGRAM(s) Oral daily  ciprofloxacin     Tablet 500 milliGRAM(s) Oral every 12 hours  guaiFENesin    Syrup 100 milliGRAM(s) Oral every 6 hours PRN  acetaminophen   Tablet .. 650 milliGRAM(s) Oral every 6 hours PRN  gabapentin 600 milliGRAM(s) Oral at bedtime  melatonin 3 milliGRAM(s) Oral at bedtime PRN  ondansetron Injectable 4 milliGRAM(s) IV Push every 6 hours PRN  sertraline 50 milliGRAM(s) Oral daily  traMADol 25 milliGRAM(s) Oral every 4 hours PRN  traMADol 50 milliGRAM(s) Oral every 4 hours PRN  famotidine    Tablet 20 milliGRAM(s) Oral daily  magnesium hydroxide Suspension 30 milliLiter(s) Oral every 12 hours PRN  senna 2 Tablet(s) Oral at bedtime  atorvastatin 40 milliGRAM(s) Oral at bedtime  anastrozole 1 milliGRAM(s) Oral daily  benzocaine 15 mG/menthol 3.6 mG Lozenge 1 Lozenge Oral every 1 hour PRN  cholecalciferol 400 Unit(s) Oral daily  cyanocobalamin 1000 MICROGram(s) Oral daily  enoxaparin Injectable 40 milliGRAM(s) SubCutaneous at bedtime  multivitamin  Chewable 1 Tablet(s) Oral daily  potassium chloride   Powder 40 milliEquivalent(s) Oral daily  thiamine 100 milliGRAM(s) Oral daily      PHYSICAL EXAM:  T(C): 36.9 (04-18-19 @ 14:33), Max: 37.3 (04-17-19 @ 21:19)  HR: 77 (04-18-19 @ 14:33) (75 - 87)  BP: 130/67 (04-18-19 @ 14:33) (111/56 - 147/72)  RR: 17 (04-18-19 @ 14:33) (16 - 18)  SpO2: 94% (04-18-19 @ 14:33) (94% - 98%)  Wt(kg): --  I&O's Summary    17 Apr 2019 07:01  -  18 Apr 2019 07:00  --------------------------------------------------------  IN: 1250 mL / OUT: 1250 mL / NET: 0 mL          Appearance: coughing throughout exam  HEENT:   cervical collar in place      Cardiovascular: Normal S1 S2,    Respiratory: scattered rhonchi, bibasilar crackles   Psychiatry: A & O x 3  Gastrointestinal:  Soft, Non-tender, + BS	  Skin: No rashes, No ecchymoses, No cyanosis  Neurologic: Non-focal  Extremities: +2pitting edema     LABS:	 	  CARDIAC MARKERS:                                10.3   16.97 )-----------( 329      ( 17 Apr 2019 06:53 )             32.9     04-18    137  |  97  |  5<L>  ----------------------------<  138<H>  3.5   |  29  |  0.40<L>    Ca    9.4      18 Apr 2019 14:58  Phos  2.7     04-17  Mg     1.8     04-17       ASSESSMENT/PLAN: 	  69yo F with PMHx of non-hodgkins lymphoma (radiation therapy in 1981), breast CA (2000s), HTN, HLD, osteoporosis, carpal tunnel release 10/2018, s/p emergent 3 level anterior cervical discectomy and fusion C4-C6 who was admitted on 4/5 for elective ACDF C3-C4 extension to C6, PSF C2-T2, iliac crest bone graft POD #5. Course complicated by hoarse voice evaluated by ENT with NTD, dysphagia for which speech and swallow is following, PNA being tx'd by abx and afib with RVR for which she is currently in NSR s/p amio gtt which was completed and transitioned to amio load     now with two intermittent  episodes of Mobitz II, continue amiodarone 200mg daily,  continue toprol 25mg daily, patients [id number] score 3 should be on anticoagulation once cleared from surgical stand point can start eliquis 5mg bid    LE edema-  start lasix 20mg IV bid, monitor electrolytes, will follow.     will d/w Dr. Velez

## 2019-04-18 NOTE — PROGRESS NOTE ADULT - SUBJECTIVE AND OBJECTIVE BOX
Subjective:  Doing well, passing gas, denies any nausea or vomiting       Vital Signs Last 24 Hrs  T(C): 37 (18 Apr 2019 04:29), Max: 37.3 (17 Apr 2019 21:19)  T(F): 98.6 (18 Apr 2019 04:29), Max: 99.2 (17 Apr 2019 21:19)  HR: 81 (18 Apr 2019 04:29) (75 - 84)  BP: 125/63 (18 Apr 2019 04:29) (111/56 - 180/92)  BP(mean): --  RR: 17 (18 Apr 2019 04:29) (16 - 18)  SpO2: 96% (18 Apr 2019 04:29) (91% - 96%)    Physical Exam:  General: NAD, resting comfortably in bed  Pulmonary: Nonlabored breathing, no respiratory distress  Abdominal:   PEG in place with no surrounding erythema or swelling  Soft, ND,NT      LABS:                        10.3   16.97 )-----------( 329      ( 17 Apr 2019 06:53 )             32.9     04-17    138  |  101  |  5<L>  ----------------------------<  103<H>  3.7   |  26  |  0.49<L>    Ca    9.1      17 Apr 2019 06:53  Phos  2.7     04-17  Mg     1.8     04-17        CAPILLARY BLOOD GLUCOSE

## 2019-04-18 NOTE — PROGRESS NOTE ADULT - SUBJECTIVE AND OBJECTIVE BOX
Interval Events: Reviewed  Patient seen and examined at bedside.    Patient is a 68y old  Female who presents with a chief complaint of Cervical hardware failure (18 Apr 2019 16:33)    she had a bad day waiting for the CT scan of abdomen  PAST MEDICAL & SURGICAL HISTORY:  Anxiety  History of breast cancer: s/p right lumpectomy , RT  Hodgkin disease: 1981, underwent radiation therapy  HLD (hyperlipidemia)  HTN (hypertension)  Cervical disc herniation  Carpal tunnel syndrome: b/l  Cervical disc disease  History of carpal tunnel surgery: 10/2018 left hand  History of tonsillectomy  S/P cervical spinal fusion: 12/15/2018 C4-C6 ACDF  History of Mohs micrographic surgery for skin cancer  History of lumpectomy of right breast: 2013      MEDICATIONS:  Pulmonary:  guaiFENesin    Syrup 100 milliGRAM(s) Oral every 6 hours PRN    Antimicrobials:  ciprofloxacin     Tablet 500 milliGRAM(s) Oral every 12 hours    Anticoagulants:  enoxaparin Injectable 40 milliGRAM(s) SubCutaneous at bedtime    Cardiac:  amiodarone    Tablet 200 milliGRAM(s) Oral daily  furosemide   Injectable 20 milliGRAM(s) IV Push every 12 hours  metoprolol succinate ER 25 milliGRAM(s) Oral daily      Allergies    penicillin (Rash)    Intolerances        Vital Signs Last 24 Hrs  T(C): 36.9 (18 Apr 2019 21:16), Max: 37 (18 Apr 2019 00:31)  T(F): 98.4 (18 Apr 2019 21:16), Max: 98.6 (18 Apr 2019 00:31)  HR: 80 (18 Apr 2019 21:16) (75 - 87)  BP: 120/69 (18 Apr 2019 21:16) (111/56 - 147/72)  BP(mean): --  RR: 17 (18 Apr 2019 21:16) (16 - 18)  SpO2: 93% (18 Apr 2019 21:16) (92% - 98%)    04-17 @ 07:01  -  04-18 @ 07:00  --------------------------------------------------------  IN: 1250 mL / OUT: 1250 mL / NET: 0 mL    04-18 @ 07:01 - 04-18 @ 22:03  --------------------------------------------------------  IN: 1080 mL / OUT: 500 mL / NET: 580 mL          LABS:      CBC Full  -  ( 17 Apr 2019 06:53 )  WBC Count : 16.97 K/uL  RBC Count : 3.43 M/uL  Hemoglobin : 10.3 g/dL  Hematocrit : 32.9 %  Platelet Count - Automated : 329 K/uL  Mean Cell Volume : 95.9 fl  Mean Cell Hemoglobin : 30.0 pg  Mean Cell Hemoglobin Concentration : 31.3 gm/dL  Auto Neutrophil # : x  Auto Lymphocyte # : x  Auto Monocyte # : x  Auto Eosinophil # : x  Auto Basophil # : x  Auto Neutrophil % : x  Auto Lymphocyte % : x  Auto Monocyte % : x  Auto Eosinophil % : x  Auto Basophil % : x    04-18    137  |  97  |  5<L>  ----------------------------<  138<H>  3.5   |  29  |  0.40<L>    Ca    9.4      18 Apr 2019 14:58  Phos  2.7     04-17  Mg     1.8     04-17        < from: CT Abdomen and Pelvis w/wo IV Cont (04.17.19 @ 13:14) >  EXAM:  CT ABDOMEN AND PELVIS [facility]                          PROCEDURE DATE:  04/17/2019          INTERPRETATION:  CLINICAL INDICATION: 68-year-old with a renal mass seen   on prior chest CT.        FINDINGS: CT of the abdomen and pelvis was performed before and after the   administration of intravenous contrast. Reconstructions were performed in   the sagittal and coronal planes. Reference is made to prior CT of the   chest dated 4/8/2019.    Evaluation of the chest demonstrates interval enlargement of moderate   left and trace right pleural effusions. Interval development of dense   right basilar consolidation. No interval change in persistent dense left   basilar consolidation. Negative for pericardial effusion. Coronary   arterial calcification. Evaluation of the abdomen demonstrates   heterogeneous perfusion of the inferior hepatic parenchyma. The pancreas,   gallbladder, spleen and bilateral adrenal glands are normal in   appearance. There is a suspicious irregularly marginated solid and cystic   mass arising from the upper pole of the left kidney measuring 10.0 x 5.7   cm with internal necrosis.  There is a cyst within the lower pole of the   left kidney measuring 2.4 cm. Evaluation of the gastrointestinal tract   demonstrates no bowel thickening and no bowel obstruction. Gastrostomy   tube in appropriate position. Evaluation of the pelvis demonstrates a   myoma the uterine fundus measuring 3.6 cm. The bilateral adnexal regions   and bladder are normal in appearance. Small volume of air within the   bladder. Anasarca. No free fluid. No adenopathy. Severe aortic vascular   calcification. Opacified aspects of the portal, splenic, superior   mesenteric vein, bilateral renal veins, IVC and bilateral iliac veins   demonstrates no presley intraluminal thrombus. Negative for tumor thrombus   within the left renal vein. There is a nodule within the lateral aspect   of the right breast measuring 1.7 cm; mammographic correlation is   recommended. Evaluation of the osseous structures several lytic lesions   within the posterior right iliac bone measuring up to 2 cm with cortical   destruction; curvature the thoracolumbar spine.        IMPRESSION:    Large renal cell carcinoma arising from the upper pole of the left   kidney; several right iliac osseous lytic metastasis.    Interval development of dense right basilar consolidation; persistent   left basilar consolidation with moderate left pleural effusion.      < end of copied text >                    RADIOLOGY & ADDITIONAL STUDIES (The following images were personally reviewed):  Esposito:                                     No  Urine output:                       adequate  DVT prophylaxis:                 Yes  Flattus:                                  Yes  Bowel movement:              No

## 2019-04-18 NOTE — DISCHARGE NOTE PROVIDER - NSDCHHNEEDSERVICEOTHER_GEN_ALL_CORE_FT
Educate on providing self enteral tube feeds  Educate on medicatons  Assess Pulmonary status  Swallow exercises

## 2019-04-18 NOTE — DISCHARGE NOTE PROVIDER - NSDCCPTREATMENT_GEN_ALL_CORE_FT
PRINCIPAL PROCEDURE  Procedure: Fusion of 7 to 12 spinal segments by posterior approach  Findings and Treatment: with ACDF and iliac crest bone graft      SECONDARY PROCEDURE  Procedure: Provide atrial fibrillation education packet  Findings and Treatment: Pt will need to follow up with Cardiologist due to irregular rhythm and on AMiodarone which converted pt back to Normal Sinus Rhythm

## 2019-04-18 NOTE — DISCHARGE NOTE PROVIDER - CARE PROVIDERS DIRECT ADDRESSES
,[email or web address]

## 2019-04-18 NOTE — DISCHARGE NOTE PROVIDER - NSDCCPCAREPLAN_GEN_ALL_CORE_FT
PRINCIPAL DISCHARGE DIAGNOSIS  Diagnosis: H/O cervical spine surgery  Assessment and Plan of Treatment: return home with services to regain your independent activity  Keep Mcgrath J collar on while out of bed  Able to remove the hard collar to shower  Able to shower and get the incision wet   Pat it dry with a clean towel  Do not apply lotions or creams to the incision  No heavy lifting anything greater than 10 pounds, no bending or twisiting  Once home make your appt with Dr Daley for a follow up visit

## 2019-04-18 NOTE — DISCHARGE NOTE PROVIDER - NSDCACTIVITY_GEN_ALL_CORE
Do not drive or operate machinery/Do not make important decisions/Walking - Indoors allowed/Showering allowed/Stairs allowed/Walking - Outdoors allowed

## 2019-04-18 NOTE — PROGRESS NOTE ADULT - SUBJECTIVE AND OBJECTIVE BOX
HPI:  This is a 69 y/o female, well known to Dr. Daley with PMH of non-hodkins lymphoma (xrt in 1981) and breast ca (2000's), HTN, HLD, osteoporosis.  In 10/18, patient was evaluated for upper extremity weakness and underwent a carpal tunnel release and ulnar transposition without relief of symptoms.   in 11/18, patient was admitted to [facility] for b/l UE weakness and difficulty walking.  Her work up revealed cervical stenosis with compression.  However, at the time, her bone scan also was significant for uptake in her right acetabulum and at the time and the evaluating spine surgeon requested this lesion be addressed prior to any cervical surgery.  A CT of the region was negative and the patient was sent home for follow up. on 12/4/18, patient was seen in the [facility] ER again, this time with worsening symptoms and she was emergently taken to the OR for a three level ACDF C4-C6.  She was discharged after the surgery with an uncomplicated post op course.     Since this surgery, patient has dramatic improvements in her walking, neuropathy and radicular pain.  Follow up imaging with X-ray lead to further evaluation with CT which revealed possible loosening of hardware.  Clinically, patient still has left UE weakness that she thinks may be from her history of broken clavicle. No bowel, bladder dysfunction     She is here for definitive surgical evaluation. (05 Apr 2019 06:51)      Hospital course:   POD#0: S/P revision of anterior cervical discectomy with fusion of C4-C6 with extension to C3, as well as C2-T2 posterior fusion with right iliac crest autograft.   POD#1: CESARIO overnight. Drains x3 remain in place, [place] J collar and prevena dressing in place. Esposito removed this am, f/u TOV. Reports some throat tightness with mucous, but denies pain, no issues talking or swallowing (tolerating liquid diet)  POD#2 Pt with secrections and difficulty swallowing. Continue decadron 4q6. Robitussin started.   4/8 Pt complaining muscle spasm and pain down BUE L>R  4/9: no acute events overnight, neuro stable. Transferred to CCU for rapid Afib, started on Amiodarone drip.  4/10: Transitioned to PO Amiodarone, off IV drip, on tele, pending transfer back to Neurosurgery. Posterior MASOOD and HMV remains.  4/11: Posterior drain removed. Pt on Vanco/zosyn for aspiration PNA  4/12 Repeat Fees. 2nd posterior drain removed. Sputum culture sent  4/13: patient requiring supplemental O2 while at rest and during physical therapy had one bout of nausea overnight.  Over all, appears comfortable.  4/14 calorie count. Pt pre-op for possible PEG on Monday. Hypokalemia repleted. Con't vanco/zosyn for aspiration pneumonia. Daily CXR.  4/16 PEG inserted  Able to take PO as tolerated  Continue IV antbix  4/17 uneventful night, abx changed to cipro x7 days for pneumonia, TFs started at 4pm via PEG, to be slowly increased per nutrition protocol   4/18: CT abd / pelvis performed yesterday demonstrates left upper lobe kidney mass (likely renal cell carcinoma), R iliac crest lytic lesions and persistent pneumonia in bilateral lung bases.  does not want patient or daughters to know about new mass, but does want oncology to see patient in-pt, Dr. Daley to discuss with patient today. DW also to clear for eliquis start date.     Vital Signs Last 24 Hrs  T(C): 37 (18 Apr 2019 04:29), Max: 37.3 (17 Apr 2019 21:19)  T(F): 98.6 (18 Apr 2019 04:29), Max: 99.2 (17 Apr 2019 21:19)  HR: 81 (18 Apr 2019 04:29) (75 - 84)  BP: 125/63 (18 Apr 2019 04:29) (111/56 - 180/92)  BP(mean): --  RR: 17 (18 Apr 2019 04:29) (16 - 18)  SpO2: 96% (18 Apr 2019 04:29) (91% - 96%)    I&O's Summary    16 Apr 2019 07:01  -  17 Apr 2019 07:00  --------------------------------------------------------  IN: 2250 mL / OUT: 900 mL / NET: 1350 mL    17 Apr 2019 07:01  -  18 Apr 2019 06:15  --------------------------------------------------------  IN: 1050 mL / OUT: 1250 mL / NET: -200 mL        PHYSICAL EXAM:  Gen: NAD, AAOx3  HEENT: PERRL. EOMI.  Neck: +[place] J collar. Left anterior incision C/D/I. Posterior cervical incision w/ dressing C/D/I,  Lungs: Clear b/l  Heart: S1, S2. NSR.  Abd: Soft, NT/ND. +BS  Back: Right iliac crest graft site C/D/I.  Neuro: CNs II-XII intact. Left  4/5, proximal 3/5. RUE and LEs b/l 5/5. Sensation intact throughout. Following commands. Speech clear.       TUBES/LINES:  [] Esposito  [] A-line  [] Lumbar Drain  [] Wound Drains  [] NGT   [] EVD   [] CVC  [] Other      DIET:  [] NPO  [x] Mechanical  [] Tube feeds    LABS:                        10.3   16.97 )-----------( 329      ( 17 Apr 2019 06:53 )             32.9     04-17    138  |  101  |  5<L>  ----------------------------<  103<H>  3.7   |  26  |  0.49<L>    Ca    9.1      17 Apr 2019 06:53  Phos  2.7     04-17  Mg     1.8     04-17              CAPILLARY BLOOD GLUCOSE          Drug Levels: [] N/A  Vancomycin Level, Trough: 17.2 ug/mL (04-14 @ 12:35)  Vancomycin Level, Trough: 26.3 ug/mL (04-13 @ 06:36)  Vancomycin Level, Trough: 10.7 ug/mL (04-12 @ 23:41)    CSF Analysis: [] N/A      Allergies    penicillin (Rash)    Intolerances        Home Medications:  anastrozole 1 mg oral tablet: 1 tab(s) orally once a day (05 Apr 2019 06:42)  gabapentin 600 mg oral tablet: 2 tab(s) orally once a day (at bedtime) (05 Apr 2019 06:42)  Lyrica 100 mg oral capsule: 1 cap(s) orally 2 times a day (05 Apr 2019 06:42)  metoprolol succinate 25 mg oral tablet, extended release: 1 tab(s) orally once a day (05 Apr 2019 06:42)  rosuvastatin 10 mg oral tablet: 1 tab(s) orally once a day (at bedtime) (05 Apr 2019 06:42)  Vitamin D3 400 intl units oral tablet: 2 tab(s) orally once a day (05 Apr 2019 06:42)      MEDICATIONS:  MEDICATIONS  (STANDING):  amiodarone    Tablet 200 milliGRAM(s) Oral daily  anastrozole 1 milliGRAM(s) Oral daily  atorvastatin 40 milliGRAM(s) Oral at bedtime  cholecalciferol 400 Unit(s) Oral daily  ciprofloxacin     Tablet 500 milliGRAM(s) Oral every 12 hours  enoxaparin Injectable 40 milliGRAM(s) SubCutaneous at bedtime  famotidine    Tablet 20 milliGRAM(s) Oral daily  gabapentin 600 milliGRAM(s) Oral at bedtime  metoprolol succinate ER 25 milliGRAM(s) Oral daily  senna 2 Tablet(s) Oral at bedtime  sertraline 50 milliGRAM(s) Oral daily    MEDICATIONS  (PRN):  acetaminophen   Tablet .. 650 milliGRAM(s) Oral every 6 hours PRN Temp greater or equal to 38C (100.4F), Mild Pain (1 - 3)  benzocaine 15 mG/menthol 3.6 mG Lozenge 1 Lozenge Oral every 1 hour PRN Sore Throat  guaiFENesin    Syrup 100 milliGRAM(s) Oral every 6 hours PRN Cough  magnesium hydroxide Suspension 30 milliLiter(s) Oral every 12 hours PRN Constipation  melatonin 3 milliGRAM(s) Oral at bedtime PRN Insomnia  ondansetron Injectable 4 milliGRAM(s) IV Push every 6 hours PRN Nausea  traMADol 25 milliGRAM(s) Oral every 4 hours PRN Moderate Pain (4 - 6)  traMADol 50 milliGRAM(s) Oral every 4 hours PRN Severe Pain (7 - 10)      CULTURES:  Culture Results:   Moderate Enterobacter cloacae complex  Accompanied by normal respiratory enedelia including Numerous Yeast (04-13 @ 07:57)      RADIOLOGY & ADDITIONAL TESTS:  EXAM:  CT ABDOMEN AND PELVIS [facility] IC                        PROCEDURE DATE:  04/17/2019    IMPRESSION:  Large renal cell carcinoma arising from the upper pole of the left   kidney; several right iliac osseous lytic metastasis.  Interval development of dense right basilar consolidation; persistent   left basilar consolidation with moderate left pleural effusion.        ASSESSMENT:  68y Female w/ PMH of HTN, HLD, h/o Breast Ca, h/o Hodgkins Lymphoma, h/o ACDF C4-C6 now s/p ACDF C3-C4 extension to C6, PSF C2-T2, right iliac crest bone graft 4/5, now s/p PEG placement for swallowing difficulties on 4/16/19, ENT assessed Vocal cords, which are intact.     PLAN:  NEURO:  - Cont neuro checks   - Cont [place] J   - Cont PT/OT   - Dc home saturday with services   - Pain control: cont gabapentin    CARDIOVASCULAR:  - BP goal: normotension   - Cont amiodarone - tapered to amiodarone 200 QD   - Cont atorvastatin    PULMONARY:  - IS     RENAL:  - I&Os   - Replete lytes prn     GI:  - PEG feeds to be advanced slowly as per nutrition to avoid overfeeding syndrome   - Pt may also have pureed (moist is best) and thin liquids in addition to PEG feeds   - Bowel regimen   - cont famotidine     HEME:  - Trend H/H     ID:  - Monitor for fevers   - Trend WBC  - Cont Cipro x7 day (4/17-4/23)     ENDO:  - ISS     DVT PROPHYLAXIS:  [x] Venodynes                                [x] Heparin/Lovenox    DISPOSITION:  - __ status   - Full code   - Dispo: pending   - D/w  __    Assessment: present when checked     [] GCS   E   V   M     Heart Failure: [] Acute, [] acute on chronic, [] chronic   Heart Failure: [] Diastolic (HFpEF), [] Systolic (HRrEF), [] Combined (HFpEF and HFrEF), [] RHF, [] Pulm HTN, [] Other     [] SUSU, [] ATN, [] AIN, [] other   [] CKD1, [] CKD2, [] CKD3, [] CKD4, [] CKD5, [] ESRD     Encephalopathy: [] Metabolic, [] Hepatic, [] Toxic, [] Neurological, [] Other     Abnormal Nutritional Status: [] malnutrition (see nutrition note), []underweight: BMI <19, [] morbid obesity: BMI >40, [] Cachexia     [] Sepsis   [] Hypovolemic shock, [] Cardiogenic shock, [] Hemorrhagic shock, [] Neurogenic shock   [] Acute respiratory failure   [] Cerebral edema, [] Brain compression / herniation   [] Functional quadriplegia   [] Acute blood loss anemia HPI:  This is a 69 y/o female, well known to Dr. Daley with PMH of non-hodkins lymphoma (xrt in 1981) and breast ca (2000's), HTN, HLD, osteoporosis.  In 10/18, patient was evaluated for upper extremity weakness and underwent a carpal tunnel release and ulnar transposition without relief of symptoms.   in 11/18, patient was admitted to [facility] for b/l UE weakness and difficulty walking.  Her work up revealed cervical stenosis with compression.  However, at the time, her bone scan also was significant for uptake in her right acetabulum and at the time and the evaluating spine surgeon requested this lesion be addressed prior to any cervical surgery.  A CT of the region was negative and the patient was sent home for follow up. on 12/4/18, patient was seen in the [facility] ER again, this time with worsening symptoms and she was emergently taken to the OR for a three level ACDF C4-C6.  She was discharged after the surgery with an uncomplicated post op course.     Since this surgery, patient has dramatic improvements in her walking, neuropathy and radicular pain.  Follow up imaging with X-ray lead to further evaluation with CT which revealed possible loosening of hardware.  Clinically, patient still has left UE weakness that she thinks may be from her history of broken clavicle. No bowel, bladder dysfunction     She is here for definitive surgical evaluation. (05 Apr 2019 06:51)      Hospital course:   POD#0: S/P revision of anterior cervical discectomy with fusion of C4-C6 with extension to C3, as well as C2-T2 posterior fusion with right iliac crest autograft.   POD#1: CESARIO overnight. Drains x3 remain in place, [place] J collar and prevena dressing in place. Esposito removed this am, f/u TOV. Reports some throat tightness with mucous, but denies pain, no issues talking or swallowing (tolerating liquid diet)  POD#2 Pt with secrections and difficulty swallowing. Continue decadron 4q6. Robitussin started.   4/8 Pt complaining muscle spasm and pain down BUE L>R  4/9: no acute events overnight, neuro stable. Transferred to CCU for rapid Afib, started on Amiodarone drip.  4/10: Transitioned to PO Amiodarone, off IV drip, on tele, pending transfer back to Neurosurgery. Posterior MASOOD and HMV remains.  4/11: Posterior drain removed. Pt on Vanco/zosyn for aspiration PNA  4/12 Repeat Fees. 2nd posterior drain removed. Sputum culture sent  4/13: patient requiring supplemental O2 while at rest and during physical therapy had one bout of nausea overnight.  Over all, appears comfortable.  4/14 calorie count. Pt pre-op for possible PEG on Monday. Hypokalemia repleted. Con't vanco/zosyn for aspiration pneumonia. Daily CXR.  4/16 PEG inserted  Able to take PO as tolerated  Continue IV antbix  4/17 uneventful night, abx changed to cipro x7 days for pneumonia, TFs started at 4pm via PEG, to be slowly increased per nutrition protocol   4/18: CT abd / pelvis performed yesterday demonstrates left upper lobe kidney mass (likely renal cell carcinoma), R iliac crest lytic lesions and persistent pneumonia in bilateral lung bases.  does not want patient or daughters to know about new mass, but does want oncology to see patient in-pt, Dr. Daley to discuss with patient today. DW also to clear for eliquis start date.     Vital Signs Last 24 Hrs  T(C): 37 (18 Apr 2019 04:29), Max: 37.3 (17 Apr 2019 21:19)  T(F): 98.6 (18 Apr 2019 04:29), Max: 99.2 (17 Apr 2019 21:19)  HR: 81 (18 Apr 2019 04:29) (75 - 84)  BP: 125/63 (18 Apr 2019 04:29) (111/56 - 180/92)  BP(mean): --  RR: 17 (18 Apr 2019 04:29) (16 - 18)  SpO2: 96% (18 Apr 2019 04:29) (91% - 96%)    I&O's Summary    16 Apr 2019 07:01  -  17 Apr 2019 07:00  --------------------------------------------------------  IN: 2250 mL / OUT: 900 mL / NET: 1350 mL    17 Apr 2019 07:01  -  18 Apr 2019 06:15  --------------------------------------------------------  IN: 1050 mL / OUT: 1250 mL / NET: -200 mL        PHYSICAL EXAM:  Gen: NAD, AAOx3  HEENT: PERRL. EOMI.  Neck: +[place] J collar. Left anterior incision C/D/I. Posterior cervical incision w/ dressing C/D/I,  Lungs: Clear b/l  Heart: S1, S2. NSR.  Abd: Soft, NT/ND. +BS, PEG site c/d/i, abdominal binder in place   Back: Right iliac crest graft site C/D/I.  Neuro: CNs II-XII intact. Left  4/5, proximal 3/5. RUE and LEs b/l 5/5. Sensation intact throughout. Following commands. Speech clear.       TUBES/LINES:  [] Esposito  [] A-line  [] Lumbar Drain  [] Wound Drains  [] NGT   [] EVD   [] CVC  [x] Other: PEG       DIET:  [] NPO  [x] Mechanical  [x] Tube feeds    LABS:                        10.3   16.97 )-----------( 329      ( 17 Apr 2019 06:53 )             32.9     04-17    138  |  101  |  5<L>  ----------------------------<  103<H>  3.7   |  26  |  0.49<L>    Ca    9.1      17 Apr 2019 06:53  Phos  2.7     04-17  Mg     1.8     04-17              CAPILLARY BLOOD GLUCOSE          Drug Levels: [] N/A  Vancomycin Level, Trough: 17.2 ug/mL (04-14 @ 12:35)  Vancomycin Level, Trough: 26.3 ug/mL (04-13 @ 06:36)  Vancomycin Level, Trough: 10.7 ug/mL (04-12 @ 23:41)    CSF Analysis: [] N/A      Allergies    penicillin (Rash)    Intolerances        Home Medications:  anastrozole 1 mg oral tablet: 1 tab(s) orally once a day (05 Apr 2019 06:42)  gabapentin 600 mg oral tablet: 2 tab(s) orally once a day (at bedtime) (05 Apr 2019 06:42)  Lyrica 100 mg oral capsule: 1 cap(s) orally 2 times a day (05 Apr 2019 06:42)  metoprolol succinate 25 mg oral tablet, extended release: 1 tab(s) orally once a day (05 Apr 2019 06:42)  rosuvastatin 10 mg oral tablet: 1 tab(s) orally once a day (at bedtime) (05 Apr 2019 06:42)  Vitamin D3 400 intl units oral tablet: 2 tab(s) orally once a day (05 Apr 2019 06:42)      MEDICATIONS:  MEDICATIONS  (STANDING):  amiodarone    Tablet 200 milliGRAM(s) Oral daily  anastrozole 1 milliGRAM(s) Oral daily  atorvastatin 40 milliGRAM(s) Oral at bedtime  cholecalciferol 400 Unit(s) Oral daily  ciprofloxacin     Tablet 500 milliGRAM(s) Oral every 12 hours  enoxaparin Injectable 40 milliGRAM(s) SubCutaneous at bedtime  famotidine    Tablet 20 milliGRAM(s) Oral daily  gabapentin 600 milliGRAM(s) Oral at bedtime  metoprolol succinate ER 25 milliGRAM(s) Oral daily  senna 2 Tablet(s) Oral at bedtime  sertraline 50 milliGRAM(s) Oral daily    MEDICATIONS  (PRN):  acetaminophen   Tablet .. 650 milliGRAM(s) Oral every 6 hours PRN Temp greater or equal to 38C (100.4F), Mild Pain (1 - 3)  benzocaine 15 mG/menthol 3.6 mG Lozenge 1 Lozenge Oral every 1 hour PRN Sore Throat  guaiFENesin    Syrup 100 milliGRAM(s) Oral every 6 hours PRN Cough  magnesium hydroxide Suspension 30 milliLiter(s) Oral every 12 hours PRN Constipation  melatonin 3 milliGRAM(s) Oral at bedtime PRN Insomnia  ondansetron Injectable 4 milliGRAM(s) IV Push every 6 hours PRN Nausea  traMADol 25 milliGRAM(s) Oral every 4 hours PRN Moderate Pain (4 - 6)  traMADol 50 milliGRAM(s) Oral every 4 hours PRN Severe Pain (7 - 10)      CULTURES:  Culture Results:   Moderate Enterobacter cloacae complex  Accompanied by normal respiratory enedelia including Numerous Yeast (04-13 @ 07:57)      RADIOLOGY & ADDITIONAL TESTS:  EXAM:  CT ABDOMEN AND PELVIS [facility] IC                        PROCEDURE DATE:  04/17/2019    IMPRESSION:  Large renal cell carcinoma arising from the upper pole of the left   kidney; several right iliac osseous lytic metastasis.  Interval development of dense right basilar consolidation; persistent   left basilar consolidation with moderate left pleural effusion.        ASSESSMENT:  68y Female w/ PMH of HTN, HLD, h/o Breast Ca, h/o Hodgkins Lymphoma, h/o ACDF C4-C6 now s/p ACDF C3-C4 extension to C6, PSF C2-T2, right iliac crest bone graft 4/5, now s/p PEG placement for swallowing difficulties on 4/16/19, ENT assessed Vocal cords, which are intact.     PLAN:  NEURO:  - Cont neuro checks   - Cont [place] J   - Cont PT/OT   - Dc home saturday with services   - Pain control: cont gabapentin, tramadol   - Cont. zoloft   - Cont home meds: anastrozole    CARDIOVASCULAR:  - BP goal: normotension   - Cont amiodarone - tapered to amiodarone 200 QD   - Cont atorvastatin, metoprolol  - Pt needs eliquis 5 bid, start date pending DW clearance     PULMONARY:  - IS   - Cont robitussin   - Trend CXR   - CT chest with persistent left lower lobe pneumonia and right lower lobe pneumonia     RENAL:  - I&Os   - Replete lytes prn   - CT abd with lytic lesion on R iliac crest and Left kidney upper lobe renal cell carcinoma - to be seen by oncologist inpatient vs outpatient, DW to discuss plan with patient and family today     GI:  - PEG feeds to be advanced slowly as per nutrition to avoid overfeeding syndrome   - Pt may also have pureed (moist is best) and thin liquids in addition to PEG feeds   - Bowel regimen   - cont famotidine   - Cont abd binder     HEME:  - Trend H/H     ID:  - Monitor for fevers   - Trend WBC  - Cont Cipro x7 day (4/17-4/23)     ENDO:  - ISS     DVT PROPHYLAXIS:  [x] Venodynes                                [x] Heparin/Lovenox    DISPOSITION:  - Tele status   - Full code   - Dispo: pending   - D/w Dr. Daley     Assessment: present when checked     [] GCS   E   V   M     Heart Failure: [] Acute, [] acute on chronic, [] chronic   Heart Failure: [] Diastolic (HFpEF), [] Systolic (HRrEF), [] Combined (HFpEF and HFrEF), [] RHF, [] Pulm HTN, [] Other     [] SUSU, [] ATN, [] AIN, [] other   [] CKD1, [] CKD2, [] CKD3, [] CKD4, [] CKD5, [] ESRD     Encephalopathy: [] Metabolic, [] Hepatic, [] Toxic, [] Neurological, [] Other     Abnormal Nutritional Status: [] malnutrition (see nutrition note), []underweight: BMI <19, [] morbid obesity: BMI >40, [] Cachexia     [] Sepsis   [] Hypovolemic shock, [] Cardiogenic shock, [] Hemorrhagic shock, [] Neurogenic shock   [] Acute respiratory failure   [] Cerebral edema, [] Brain compression / herniation   [] Functional quadriplegia   [] Acute blood loss anemia

## 2019-04-18 NOTE — PROGRESS NOTE ADULT - PROBLEM SELECTOR PLAN 2
Vision is to continue on the current antibiotic. Duration about 7 days. Chest x-ray was unchanged. The pulmonary status is stable for the procedure.  She is PO cipro.  The CT scan revealed consolidation LLL and need to follow with repat CT scan in 6 weeks

## 2019-04-18 NOTE — DISCHARGE NOTE PROVIDER - HOSPITAL COURSE
This is a 69 y/o female, well known to Dr. Daley with PMH of non-hodkins lymphoma (xrt in 1981) and breast ca (2000's), HTN, HLD, osteoporosis.  In 10/18, patient was evaluated for upper extremity weakness and underwent a carpal tunnel release and ulnar transposition without relief of symptoms.   in 11/18, patient was admitted to [facility] for b/l UE weakness and difficulty walking.  Her work up revealed cervical stenosis with compression.  However, at the time, her bone scan also was significant for uptake in her right acetabulum and at the time and the evaluating spine surgeon requested this lesion be addressed prior to any cervical surgery.  A CT of the region was negative and the patient was sent home for follow up. on 12/4/18, patient was seen in the [facility] ER again, this time with worsening symptoms and she was emergently taken to the OR for a three level ACDF C4-C6.  She was discharged after the surgery with an uncomplicated post op course.         Since this surgery, patient has dramatic improvements in her walking, neuropathy and radicular pain.  Follow up imaging with X-ray lead to further evaluation with CT which revealed possible loosening of hardware.    Clinically, patient still has left UE weakness that she thinks may be from her history of broken clavicle. No bowel, bladder dysfunction         She is here for definitive surgical evaluation.          PROCEDURES:    Graft, bone, iliac crest 05-Apr-2019 17:29:04 Right Shlifer, Manuelito    Fusion of 7 to 12 spinal segments by posterior approach 05-Apr-2019 17:28:21 C2-T2 PSF with right iliac crest autograft Revision of anterior cervical discectomy with fusion (ACDF) 05-Apr-2019 17:26:53 ACDF C3-C4, extension of fusion C3-C6         Gastrostomy tube placement 16-Apr-2019 11:26:58          Hospital course:     POD#0: S/P revision of anterior cervical discectomy with fusion of C4-C6 with extension to C3, as well as C2-T2 posterior fusion with right iliac crest autograft.     POD#1: CESARIO overnight. Drains x3 remain in place, [place] J collar and prevena dressing in place. Esposito removed this am, f/u TOV. Reports some throat tightness with mucous, but denies pain, no issues talking or swallowing (tolerating liquid diet)    POD#2 Pt with secrections and difficulty swallowing. Continue decadron 4q6. Robitussin started.     4/8 Pt complaining muscle spasm and pain down BUE L>R    4/9: no acute events overnight, neuro stable. Transferred to CCU for rapid Afib, started on Amiodarone drip.    4/10: Transitioned to PO Amiodarone, off IV drip, on tele, pending transfer back to Neurosurgery. Posterior MASOOD and HMV remains.    4/11: Posterior drain removed. Pt on Vanco/zosyn for aspiration PNA    4/12 Repeat Fees. 2nd posterior drain removed. Sputum culture sent    4/13: patient requiring supplemental O2 while at rest and during physical therapy had one bout of nausea overnight.  Over all, appears comfortable.    4/14 calorie count. Pt pre-op for possible PEG on Monday. Hypokalemia repleted. Con't vanco/zosyn for aspiration pneumonia. Daily CXR.    4/16 PEG inserted  Able to take PO as tolerated  Continue IV antbix    4/17 uneventful night, abx changed to cipro x7 days for pneumonia, TFs started at 4pm via PEG, to be slowly increased per nutrition protocol     4/18: CT abd / pelvis performed yesterday demonstrates left upper lobe kidney mass (likely renal cell carcinoma), R iliac crest lytic lesions and persistent pneumonia in bilateral lung bases.  does not want patient or daughters to know about new mass, but does want oncology to see patient in-pt, Dr. Daley to discuss with patient today. DW also to clear for eliquis start date. This is a 69 y/o female, well known to Dr. Daley with PMH of non-hodkins lymphoma (xrt in 1981) and breast ca (2000's), HTN, HLD, osteoporosis.  In 10/18, patient was evaluated for upper extremity weakness and underwent a carpal tunnel release and ulnar transposition without relief of symptoms.   in 11/18, patient was admitted to [facility] for b/l UE weakness and difficulty walking.  Her work up revealed cervical stenosis with compression.  However, at the time, her bone scan also was significant for uptake in her right acetabulum and at the time and the evaluating spine surgeon requested this lesion be addressed prior to any cervical surgery.  A CT of the region was negative and the patient was sent home for follow up. on 12/4/18, patient was seen in the [facility] ER again, this time with worsening symptoms and she was emergently taken to the OR for a three level ACDF C4-C6.  She was discharged after the surgery with an uncomplicated post op course.         Since this surgery, patient has dramatic improvements in her walking, neuropathy and radicular pain.  Follow up imaging with X-ray lead to further evaluation with CT which revealed possible loosening of hardware.    Clinically, patient still has left UE weakness that she thinks may be from her history of broken clavicle. No bowel, bladder dysfunction         She is here for definitive surgical evaluation.          PROCEDURES:    Graft, bone, iliac crest 05-Apr-2019 17:29:04 Right Shlifer, Manuelito    Fusion of 7 to 12 spinal segments by posterior approach 05-Apr-2019 17:28:21 C2-T2 PSF with right iliac crest autograft Revision of anterior cervical discectomy with fusion (ACDF) 05-Apr-2019 17:26:53 ACDF C3-C4, extension of fusion C3-C6         Gastrostomy tube placement 16-Apr-2019 11:26:58          Hospital course:     POD#0: S/P revision of anterior cervical discectomy with fusion of C4-C6 with extension to C3, as well as C2-T2 posterior fusion with right iliac crest autograft.     POD#1: CESARIO overnight. Drains x3 remain in place, [place] J collar and prevena dressing in place. Esposito removed this am, f/u TOV. Reports some throat tightness with mucous, but denies pain, no issues talking or swallowing (tolerating liquid diet)    POD#2 Pt with secrections and difficulty swallowing. Continue decadron 4q6. Robitussin started.     4/8 Pt complaining muscle spasm and pain down BUE L>R    4/9: no acute events overnight, neuro stable. Transferred to CCU for rapid Afib, started on Amiodarone drip.    4/10: Transitioned to PO Amiodarone, off IV drip, on tele, pending transfer back to Neurosurgery. Posterior MASOOD and HMV remains.    4/11: Posterior drain removed. Pt on Vanco/zosyn for aspiration PNA    4/12 Repeat Fees. 2nd posterior drain removed. Sputum culture sent    4/13: patient requiring supplemental O2 while at rest and during physical therapy had one bout of nausea overnight.  Over all, appears comfortable.    4/14 calorie count. Pt pre-op for possible PEG on Monday. Hypokalemia repleted. Con't vanco/zosyn for aspiration pneumonia. Daily CXR.    4/16 PEG inserted  Able to take PO as tolerated  Continue IV antbix    4/17 uneventful night, abx changed to cipro x7 days for pneumonia, TFs started at 4pm via PEG, to be slowly increased per nutrition protocol     4/18: CT abd / pelvis performed yesterday demonstrates left upper lobe kidney mass (likely renal cell carcinoma), R iliac crest lytic lesions and persistent pneumonia in bilateral lung bases.  does not want patient or daughters to know about new mass, but does want oncology to see patient in-pt, Dr. Daley to discuss with patient today. DW also to clear for eliquis start date.     As per Cardiiology Lasix IV 40mg bid with Potassium replacment for pitting edema BLE  to diruesis   Pending Acute Rehab This is a 69 y/o female, well known to Dr. Daley with PMH of non-hodkins lymphoma (xrt in 1981) and breast ca (2000's), HTN, HLD, osteoporosis.  In 10/18, patient was evaluated for upper extremity weakness and underwent a carpal tunnel release and ulnar transposition without relief of symptoms.   in 11/18, patient was admitted to [facility] for b/l UE weakness and difficulty walking.  Her work up revealed cervical stenosis with compression.  However, at the time, her bone scan also was significant for uptake in her right acetabulum and at the time and the evaluating spine surgeon requested this lesion be addressed prior to any cervical surgery.  A CT of the region was negative and the patient was sent home for follow up. on 12/4/18, patient was seen in the [facility] ER again, this time with worsening symptoms and she was emergently taken to the OR for a three level ACDF C4-C6.  She was discharged after the surgery with an uncomplicated post op course.         Since this surgery, patient has dramatic improvements in her walking, neuropathy and radicular pain.  Follow up imaging with X-ray lead to further evaluation with CT which revealed possible loosening of hardware.    Clinically, patient still has left UE weakness that she thinks may be from her history of broken clavicle. No bowel, bladder dysfunction         She is here for definitive surgical evaluation.          PROCEDURES:    Graft, bone, iliac crest 05-Apr-2019 17:29:04 Right Shlifer, Manuelito    Fusion of 7 to 12 spinal segments by posterior approach 05-Apr-2019 17:28:21 C2-T2 PSF with right iliac crest autograft Revision of anterior cervical discectomy with fusion (ACDF) 05-Apr-2019 17:26:53 ACDF C3-C4, extension of fusion C3-C6         Gastrostomy tube placement 16-Apr-2019 11:26:58          Hospital course:     POD#0: S/P revision of anterior cervical discectomy with fusion of C4-C6 with extension to C3, as well as C2-T2 posterior fusion with right iliac crest autograft.     POD#1: CESARIO overnight. Drains x3 remain in place, [place] J collar and prevena dressing in place. Esposito removed this am, f/u TOV. Reports some throat tightness with mucous, but denies pain, no issues talking or swallowing (tolerating liquid diet)    POD#2 Pt with secrections and difficulty swallowing. Continue decadron 4q6. Robitussin started.     4/8 Pt complaining muscle spasm and pain down BUE L>R    4/9: no acute events overnight, neuro stable. Transferred to CCU for rapid Afib, started on Amiodarone drip.    4/10: Transitioned to PO Amiodarone, off IV drip, on tele, pending transfer back to Neurosurgery. Posterior MASOOD and HMV remains.    4/11: Posterior drain removed. Pt on Vanco/zosyn for aspiration PNA    4/12 Repeat Fees. 2nd posterior drain removed. Sputum culture sent    4/13: patient requiring supplemental O2 while at rest and during physical therapy had one bout of nausea overnight.  Over all, appears comfortable.    4/14 calorie count. Pt pre-op for possible PEG on Monday. Hypokalemia repleted. Con't vanco/zosyn for aspiration pneumonia. Daily CXR.    4/16 PEG inserted  Able to take PO as tolerated  Continue IV antbix    4/17 uneventful night, abx changed to cipro x7 days for pneumonia, TFs started at 4pm via PEG, to be slowly increased per nutrition protocol     4/18: CT abd / pelvis performed yesterday demonstrates left upper lobe kidney mass (likely renal cell carcinoma), R iliac crest lytic lesions and persistent pneumonia in bilateral lung bases.  does not want patient or daughters to know about new mass, but does want oncology to see patient in-pt, Dr. Daley to discuss with patient today. DW also to clear for eliquis start date.     As per Cardiiology Lasix IV 40mg bid with Potassium replacment for pitting edema BLE  to diruesis   Pending Acute Rehab    4/20: findings of lytic lesions in right iliac crest consistent with postoperative changes from bone grafting for cervical spine fusion.  Sutures removed.  Clear for discharge

## 2019-04-18 NOTE — PROGRESS NOTE ADULT - SUBJECTIVE AND OBJECTIVE BOX
Neurology Follow up note    Name  HILARIA KUMAR    HPI:  This is a 69 y/o female, well known to Dr. Daley with PMH of non-hodkins lymphoma (xrt in 1981) and breast ca (2000's), HTN, HLD, osteoporosis.  In 10/18, patient was evaluated for upper extremity weakness and underwent a carpal tunnel release and ulnar transposition without relief of symptoms.   in 11/18, patient was admitted to [facility] for b/l UE weakness and difficulty walking.  Her work up revealed cervical stenosis with compression.  However, at the time, her bone scan also was significant for uptake in her right acetabulum and at the time and the evaluating spine surgeon requested this lesion be addressed prior to any cervical surgery.  A CT of the region was negative and the patient was sent home for follow up. on 12/4/18, patient was seen in the [facility] ER again, this time with worsening symptoms and she was emergently taken to the OR for a three level ACDF C4-C6.  She was discharged after the surgery with an uncomplicated post op course.     Since this surgery, patient has dramatic improvements in her walking, neuropathy and radicular pain.  Follow up imaging with X-ray lead to further evaluation with CT which revealed possible loosening of hardware.  Clinically, patient still has left UE weakness that she thinks may be from her history of broken clavicle. No bowel, bladder dysfunction     She is here for definitive surgical evaluation. (05 Apr 2019 06:51)      Interval History - continues to have weakness in the LUE        REVIEW OF SYSTEMS    Vital Signs Last 24 Hrs  T(C): 37 (18 Apr 2019 04:29), Max: 37.3 (17 Apr 2019 21:19)  T(F): 98.6 (18 Apr 2019 04:29), Max: 99.2 (17 Apr 2019 21:19)  HR: 81 (18 Apr 2019 04:29) (75 - 84)  BP: 125/63 (18 Apr 2019 04:29) (111/56 - 180/92)  BP(mean): --  RR: 17 (18 Apr 2019 04:29) (16 - 18)  SpO2: 96% (18 Apr 2019 04:29) (91% - 96%)    Physical Exam-     Mental Status- awake and alert    Cranial Nerves- full EOM    Gait and station- n/a    Motor- proximal LUE weakness    Reflexes- decrease Left biceps    Sensation- decrease LUE    Coordination- no tremors    Vascular - no bruits    Medications  acetaminophen   Tablet .. 650 milliGRAM(s) Oral every 6 hours PRN  amiodarone    Tablet 200 milliGRAM(s) Oral daily  anastrozole 1 milliGRAM(s) Oral daily  atorvastatin 40 milliGRAM(s) Oral at bedtime  benzocaine 15 mG/menthol 3.6 mG Lozenge 1 Lozenge Oral every 1 hour PRN  cholecalciferol 400 Unit(s) Oral daily  ciprofloxacin     Tablet 500 milliGRAM(s) Oral every 12 hours  enoxaparin Injectable 40 milliGRAM(s) SubCutaneous at bedtime  famotidine    Tablet 20 milliGRAM(s) Oral daily  gabapentin 600 milliGRAM(s) Oral at bedtime  guaiFENesin    Syrup 100 milliGRAM(s) Oral every 6 hours PRN  magnesium hydroxide Suspension 30 milliLiter(s) Oral every 12 hours PRN  melatonin 3 milliGRAM(s) Oral at bedtime PRN  metoprolol succinate ER 25 milliGRAM(s) Oral daily  ondansetron Injectable 4 milliGRAM(s) IV Push every 6 hours PRN  senna 2 Tablet(s) Oral at bedtime  sertraline 50 milliGRAM(s) Oral daily  traMADol 25 milliGRAM(s) Oral every 4 hours PRN  traMADol 50 milliGRAM(s) Oral every 4 hours PRN      Lab      Radiology    Assessment- Cervical radiculopathy    Plan as per NS

## 2019-04-18 NOTE — DISCHARGE NOTE PROVIDER - CARE PROVIDER_API CALL
Norbert Daley)  Neurosurgery  General  [address] [facility] [address], [address]  [place] [address]  Phone: [phone]  Fax: [phone]  Follow Up Time:     David Velez)  Internal Medicine  [address] [id number]  Phone: [phone]  Fax: [phone]  Follow Up Time:     Keysha Pina)  Critical Care Medicine; Pulmonary Disease  [address], [address] [place] [id number]  Phone: [phone]  Fax: [phone]  Follow Up Time:     Brittney Hutchinson)  Surgery  [address], [place] [address]  Phone: [phone]  Fax: [phone]  Follow Up Time:

## 2019-04-18 NOTE — DISCHARGE NOTE PROVIDER - PROVIDER TOKENS
PROVIDER:[TOKEN:[[id number]]],PROVIDER:[TOKEN:[[id number]]],PROVIDER:[TOKEN:[[id number]]],PROVIDER:[TOKEN:[[id number]]]

## 2019-04-18 NOTE — DISCHARGE NOTE PROVIDER - NSDCFUADDINST_GEN_ALL_CORE_FT
Follow up out pt Swallow evaluation and treat  Follow up with your Oncologist  Follow up in regard to renal mass  Encourage to cough and deep breathing  Use Incentive Spirometry every hour as tolerated

## 2019-04-18 NOTE — PROGRESS NOTE ADULT - ASSESSMENT
67 y/o female h/o NHL s/p XRT, now s/p Fusion of 7 to 12 spinal segments by posterior approach, Revision of anterior cervical discectomy with fusion (ACDF), with aspiration pneumonia, aspiration risk on speech eval, Day 2 S/p EGD and PEG    Recs:  - started at tube feeds, please advance as tolerated till goal is met  - Please keep abdominal binder to avoid inadvertent PEG dislodgment  - Surgery 4C will follow  - Discussed with  69 y/o female h/o NHL s/p XRT, now s/p Fusion of 7 to 12 spinal segments by posterior approach, Revision of anterior cervical discectomy with fusion (ACDF), with aspiration pneumonia, aspiration risk on speech eval, Day 2 S/p EGD and PEG    Recs:  - started at tube feeds, please advance as tolerated till goal is met  - Please keep abdominal binder to avoid inadvertent PEG dislodgment  - Surgery 4C will follow  - Discussed with       Addendum:  - Tolerating feeds  - Feeds per primary team  - Keep abdominal binder to avoid inadvertent dislodgment, When she go home she can put it on when she sleeps  - Can follow up with  when she is tolerating PO for PEG removal  - Will sign off

## 2019-04-18 NOTE — DISCHARGE NOTE PROVIDER - NSDCHHNEEDSERVICE_GEN_ALL_CORE
Other, specify.../Wound care and assessment/Rehabilitation services/Medication teaching and assessment

## 2019-04-18 NOTE — CHART NOTE - NSCHARTNOTEFT_GEN_A_CORE
Admitting Diagnosis:   Patient is a 68y old  Female who presents with a chief complaint of Cervical hardware failure (18 Apr 2019 07:40)      PAST MEDICAL & SURGICAL HISTORY:  Anxiety  History of breast cancer: s/p right lumpectomy , RT  Hodgkin disease: 1981, underwent radiation therapy  HLD (hyperlipidemia)  HTN (hypertension)  Cervical disc herniation  Carpal tunnel syndrome: b/l  Cervical disc disease  History of carpal tunnel surgery: 10/2018 left hand  History of tonsillectomy  S/P cervical spinal fusion: 12/15/2018 C4-C6 ACDF  History of Mohs micrographic surgery for skin cancer  History of lumpectomy of right breast: 2013      Current Nutrition Order:  Pureed Diet  Jevity 1.2 @47ml/hr x24hrs via PEG (1128mlTV, 1354kcal, 63g pro, 910ml FW)    PO Intake: Good (%) [   ]  Fair (50-75%) [   ] Poor (<25%) [   ]  Consuming ~50% of tray. Had apple sauce, cream of wheat, yogurt    GI Issues:  No apparent GI distress  No N/V/D/C  Abdominal discomfort per flowsheet  Last BM 4/15    Pain:   No pain reported    Skin Integrity:   PEG site   Surgical incision: posterior neck, crest bone    Labs:   04-17    138  |  101  |  5<L>  ----------------------------<  103<H>  3.7   |  26  |  0.49<L>    Ca    9.1      17 Apr 2019 06:53  Phos  2.7     04-17  Mg     1.8     04-17      CAPILLARY BLOOD GLUCOSE          Medications:  MEDICATIONS  (STANDING):  amiodarone    Tablet 200 milliGRAM(s) Oral daily  anastrozole 1 milliGRAM(s) Oral daily  atorvastatin 40 milliGRAM(s) Oral at bedtime  cholecalciferol 400 Unit(s) Oral daily  ciprofloxacin     Tablet 500 milliGRAM(s) Oral every 12 hours  cyanocobalamin 1000 MICROGram(s) Oral daily  enoxaparin Injectable 40 milliGRAM(s) SubCutaneous at bedtime  famotidine    Tablet 20 milliGRAM(s) Oral daily  furosemide    Tablet 20 milliGRAM(s) Oral every 12 hours  gabapentin 600 milliGRAM(s) Oral at bedtime  metoprolol succinate ER 25 milliGRAM(s) Oral daily  multivitamin  Chewable 1 Tablet(s) Oral daily  potassium chloride   Powder 40 milliEquivalent(s) Oral daily  senna 2 Tablet(s) Oral at bedtime  sertraline 50 milliGRAM(s) Oral daily  thiamine 100 milliGRAM(s) Oral daily    MEDICATIONS  (PRN):  acetaminophen   Tablet .. 650 milliGRAM(s) Oral every 6 hours PRN Temp greater or equal to 38C (100.4F), Mild Pain (1 - 3)  benzocaine 15 mG/menthol 3.6 mG Lozenge 1 Lozenge Oral every 1 hour PRN Sore Throat  guaiFENesin    Syrup 100 milliGRAM(s) Oral every 6 hours PRN Cough  magnesium hydroxide Suspension 30 milliLiter(s) Oral every 12 hours PRN Constipation  melatonin 3 milliGRAM(s) Oral at bedtime PRN Insomnia  ondansetron Injectable 4 milliGRAM(s) IV Push every 6 hours PRN Nausea  traMADol 25 milliGRAM(s) Oral every 4 hours PRN Moderate Pain (4 - 6)  traMADol 50 milliGRAM(s) Oral every 4 hours PRN Severe Pain (7 - 10)      Weight:  68.3kg- admit wt  72.6kg- 4/16  Height: 5'7" Weight: 150.5 lbs,  lbs+/-10%, %%, BMI 23.6,     Weight Change:   Wt appears fairly stable. Continue to trend.    Nutrition Focused Physical Exam: Completed [   ]  Not Pertinent [  x ]    Estimated energy needs:   UBW used to calculate EER as per pt's current body weight is within % IBW   Estimated nutrient needs based on [facility] SOC for maintenance in older adults. Needs adjusted for age and post-op healing  20-25kcal/kg= 1350-1687kcal  1.2-1.4gms pro/kg= 81-94gms pro  30-35ml/kg= 2025-2362ml    Subjective:   Pt seen for follow up. 68 y.o F from home with PMHx of non-Hodgkin lymphoma s/p XRT 1981 and Breast CA (2000’s), HTN, HLD, OP. Pt admitted and underwent ACDF C3-C4 extension to C6, PSF C2-T2 4/4. Pt transferred to [facility] for Afib with RVR in setting of aspiration PNA. Pt c/o anorexia, dysphagia xfew days PTA. Pt previously on mechanical soft diet with thin liquids 4/8, however, pt w/ continued complaints of dysphagia and excessive coughing, exacerbated during meal time per pt.Pt is deemed to be high aspiration risk and placed on pureed diet. Per SLP 4/12: "If GOC c/w full oral intake, consider smooth/moist purees and small sips of thin liquid. If GOC are to avoid prandial aspiration & optimize nutritional intake consider long-term alternative means of nutrition/hydration & medication". Pt now s/p PEG placement w/ surgery. Team consulted nutrition for bolus feeds education. Plan is for pt to continue to eat orally for pleasure and for EN to meet ~75% EER. Do not recommend bolus feeds if patient is high aspiration risk as bolus feeds increase risk for aspiration. Continuous, cyclic or PEJ feeds are more ideal given concern for aspiration.  See recommendations below.     Previous Nutrition Diagnosis:  Inadequate energy intake RT dysphagia AEB pt w/ poor to fair intake of pureed foods and likely meeting <50% EER per calorie count completed over the weekend.     Active [ x  ]  Resolved [   ]    If resolved, new PES:     Goal: Pt to receive >75% EER     Recommendations:  1. Pt is not an ideal candidate for bolus feeding d/t high aspiration risk. If possible recommend continuing on continuous or cyclic feeds via pump.   >>Jevity 1.2 @47ml/hr x24hrs via PEG (1128mlTV, 1354kcal, 63g pro, 910ml FW)  2. If pump is not feasible recommend gravity feeds for lower rate (would need additional supplies for this).   3. If bolus feeds are the only options, recommend feeds occur over a 30 minute time period to avoid rapid intake of high volume.   >> Recommend 5 cans Jevity 1.2/day via PEG (1185ml TV, 1422kcal, 65g pro, 956ml FW). Please sit up during feeds and for 1 hour after. Recommend slow bolus of 30-60 minutes.   >>Recommend 1 can every 3-4 hours to avoid too much volume at a time. PO intake improves, pt may only need 4 cans/day.   4. Please replete lytes PRN and ensure stable lab values prior to d/c.   *d/w team aspiration risk for bolus feeds    Education:   Discussed w/ pt needing 5 cans/day of Jevity 1.2 and slow intake    Risk Level: High [ x  ] Moderate [   ] Low [   ].

## 2019-04-19 LAB
ANION GAP SERPL CALC-SCNC: 10 MMOL/L — SIGNIFICANT CHANGE UP (ref 5–17)
BUN SERPL-MCNC: 8 MG/DL — SIGNIFICANT CHANGE UP (ref 7–23)
CALCIUM SERPL-MCNC: 9.1 MG/DL — SIGNIFICANT CHANGE UP (ref 8.4–10.5)
CHLORIDE SERPL-SCNC: 99 MMOL/L — SIGNIFICANT CHANGE UP (ref 96–108)
CO2 SERPL-SCNC: 29 MMOL/L — SIGNIFICANT CHANGE UP (ref 22–31)
CREAT SERPL-MCNC: 0.46 MG/DL — LOW (ref 0.5–1.3)
GLUCOSE SERPL-MCNC: 137 MG/DL — HIGH (ref 70–99)
HCT VFR BLD CALC: 30.4 % — LOW (ref 34.5–45)
HGB BLD-MCNC: 9.6 G/DL — LOW (ref 11.5–15.5)
MAGNESIUM SERPL-MCNC: 1.9 MG/DL — SIGNIFICANT CHANGE UP (ref 1.6–2.6)
MCHC RBC-ENTMCNC: 29.8 PG — SIGNIFICANT CHANGE UP (ref 27–34)
MCHC RBC-ENTMCNC: 31.6 GM/DL — LOW (ref 32–36)
MCV RBC AUTO: 94.4 FL — SIGNIFICANT CHANGE UP (ref 80–100)
NRBC # BLD: 0 /100 WBCS — SIGNIFICANT CHANGE UP (ref 0–0)
PHOSPHATE SERPL-MCNC: 3.6 MG/DL — SIGNIFICANT CHANGE UP (ref 2.5–4.5)
PLATELET # BLD AUTO: 343 K/UL — SIGNIFICANT CHANGE UP (ref 150–400)
POTASSIUM SERPL-MCNC: 4 MMOL/L — SIGNIFICANT CHANGE UP (ref 3.5–5.3)
POTASSIUM SERPL-SCNC: 4 MMOL/L — SIGNIFICANT CHANGE UP (ref 3.5–5.3)
RBC # BLD: 3.22 M/UL — LOW (ref 3.8–5.2)
RBC # FLD: 14.4 % — SIGNIFICANT CHANGE UP (ref 10.3–14.5)
SODIUM SERPL-SCNC: 138 MMOL/L — SIGNIFICANT CHANGE UP (ref 135–145)
WBC # BLD: 12.13 K/UL — HIGH (ref 3.8–10.5)
WBC # FLD AUTO: 12.13 K/UL — HIGH (ref 3.8–10.5)

## 2019-04-19 PROCEDURE — 99232 SBSQ HOSP IP/OBS MODERATE 35: CPT | Mod: GC

## 2019-04-19 PROCEDURE — 99232 SBSQ HOSP IP/OBS MODERATE 35: CPT

## 2019-04-19 RX ADMIN — Medication 100 MILLIGRAM(S): at 13:20

## 2019-04-19 RX ADMIN — Medication 500 MILLIGRAM(S): at 06:43

## 2019-04-19 RX ADMIN — AMIODARONE HYDROCHLORIDE 200 MILLIGRAM(S): 400 TABLET ORAL at 06:43

## 2019-04-19 RX ADMIN — Medication 400 UNIT(S): at 13:20

## 2019-04-19 RX ADMIN — ONDANSETRON 4 MILLIGRAM(S): 8 TABLET, FILM COATED ORAL at 19:34

## 2019-04-19 RX ADMIN — ANASTROZOLE 1 MILLIGRAM(S): 1 TABLET ORAL at 13:20

## 2019-04-19 RX ADMIN — Medication 40 MILLIEQUIVALENT(S): at 13:20

## 2019-04-19 RX ADMIN — Medication 500 MILLIGRAM(S): at 17:50

## 2019-04-19 RX ADMIN — Medication 20 MILLIGRAM(S): at 06:43

## 2019-04-19 RX ADMIN — Medication 20 MILLIGRAM(S): at 17:50

## 2019-04-19 RX ADMIN — Medication 25 MILLIGRAM(S): at 06:43

## 2019-04-19 RX ADMIN — PREGABALIN 1000 MICROGRAM(S): 225 CAPSULE ORAL at 13:20

## 2019-04-19 RX ADMIN — SERTRALINE 50 MILLIGRAM(S): 25 TABLET, FILM COATED ORAL at 13:21

## 2019-04-19 RX ADMIN — ENOXAPARIN SODIUM 40 MILLIGRAM(S): 100 INJECTION SUBCUTANEOUS at 22:36

## 2019-04-19 NOTE — PROGRESS NOTE ADULT - NEGATIVE GASTROINTESTINAL SYMPTOMS
problem swalling

## 2019-04-19 NOTE — PROGRESS NOTE ADULT - SUBJECTIVE AND OBJECTIVE BOX
Neurology Follow up note    Name  HILARIA KUMAR    HPI:  This is a 67 y/o female, well known to Dr. Daley with PMH of non-hodkins lymphoma (xrt in 1981) and breast ca (2000's), HTN, HLD, osteoporosis.  In 10/18, patient was evaluated for upper extremity weakness and underwent a carpal tunnel release and ulnar transposition without relief of symptoms.   in 11/18, patient was admitted to [facility] for b/l UE weakness and difficulty walking.  Her work up revealed cervical stenosis with compression.  However, at the time, her bone scan also was significant for uptake in her right acetabulum and at the time and the evaluating spine surgeon requested this lesion be addressed prior to any cervical surgery.  A CT of the region was negative and the patient was sent home for follow up. on 12/4/18, patient was seen in the [facility] ER again, this time with worsening symptoms and she was emergently taken to the OR for a three level ACDF C4-C6.  She was discharged after the surgery with an uncomplicated post op course.     Since this surgery, patient has dramatic improvements in her walking, neuropathy and radicular pain.  Follow up imaging with X-ray lead to further evaluation with CT which revealed possible loosening of hardware.  Clinically, patient still has left UE weakness that she thinks may be from her history of broken clavicle. No bowel, bladder dysfunction     She is here for definitive surgical evaluation. (05 Apr 2019 06:51)      Interval History - no new weakness in the UE/ LE- PEG tube        REVIEW OF SYSTEMS    Vital Signs Last 24 Hrs  T(C): 36.8 (19 Apr 2019 05:04), Max: 37 (18 Apr 2019 16:35)  T(F): 98.3 (19 Apr 2019 05:04), Max: 98.6 (18 Apr 2019 16:35)  HR: 75 (19 Apr 2019 05:04) (75 - 87)  BP: 132/75 (19 Apr 2019 05:04) (105/58 - 147/72)  BP(mean): --  RR: 18 (19 Apr 2019 05:04) (17 - 18)  SpO2: 96% (19 Apr 2019 05:04) (92% - 98%)    Physical Exam-     Mental Status- awake and alert    Cranial Nerves- full EOM    Gait and station- n/a    Motor- LUE weakness    Reflexes- decreased    Sensation- no sensory level    Coordination- no tremors    Vascular - no bruits    Medications  acetaminophen   Tablet .. 650 milliGRAM(s) Oral every 6 hours PRN  amiodarone    Tablet 200 milliGRAM(s) Oral daily  anastrozole 1 milliGRAM(s) Oral daily  atorvastatin 40 milliGRAM(s) Oral at bedtime  benzocaine 15 mG/menthol 3.6 mG Lozenge 1 Lozenge Oral every 1 hour PRN  cholecalciferol 400 Unit(s) Oral daily  ciprofloxacin     Tablet 500 milliGRAM(s) Oral every 12 hours  cyanocobalamin 1000 MICROGram(s) Oral daily  enoxaparin Injectable 40 milliGRAM(s) SubCutaneous at bedtime  famotidine    Tablet 20 milliGRAM(s) Oral daily  furosemide   Injectable 20 milliGRAM(s) IV Push every 12 hours  gabapentin 600 milliGRAM(s) Oral at bedtime  guaiFENesin    Syrup 100 milliGRAM(s) Oral every 6 hours PRN  magnesium hydroxide Suspension 30 milliLiter(s) Oral every 12 hours PRN  melatonin 3 milliGRAM(s) Oral at bedtime PRN  metoprolol succinate ER 25 milliGRAM(s) Oral daily  multivitamin  Chewable 1 Tablet(s) Oral daily  ondansetron Injectable 4 milliGRAM(s) IV Push every 6 hours PRN  potassium chloride   Powder 40 milliEquivalent(s) Oral daily  senna 2 Tablet(s) Oral at bedtime  sertraline 50 milliGRAM(s) Oral daily  thiamine 100 milliGRAM(s) Oral daily  traMADol 25 milliGRAM(s) Oral every 4 hours PRN  traMADol 50 milliGRAM(s) Oral every 4 hours PRN      Lab      Radiology    Assessment- Cervical radiculopathy    Plan as per NS

## 2019-04-19 NOTE — PROGRESS NOTE ADULT - PROBLEM SELECTOR PROBLEM 3
PNA (pneumonia)
Renal mass
Elevated hemidiaphragm
Renal mass
Elevated hemidiaphragm

## 2019-04-19 NOTE — PROGRESS NOTE ADULT - RESPIRATORY
Breath Sounds equal & clear to percussion & auscultation, no accessory muscle use
Breath Sounds equal & clear to percussion & auscultation, no accessory muscle use
detailed exam
Breath Sounds equal & clear to percussion & auscultation, no accessory muscle use
detailed exam
detailed exam
Breath Sounds equal & clear to percussion & auscultation, no accessory muscle use

## 2019-04-19 NOTE — PROGRESS NOTE ADULT - SUBJECTIVE AND OBJECTIVE BOX
INTERVAL HISTORY:  	feels much better today, less SOB   MEDICATIONS:  amiodarone    Tablet 200 milliGRAM(s) Oral daily  furosemide   Injectable 20 milliGRAM(s) IV Push every 12 hours  metoprolol succinate ER 25 milliGRAM(s) Oral daily  ciprofloxacin     Tablet 500 milliGRAM(s) Oral every 12 hours  guaiFENesin    Syrup 100 milliGRAM(s) Oral every 6 hours PRN  acetaminophen   Tablet .. 650 milliGRAM(s) Oral every 6 hours PRN  gabapentin 600 milliGRAM(s) Oral at bedtime  melatonin 3 milliGRAM(s) Oral at bedtime PRN  ondansetron Injectable 4 milliGRAM(s) IV Push every 6 hours PRN  sertraline 50 milliGRAM(s) Oral daily  traMADol 25 milliGRAM(s) Oral every 4 hours PRN  traMADol 50 milliGRAM(s) Oral every 4 hours PRN    famotidine    Tablet 20 milliGRAM(s) Oral daily  magnesium hydroxide Suspension 30 milliLiter(s) Oral every 12 hours PRN  senna 2 Tablet(s) Oral at bedtime    atorvastatin 40 milliGRAM(s) Oral at bedtime    anastrozole 1 milliGRAM(s) Oral daily  benzocaine 15 mG/menthol 3.6 mG Lozenge 1 Lozenge Oral every 1 hour PRN  cholecalciferol 400 Unit(s) Oral daily  cyanocobalamin 1000 MICROGram(s) Oral daily  enoxaparin Injectable 40 milliGRAM(s) SubCutaneous at bedtime  multivitamin  Chewable 1 Tablet(s) Oral daily  potassium chloride   Powder 40 milliEquivalent(s) Oral daily  thiamine 100 milliGRAM(s) Oral daily      PHYSICAL EXAM:  T(C): 36.8 (04-19-19 @ 17:15), Max: 37 (04-19-19 @ 08:07)  HR: 82 (04-19-19 @ 17:15) (73 - 82)  BP: 134/74 (04-19-19 @ 17:15) (105/58 - 134/74)  RR: 18 (04-19-19 @ 17:15) (17 - 20)  SpO2: 97% (04-19-19 @ 17:15) (93% - 97%)  Wt(kg): --  I&O's Summary    18 Apr 2019 07:01  -  19 Apr 2019 07:00  --------------------------------------------------------  IN: 1080 mL / OUT: 1000 mL / NET: 80 mL          Appearance: Normal	  HEENT:  cervical collar in place     Cardiovascular: Normal S1 S2, No JVD, No murmurs   Respiratory: improved     Gastrointestinal:  Soft, Non-tender, + BS	  Skin: No rashes, No ecchymoses, No cyanosis  Neurologic: no focal deficits  Extremities: improved +2 edema pitting    LABS:	 	  CARDIAC MARKERS:                                9.6    12.13 )-----------( 343      ( 19 Apr 2019 07:00 )             30.4     04-19    138  |  99  |  8   ----------------------------<  137<H>  4.0   |  29  |  0.46<L>    Ca    9.1      19 Apr 2019 07:43  Phos  3.6     04-19  Mg     1.9     04-19        ASSESSMENT/PLAN: 	  69yo F with PMHx of non-hodgkins lymphoma (radiation therapy in 1981), breast CA (2000s), HTN, HLD, osteoporosis, carpal tunnel release 10/2018, s/p emergent 3 level anterior cervical discectomy and fusion C4-C6 who was admitted on 4/5 for elective ACDF C3-C4 extension to C6, PSF C2-T2, iliac crest bone graft POD #5. Course complicated by hoarse voice evaluated by ENT with NTD, dysphagia for which speech and swallow is following, PNA being tx'd by abx and afib with RVR for which she is currently in NSR s/p amio gtt which was completed and transitioned to amio load     now with two intermittent  episodes of Mobitz II, continue amiodarone 200mg daily,  continue toprol 25mg daily, patients [id number] score 3 should be on anticoagulation once cleared from surgical stand point can start eliquis 5mg bid    LE edema-  improved with IV lasix, continue for tonight, consider discharging on Po Lasix 2omg daily, and she can follow up with her cardiologist as she may not need it long term.     she can follow up with her Cardiologist Dr. Rhodes in 1-2 weeks

## 2019-04-19 NOTE — PROGRESS NOTE ADULT - PROBLEM SELECTOR PLAN 3
will discuss with NS and follow with urology Urine cytology.  The  was made aware by NS but he want to address the renal issues after her discharge

## 2019-04-19 NOTE — PROGRESS NOTE ADULT - PROBLEM SELECTOR PROBLEM 1
Atrial fibrillation
Other dysphagia

## 2019-04-19 NOTE — PROGRESS NOTE ADULT - PROBLEM SELECTOR PROBLEM 2
Cervical disc disease
Aspiration pneumonia of left lower lobe, unspecified aspiration pneumonia type

## 2019-04-19 NOTE — PROGRESS NOTE ADULT - RS GEN HX ROS MEA POS PC
dyspnea/cough/sputum production
sputum production/dyspnea/cough
cough/sputum production/dyspnea
dyspnea/cough/sputum production
cough/dyspnea/sputum production
cough/sputum production/dyspnea
dyspnea/cough/sputum production

## 2019-04-19 NOTE — PROGRESS NOTE ADULT - SUBJECTIVE AND OBJECTIVE BOX
Interval Events: Reviewed  Patient seen and examined at bedside.    Patient is a 68y old  Female who presents with a chief complaint of Cervical hardware failure (19 Apr 2019 10:21)    she is tolerating the feed and having a BM  PAST MEDICAL & SURGICAL HISTORY:  Anxiety  History of breast cancer: s/p right lumpectomy , RT  Hodgkin disease: 1981, underwent radiation therapy  HLD (hyperlipidemia)  HTN (hypertension)  Cervical disc herniation  Carpal tunnel syndrome: b/l  Cervical disc disease  History of carpal tunnel surgery: 10/2018 left hand  History of tonsillectomy  S/P cervical spinal fusion: 12/15/2018 C4-C6 ACDF  History of Mohs micrographic surgery for skin cancer  History of lumpectomy of right breast: 2013      MEDICATIONS:  Pulmonary:  guaiFENesin    Syrup 100 milliGRAM(s) Oral every 6 hours PRN    Antimicrobials:  ciprofloxacin     Tablet 500 milliGRAM(s) Oral every 12 hours    Anticoagulants:  enoxaparin Injectable 40 milliGRAM(s) SubCutaneous at bedtime    Cardiac:  amiodarone    Tablet 200 milliGRAM(s) Oral daily  furosemide   Injectable 20 milliGRAM(s) IV Push every 12 hours  metoprolol succinate ER 25 milliGRAM(s) Oral daily      Allergies    penicillin (Rash)    Intolerances        Vital Signs Last 24 Hrs  T(C): 37 (19 Apr 2019 08:07), Max: 37 (18 Apr 2019 16:35)  T(F): 98.6 (19 Apr 2019 08:07), Max: 98.6 (18 Apr 2019 16:35)  HR: 76 (19 Apr 2019 08:07) (75 - 85)  BP: 120/80 (19 Apr 2019 08:07) (105/58 - 146/70)  BP(mean): --  RR: 20 (19 Apr 2019 08:07) (17 - 20)  SpO2: 96% (19 Apr 2019 08:07) (92% - 96%)    04-18 @ 07:01  -  04-19 @ 07:00  --------------------------------------------------------  IN: 1080 mL / OUT: 1000 mL / NET: 80 mL          LABS:      CBC Full  -  ( 19 Apr 2019 07:00 )  WBC Count : 12.13 K/uL  RBC Count : 3.22 M/uL  Hemoglobin : 9.6 g/dL  Hematocrit : 30.4 %  Platelet Count - Automated : 343 K/uL  Mean Cell Volume : 94.4 fl  Mean Cell Hemoglobin : 29.8 pg  Mean Cell Hemoglobin Concentration : 31.6 gm/dL  Auto Neutrophil # : x  Auto Lymphocyte # : x  Auto Monocyte # : x  Auto Eosinophil # : x  Auto Basophil # : x  Auto Neutrophil % : x  Auto Lymphocyte % : x  Auto Monocyte % : x  Auto Eosinophil % : x  Auto Basophil % : x    04-19    138  |  99  |  8   ----------------------------<  137<H>  4.0   |  29  |  0.46<L>    Ca    9.1      19 Apr 2019 07:43  Phos  3.6     04-19  Mg     1.9     04-19                          RADIOLOGY & ADDITIONAL STUDIES (The following images were personally reviewed):  Esposito:                                     No  Urine output:                       adequate  DVT prophylaxis:                 Yes  Flattus:                                  Yes  Bowel movement:              yes

## 2019-04-19 NOTE — PROGRESS NOTE ADULT - ATTENDING COMMENTS
Assessment: Patient personally seen and examined myself during rounds with the   Fellow  ON DATE 4/17/19  Note read, including vitals, physical findings, laboratory data, and radiological reports.   Agree with above with revisions, if any included below.     TIME SPENT in evaluation and management, reassessments, review and interpretation of labs and x-rays, and hemodynamic management, formulating a plan and coordinating care: ___25____ MIN.  Time does not include procedural time.    David Velez MD  Cardiology
Patient seen and examined with house-staff during bedside rounds.  Resident note read, including vitals, physical findings, laboratory data, and radiological reports.   Revisions included below.  Direct personal management at bed side and extensive interpretation of the data.  Plan was outlined and discussed in details with the housestaff.  Decision making of high complexity  Action taken for acute disease activity to reflect the level of care provided:  - medication reconciliation  - review laboratory data

## 2019-04-19 NOTE — PROGRESS NOTE ADULT - PROBLEM SELECTOR PLAN 2
Vision is to continue on the current antibiotic. Duration about 7 days. Chest x-ray was unchanged. The pulmonary status is stable for the procedure.  She is PO cipro.  The CT scan revealed consolidation LLL and need to follow with repeat CT scan in 6 weeks

## 2019-04-20 ENCOUNTER — INBOUND DOCUMENT (OUTPATIENT)
Age: 69
End: 2019-04-20

## 2019-04-20 ENCOUNTER — TRANSCRIPTION ENCOUNTER (OUTPATIENT)
Age: 69
End: 2019-04-20

## 2019-04-20 VITALS
TEMPERATURE: 99 F | RESPIRATION RATE: 17 BRPM | HEART RATE: 78 BPM | SYSTOLIC BLOOD PRESSURE: 121 MMHG | OXYGEN SATURATION: 94 % | DIASTOLIC BLOOD PRESSURE: 68 MMHG

## 2019-04-20 LAB
ANION GAP SERPL CALC-SCNC: 10 MMOL/L — SIGNIFICANT CHANGE UP (ref 5–17)
BUN SERPL-MCNC: 9 MG/DL — SIGNIFICANT CHANGE UP (ref 7–23)
CALCIUM SERPL-MCNC: 9.5 MG/DL — SIGNIFICANT CHANGE UP (ref 8.4–10.5)
CHLORIDE SERPL-SCNC: 98 MMOL/L — SIGNIFICANT CHANGE UP (ref 96–108)
CO2 SERPL-SCNC: 30 MMOL/L — SIGNIFICANT CHANGE UP (ref 22–31)
CREAT SERPL-MCNC: 0.44 MG/DL — LOW (ref 0.5–1.3)
GLUCOSE SERPL-MCNC: 135 MG/DL — HIGH (ref 70–99)
HCT VFR BLD CALC: 33.3 % — LOW (ref 34.5–45)
HGB BLD-MCNC: 10.5 G/DL — LOW (ref 11.5–15.5)
MAGNESIUM SERPL-MCNC: 2 MG/DL — SIGNIFICANT CHANGE UP (ref 1.6–2.6)
MCHC RBC-ENTMCNC: 29.7 PG — SIGNIFICANT CHANGE UP (ref 27–34)
MCHC RBC-ENTMCNC: 31.5 GM/DL — LOW (ref 32–36)
MCV RBC AUTO: 94.3 FL — SIGNIFICANT CHANGE UP (ref 80–100)
NRBC # BLD: 0 /100 WBCS — SIGNIFICANT CHANGE UP (ref 0–0)
PHOSPHATE SERPL-MCNC: 4 MG/DL — SIGNIFICANT CHANGE UP (ref 2.5–4.5)
PLATELET # BLD AUTO: 393 K/UL — SIGNIFICANT CHANGE UP (ref 150–400)
POTASSIUM SERPL-MCNC: 3.9 MMOL/L — SIGNIFICANT CHANGE UP (ref 3.5–5.3)
POTASSIUM SERPL-SCNC: 3.9 MMOL/L — SIGNIFICANT CHANGE UP (ref 3.5–5.3)
RBC # BLD: 3.53 M/UL — LOW (ref 3.8–5.2)
RBC # FLD: 14.4 % — SIGNIFICANT CHANGE UP (ref 10.3–14.5)
SODIUM SERPL-SCNC: 138 MMOL/L — SIGNIFICANT CHANGE UP (ref 135–145)
WBC # BLD: 13.07 K/UL — HIGH (ref 3.8–10.5)
WBC # FLD AUTO: 13.07 K/UL — HIGH (ref 3.8–10.5)

## 2019-04-20 PROCEDURE — 83735 ASSAY OF MAGNESIUM: CPT

## 2019-04-20 PROCEDURE — 85379 FIBRIN DEGRADATION QUANT: CPT

## 2019-04-20 PROCEDURE — 71045 X-RAY EXAM CHEST 1 VIEW: CPT

## 2019-04-20 PROCEDURE — C1889: CPT

## 2019-04-20 PROCEDURE — 85025 COMPLETE CBC W/AUTO DIFF WBC: CPT

## 2019-04-20 PROCEDURE — 92611 MOTION FLUOROSCOPY/SWALLOW: CPT

## 2019-04-20 PROCEDURE — 92526 ORAL FUNCTION THERAPY: CPT

## 2019-04-20 PROCEDURE — 97116 GAIT TRAINING THERAPY: CPT

## 2019-04-20 PROCEDURE — 72040 X-RAY EXAM NECK SPINE 2-3 VW: CPT

## 2019-04-20 PROCEDURE — 71275 CT ANGIOGRAPHY CHEST: CPT

## 2019-04-20 PROCEDURE — 87186 SC STD MICRODIL/AGAR DIL: CPT

## 2019-04-20 PROCEDURE — 80048 BASIC METABOLIC PNL TOTAL CA: CPT

## 2019-04-20 PROCEDURE — 85610 PROTHROMBIN TIME: CPT

## 2019-04-20 PROCEDURE — 86850 RBC ANTIBODY SCREEN: CPT

## 2019-04-20 PROCEDURE — 82962 GLUCOSE BLOOD TEST: CPT

## 2019-04-20 PROCEDURE — 76000 FLUOROSCOPY <1 HR PHYS/QHP: CPT

## 2019-04-20 PROCEDURE — 74230 X-RAY XM SWLNG FUNCJ C+: CPT

## 2019-04-20 PROCEDURE — P9045: CPT

## 2019-04-20 PROCEDURE — 84100 ASSAY OF PHOSPHORUS: CPT

## 2019-04-20 PROCEDURE — 81001 URINALYSIS AUTO W/SCOPE: CPT

## 2019-04-20 PROCEDURE — 92612 ENDOSCOPY SWALLOW (FEES) VID: CPT

## 2019-04-20 PROCEDURE — 74178 CT ABD&PLV WO CNTR FLWD CNTR: CPT

## 2019-04-20 PROCEDURE — 92610 EVALUATE SWALLOWING FUNCTION: CPT

## 2019-04-20 PROCEDURE — 72128 CT CHEST SPINE W/O DYE: CPT

## 2019-04-20 PROCEDURE — 93970 EXTREMITY STUDY: CPT

## 2019-04-20 PROCEDURE — 86900 BLOOD TYPING SEROLOGIC ABO: CPT

## 2019-04-20 PROCEDURE — 93005 ELECTROCARDIOGRAM TRACING: CPT

## 2019-04-20 PROCEDURE — 80202 ASSAY OF VANCOMYCIN: CPT

## 2019-04-20 PROCEDURE — 86901 BLOOD TYPING SEROLOGIC RH(D): CPT

## 2019-04-20 PROCEDURE — 97535 SELF CARE MNGMENT TRAINING: CPT

## 2019-04-20 PROCEDURE — 87070 CULTURE OTHR SPECIMN AEROBIC: CPT

## 2019-04-20 PROCEDURE — L8699: CPT

## 2019-04-20 PROCEDURE — 84443 ASSAY THYROID STIM HORMONE: CPT

## 2019-04-20 PROCEDURE — 97162 PT EVAL MOD COMPLEX 30 MIN: CPT

## 2019-04-20 PROCEDURE — C1713: CPT

## 2019-04-20 PROCEDURE — 85730 THROMBOPLASTIN TIME PARTIAL: CPT

## 2019-04-20 PROCEDURE — 97161 PT EVAL LOW COMPLEX 20 MIN: CPT

## 2019-04-20 PROCEDURE — 83036 HEMOGLOBIN GLYCOSYLATED A1C: CPT

## 2019-04-20 PROCEDURE — 85027 COMPLETE CBC AUTOMATED: CPT

## 2019-04-20 PROCEDURE — 80061 LIPID PANEL: CPT

## 2019-04-20 PROCEDURE — 72125 CT NECK SPINE W/O DYE: CPT

## 2019-04-20 PROCEDURE — 93306 TTE W/DOPPLER COMPLETE: CPT

## 2019-04-20 PROCEDURE — 97530 THERAPEUTIC ACTIVITIES: CPT

## 2019-04-20 PROCEDURE — 36415 COLL VENOUS BLD VENIPUNCTURE: CPT

## 2019-04-20 RX ORDER — AMIODARONE HYDROCHLORIDE 400 MG/1
1 TABLET ORAL
Qty: 0 | Refills: 0 | DISCHARGE
Start: 2019-04-20

## 2019-04-20 RX ORDER — SENNA PLUS 8.6 MG/1
2 TABLET ORAL
Qty: 0 | Refills: 0 | DISCHARGE
Start: 2019-04-20

## 2019-04-20 RX ORDER — ACETAMINOPHEN 500 MG
2 TABLET ORAL
Qty: 0 | Refills: 0 | DISCHARGE
Start: 2019-04-20

## 2019-04-20 RX ORDER — PREGABALIN 225 MG/1
1 CAPSULE ORAL
Qty: 0 | Refills: 0 | DISCHARGE
Start: 2019-04-20

## 2019-04-20 RX ORDER — ANASTROZOLE 1 MG/1
1 TABLET ORAL
Qty: 0 | Refills: 0 | COMMUNITY

## 2019-04-20 RX ORDER — SERTRALINE 25 MG/1
1 TABLET, FILM COATED ORAL
Qty: 0 | Refills: 0 | DISCHARGE
Start: 2019-04-20

## 2019-04-20 RX ORDER — LANOLIN ALCOHOL/MO/W.PET/CERES
1 CREAM (GRAM) TOPICAL
Qty: 0 | Refills: 0 | DISCHARGE
Start: 2019-04-20

## 2019-04-20 RX ORDER — FAMOTIDINE 10 MG/ML
1 INJECTION INTRAVENOUS
Qty: 0 | Refills: 0 | DISCHARGE
Start: 2019-04-20

## 2019-04-20 RX ORDER — TRAMADOL HYDROCHLORIDE 50 MG/1
1 TABLET ORAL
Qty: 0 | Refills: 0 | DISCHARGE
Start: 2019-04-20

## 2019-04-20 RX ORDER — CIPROFLOXACIN LACTATE 400MG/40ML
1 VIAL (ML) INTRAVENOUS
Qty: 0 | Refills: 0 | DISCHARGE
Start: 2019-04-20 | End: 2019-04-24

## 2019-04-20 RX ORDER — POTASSIUM CHLORIDE 20 MEQ
2 PACKET (EA) ORAL
Qty: 0 | Refills: 0 | DISCHARGE
Start: 2019-04-20

## 2019-04-20 RX ORDER — CHOLECALCIFEROL (VITAMIN D3) 125 MCG
2 CAPSULE ORAL
Qty: 0 | Refills: 0 | COMMUNITY

## 2019-04-20 RX ORDER — THIAMINE MONONITRATE (VIT B1) 100 MG
1 TABLET ORAL
Qty: 0 | Refills: 0 | DISCHARGE
Start: 2019-04-20

## 2019-04-20 RX ORDER — ENOXAPARIN SODIUM 100 MG/ML
0 INJECTION SUBCUTANEOUS
Qty: 0 | Refills: 0 | DISCHARGE
Start: 2019-04-20

## 2019-04-20 RX ORDER — SENNA PLUS 8.6 MG/1
1 TABLET ORAL
Qty: 0 | Refills: 0 | DISCHARGE
Start: 2019-04-20

## 2019-04-20 RX ORDER — ATORVASTATIN CALCIUM 80 MG/1
1 TABLET, FILM COATED ORAL
Qty: 0 | Refills: 0 | DISCHARGE
Start: 2019-04-20

## 2019-04-20 RX ORDER — ROSUVASTATIN CALCIUM 5 MG/1
1 TABLET ORAL
Qty: 0 | Refills: 0 | COMMUNITY

## 2019-04-20 RX ORDER — MAGNESIUM HYDROXIDE 400 MG/1
30 TABLET, CHEWABLE ORAL
Qty: 0 | Refills: 0 | DISCHARGE
Start: 2019-04-20

## 2019-04-20 RX ADMIN — Medication 40 MILLIEQUIVALENT(S): at 10:29

## 2019-04-20 RX ADMIN — Medication 20 MILLIGRAM(S): at 07:06

## 2019-04-20 RX ADMIN — SERTRALINE 50 MILLIGRAM(S): 25 TABLET, FILM COATED ORAL at 10:29

## 2019-04-20 RX ADMIN — AMIODARONE HYDROCHLORIDE 200 MILLIGRAM(S): 400 TABLET ORAL at 07:06

## 2019-04-20 RX ADMIN — Medication 100 MILLIGRAM(S): at 10:35

## 2019-04-20 RX ADMIN — Medication 500 MILLIGRAM(S): at 07:06

## 2019-04-20 RX ADMIN — FAMOTIDINE 20 MILLIGRAM(S): 10 INJECTION INTRAVENOUS at 10:29

## 2019-04-20 RX ADMIN — ANASTROZOLE 1 MILLIGRAM(S): 1 TABLET ORAL at 10:28

## 2019-04-20 RX ADMIN — Medication 25 MILLIGRAM(S): at 07:06

## 2019-04-20 RX ADMIN — Medication 400 UNIT(S): at 10:36

## 2019-04-20 NOTE — PROGRESS NOTE ADULT - REASON FOR ADMISSION
Cervical hardware failure
Cervical hardware failure, elective surgery
Cervical hardware failure

## 2019-04-20 NOTE — DISCHARGE NOTE NURSING/CASE MANAGEMENT/SOCIAL WORK - NSDCPEWEB_GEN_ALL_CORE
[facility] for Tobacco Control website --- [email or web address] --- [email or web address]

## 2019-04-20 NOTE — DISCHARGE NOTE NURSING/CASE MANAGEMENT/SOCIAL WORK - NSDCPEEMAIL_GEN_ALL_CORE
[facility] for Tobacco Control email [email or web address]

## 2019-04-20 NOTE — PROGRESS NOTE ADULT - SUBJECTIVE AND OBJECTIVE BOX
Neurology Follow up note    Name  HILARIA KUMAR    HPI:  This is a 67 y/o female, well known to Dr. Daley with PMH of non-hodkins lymphoma (xrt in 1981) and breast ca (2000's), HTN, HLD, osteoporosis.  In 10/18, patient was evaluated for upper extremity weakness and underwent a carpal tunnel release and ulnar transposition without relief of symptoms.   in 11/18, patient was admitted to [facility] for b/l UE weakness and difficulty walking.  Her work up revealed cervical stenosis with compression.  However, at the time, her bone scan also was significant for uptake in her right acetabulum and at the time and the evaluating spine surgeon requested this lesion be addressed prior to any cervical surgery.  A CT of the region was negative and the patient was sent home for follow up. on 12/4/18, patient was seen in the [facility] ER again, this time with worsening symptoms and she was emergently taken to the OR for a three level ACDF C4-C6.  She was discharged after the surgery with an uncomplicated post op course.     Since this surgery, patient has dramatic improvements in her walking, neuropathy and radicular pain.  Follow up imaging with X-ray lead to further evaluation with CT which revealed possible loosening of hardware.  Clinically, patient still has left UE weakness that she thinks may be from her history of broken clavicle. No bowel, bladder dysfunction     She is here for definitive surgical evaluation. (05 Apr 2019 06:51)      Interval History - feeling better with less swelling in the LUE and LE        REVIEW OF SYSTEMS    Vital Signs Last 24 Hrs  T(C): 37.2 (20 Apr 2019 08:24), Max: 37.2 (20 Apr 2019 08:24)  T(F): 99 (20 Apr 2019 08:24), Max: 99 (20 Apr 2019 08:24)  HR: 78 (20 Apr 2019 08:24) (78 - 82)  BP: 121/68 (20 Apr 2019 08:24) (114/75 - 137/70)  BP(mean): --  RR: 17 (20 Apr 2019 08:24) (17 - 18)  SpO2: 94% (20 Apr 2019 08:24) (94% - 98%)    Physical Exam-     Mental Status- awake and alert    Cranial Nerves- full EOM    Gait and station- n/a    Motor- LUE weakness    Reflexes- decreased    Sensation- no sensory level    Coordination- no tremors    Vascular - no bruits    Medications  acetaminophen   Tablet .. 650 milliGRAM(s) Oral every 6 hours PRN  amiodarone    Tablet 200 milliGRAM(s) Oral daily  anastrozole 1 milliGRAM(s) Oral daily  atorvastatin 40 milliGRAM(s) Oral at bedtime  benzocaine 15 mG/menthol 3.6 mG Lozenge 1 Lozenge Oral every 1 hour PRN  cholecalciferol 400 Unit(s) Oral daily  ciprofloxacin     Tablet 500 milliGRAM(s) Oral every 12 hours  cyanocobalamin 1000 MICROGram(s) Oral daily  enoxaparin Injectable 40 milliGRAM(s) SubCutaneous at bedtime  famotidine    Tablet 20 milliGRAM(s) Oral daily  furosemide   Injectable 20 milliGRAM(s) IV Push every 12 hours  gabapentin 600 milliGRAM(s) Oral at bedtime  guaiFENesin    Syrup 100 milliGRAM(s) Oral every 6 hours PRN  magnesium hydroxide Suspension 30 milliLiter(s) Oral every 12 hours PRN  melatonin 3 milliGRAM(s) Oral at bedtime PRN  metoprolol succinate ER 25 milliGRAM(s) Oral daily  multivitamin  Chewable 1 Tablet(s) Oral daily  ondansetron Injectable 4 milliGRAM(s) IV Push every 6 hours PRN  potassium chloride   Powder 40 milliEquivalent(s) Oral daily  senna 2 Tablet(s) Oral at bedtime  sertraline 50 milliGRAM(s) Oral daily  thiamine 100 milliGRAM(s) Oral daily  traMADol 25 milliGRAM(s) Oral every 4 hours PRN  traMADol 50 milliGRAM(s) Oral every 4 hours PRN      Lab      Radiology    Assessment- Cervical radiculopathy- dysphagia    Plan as per NS

## 2019-04-20 NOTE — DISCHARGE NOTE NURSING/CASE MANAGEMENT/SOCIAL WORK - NSDCDPATPORTLINK_GEN_ALL_CORE
You can access the [facility] Portal, offered by [facility], by registering with the following website: [email or web address]

## 2019-04-20 NOTE — PROGRESS NOTE ADULT - SUBJECTIVE AND OBJECTIVE BOX
HPI:  This is a 69 y/o female, well known to Dr. Daley with PMH of non-hodkins lymphoma (xrt in 1981) and breast ca (2000's), HTN, HLD, osteoporosis.  In 10/18, patient was evaluated for upper extremity weakness and underwent a carpal tunnel release and ulnar transposition without relief of symptoms.   in 11/18, patient was admitted to [facility] for b/l UE weakness and difficulty walking.  Her work up revealed cervical stenosis with compression.  However, at the time, her bone scan also was significant for uptake in her right acetabulum and at the time and the evaluating spine surgeon requested this lesion be addressed prior to any cervical surgery.  A CT of the region was negative and the patient was sent home for follow up. on 12/4/18, patient was seen in the [facility] ER again, this time with worsening symptoms and she was emergently taken to the OR for a three level ACDF C4-C6.  She was discharged after the surgery with an uncomplicated post op course.     Since this surgery, patient has dramatic improvements in her walking, neuropathy and radicular pain.  Follow up imaging with X-ray lead to further evaluation with CT which revealed possible loosening of hardware.  Clinically, patient still has left UE weakness that she thinks may be from her history of broken clavicle. No bowel, bladder dysfunction     She is here for definitive surgical evaluation. (05 Apr 2019 06:51)    POD#0: S/P revision of anterior cervical discectomy with fusion of C4-C6 with extension to C3, as well as C2-T2 posterior fusion with right iliac crest autograft.   POD#1: CESARIO overnight. Drains x3 remain in place, [place] J collar and prevena dressing in place. Esposito removed this am, f/u TOV. Reports some throat tightness with mucous, but denies pain, no issues talking or swallowing (tolerating liquid diet)  POD#2 Pt with secrections and difficulty swallowing. Continue decadron 4q6. Robitussin started.   4/8 Pt complaining muscle spasm and pain down BUE L>R  4/9: no acute events overnight, neuro stable. Transferred to CCU for rapid Afib, started on Amiodarone drip.  4/10: Transitioned to PO Amiodarone, off IV drip, on tele, pending transfer back to Neurosurgery. Posterior MASOOD and HMV remains.  4/11: Posterior drain removed. Pt on Vanco/zosyn for aspiration PNA  4/12 Repeat Fees. 2nd posterior drain removed. Sputum culture sent  4/13: patient requiring supplemental O2 while at rest and during physical therapy had one bout of nausea overnight.  Over all, appears comfortable.  4/14 calorie count. Pt pre-op for possible PEG on Monday. Hypokalemia repleted. Con't vanco/zosyn for aspiration pneumonia. Daily CXR.  4/16 PEG inserted  Able to take PO as tolerated  Continue IV antbix  4/17 uneventful night, abx changed to cipro x7 days for pneumonia, TFs started at 4pm via PEG, to be slowly increased per nutrition protocol   4/18: CT abd / pelvis performed yesterday demonstrates left upper lobe kidney mass (likely renal cell carcinoma), R iliac crest lytic lesions and persistent pneumonia in bilateral lung bases.  does not want patient or daughters to know about new mass, but does want oncology to see patient in-pt, Dr. Daley to discuss with patient today. DW also to clear for eliquis start date.     OVERNIGHT EVENTS:  Vital Signs Last 24 Hrs  T(C): 37.1 (20 Apr 2019 03:57), Max: 37.1 (19 Apr 2019 21:23)  T(F): 98.8 (20 Apr 2019 03:57), Max: 98.8 (19 Apr 2019 21:23)  HR: 82 (20 Apr 2019 03:57) (73 - 82)  BP: 137/70 (20 Apr 2019 03:57) (109/64 - 137/70)  BP(mean): --  RR: 18 (20 Apr 2019 03:57) (18 - 20)  SpO2: 98% (20 Apr 2019 03:57) (95% - 98%)    I&O's Summary    19 Apr 2019 07:01  -  20 Apr 2019 07:00  --------------------------------------------------------  IN: 329 mL / OUT: 850 mL / NET: -521 mL        PHYSICAL EXAM:  Gen: NAD, AAOx3  HEENT: PERRL. EOMI.  Neuro: CNs II-XII intact. Left  4/5, proximal 3/5. RUE and LEs b/l 5/5. Sensation intact throughout. Following commands. Speech clear.   Neck: +Jal J collar. Left anterior incision C/D/I. Posterior cervical incision dry and clean, dressing removed.  Lungs: Clear b/l  Heart: S1, S2. NSR.  Abd: Soft, NT/ND. +BS, PEG site c/d/i, abdominal binder in place   Back: Right iliac crest graft site C/D/I.  Bilateral 2+ pedal edema      TUBES/LINES:  [] Esposito  [] Lumbar Drain  [] Wound Drains  [] Others      DIET:  [] NPO  [] Mechanical  [] Tube feeds    LABS:                        9.6    12.13 )-----------( 343      ( 19 Apr 2019 07:00 )             30.4     04-19    138  |  99  |  8   ----------------------------<  137<H>  4.0   |  29  |  0.46<L>    Ca    9.1      19 Apr 2019 07:43  Phos  3.6     04-19  Mg     1.9     04-19              CAPILLARY BLOOD GLUCOSE          Drug Levels: [] N/A  Vancomycin Level, Trough: 17.2 ug/mL (04-14 @ 12:35)    CSF Analysis: [] N/A      Allergies    penicillin (Rash)    Intolerances      MEDICATIONS:  Antibiotics:  ciprofloxacin     Tablet 500 milliGRAM(s) Oral every 12 hours    Neuro:  acetaminophen   Tablet .. 650 milliGRAM(s) Oral every 6 hours PRN  gabapentin 600 milliGRAM(s) Oral at bedtime  melatonin 3 milliGRAM(s) Oral at bedtime PRN  ondansetron Injectable 4 milliGRAM(s) IV Push every 6 hours PRN  sertraline 50 milliGRAM(s) Oral daily  traMADol 25 milliGRAM(s) Oral every 4 hours PRN  traMADol 50 milliGRAM(s) Oral every 4 hours PRN    Anticoagulation:  enoxaparin Injectable 40 milliGRAM(s) SubCutaneous at bedtime    OTHER:  amiodarone    Tablet 200 milliGRAM(s) Oral daily  anastrozole 1 milliGRAM(s) Oral daily  atorvastatin 40 milliGRAM(s) Oral at bedtime  benzocaine 15 mG/menthol 3.6 mG Lozenge 1 Lozenge Oral every 1 hour PRN  famotidine    Tablet 20 milliGRAM(s) Oral daily  furosemide   Injectable 20 milliGRAM(s) IV Push every 12 hours  guaiFENesin    Syrup 100 milliGRAM(s) Oral every 6 hours PRN  magnesium hydroxide Suspension 30 milliLiter(s) Oral every 12 hours PRN  metoprolol succinate ER 25 milliGRAM(s) Oral daily  senna 2 Tablet(s) Oral at bedtime    IVF:  cholecalciferol 400 Unit(s) Oral daily  cyanocobalamin 1000 MICROGram(s) Oral daily  multivitamin  Chewable 1 Tablet(s) Oral daily  potassium chloride   Powder 40 milliEquivalent(s) Oral daily  thiamine 100 milliGRAM(s) Oral daily    CULTURES:  Culture Results:   Moderate Enterobacter cloacae complex  Accompanied by normal respiratory enedelia including Numerous Yeast (04-13 @ 07:57)    RADIOLOGY & ADDITIONAL TESTS:      ASSESSMENT:  68y Female w/ PMH of HTN, HLD, h/o Breast Ca, h/o Hodgkins Lymphoma, h/o ACDF C4-C6 now s/p ACDF C3-C4 extension to C6, PSF C2-T2, right iliac crest bone graft 4/5, now s/p PEG placement for swallowing difficulties on 4/16/19, ENT assessed Vocal cords, which are intact.     CERVICALSPINE M53.2X2  No pertinent family history in first degree relatives  Family history of colon cancer in mother (Mother)  Handoff  MEWS Score  Anxiety  History of breast cancer  Hodgkin disease  HLD (hyperlipidemia)  HTN (hypertension)  Breast CA  Cervical disc herniation  Carpal tunnel syndrome  Cervical disc disease  Hodgkin disease  Breast cancer  Cervical spine instability  Cervical spine instability  Degenerative cervical spinal stenosis  H/O cervical spine surgery  Renal mass  Hypoxemia  Elevated hemidiaphragm  Aspiration pneumonia of left lower lobe, unspecified aspiration pneumonia type  Other dysphagia  HLD (hyperlipidemia)  Dysphagia  PNA (pneumonia)  Cervical disc disease  Atrial fibrillation  Gastrostomy tube placement  Graft, bone, iliac crest  Fusion of 7 to 12 spinal segments by posterior approach  Revision of anterior cervical discectomy with fusion (ACDF)  History of carpal tunnel surgery  History of tonsillectomy  S/P cervical spinal fusion  History of Mohs micrographic surgery for skin cancer  History of lumpectomy of right breast  H/O lumpectomy  No significant past surgical history  History of lumpectomy  S/P appendectomy  Provide atrial fibrillation education packet    NEURO:  - Cont neuro checks   - Cont Jal J   - Cont PT/OT   - Dc rehab today  - Pain control: cont gabapentin, tramadol   - Cont. zoloft   - Cont home meds: anastrozole    CARDIOVASCULAR:  - BP goal: normotension   - Cont amiodarone - tapered to amiodarone 200 QD   - Cont atorvastatin, metoprolol  - Pt needs eliquis 5 bid, start date pending DW clearance     PULMONARY:  - IS   - Cont robitussin   - Trend CXR   - CT chest with persistent left lower lobe pneumonia and right lower lobe pneumonia     RENAL:  - I&Os   - Replete lytes prn   - CT abd with lytic lesion on R iliac crest and Left kidney upper lobe renal cell carcinoma - to be seen by oncologist inpatient vs outpatient, DW to discuss plan with patient and family today     GI:  - PEG feeds to be advanced slowly as per nutrition to avoid overfeeding syndrome   - Pt may also have pureed (moist is best) and thin liquids in addition to PEG feeds   - Bowel regimen   - cont famotidine   - Cont abd binder     HEME:  - Trend H/H     ID:  - Monitor for fevers   - Trend WBC  - Cont Cipro x7 day (4/17-4/23)     ENDO:  - ISS     DVT PROPHYLAXIS:  [x] Venodynes                                [x] Heparin/Lovenox    DISPOSITION:  - Tele status   - Full code   - Dispo: pending. Patient was acceprd to [facility]ul[facility] Garcia. will discuss with family.  - D/w Dr. Daley       Assessment:  Present when checked    []  GCS  E   V  M     Heart Failure: []Acute, [] acute on chronic , []chronic  Heart Failure:  [] Diastolic (HFpEF), [] Systolic (HFrEF), []Combined (HFpEF and HFrEF), [] RHF, [] Pulm HTN, [] Other    [] SUSU, [] ATN, [] AIN, [] other  [] CKD1, [] CKD2, [] CKD 3, [] CKD 4, [] CKD 5, []ESRD    Encephalopathy: [] Metabolic, [] Hepatic, [] toxic, [] Neurological, [] Other    Abnormal Nurtitional Status: [] malnurtition (see nutrition note), [ ]underweight: BMI < 19, [] morbid obesity: BMI >40, [] Cachexia    [] Sepsis  [] hypovolemic shock,[] cardiogenic shock, [] hemorrhagic shock, [] neuogenic shock  [] Acute Respiratory Failure  []Cerebral edema, [] Brain compression/ herniation,   [] Functional quadriplegia  [] Acute blood loss anemia

## 2019-04-20 NOTE — PROGRESS NOTE ADULT - PROVIDER SPECIALTY LIST ADULT
Cardiology
ENT
ENT
Internal Medicine
Intervent Cardiology
Intervent Cardiology
Neurology
Neurosurgery
Plastic Surgery
Pulmonology
Surgery
Cardiology
Pulmonology

## 2019-04-23 LAB
APTT BLD: 30 SEC
INR PPP: 0.96 RATIO
PT BLD: 10.9 SEC

## 2019-04-24 DIAGNOSIS — Z85.72 PERSONAL HISTORY OF NON-HODGKIN LYMPHOMAS: ICD-10-CM

## 2019-04-24 DIAGNOSIS — M81.0 AGE-RELATED OSTEOPOROSIS WITHOUT CURRENT PATHOLOGICAL FRACTURE: ICD-10-CM

## 2019-04-24 DIAGNOSIS — M96.0 PSEUDARTHROSIS AFTER FUSION OR ARTHRODESIS: ICD-10-CM

## 2019-04-24 DIAGNOSIS — F41.9 ANXIETY DISORDER, UNSPECIFIED: ICD-10-CM

## 2019-04-24 DIAGNOSIS — T84.296A OTHER MECHANICAL COMPLICATION OF INTERNAL FIXATION DEVICE OF VERTEBRAE, INITIAL ENCOUNTER: ICD-10-CM

## 2019-04-24 DIAGNOSIS — K29.70 GASTRITIS, UNSPECIFIED, WITHOUT BLEEDING: ICD-10-CM

## 2019-04-24 DIAGNOSIS — I95.81 POSTPROCEDURAL HYPOTENSION: ICD-10-CM

## 2019-04-24 DIAGNOSIS — N28.89 OTHER SPECIFIED DISORDERS OF KIDNEY AND URETER: ICD-10-CM

## 2019-04-24 DIAGNOSIS — Z85.3 PERSONAL HISTORY OF MALIGNANT NEOPLASM OF BREAST: ICD-10-CM

## 2019-04-24 DIAGNOSIS — I44.1 ATRIOVENTRICULAR BLOCK, SECOND DEGREE: ICD-10-CM

## 2019-04-24 DIAGNOSIS — M43.12 SPONDYLOLISTHESIS, CERVICAL REGION: ICD-10-CM

## 2019-04-24 DIAGNOSIS — E78.5 HYPERLIPIDEMIA, UNSPECIFIED: ICD-10-CM

## 2019-04-24 DIAGNOSIS — M40.202 UNSPECIFIED KYPHOSIS, CERVICAL REGION: ICD-10-CM

## 2019-04-24 DIAGNOSIS — R13.10 DYSPHAGIA, UNSPECIFIED: ICD-10-CM
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
## 2019-04-24 DIAGNOSIS — I10 ESSENTIAL (PRIMARY) HYPERTENSION: ICD-10-CM

## 2019-04-24 DIAGNOSIS — E87.6 HYPOKALEMIA: ICD-10-CM

## 2019-04-24 DIAGNOSIS — M50.13 CERVICAL DISC DISORDER WITH RADICULOPATHY, CERVICOTHORACIC REGION: ICD-10-CM

## 2019-04-24 DIAGNOSIS — J69.0 PNEUMONITIS DUE TO INHALATION OF FOOD AND VOMIT: ICD-10-CM

## 2019-04-24 DIAGNOSIS — I48.91 UNSPECIFIED ATRIAL FIBRILLATION: ICD-10-CM

## 2019-04-24 DIAGNOSIS — M50.03 CERVICAL DISC DISORDER WITH MYELOPATHY, CERVICOTHORACIC REGION: ICD-10-CM

## 2019-04-25 PROBLEM — F41.9 ANXIETY DISORDER, UNSPECIFIED: Chronic | Status: ACTIVE | Noted: 2019-03-29

## 2019-04-30 ENCOUNTER — INBOUND DOCUMENT (OUTPATIENT)
Age: 69
End: 2019-04-30

## 2019-04-30 ENCOUNTER — APPOINTMENT (OUTPATIENT)
Dept: NEUROSURGERY | Facility: CLINIC | Age: 69
End: 2019-04-30

## 2019-05-03 NOTE — ASSESSMENT
[FreeTextEntry1] : Mrs. Shelton is a 59 yo woman s/p 12/15/18 C4-C6 ACDF with repeat imaging demonstrating cervical vertebral collapse.  She will continue to wear the Aspen collar at all times.  We discussed recommended surgical intervention to stabilize the cervical spine including the risks, benefits, and all questions answered.  We will schedule an anterior cervical extension of fusion, and posterior C2-T2 fusion.  \par \par 1)  Schedule Cervical fusion at [facility] 4/5/19

## 2019-05-03 NOTE — PHYSICAL EXAM
[General Appearance - Alert] : alert [General Appearance - In No Acute Distress] : in no acute distress [General Appearance - Well Nourished] : well nourished [General Appearance - Well Developed] : well developed [Clean] : clean [Dry] : dry [No Drainage] : without drainage [Normal Skin] : normal [Oriented To Time, Place, And Person] : oriented to person, place, and time [Impaired Insight] : insight and judgment were intact [Affect] : the affect was normal [Mood] : the mood was normal [Cranial Nerves Optic (II)] : visual acuity intact bilaterally,  pupils equal round and reactive to light [Cranial Nerves Oculomotor (III)] : extraocular motion intact [Cranial Nerves Trigeminal (V)] : facial sensation intact symmetrically [Cranial Nerves Facial (VII)] : face symmetrical [Cranial Nerves Vestibulocochlear (VIII)] : hearing was intact bilaterally [Cranial Nerves Glossopharyngeal (IX)] : tongue and palate midline [Cranial Nerves Accessory (XI - Cranial And Spinal)] : head turning and shoulder shrug symmetric [Cranial Nerves Hypoglossal (XII)] : there was no tongue deviation with protrusion [Motor Tone] : muscle tone was normal in all four extremities [2] : C5 deltoid 2/5 [4] : C7 triceps 4/5 [PERRL With Normal Accommodation] : pupils were equal in size, round, reactive to light, with normal accommodation [Hearing Threshold Finger Rub Not [place]] : hearing was normal [Neck Appearance] : the appearance of the neck was normal [] : no respiratory distress [Respiration, Rhythm And Depth] : normal respiratory rhythm and effort [Auscultation Breath Sounds / Voice Sounds] : lungs were clear to auscultation bilaterally [Heart Sounds] : normal S1 and S2 [Edema] : there was no peripheral edema [Involuntary Movements] : no involuntary movements were seen [Skin Color & Pigmentation] : normal skin color and pigmentation [Well-Healed] : well-healed [5] : S1 ankle flexors 5/5 [Sensation Tactile Decrease] : light touch was intact [Abnormal Walk] : normal gait [Balance] : balance was intact [Normal] : normal [Non- Antalgic] : non-antalgic [Able to toe walk] : the patient was able to toe walk [Able to heel walk] : the patient was able to heel walk [Intact] : all sensory within normal limits bilaterally [Erythema] : not erythematous [Warm] : not warm [Romberg's Sign] : Romberg's sign was negtive [Tremor] : no tremor present [Plantar Reflex Right Only] : normal on the right [Plantar Reflex Left Only] : normal on the left [___] : absent on the right [___] : absent on the left [Carlos] : Carlos's sign was not demonstrated [L'Hermitte's] : neck flexion did not produce tingling down the spine/arms [Spurling's - Opposite Side] : Negative Spurling's on opposite side [Spurling's Same Side] : Negative Spurling's on same side [FreeTextEntry1] : MMT RUE 4+ to 5/5, LUE AROM Sh flex to 75 deg, PROM to 150

## 2019-05-03 NOTE — REVIEW OF SYSTEMS
[Arm Weakness] : arm weakness [As Noted in HPI] : as noted in HPI [Negative] : Endocrine [Abdominal Pain] : no abdominal pain [Vomiting] : no vomiting [Diarrhea] : no diarrhea [Dysuria] : no dysuria [Incontinence] : no incontinence [Skin Lesions] : no skin lesions [Skin Wound] : no skin wound [Itching] : no itching [Easy Bleeding] : no tendency for easy bleeding [Easy Bruising] : no tendency for easy bruising [de-identified] : History of panic attacks [de-identified] : left neck and upper chest s/p RT

## 2019-05-03 NOTE — HISTORY OF PRESENT ILLNESS
[FreeTextEntry1] : Mrs. Shelton is a 67 yo woman with PMH of HTN, HLD, R breast lumpectomy s/p RT, Hodgkins lymphoma s/p RT, bilateral CTS, ulnar surgery, and panic attacks who arrives follow up after returning from [place] s/p 12/5/2018 C4-C6 ACDF @ [facility].  Imaging obtained 3/1/19 CT shows vertebral collapse.  She is feeling well, and wearing the Aspen collar at all times.  She has regained strength in the right UE, and no longer has paresthesias or gait difficulties.  She is working on LUE chronic weakness in OT and PT.  The surgical incision is well healed, and well approximated, without signs or irritation, erythema, or drainage.

## 2019-05-08 ENCOUNTER — APPOINTMENT (OUTPATIENT)
Dept: SURGICAL ONCOLOGY | Facility: CLINIC | Age: 69
End: 2019-05-08

## 2019-05-09 ENCOUNTER — APPOINTMENT (OUTPATIENT)
Dept: UROLOGY | Facility: CLINIC | Age: 69
End: 2019-05-09
Payer: MEDICARE

## 2019-05-09 PROCEDURE — 99205 OFFICE O/P NEW HI 60 MIN: CPT

## 2019-05-09 NOTE — REVIEW OF SYSTEMS
[Negative] : Heme/Lymph [Feeling Tired] : feeling tired [Recent Weight Gain (___ Lbs)] : recent [unfilled] ~Ulb weight gain [Wake up at night to urinate  How many times?  ___] : wakes up to urinate [unfilled] times during the night [Skin Wound] : skin wound [Limb Weakness] : limb weakness [Difficulty Walking] : difficulty walking [Muscle Weakness] : muscle weakness [Feelings Of Weakness] : feelings of weakness

## 2019-05-10 NOTE — LETTER BODY
[FreeTextEntry1] : Radu Rhodes MD\par [address] [place]\[place] [address]\par \par Dear Dr. Rhodes,\par \par Jonna Shelton presents to the office today. She is a 69-year-old woman who is referred for evaluation of a recently discovered left renal mass. She had undergone a procedure for her cervical spine recently with Dr. Daley at [facility] and during the course of her recovery, she had a chest CT scan because she was having some difficulty swallowing and the upper aspects of the kidneys were imaged on the lower cuts of the CT. There was an incidental finding of a renal mass than prompting further abdominal CT imaging. This has demonstrated the presence of a large left renal mass involving the upper pole the left kidney.\par \par The patient does not have a current abdominal pain or flank pain. There has not been any gross hematuria. She has been in rehabilitation after her recent cervical spine surgery and feels as though she is getting much stronger. Because she was temporarily having some difficulty swallowing, she has a feeding tube in place (PEG) but it is currently clamped and she is no longer using it. She is taking everything by mouth. She is ambulating independently without a walker. She feels as though her strength is improving although she still feels weak compared to baseline her appetite is still recovering.\par \par I reviewed the patient's CT scan report and imaging with her today. It shows a large mass arising from the upper pole of the left kidney measuring 10 x 5.7 cm with internal necrosis. There was a lower pole cyst of the left kidney as well measuring 2.4 cm which appears simple. The imaging features of the mass are concerning for renal cell carcinoma and most likely this is her diagnosis. There was also a reading of possible metastasis in the right iliac bone. After discussing this with Dr. Daley, I learned that he had taken a bone graft from this exact area on the iliac bone and after consideration of that recent procedure, it is unlikely that this actually represents metastatic disease but more likely represents the fact that a bone graft was taken from that site. There was no other evidence of metastatic disease.\par \par I discussed the likely renal cell carcinoma here with the patient explaining that treatment of this tissue would involve additional surgery. Most of the tumor is exophytic meaning that we could potentially consider a partial nephrectomy here removing the upper pole of the kidney and leaving the remainder of the kidney in place. I did explain that with a mass of the size, radical nephrectomy is also possible if there is evidence at surgery of very central involvement of the tumor into the main collecting system or into the venous branches of the kidney. On imaging it does not appear as though the venous branches are fall.\par \par Laparoscopic partial and radical nephrectomy were discussed with the patient today. I have explained the typical preoperative preparation, hospital course, and postoperative recovery period. I would suggest that she could result some additional time to recover from her recent cervical spine surgery to get a bit stronger before we move forward with the kidney operation and also that she would need your medical clearance. She would need to temporarily discontinue Eliquis prior to the procedure to minimize bleeding risk and this would need to be held also temporarily in the postoperative setting. Risks and benefits of the procedure were discussed with the patient including the possibility of bleeding, infection, and injury to other abdominal structures.\par \par She indicates her understanding of the plan and is in agreement to move forward with it. We will likely plan us in about one month from now to give her some additional recovery time and she will see you in the interim if you deem it necessary.\par \par Please do not hesitate to contact you with any questions or concerns.\par \par Sincerely,\par \par \par \par \par Xiang Galindo MD, FACS\par  of Urology\par [facility]\par \par [facility]\par Director of Robotics and Minimally Invasive Surgery\par [address] [place] [address]\[place] [address]\par P: [phone]\par F: [phone]\par [email or web address]\par

## 2019-05-10 NOTE — REASON FOR VISIT
[Spouse] : spouse [Initial Visit ___] : [unfilled] is here today for an initial visit  for [unfilled] [Family Member] : family member

## 2019-05-10 NOTE — PHYSICAL EXAM
[General Appearance - Well Nourished] : well nourished [General Appearance - Well Developed] : well developed [Normal Appearance] : normal appearance [General Appearance - In No Acute Distress] : no acute distress [Well Groomed] : well groomed [Edema] : no peripheral edema [Respiration, Rhythm And Depth] : normal respiratory rhythm and effort [Exaggerated Use Of Accessory Muscles For Inspiration] : no accessory muscle use [Abdomen Soft] : soft [Costovertebral Angle Tenderness] : no ~M costovertebral angle tenderness [Abdomen Tenderness] : non-tender [Urinary Bladder Findings] : the bladder was normal on palpation [Normal Station and Gait] : the gait and station were normal for the patient's age [] : no rash [No Focal Deficits] : no focal deficits [Oriented To Time, Place, And Person] : oriented to person, place, and time [Affect] : the affect was normal [Not Anxious] : not anxious [Mood] : the mood was normal [No Palpable Adenopathy] : no palpable adenopathy

## 2019-05-16 ENCOUNTER — RX RENEWAL (OUTPATIENT)
Age: 69
End: 2019-05-16

## 2019-06-04 ENCOUNTER — OUTPATIENT (OUTPATIENT)
Dept: OUTPATIENT SERVICES | Facility: HOSPITAL | Age: 69
LOS: 1 days | End: 2019-06-04
Payer: MEDICARE

## 2019-06-04 VITALS
RESPIRATION RATE: 16 BRPM | HEIGHT: 65.5 IN | WEIGHT: 145.95 LBS | DIASTOLIC BLOOD PRESSURE: 70 MMHG | TEMPERATURE: 98 F | HEART RATE: 73 BPM | SYSTOLIC BLOOD PRESSURE: 124 MMHG

## 2019-06-04 DIAGNOSIS — N28.89 OTHER SPECIFIED DISORDERS OF KIDNEY AND URETER: ICD-10-CM

## 2019-06-04 DIAGNOSIS — Z98.890 OTHER SPECIFIED POSTPROCEDURAL STATES: Chronic | ICD-10-CM

## 2019-06-04 DIAGNOSIS — I10 ESSENTIAL (PRIMARY) HYPERTENSION: ICD-10-CM

## 2019-06-04 DIAGNOSIS — C64.2 MALIGNANT NEOPLASM OF LEFT KIDNEY, EXCEPT RENAL PELVIS: ICD-10-CM

## 2019-06-04 DIAGNOSIS — Z85.828 PERSONAL HISTORY OF OTHER MALIGNANT NEOPLASM OF SKIN: Chronic | ICD-10-CM

## 2019-06-04 DIAGNOSIS — Z90.89 ACQUIRED ABSENCE OF OTHER ORGANS: Chronic | ICD-10-CM

## 2019-06-04 DIAGNOSIS — Z98.1 ARTHRODESIS STATUS: Chronic | ICD-10-CM

## 2019-06-04 DIAGNOSIS — I48.91 UNSPECIFIED ATRIAL FIBRILLATION: ICD-10-CM

## 2019-06-04 LAB
ALBUMIN SERPL ELPH-MCNC: 4.7 G/DL — SIGNIFICANT CHANGE UP (ref 3.3–5)
ALP SERPL-CCNC: 93 U/L — SIGNIFICANT CHANGE UP (ref 40–120)
ALT FLD-CCNC: 25 U/L — SIGNIFICANT CHANGE UP (ref 4–33)
ANION GAP SERPL CALC-SCNC: 13 MMO/L — SIGNIFICANT CHANGE UP (ref 7–14)
APPEARANCE UR: CLEAR — SIGNIFICANT CHANGE UP
AST SERPL-CCNC: 24 U/L — SIGNIFICANT CHANGE UP (ref 4–32)
BILIRUB SERPL-MCNC: 1.2 MG/DL — SIGNIFICANT CHANGE UP (ref 0.2–1.2)
BILIRUB UR-MCNC: NEGATIVE — SIGNIFICANT CHANGE UP
BLD GP AB SCN SERPL QL: NEGATIVE — SIGNIFICANT CHANGE UP
BLOOD UR QL VISUAL: NEGATIVE — SIGNIFICANT CHANGE UP
BUN SERPL-MCNC: 20 MG/DL — SIGNIFICANT CHANGE UP (ref 7–23)
CALCIUM SERPL-MCNC: 10.5 MG/DL — SIGNIFICANT CHANGE UP (ref 8.4–10.5)
CHLORIDE SERPL-SCNC: 98 MMOL/L — SIGNIFICANT CHANGE UP (ref 98–107)
CO2 SERPL-SCNC: 26 MMOL/L — SIGNIFICANT CHANGE UP (ref 22–31)
COLOR SPEC: SIGNIFICANT CHANGE UP
CREAT SERPL-MCNC: 0.55 MG/DL — SIGNIFICANT CHANGE UP (ref 0.5–1.3)
GLUCOSE SERPL-MCNC: 79 MG/DL — SIGNIFICANT CHANGE UP (ref 70–99)
GLUCOSE UR-MCNC: NEGATIVE — SIGNIFICANT CHANGE UP
HCT VFR BLD CALC: 38.5 % — SIGNIFICANT CHANGE UP (ref 34.5–45)
HGB BLD-MCNC: 11.9 G/DL — SIGNIFICANT CHANGE UP (ref 11.5–15.5)
KETONES UR-MCNC: NEGATIVE — SIGNIFICANT CHANGE UP
LEUKOCYTE ESTERASE UR-ACNC: NEGATIVE — SIGNIFICANT CHANGE UP
MCHC RBC-ENTMCNC: 29.6 PG — SIGNIFICANT CHANGE UP (ref 27–34)
MCHC RBC-ENTMCNC: 30.9 % — LOW (ref 32–36)
MCV RBC AUTO: 95.8 FL — SIGNIFICANT CHANGE UP (ref 80–100)
NITRITE UR-MCNC: NEGATIVE — SIGNIFICANT CHANGE UP
NRBC # FLD: 0 K/UL — SIGNIFICANT CHANGE UP (ref 0–0)
PH UR: 6.5 — SIGNIFICANT CHANGE UP (ref 5–8)
PLATELET # BLD AUTO: 369 K/UL — SIGNIFICANT CHANGE UP (ref 150–400)
PMV BLD: 9.8 FL — SIGNIFICANT CHANGE UP (ref 7–13)
POTASSIUM SERPL-MCNC: 4.2 MMOL/L — SIGNIFICANT CHANGE UP (ref 3.5–5.3)
POTASSIUM SERPL-SCNC: 4.2 MMOL/L — SIGNIFICANT CHANGE UP (ref 3.5–5.3)
PROT SERPL-MCNC: 7.6 G/DL — SIGNIFICANT CHANGE UP (ref 6–8.3)
PROT UR-MCNC: NEGATIVE — SIGNIFICANT CHANGE UP
RBC # BLD: 4.02 M/UL — SIGNIFICANT CHANGE UP (ref 3.8–5.2)
RBC # FLD: 15.1 % — HIGH (ref 10.3–14.5)
RH IG SCN BLD-IMP: POSITIVE — SIGNIFICANT CHANGE UP
SODIUM SERPL-SCNC: 137 MMOL/L — SIGNIFICANT CHANGE UP (ref 135–145)
SP GR SPEC: 1.01 — SIGNIFICANT CHANGE UP (ref 1–1.04)
UROBILINOGEN FLD QL: NORMAL — SIGNIFICANT CHANGE UP
WBC # BLD: 8.25 K/UL — SIGNIFICANT CHANGE UP (ref 3.8–10.5)
WBC # FLD AUTO: 8.25 K/UL — SIGNIFICANT CHANGE UP (ref 3.8–10.5)

## 2019-06-04 PROCEDURE — 93010 ELECTROCARDIOGRAM REPORT: CPT

## 2019-06-04 RX ORDER — ANASTROZOLE 1 MG/1
1 TABLET ORAL
Qty: 0 | Refills: 0 | DISCHARGE

## 2019-06-04 RX ORDER — FUROSEMIDE 40 MG
1 TABLET ORAL
Qty: 0 | Refills: 0 | DISCHARGE

## 2019-06-04 RX ORDER — AMIODARONE HYDROCHLORIDE 400 MG/1
1 TABLET ORAL
Qty: 0 | Refills: 0 | DISCHARGE

## 2019-06-04 NOTE — H&P PST ADULT - MS GEN HX ROS MEA POS PC
Pt c/o of left arm tingling and weakness r/t prior clavicle fracture/arm pain L/arthritis/arthralgia

## 2019-06-04 NOTE — H&P PST ADULT - NSICDXPASTMEDICALHX_GEN_ALL_CORE_FT
PAST MEDICAL HISTORY:  Anxiety     Carpal tunnel syndrome b/l    Cervical disc disease     Cervical disc herniation     History of breast cancer s/p right lumpectomy , RT    HLD (hyperlipidemia)     Hodgkin disease 1981, underwent radiation therapy    HTN (hypertension)     Kidney mass

## 2019-06-04 NOTE — H&P PST ADULT - NECK DETAILS
pt wearing hard cervical collar S/P cervical fusion 4/5/19 - pt evaluated by Dr Leong and neurosurgeon clearance to be requested/no JVD

## 2019-06-04 NOTE — H&P PST ADULT - NSICDXPROBLEM_GEN_ALL_CORE_FT
PROBLEM DIAGNOSES  Problem: Kidney mass  Assessment and Plan: Pt scheduled for surgery and preop instructions including for Famotidine and Chlorhexidine use in showering on the day of surgery given via teach back method and patient verbalized understanding of instructions.   OR Booking notified of PEG tube, cervical collar, risk for sleep apnea, cervical hardware and Eliquis   Pt to get CC from Cardio - call placed and office to call back with confirmation of Eliquis instructions - pt told to stop Eliquis with last dose 6/7/19 - call to surgeon to inform of same.       Problem: HTN (hypertension)  Assessment and Plan: Pt to take Metoprolol am DOS    Problem: A-fib  Assessment and Plan: Pt to take Amiodarone am DOS

## 2019-06-04 NOTE — H&P PST ADULT - HISTORY OF PRESENT ILLNESS
68 yr old female with HTN, HLD, breast ca, right lumpectomy, RT, Hodgkin's lymphoma, cervical spine disease, underwent C4-C6 ACDF on 12/15/18, patient wear neck collar support, reports cervical instability, " fragile bones" , presents to PST for scheduled C3 anterior cervical extension of fusion w/ENT exposure, C2-T2 posterior fusion, plastic closure on 4/5/19 @ [facility]. Patient denies fever, chills, ambulating without assistance. Pt is a 69 yr old female scheduled for Left Laparoscopic Partial Nephrectomy , poss radical Nephrectomy with Dr Galindo 6/11/19 - pt hx of cervical fusion 12/18 and 4/19 with hardware and CXR done noted kidney mass Left - pt now has healed from cervical fusion done at [facility] 4/5/19 and to have mass removed. Pt denies hematuria or dysuria. Pt in hard cervical collar and denies pain or radiation of pain to shoulders .. Hx of Hodgkins 1981, breast ca with lumpectomy right 2014, and PEG placement

## 2019-06-04 NOTE — H&P PST ADULT - NSICDXPASTSURGICALHX_GEN_ALL_CORE_FT
PAST SURGICAL HISTORY:  History of carpal tunnel surgery 10/2018 left hand    History of lumpectomy of right breast 2013    History of Mohs micrographic surgery for skin cancer     History of tonsillectomy     S/P cervical spinal fusion 12/15/2018 C4-C6 ACDF, 4/19 - plates and screws

## 2019-06-04 NOTE — H&P PST ADULT - OTHER CARE PROVIDERS
oncologist Dr. Ean Duval [phone] oncologist Dr. Ean Duval [phone], Neurosurgeon Dr Daley [phone]

## 2019-06-04 NOTE — H&P PST ADULT - MUSCULOSKELETAL COMMENTS
Pt S/P cervical fusion 4/19 and denies pain with significant improvement in swallowing and ROM of UE - pt in hard collar Pt c/o of decreased ROM cervical neck r/t recent surgery -

## 2019-06-04 NOTE — H&P PST ADULT - MALLAMPATI CLASS
Class I (easy) - visualization of the soft palate, fauces, uvula, and both anterior and posterior pillars Class II - visualization of the soft palate, fauces, and uvula/with phonation

## 2019-06-06 LAB
BACTERIA UR CULT: SIGNIFICANT CHANGE UP
SPECIMEN SOURCE: SIGNIFICANT CHANGE UP

## 2019-06-10 ENCOUNTER — TRANSCRIPTION ENCOUNTER (OUTPATIENT)
Age: 69
End: 2019-06-10

## 2019-06-10 NOTE — ASU PATIENT PROFILE, ADULT - PATIENT REPRESENTATIVE NAME
2 person assist/verbal cues for sequencing, to maintain an upright posture/maintain his center of gravity over his JESSICA
Xiang Shelton

## 2019-06-11 ENCOUNTER — RESULT REVIEW (OUTPATIENT)
Age: 69
End: 2019-06-11

## 2019-06-11 ENCOUNTER — APPOINTMENT (OUTPATIENT)
Dept: UROLOGY | Facility: HOSPITAL | Age: 69
End: 2019-06-11

## 2019-06-11 ENCOUNTER — INPATIENT (INPATIENT)
Facility: HOSPITAL | Age: 69
LOS: 1 days | Discharge: HOME HEALTH | End: 2019-06-13
Attending: UROLOGY | Admitting: UROLOGY
Payer: MEDICARE

## 2019-06-11 VITALS
SYSTOLIC BLOOD PRESSURE: 159 MMHG | HEART RATE: 82 BPM | WEIGHT: 145.95 LBS | TEMPERATURE: 98 F | OXYGEN SATURATION: 96 % | DIASTOLIC BLOOD PRESSURE: 74 MMHG | RESPIRATION RATE: 16 BRPM

## 2019-06-11 DIAGNOSIS — D72.829 ELEVATED WHITE BLOOD CELL COUNT, UNSPECIFIED: ICD-10-CM

## 2019-06-11 DIAGNOSIS — K21.9 GASTRO-ESOPHAGEAL REFLUX DISEASE WITHOUT ESOPHAGITIS: ICD-10-CM

## 2019-06-11 DIAGNOSIS — Z85.828 PERSONAL HISTORY OF OTHER MALIGNANT NEOPLASM OF SKIN: Chronic | ICD-10-CM

## 2019-06-11 DIAGNOSIS — I10 ESSENTIAL (PRIMARY) HYPERTENSION: ICD-10-CM

## 2019-06-11 DIAGNOSIS — E55.9 VITAMIN D DEFICIENCY, UNSPECIFIED: ICD-10-CM

## 2019-06-11 DIAGNOSIS — F32.9 MAJOR DEPRESSIVE DISORDER, SINGLE EPISODE, UNSPECIFIED: ICD-10-CM

## 2019-06-11 DIAGNOSIS — E78.5 HYPERLIPIDEMIA, UNSPECIFIED: ICD-10-CM

## 2019-06-11 DIAGNOSIS — K59.00 CONSTIPATION, UNSPECIFIED: ICD-10-CM

## 2019-06-11 DIAGNOSIS — R13.10 DYSPHAGIA, UNSPECIFIED: ICD-10-CM

## 2019-06-11 DIAGNOSIS — Z98.890 OTHER SPECIFIED POSTPROCEDURAL STATES: Chronic | ICD-10-CM

## 2019-06-11 DIAGNOSIS — Z98.1 ARTHRODESIS STATUS: ICD-10-CM

## 2019-06-11 DIAGNOSIS — Z98.1 ARTHRODESIS STATUS: Chronic | ICD-10-CM

## 2019-06-11 DIAGNOSIS — I48.91 UNSPECIFIED ATRIAL FIBRILLATION: ICD-10-CM

## 2019-06-11 DIAGNOSIS — C64.2 MALIGNANT NEOPLASM OF LEFT KIDNEY, EXCEPT RENAL PELVIS: ICD-10-CM

## 2019-06-11 DIAGNOSIS — Z90.89 ACQUIRED ABSENCE OF OTHER ORGANS: Chronic | ICD-10-CM

## 2019-06-11 LAB
ANION GAP SERPL CALC-SCNC: 13 MMO/L — SIGNIFICANT CHANGE UP (ref 7–14)
BASOPHILS # BLD AUTO: 0.05 K/UL — SIGNIFICANT CHANGE UP (ref 0–0.2)
BASOPHILS NFR BLD AUTO: 0.3 % — SIGNIFICANT CHANGE UP (ref 0–2)
BUN SERPL-MCNC: 14 MG/DL — SIGNIFICANT CHANGE UP (ref 7–23)
CALCIUM SERPL-MCNC: 9 MG/DL — SIGNIFICANT CHANGE UP (ref 8.4–10.5)
CHLORIDE SERPL-SCNC: 100 MMOL/L — SIGNIFICANT CHANGE UP (ref 98–107)
CO2 SERPL-SCNC: 23 MMOL/L — SIGNIFICANT CHANGE UP (ref 22–31)
CREAT SERPL-MCNC: 0.53 MG/DL — SIGNIFICANT CHANGE UP (ref 0.5–1.3)
EOSINOPHIL # BLD AUTO: 0.07 K/UL — SIGNIFICANT CHANGE UP (ref 0–0.5)
EOSINOPHIL NFR BLD AUTO: 0.4 % — SIGNIFICANT CHANGE UP (ref 0–6)
GLUCOSE SERPL-MCNC: 161 MG/DL — HIGH (ref 70–99)
HCT VFR BLD CALC: 33.9 % — LOW (ref 34.5–45)
HGB BLD-MCNC: 10.9 G/DL — LOW (ref 11.5–15.5)
IMM GRANULOCYTES NFR BLD AUTO: 0.7 % — SIGNIFICANT CHANGE UP (ref 0–1.5)
LYMPHOCYTES # BLD AUTO: 0.46 K/UL — LOW (ref 1–3.3)
LYMPHOCYTES # BLD AUTO: 2.9 % — LOW (ref 13–44)
MCHC RBC-ENTMCNC: 29.6 PG — SIGNIFICANT CHANGE UP (ref 27–34)
MCHC RBC-ENTMCNC: 32.2 % — SIGNIFICANT CHANGE UP (ref 32–36)
MCV RBC AUTO: 92.1 FL — SIGNIFICANT CHANGE UP (ref 80–100)
MONOCYTES # BLD AUTO: 0.16 K/UL — SIGNIFICANT CHANGE UP (ref 0–0.9)
MONOCYTES NFR BLD AUTO: 1 % — LOW (ref 2–14)
NEUTROPHILS # BLD AUTO: 15.14 K/UL — HIGH (ref 1.8–7.4)
NEUTROPHILS NFR BLD AUTO: 94.7 % — HIGH (ref 43–77)
NRBC # FLD: 0 K/UL — SIGNIFICANT CHANGE UP (ref 0–0)
PLATELET # BLD AUTO: 299 K/UL — SIGNIFICANT CHANGE UP (ref 150–400)
PMV BLD: 9.3 FL — SIGNIFICANT CHANGE UP (ref 7–13)
POTASSIUM SERPL-MCNC: 4.1 MMOL/L — SIGNIFICANT CHANGE UP (ref 3.5–5.3)
POTASSIUM SERPL-SCNC: 4.1 MMOL/L — SIGNIFICANT CHANGE UP (ref 3.5–5.3)
RBC # BLD: 3.68 M/UL — LOW (ref 3.8–5.2)
RBC # FLD: 14.6 % — HIGH (ref 10.3–14.5)
SODIUM SERPL-SCNC: 136 MMOL/L — SIGNIFICANT CHANGE UP (ref 135–145)
WBC # BLD: 15.99 K/UL — HIGH (ref 3.8–10.5)
WBC # FLD AUTO: 15.99 K/UL — HIGH (ref 3.8–10.5)

## 2019-06-11 PROCEDURE — 50543 LAPARO PARTIAL NEPHRECTOMY: CPT | Mod: LT

## 2019-06-11 PROCEDURE — 76998 US GUIDE INTRAOP: CPT | Mod: 26

## 2019-06-11 PROCEDURE — 99223 1ST HOSP IP/OBS HIGH 75: CPT

## 2019-06-11 PROCEDURE — 88312 SPECIAL STAINS GROUP 1: CPT | Mod: 26

## 2019-06-11 PROCEDURE — 88331 PATH CONSLTJ SURG 1 BLK 1SPC: CPT | Mod: 26

## 2019-06-11 PROCEDURE — 88305 TISSUE EXAM BY PATHOLOGIST: CPT | Mod: 26

## 2019-06-11 PROCEDURE — 88307 TISSUE EXAM BY PATHOLOGIST: CPT | Mod: 26

## 2019-06-11 RX ORDER — METOPROLOL TARTRATE 50 MG
25 TABLET ORAL DAILY
Refills: 0 | Status: DISCONTINUED | OUTPATIENT
Start: 2019-06-11 | End: 2019-06-13

## 2019-06-11 RX ORDER — AMIODARONE HYDROCHLORIDE 400 MG/1
100 TABLET ORAL DAILY
Refills: 0 | Status: DISCONTINUED | OUTPATIENT
Start: 2019-06-12 | End: 2019-06-13

## 2019-06-11 RX ORDER — SENNA PLUS 8.6 MG/1
2 TABLET ORAL AT BEDTIME
Refills: 0 | Status: DISCONTINUED | OUTPATIENT
Start: 2019-06-11 | End: 2019-06-11

## 2019-06-11 RX ORDER — PREGABALIN 225 MG/1
1000 CAPSULE ORAL DAILY
Refills: 0 | Status: DISCONTINUED | OUTPATIENT
Start: 2019-06-11 | End: 2019-06-13

## 2019-06-11 RX ORDER — ACETAMINOPHEN 500 MG
1000 TABLET ORAL ONCE
Refills: 0 | Status: COMPLETED | OUTPATIENT
Start: 2019-06-11 | End: 2019-06-11

## 2019-06-11 RX ORDER — FAMOTIDINE 10 MG/ML
20 INJECTION INTRAVENOUS DAILY
Refills: 0 | Status: DISCONTINUED | OUTPATIENT
Start: 2019-06-11 | End: 2019-06-13

## 2019-06-11 RX ORDER — SODIUM CHLORIDE 9 MG/ML
1000 INJECTION, SOLUTION INTRAVENOUS
Refills: 0 | Status: DISCONTINUED | OUTPATIENT
Start: 2019-06-11 | End: 2019-06-11

## 2019-06-11 RX ORDER — HYDROMORPHONE HYDROCHLORIDE 2 MG/ML
0.5 INJECTION INTRAMUSCULAR; INTRAVENOUS; SUBCUTANEOUS
Refills: 0 | Status: DISCONTINUED | OUTPATIENT
Start: 2019-06-11 | End: 2019-06-11

## 2019-06-11 RX ORDER — ONDANSETRON 8 MG/1
4 TABLET, FILM COATED ORAL ONCE
Refills: 0 | Status: DISCONTINUED | OUTPATIENT
Start: 2019-06-11 | End: 2019-06-11

## 2019-06-11 RX ORDER — DOCUSATE SODIUM 100 MG
100 CAPSULE ORAL THREE TIMES A DAY
Refills: 0 | Status: DISCONTINUED | OUTPATIENT
Start: 2019-06-11 | End: 2019-06-13

## 2019-06-11 RX ORDER — KETOROLAC TROMETHAMINE 30 MG/ML
15 SYRINGE (ML) INJECTION EVERY 6 HOURS
Refills: 0 | Status: DISCONTINUED | OUTPATIENT
Start: 2019-06-11 | End: 2019-06-12

## 2019-06-11 RX ORDER — ACETAMINOPHEN 500 MG
1000 TABLET ORAL ONCE
Refills: 0 | Status: COMPLETED | OUTPATIENT
Start: 2019-06-12 | End: 2019-06-12

## 2019-06-11 RX ORDER — ATORVASTATIN CALCIUM 80 MG/1
40 TABLET, FILM COATED ORAL AT BEDTIME
Refills: 0 | Status: DISCONTINUED | OUTPATIENT
Start: 2019-06-11 | End: 2019-06-13

## 2019-06-11 RX ORDER — HEPARIN SODIUM 5000 [USP'U]/ML
5000 INJECTION INTRAVENOUS; SUBCUTANEOUS EVERY 8 HOURS
Refills: 0 | Status: DISCONTINUED | OUTPATIENT
Start: 2019-06-11 | End: 2019-06-13

## 2019-06-11 RX ORDER — ROSUVASTATIN CALCIUM 5 MG/1
1 TABLET ORAL
Qty: 0 | Refills: 0 | DISCHARGE

## 2019-06-11 RX ORDER — SODIUM CHLORIDE 9 MG/ML
1000 INJECTION, SOLUTION INTRAVENOUS
Refills: 0 | Status: DISCONTINUED | OUTPATIENT
Start: 2019-06-11 | End: 2019-06-12

## 2019-06-11 RX ORDER — ANASTROZOLE 1 MG/1
1 TABLET ORAL DAILY
Refills: 0 | Status: DISCONTINUED | OUTPATIENT
Start: 2019-06-11 | End: 2019-06-13

## 2019-06-11 RX ORDER — HEPARIN SODIUM 5000 [USP'U]/ML
5000 INJECTION INTRAVENOUS; SUBCUTANEOUS EVERY 8 HOURS
Refills: 0 | Status: DISCONTINUED | OUTPATIENT
Start: 2019-06-11 | End: 2019-06-11

## 2019-06-11 RX ORDER — SERTRALINE 25 MG/1
50 TABLET, FILM COATED ORAL DAILY
Refills: 0 | Status: DISCONTINUED | OUTPATIENT
Start: 2019-06-11 | End: 2019-06-13

## 2019-06-11 RX ORDER — OXYCODONE HYDROCHLORIDE 5 MG/1
10 TABLET ORAL ONCE
Refills: 0 | Status: DISCONTINUED | OUTPATIENT
Start: 2019-06-11 | End: 2019-06-11

## 2019-06-11 RX ORDER — THIAMINE MONONITRATE (VIT B1) 100 MG
100 TABLET ORAL DAILY
Refills: 0 | Status: DISCONTINUED | OUTPATIENT
Start: 2019-06-11 | End: 2019-06-13

## 2019-06-11 RX ORDER — ANASTROZOLE 1 MG/1
1 TABLET ORAL
Qty: 0 | Refills: 0 | DISCHARGE

## 2019-06-11 RX ORDER — SENNA PLUS 8.6 MG/1
1 TABLET ORAL AT BEDTIME
Refills: 0 | Status: DISCONTINUED | OUTPATIENT
Start: 2019-06-11 | End: 2019-06-13

## 2019-06-11 RX ORDER — HYDROMORPHONE HYDROCHLORIDE 2 MG/ML
0.25 INJECTION INTRAMUSCULAR; INTRAVENOUS; SUBCUTANEOUS
Refills: 0 | Status: DISCONTINUED | OUTPATIENT
Start: 2019-06-11 | End: 2019-06-11

## 2019-06-11 RX ADMIN — Medication 100 MILLIGRAM(S): at 22:46

## 2019-06-11 RX ADMIN — SENNA PLUS 1 TABLET(S): 8.6 TABLET ORAL at 22:46

## 2019-06-11 RX ADMIN — HEPARIN SODIUM 5000 UNIT(S): 5000 INJECTION INTRAVENOUS; SUBCUTANEOUS at 22:46

## 2019-06-11 RX ADMIN — Medication 15 MILLIGRAM(S): at 17:24

## 2019-06-11 RX ADMIN — Medication 400 MILLIGRAM(S): at 17:16

## 2019-06-11 RX ADMIN — HYDROMORPHONE HYDROCHLORIDE 0.5 MILLIGRAM(S): 2 INJECTION INTRAMUSCULAR; INTRAVENOUS; SUBCUTANEOUS at 12:45

## 2019-06-11 RX ADMIN — HYDROMORPHONE HYDROCHLORIDE 0.5 MILLIGRAM(S): 2 INJECTION INTRAMUSCULAR; INTRAVENOUS; SUBCUTANEOUS at 12:30

## 2019-06-11 RX ADMIN — SODIUM CHLORIDE 125 MILLILITER(S): 9 INJECTION, SOLUTION INTRAVENOUS at 17:17

## 2019-06-11 RX ADMIN — ATORVASTATIN CALCIUM 40 MILLIGRAM(S): 80 TABLET, FILM COATED ORAL at 22:46

## 2019-06-11 RX ADMIN — SERTRALINE 50 MILLIGRAM(S): 25 TABLET, FILM COATED ORAL at 19:02

## 2019-06-11 NOTE — CONSULT NOTE ADULT - PROBLEM SELECTOR RECOMMENDATION 2
c/w home amiodarone 100mg qd and metoprolol  Eliquis on hold, restart when ok with urology post op management per urology, early ambulation, await return of bowel function, pain control, IVFs, IS, DVT ppx (SC heparin)

## 2019-06-11 NOTE — CONSULT NOTE ADULT - PROBLEM SELECTOR RECOMMENDATION 4
soft diet, thin liquids  aspiration precautions  pt still with PEG but not using for feedings;  flush daily c/w metoprolol as bp/hr tolerate

## 2019-06-11 NOTE — CONSULT NOTE ADULT - SUBJECTIVE AND OBJECTIVE BOX
Patient is a 69y old  Female who presents with a chief complaint of kidney mass - left (04 Jun 2019 12:20)    HPI: 69F Hx of Hodgkins 1981, breast ca with lumpectomy right 2014, HTN, HLD, osteoporosis,     scheduled for Left Laparoscopic Partial Nephrectomy , poss radical Nephrectomy with Dr Galindo 6/11/19 - pt hx of cervical fusion 12/18 and 4/19 with hardware and CXR done noted kidney mass Left - pt now has healed from cervical fusion done at [facility] 4/5/19 and to have mass removed. Pt denies hematuria or dysuria. Pt in hard cervical collar and denies pain or radiation of pain to shoulders ..  and PEG placement (04 Jun 2019 12:20)    Allergies: penicillin (Rash)    HOME MEDICATIONS:   · 	thiamine 100 mg oral tablet: 1 tab(s) orally once a day  · 	Multiple Vitamins oral tablet, chewable: 1 tab(s) orally once a day  · 	senna oral tablet: 2 tab(s) orally once a day (at bedtime), As Needed  · 	famotidine 20 mg oral tablet: 1 tab(s) orally once a day in pm  · 	potassium chloride 20 mEq oral powder for reconstitution: 2 packet(s) orally once a day  · 	melatonin 3 mg oral tablet: 1 tab(s) orally once a day (at bedtime), As needed, Insomnia  · 	sertraline 50 mg oral tablet: 1 tab(s) orally once a day in pm  · 	metoprolol succinate 25 mg oral tablet, extended release: 1 tab(s) orally once a day in am  · 	Vitamin D3 400 intl units oral tablet: 2 tab(s) orally once a day  · 	anastrozole 1 mg oral tablet: 1 tab(s) orally once a day in am   · 	Eliquis 5 mg oral tablet: 1 tab(s) orally 2 times a day  · 	Vitamin B-12 1000 mcg orally daily:     MEDICATIONS  (STANDING):  acetaminophen  IVPB .. 1000 milliGRAM(s) IV Intermittent once  acetaminophen  IVPB .. 1000 milliGRAM(s) IV Intermittent once  anastrozole 1 milliGRAM(s) Oral daily  cyanocobalamin 1000 MICROGram(s) Oral daily  docusate sodium 100 milliGRAM(s) Oral three times a day  famotidine    Tablet 20 milliGRAM(s) Oral daily  heparin  Injectable 5000 Unit(s) SubCutaneous every 8 hours  ketorolac   Injectable 15 milliGRAM(s) IV Push every 6 hours  lactated ringers. 1000 milliLiter(s) (125 mL/Hr) IV Continuous <Continuous>  metoprolol succinate ER 25 milliGRAM(s) Oral daily  sertraline 50 milliGRAM(s) Oral daily  thiamine 100 milliGRAM(s) Oral daily    MEDICATIONS  (PRN):  HYDROmorphone  Injectable 0.25 milliGRAM(s) IV Push every 10 minutes PRN Moderate Pain (4 - 6)  HYDROmorphone  Injectable 0.5 milliGRAM(s) IV Push every 10 minutes PRN Severe Pain (7 - 10)  ondansetron Injectable 4 milliGRAM(s) IV Push once PRN Nausea and/or Vomiting  oxyCODONE    IR 10 milliGRAM(s) Oral once PRN Severe Pain (7 - 10)  senna 2 Tablet(s) Oral at bedtime PRN Constipation    PAST MEDICAL & SURGICAL HISTORY:  Kidney mass  Anxiety  History of breast cancer: s/p right lumpectomy , RT  Hodgkin disease: 1981, underwent radiation therapy  HLD (hyperlipidemia)  HTN (hypertension)  Cervical disc herniation  Carpal tunnel syndrome: b/l  Cervical disc disease  History of carpal tunnel surgery: 10/2018 left hand  History of tonsillectomy  S/P cervical spinal fusion: 12/15/2018 C4-C6 ACDF, 4/19 - plates and screws  History of Mohs micrographic surgery for skin cancer  History of lumpectomy of right breast: 2013     SOCIAL HISTORY:    FAMILY HISTORY:  Family history of colon cancer in mother (Mother)    REVIEW OF SYSTEMS:    CONSTITUTIONAL: No fever, weight loss, or fatigue  EYES: No eye pain, visual disturbances, or discharge  ENMT:  No difficulty hearing, tinnitus, vertigo; No sinus or throat pain  NECK: No pain or stiffness  BREASTS: No pain, masses, or nipple discharge  RESPIRATORY: No cough, wheezing, chills or hemoptysis; No shortness of breath  CARDIOVASCULAR: No chest pain, palpitations, dizziness, or leg swelling  GASTROINTESTINAL: No abdominal or epigastric pain. No nausea, vomiting, or hematemesis; No diarrhea or constipation. No melena or hematochezia.  GENITOURINARY: No dysuria, frequency, hematuria, or incontinence  NEUROLOGICAL: No headaches, memory loss, loss of strength, numbness, or tremors  SKIN: No itching, burning, rashes, or lesions   LYMPH NODES: No enlarged glands  ENDOCRINE: No heat or cold intolerance; No hair loss  MUSCULOSKELETAL: No muscle or back pain  PSYCHIATRIC: No depression, anxiety, mood swings, or difficulty sleeping  HEME/LYMPH: No easy bruising, or bleeding gums  ALLERGY AND IMMUNOLOGIC: No hives or eczema  [ X ] All other ROS negative  [  ] Unable to obtain due to poor mental status    Vital Signs Last 24 Hrs  T(C): 36.2 (11 Jun 2019 15:00), Max: 36.6 (11 Jun 2019 05:56)  T(F): 97.2 (11 Jun 2019 15:00), Max: 97.9 (11 Jun 2019 05:56)  HR: 68 (11 Jun 2019 15:00) (62 - 82)  BP: 108/61 (11 Jun 2019 15:00) (86/57 - 159/74)  BP(mean): 72 (11 Jun 2019 15:00) (61 - 82)  RR: 12 (11 Jun 2019 15:00) (9 - 17)  SpO2: 93% (11 Jun 2019 15:00) (92% - 96%)    PHYSICAL EXAM:  GENERAL: NAD, well-groomed, well-developed  HEAD:  Atraumatic, Normocephalic  EYES: EOMI, PERRLA, conjunctiva and sclera clear  ENMT: Moist mucous membranes  NECK: Supple, No JVD  RESPIRATORY: Clear to auscultation bilaterally; No rales, rhonchi, wheezing, or rubs  CARDIOVASCULAR: Regular rate and rhythm; No murmurs, rubs, or gallops  GASTROINTESTINAL: Soft, Nontender, Nondistended; Bowel sounds present  GENITOURINARY: Not examined  EXTREMITIES:  2+ Peripheral Pulses, No clubbing, cyanosis, or edema  NERVOUS SYSTEM:  Alert & Oriented X3; Moving all 4 extremities; No gross sensory deficits  HEME/LYMPH: No lymphadenopathy noted  SKIN: No rashes or lesions; Incisions C/D/I    LABS:                        10.9   15.99 )-----------( 299      ( 11 Jun 2019 12:25 )             33.9     06-11    136  |  100  |  14  ----------------------------<  161<H>  4.1   |  23  |  0.53    Ca    9.0      11 Jun 2019 12:25    RADIOLOGY & ADDITIONAL STUDIES:    EKG:   Personally Reviewed:  [ ] YES     Imaging:   Personally Reviewed:  [ ] YES               Consultant(s) notes reviewed:    Care Discussed with Consultant(s)/Other Providers:  urology re overall care Patient is a 69y old  Female who presents with a chief complaint of kidney mass - left (2019 12:20)    HPI: 69F Hx of Hodgkins , breast ca with lumpectomy right , HTN, HLD, osteoporosis, found to have cervical stenosis with compression 2018, s/p C4-6 ACDF 18.  Pt was [facility] to [facility] -19 underwent revision of anterior cervical discectomy with fusion of C4-C6 with extension to C3, as well as C2-T2 posterior fusion with right iliac crest autograft;  course complicated by JAXON treated with amio/anticoagulation, dysphagia treated for aspiration pneumonia, PEG placed.  Pt also incidentally found to have L renal mass.  Pt recovered from cervical surgery, been functioning well as home, wears neck collar ATC, walking independently, not used PEG in over one month, eating soft food with regular thin liquids.  Today underwent L lap alba-nephrectomy.  Pt doing well after surgery, pain at surgical sites worse with movement as expected.  Dry mouth, thirsty.      Allergies: penicillin (Rash)    HOME MEDICATIONS:   · 	thiamine 100 mg oral tablet: 1 tab(s) orally once a day  · 	Multiple Vitamins oral tablet, chewable: 1 tab(s) orally once a day  · 	senna oral tablet: 1 tab(s) orally once a day (at bedtime), As Needed  · 	famotidine 20 mg oral tablet: 1 tab(s) orally once a day in pm  · 	sertraline 50 mg oral tablet: 1 tab(s) orally once a day in pm  · 	metoprolol succinate 25 mg oral tablet, extended release: 1 tab(s) orally once a day in am  · 	Vitamin D3 400 intl units oral tablet: 2 tab(s) orally once a day  · 	anastrozole 1 mg oral tablet: 1 tab(s) orally once a day in am   · 	Eliquis 5 mg oral tablet: 1 tab(s) orally 2 times a day --- held since Friday  · 	Vitamin B-12 1000 mcg orally daily:   amiodarone 100mg qd  rouvastatin 10mg qd    MEDICATIONS  (STANDING):  acetaminophen  IVPB .. 1000 milliGRAM(s) IV Intermittent once  acetaminophen  IVPB .. 1000 milliGRAM(s) IV Intermittent once  anastrozole 1 milliGRAM(s) Oral daily  cyanocobalamin 1000 MICROGram(s) Oral daily  docusate sodium 100 milliGRAM(s) Oral three times a day  famotidine    Tablet 20 milliGRAM(s) Oral daily  heparin  Injectable 5000 Unit(s) SubCutaneous every 8 hours  ketorolac   Injectable 15 milliGRAM(s) IV Push every 6 hours  lactated ringers. 1000 milliLiter(s) (125 mL/Hr) IV Continuous <Continuous>  metoprolol succinate ER 25 milliGRAM(s) Oral daily  sertraline 50 milliGRAM(s) Oral daily  thiamine 100 milliGRAM(s) Oral daily    MEDICATIONS  (PRN):  HYDROmorphone  Injectable 0.25 milliGRAM(s) IV Push every 10 minutes PRN Moderate Pain (4 - 6)  HYDROmorphone  Injectable 0.5 milliGRAM(s) IV Push every 10 minutes PRN Severe Pain (7 - 10)  ondansetron Injectable 4 milliGRAM(s) IV Push once PRN Nausea and/or Vomiting  oxyCODONE    IR 10 milliGRAM(s) Oral once PRN Severe Pain (7 - 10)  senna 2 Tablet(s) Oral at bedtime PRN Constipation    PAST MEDICAL & SURGICAL HISTORY:  Kidney mass  Anxiety  History of breast cancer: s/p right lumpectomy , RT  Hodgkin disease: , underwent radiation therapy  HLD (hyperlipidemia)  HTN (hypertension)  Cervical disc herniation  Carpal tunnel syndrome: b/l  Cervical disc disease  History of carpal tunnel surgery: 10/2018 left hand  History of tonsillectomy  S/P cervical spinal fusion: 12/15/2018 C4-C6 ACDF,  - plates and screws  History of Mohs micrographic surgery for skin cancer  History of lumpectomy of right breast:      SOCIAL HISTORY:  , 2 children, 4 grandchildren  no tob; rare alcohol, no drugs  retired     FAMILY HISTORY:  mother - colon cancer, MS  father -  at 94yo, had h/o aneurysm    REVIEW OF SYSTEMS:  CONSTITUTIONAL: No fever, weight loss, or fatigue  EYES: No eye pain, visual disturbances, or discharge  ENMT:  No difficulty hearing, tinnitus, vertigo; No sinus or throat pain  NECK: No pain or stiffness  BREASTS: No pain, masses, or nipple discharge  RESPIRATORY: No cough, wheezing, chills or hemoptysis; No shortness of breath  CARDIOVASCULAR: No chest pain, palpitations, dizziness, or leg swelling  GASTROINTESTINAL: No epigastric pain. No nausea, vomiting, or hematemesis; No diarrhea or constipation. No melena or hematochezia.  GENITOURINARY: No dysuria, frequency, hematuria, or incontinence  NEUROLOGICAL: No headaches, memory loss, numbness, or tremors;  since neck surgery LUE limited ROM and weakness  SKIN: No itching, burning, rashes, or lesions   LYMPH NODES: No enlarged glands  ENDOCRINE: No heat or cold intolerance; No hair loss  MUSCULOSKELETAL: No muscle or back pain  PSYCHIATRIC: No depression, anxiety, mood swings, or difficulty sleeping  HEME/LYMPH: No easy bruising, or bleeding gums  ALLERGY AND IMMUNOLOGIC: No hives or eczema  [ X ] All other ROS negative  [  ] Unable to obtain due to poor mental status    Vital Signs Last 24 Hrs  T(C): 36.2 (2019 15:00), Max: 36.6 (2019 05:56)  T(F): 97.2 (2019 15:00), Max: 97.9 (2019 05:56)  HR: 68 (2019 15:00) (62 - 82)  BP: 108/61 (2019 15:00) (86/57 - 159/74)  BP(mean): 72 (2019 15:00) (61 - 82)  RR: 12 (2019 15:00) (9 - 17)  SpO2: 93% (2019 15:00) (92% - 96%)    PHYSICAL EXAM:  GENERAL: NAD, well-groomed, well-developed, sitting up in bed, neck collar on  HEAD:  Atraumatic, Normocephalic  EYES: EOMI, PERRLA, conjunctiva and sclera clear  ENMT: Moist mucous membranes  NECK: anterior/posterior wounds well healed  RESPIRATORY: Clear to auscultation bilaterally; No rales, rhonchi, wheezing, or rubs  CARDIOVASCULAR: Regular rate and rhythm; No murmurs, rubs, or gallops  GASTROINTESTINAL: Soft, incisions intact, appropriately tender  GENITOURINARY: +matos  EXTREMITIES:  2+ Peripheral Pulses, No clubbing, cyanosis, or edema  NERVOUS SYSTEM:  Alert & Oriented X3; Moving all 4 extremities; No gross sensory deficits;  limited ROM L shoulder  HEME/LYMPH: No lymphadenopathy noted  SKIN: No rashes or lesions  PSYCH: calm, appropriate    LABS:                        10.9   15.99 )-----------( 299      ( 2019 12:25 )             33.9     06-11    136  |  100  |  14  ----------------------------<  161<H>  4.1   |  23  |  0.53    Ca    9.0      2019 12:25    RADIOLOGY & ADDITIONAL STUDIES:    EKG:   Personally Reviewed:  [ ] YES     Imaging:   Personally Reviewed:  [ ] YES               Consultant(s) notes reviewed:    Care Discussed with Consultant(s)/Other Providers:  urology re overall care

## 2019-06-11 NOTE — CONSULT NOTE ADULT - PROBLEM SELECTOR PROBLEM 7
Depression, unspecified depression type Gastroesophageal reflux disease, esophagitis presence not specified

## 2019-06-11 NOTE — CONSULT NOTE ADULT - PROBLEM SELECTOR RECOMMENDATION 3
c/w metoprolol as bp/hr tolerate c/w home amiodarone 100mg qd and metoprolol  Eliquis on hold, restart when ok with urology

## 2019-06-11 NOTE — CONSULT NOTE ADULT - PROBLEM SELECTOR RECOMMENDATION 5
ATC cervical collar  soft diet, aspiration precautions soft diet, thin liquids  aspiration precautions  pt still with PEG but not using for feedings;  flush daily when able to take PO tolerates thin liquids and mechanical soft diet  aspiration precautions  pt still with PEG but not using for feedings;  flush daily

## 2019-06-11 NOTE — CONSULT NOTE ADULT - PROBLEM SELECTOR RECOMMENDATION 9
post op management per urology, early ambulation, await return of bowel function, pain control, IVFs, IS, DVT ppx (SC heparin) c/w vitamin D likely post-op stress reaction, f/u temp/clinically c/w statin

## 2019-06-11 NOTE — PROGRESS NOTE ADULT - SUBJECTIVE AND OBJECTIVE BOX
Post op check    This 68 yo F is s/p L. lap alba-neph  PMH: Breast Ca; spinal fusion; Hodgkin's    Pt is awake and alert  Has no c/o  Afeb 153/80  83  96%RA  Abd- soft; appropriately tender             wounds C&D  [address] Post op check    This 70 yo F is s/p L. lap alba-neph  PMH: Breast Ca; spinal fusion; Hodgkin's    Pt is awake and alert  Has no c/o  Afeb 153/80  83  96%RA  Abd- soft; appropriately tender             wounds C&D; pt has a peg placed after neck surgery, not used for a month  [address]

## 2019-06-11 NOTE — PATIENT PROFILE ADULT - DO YOU WANT TO COMPLETE THE HCP AND NAME A HEALTH CARE AGENT
-Mr. Zacarias was admitted to inpatient status  -On admit he was tachycardic with leukocytosis but afebrile with normal lactic acid.  -Blood and wound cultures have been obtained and are negative so far  -CT of chest does not show a large fluid collection, but there are frankly fluctuant areas and puss is draining.  Will consult general surgery to evaluate for incision and drainage.  -Taken to OR by Dr. Correa for I/D of abscess.  Puss drained but no large focal abscess identified  -Will continue with vancomycin and zosyn for now.     no

## 2019-06-12 ENCOUNTER — TRANSCRIPTION ENCOUNTER (OUTPATIENT)
Age: 69
End: 2019-06-12

## 2019-06-12 DIAGNOSIS — Z93.1 GASTROSTOMY STATUS: ICD-10-CM

## 2019-06-12 DIAGNOSIS — Z71.89 OTHER SPECIFIED COUNSELING: ICD-10-CM

## 2019-06-12 LAB
ANION GAP SERPL CALC-SCNC: 13 MMO/L — SIGNIFICANT CHANGE UP (ref 7–14)
BUN SERPL-MCNC: 12 MG/DL — SIGNIFICANT CHANGE UP (ref 7–23)
CALCIUM SERPL-MCNC: 9.8 MG/DL — SIGNIFICANT CHANGE UP (ref 8.4–10.5)
CHLORIDE SERPL-SCNC: 100 MMOL/L — SIGNIFICANT CHANGE UP (ref 98–107)
CO2 SERPL-SCNC: 24 MMOL/L — SIGNIFICANT CHANGE UP (ref 22–31)
CREAT SERPL-MCNC: 0.6 MG/DL — SIGNIFICANT CHANGE UP (ref 0.5–1.3)
GLUCOSE SERPL-MCNC: 111 MG/DL — HIGH (ref 70–99)
HCT VFR BLD CALC: 31.6 % — LOW (ref 34.5–45)
HGB BLD-MCNC: 10.3 G/DL — LOW (ref 11.5–15.5)
MCHC RBC-ENTMCNC: 29.9 PG — SIGNIFICANT CHANGE UP (ref 27–34)
MCHC RBC-ENTMCNC: 32.6 % — SIGNIFICANT CHANGE UP (ref 32–36)
MCV RBC AUTO: 91.6 FL — SIGNIFICANT CHANGE UP (ref 80–100)
NRBC # FLD: 0 K/UL — SIGNIFICANT CHANGE UP (ref 0–0)
PLATELET # BLD AUTO: 289 K/UL — SIGNIFICANT CHANGE UP (ref 150–400)
PMV BLD: 9.3 FL — SIGNIFICANT CHANGE UP (ref 7–13)
POTASSIUM SERPL-MCNC: 4.6 MMOL/L — SIGNIFICANT CHANGE UP (ref 3.5–5.3)
POTASSIUM SERPL-SCNC: 4.6 MMOL/L — SIGNIFICANT CHANGE UP (ref 3.5–5.3)
RBC # BLD: 3.45 M/UL — LOW (ref 3.8–5.2)
RBC # FLD: 14.6 % — HIGH (ref 10.3–14.5)
SODIUM SERPL-SCNC: 137 MMOL/L — SIGNIFICANT CHANGE UP (ref 135–145)
WBC # BLD: 10.02 K/UL — SIGNIFICANT CHANGE UP (ref 3.8–10.5)
WBC # FLD AUTO: 10.02 K/UL — SIGNIFICANT CHANGE UP (ref 3.8–10.5)

## 2019-06-12 PROCEDURE — 99233 SBSQ HOSP IP/OBS HIGH 50: CPT

## 2019-06-12 RX ORDER — SODIUM CHLORIDE 9 MG/ML
1000 INJECTION, SOLUTION INTRAVENOUS
Refills: 0 | Status: DISCONTINUED | OUTPATIENT
Start: 2019-06-12 | End: 2019-06-13

## 2019-06-12 RX ADMIN — Medication 10 MILLIGRAM(S): at 18:17

## 2019-06-12 RX ADMIN — Medication 100 MILLIGRAM(S): at 13:05

## 2019-06-12 RX ADMIN — PREGABALIN 1000 MICROGRAM(S): 225 CAPSULE ORAL at 13:05

## 2019-06-12 RX ADMIN — Medication 400 MILLIGRAM(S): at 05:25

## 2019-06-12 RX ADMIN — HEPARIN SODIUM 5000 UNIT(S): 5000 INJECTION INTRAVENOUS; SUBCUTANEOUS at 21:51

## 2019-06-12 RX ADMIN — Medication 15 MILLIGRAM(S): at 00:30

## 2019-06-12 RX ADMIN — Medication 15 MILLIGRAM(S): at 05:25

## 2019-06-12 RX ADMIN — Medication 100 MILLIGRAM(S): at 05:25

## 2019-06-12 RX ADMIN — SODIUM CHLORIDE 125 MILLILITER(S): 9 INJECTION, SOLUTION INTRAVENOUS at 05:26

## 2019-06-12 RX ADMIN — AMIODARONE HYDROCHLORIDE 100 MILLIGRAM(S): 400 TABLET ORAL at 05:28

## 2019-06-12 RX ADMIN — ANASTROZOLE 1 MILLIGRAM(S): 1 TABLET ORAL at 13:05

## 2019-06-12 RX ADMIN — Medication 15 MILLIGRAM(S): at 13:18

## 2019-06-12 RX ADMIN — Medication 15 MILLIGRAM(S): at 23:17

## 2019-06-12 RX ADMIN — ATORVASTATIN CALCIUM 40 MILLIGRAM(S): 80 TABLET, FILM COATED ORAL at 21:51

## 2019-06-12 RX ADMIN — Medication 25 MILLIGRAM(S): at 05:25

## 2019-06-12 RX ADMIN — FAMOTIDINE 20 MILLIGRAM(S): 10 INJECTION INTRAVENOUS at 05:25

## 2019-06-12 RX ADMIN — Medication 100 MILLIGRAM(S): at 13:04

## 2019-06-12 RX ADMIN — HEPARIN SODIUM 5000 UNIT(S): 5000 INJECTION INTRAVENOUS; SUBCUTANEOUS at 13:04

## 2019-06-12 RX ADMIN — SERTRALINE 50 MILLIGRAM(S): 25 TABLET, FILM COATED ORAL at 13:04

## 2019-06-12 RX ADMIN — Medication 15 MILLIGRAM(S): at 18:16

## 2019-06-12 RX ADMIN — Medication 400 MILLIGRAM(S): at 00:29

## 2019-06-12 RX ADMIN — HEPARIN SODIUM 5000 UNIT(S): 5000 INJECTION INTRAVENOUS; SUBCUTANEOUS at 05:26

## 2019-06-12 NOTE — PROGRESS NOTE ADULT - SUBJECTIVE AND OBJECTIVE BOX
ANESTHESIA POSTOP CHECK    69y Female POSTOP DAY 1    Vital Signs Last 24 Hrs  T(C): 36.9 (12 Jun 2019 13:34), Max: 36.9 (12 Jun 2019 09:13)  T(F): 98.5 (12 Jun 2019 13:34), Max: 98.5 (12 Jun 2019 13:34)  HR: 80 (12 Jun 2019 13:34) (80 - 85)  BP: 143/77 (12 Jun 2019 13:34) (99/55 - 143/77)  BP(mean): --  RR: 17 (12 Jun 2019 13:34) (15 - 17)  SpO2: 98% (12 Jun 2019 13:34) (94% - 98%)  I&O's Summary    11 Jun 2019 07:01  -  12 Jun 2019 07:00  --------------------------------------------------------  IN: 500 mL / OUT: 1390 mL / NET: -890 mL    12 Jun 2019 07:01  -  12 Jun 2019 16:58  --------------------------------------------------------  IN: 0 mL / OUT: 400 mL / NET: -400 mL        [X ] NO APPARENT ANESTHESIA COMPLICATIONS      Comments:

## 2019-06-12 NOTE — PROGRESS NOTE ADULT - ASSESSMENT
69F Hx of Hodgkins 1981, breast ca with lumpectomy right 2014, HTN, HLD, osteoporosis, found to have cervical stenosis with compression Nov 2018, s/p C4-6 ACDF 12/4/18.  Pt was admitted to [facility] 4/5-4/20/19 underwent revision of anterior cervical discectomy with fusion of C4-C6 with extension to C3, as well as C2-T2 posterior fusion with right iliac crest autograft;  course complicated by JAXON treated with amio/anticoagulation, dysphagia treated for aspiration pneumonia, PEG placed.  Pt also incidentally found to have L renal mass.  6/11 s/p left lap alba-nephrectomy.

## 2019-06-12 NOTE — PROGRESS NOTE ADULT - PROBLEM SELECTOR PLAN 5
when able to take PO tolerates thin liquids and mechanical soft diet  aspiration precautions  pt still with PEG - GI consulted for possible removal

## 2019-06-12 NOTE — CONSULT NOTE ADULT - ASSESSMENT
#peg removal
69F Hx of Hodgkins 1981, breast ca with lumpectomy right 2014, HTN, HLD, osteoporosis, found to have cervical stenosis with compression Nov 2018, s/p C4-6 ACDF 12/4/18.  Pt was admitted to [facility] 4/5-4/20/19 underwent revision of anterior cervical discectomy with fusion of C4-C6 with extension to C3, as well as C2-T2 posterior fusion with right iliac crest autograft;  course complicated by JAXON treated with amio/anticoagulation, dysphagia treated for aspiration pneumonia, PEG placed.  Pt also incidentally found to have L renal mass.  Today underwent L lap alba-nephrectomy.

## 2019-06-12 NOTE — DISCHARGE NOTE NURSING/CASE MANAGEMENT/SOCIAL WORK - NSDPDISTO_GEN_ALL_CORE
Home Peg site dressing c/d/i.  Left lower abdominal steri strip c/d/i.  +void  Tolerated po and fluids.  Cervical collar on./Home Peg site dressing c/d/i.  Left lower abdominal steri strip c/d/i.  +void  Tolerated po and fluids.  Cervical collar on./Home with home care

## 2019-06-12 NOTE — PROGRESS NOTE ADULT - SUBJECTIVE AND OBJECTIVE BOX
Patient is a 69y old  Female who presents with a chief complaint of surgery (11 Jun 2019 16:35)      SUBJECTIVE / OVERNIGHT EVENTS:  Feeling pretty good.  Has walked in [place].  No dizziness.  No flatus yet.  Tolerating clears, hungry.  Pain controlled wit Tylenol.    MEDICATIONS  (STANDING):  amiodarone    Tablet 100 milliGRAM(s) Oral daily  anastrozole 1 milliGRAM(s) Oral daily  atorvastatin 40 milliGRAM(s) Oral at bedtime  cyanocobalamin 1000 MICROGram(s) Oral daily  dextrose 5% + sodium chloride 0.45%. 1000 milliLiter(s) (75 mL/Hr) IV Continuous <Continuous>  docusate sodium 100 milliGRAM(s) Oral three times a day  famotidine    Tablet 20 milliGRAM(s) Oral daily  heparin  Injectable 5000 Unit(s) SubCutaneous every 8 hours  ketorolac   Injectable 15 milliGRAM(s) IV Push every 6 hours  metoprolol succinate ER 25 milliGRAM(s) Oral daily  senna 1 Tablet(s) Oral at bedtime  sertraline 50 milliGRAM(s) Oral daily  thiamine 100 milliGRAM(s) Oral daily    MEDICATIONS  (PRN):      CAPILLARY BLOOD GLUCOSE          I&O's Summary    11 Jun 2019 07:01  -  12 Jun 2019 07:00  --------------------------------------------------------  IN: 500 mL / OUT: 1390 mL / NET: -890 mL        Vital Signs Last 24 Hrs  T(C): 36.9 (12 Jun 2019 09:13), Max: 36.9 (12 Jun 2019 09:13)  T(F): 98.4 (12 Jun 2019 09:13), Max: 98.4 (12 Jun 2019 09:13)  HR: 80 (12 Jun 2019 09:13) (62 - 85)  BP: 99/55 (12 Jun 2019 09:13) (86/57 - 153/80)  BP(mean): 72 (11 Jun 2019 15:00) (61 - 77)  RR: 17 (12 Jun 2019 09:13) (10 - 17)  SpO2: 96% (12 Jun 2019 09:13) (92% - 96%)    PHYSICAL EXAM:  GENERAL: elderly WF, sitting up in chair, cervical neck collar on  HEAD:  Atraumatic, Normocephalic  EYES: EOMI, PERRLA, conjunctiva and sclera clear  NECK: Supple, No JVD  CHEST/LUNG: Clear to auscultation bilaterally; No wheeze  HEART: Regular rate and rhythm; No murmurs, rubs, or gallops  ABDOMEN: Soft, ND, incisions intact, PEG  EXTREMITIES:  2+ Peripheral Pulses, No clubbing, cyanosis, or edema  PSYCH: AAOx3  NEUROLOGY: non-focal  SKIN: No rashes or lesions  +matos    LABS:                        10.3   10.02 )-----------( 289      ( 12 Jun 2019 06:01 )             31.6     06-12    137  |  100  |  12  ----------------------------<  111<H>  4.6   |  24  |  0.60    Ca    9.8      12 Jun 2019 06:01                RADIOLOGY & ADDITIONAL TESTS:    Imaging Personally Reviewed:    Consultant(s) Notes Reviewed:      Care Discussed with Consultants/Other Providers: urology re overall care

## 2019-06-12 NOTE — PROGRESS NOTE ADULT - PROBLEM SELECTOR PLAN 2
post op management per urology, encourage ambulation, await return of bowel function, pain control, IVFs, IS, DVT ppx (SC heparin).

## 2019-06-12 NOTE — DISCHARGE NOTE NURSING/CASE MANAGEMENT/SOCIAL WORK - NSDCPEWEB_GEN_ALL_CORE
[place] website --- [email or web address] for Tobacco Control website --- [email or web address]

## 2019-06-12 NOTE — PROGRESS NOTE ADULT - ASSESSMENT
stable s/p L. lap alba-neph  Plan:  OOB  AM labs  clears until flatus  GI consult to remove PEG  d/c matos

## 2019-06-12 NOTE — DISCHARGE NOTE NURSING/CASE MANAGEMENT/SOCIAL WORK - NSDCDPATPORTLINK_GEN_ALL_CORE
You can access the "[employer]"[facility] Patient Portal, offered by [facility], by registering with the following website: [email or web address] [email or web address]

## 2019-06-12 NOTE — DISCHARGE NOTE NURSING/CASE MANAGEMENT/SOCIAL WORK - NSDCPECAREGIVERED_GEN_ALL_CORE
Medline and carenotes for surgical procedure as well as DC Medications Colace, Tylenol and side effects literature for patient reference. No/Medline and carenotes for surgical procedure as well as DC Medications Colace, Tylenol and side effects literature for patient reference.

## 2019-06-12 NOTE — PROGRESS NOTE ADULT - SUBJECTIVE AND OBJECTIVE BOX
POD #1  Afeb 118/63 85 98%RA    Pt has no c/o  Abd- soft NT ND; no flatus      wounds C&D; PEG in place  [address]

## 2019-06-12 NOTE — DISCHARGE NOTE NURSING/CASE MANAGEMENT/SOCIAL WORK - NSDCPNINST_GEN_ALL_CORE
Maintain abdominal incisions clean dry and intact, steri strips will fall off on their own in 1-2 weeks. Call MD with any signs of infection ie fever/shaking chills, redness or drainage, or with signs of bleeding or persistent nausea. Continue to drink plenty of fluids and avoid straining and constipation which may be a side effect from taking narcotic pain meds. No heavy lifting greater than 10 pounds (ie a gallon of milk.) Follow-up with MD as well as PMD in office as instructed for continuity of care post-operatively, as per safe Dc plan.

## 2019-06-12 NOTE — CONSULT NOTE ADULT - SUBJECTIVE AND OBJECTIVE BOX
Chief Complaint:  Patient is a 69y old  Female who presents with a chief complaint of surgery (2019 16:35)    Kidney mass  Anxiety  History of breast cancer  Hodgkin disease  HLD (hyperlipidemia)  HTN (hypertension)  Breast CA  Cervical disc herniation  Carpal tunnel syndrome  Cervical disc disease  Hodgkin disease  Breast cancer  History of carpal tunnel surgery  History of tonsillectomy  S/P cervical spinal fusion  History of Mohs micrographic surgery for skin cancer  History of lumpectomy of right breast  H/O lumpectomy  No significant past surgical history  History of lumpectomy  S/P appendectomy     HPI:  Pt is a 69 yr old female h/o Hodgkins , breast ca with lumpectomy right , and PEG placement 2019 (at [facility]) scheduled for Left Laparoscopic Partial Nephrectomy , poss radical Nephrectomy with Dr Galindo 19 - pt hx of cervical fusion  and  with hardware and CXR done noted kidney mass Left - pt now has healed from cervical fusion done at [facility] 19 and to have mass removed. Pt denies hematuria or dysuria. Pt in hard cervical collar and denies pain or radiation of pain to shoulders. GI consulted for peg removal. The patient reports that the peg was placed prior to her kidney surgery to optimize her nutrition. Since May she reports that she has been with good oral intake and is without any dysphagia or odynophagia and her peg is no longer needed. She has not used her peg in "over a month"    penicillin (Rash)      amiodarone    Tablet 100 milliGRAM(s) Oral daily  anastrozole 1 milliGRAM(s) Oral daily  atorvastatin 40 milliGRAM(s) Oral at bedtime  cyanocobalamin 1000 MICROGram(s) Oral daily  dextrose 5% + sodium chloride 0.45%. 1000 milliLiter(s) IV Continuous <Continuous>  docusate sodium 100 milliGRAM(s) Oral three times a day  famotidine    Tablet 20 milliGRAM(s) Oral daily  heparin  Injectable 5000 Unit(s) SubCutaneous every 8 hours  ketorolac   Injectable 15 milliGRAM(s) IV Push every 6 hours  metoprolol succinate ER 25 milliGRAM(s) Oral daily  senna 1 Tablet(s) Oral at bedtime  sertraline 50 milliGRAM(s) Oral daily  thiamine 100 milliGRAM(s) Oral daily        FAMILY HISTORY:  Family history of colon cancer in mother (Mother)        Review of Systems:    General:  No wt loss, fevers, chills, night sweats, fatigue  Eyes:  Good vision, no reported pain  ENT:  No sore throat, pain, runny nose, dysphagia  CV:  No pain, palpitations, no lightheadedness  Resp:  No dyspnea, cough, tachypnea, wheezing  GI: denies n/v/d/c, abdominal pain, melena or brbpr   :  No pain, bleeding, incontinence, nocturia  Muscle:  No pain, weakness  Neuro:  No weakness, tingling, memory problems  Psych:  No fatigue, insomnia, mood problems, depression  Endocrine:  No polyuria, polydypsia, cold/heat intolerance  Heme:  No petechiae, ecchymosis, easy bruisability  Skin:  No rash, tattoos, scars, edema    Relevant Family History:   n/c    Relevant Social History: n/c      Physical Exam:    Vital Signs:  Vital Signs Last 24 Hrs  T(C): 36.9 (2019 09:13), Max: 36.9 (2019 09:13)  T(F): 98.4 (2019 09:13), Max: 98.4 (2019 09:13)  HR: 80 (2019 09:13) (62 - 85)  BP: 99/55 (2019 09:13) (86/57 - 153/80)  BP(mean): 72 (2019 15:00) (61 - 82)  RR: 17 (2019 09:13) (9 - 17)  SpO2: 96% (2019 09:13) (92% - 96%)  Daily     Daily Weight in k.7 (2019 01:31)    General:  Appears stated age, well-groomed, nad  HEENT:  NC/AT,  conjunctivae clear and pink, no thyromegaly, nodules, adenopathy, no JVD  Chest:  Full & symmetric excursion, no increased effort, breath sounds clear  Cardiovascular:  Regular rhythm, S1, S2, no murmur/rub/S3/S4, no abdominal bruit, no edema  Abdomen:  Soft, non-tender, non-distended, normoactive bowel sounds,  no masses ,no hepatosplenomeagaly, no signs of chronic liver disease +peg   Extremities:  no cyanosis,clubbing or edema  Skin:  No rash/erythema/ecchymoses/petechiae/wounds/abscess/warm/dry  Neuro/Psych:  A&Ox3 , no asterixis, no tremor, no encephalopathy    Laboratory:                            10.3   10. )-----------( 289      ( 2019 06:01 )             31.6     06-12    137  |  100  |  12  ----------------------------<  111<H>  4.6   |  24  |  0.60    Ca    9.8      2019 06:01              Imaging:

## 2019-06-13 ENCOUNTER — TRANSCRIPTION ENCOUNTER (OUTPATIENT)
Age: 69
End: 2019-06-13

## 2019-06-13 VITALS
SYSTOLIC BLOOD PRESSURE: 106 MMHG | RESPIRATION RATE: 16 BRPM | OXYGEN SATURATION: 95 % | TEMPERATURE: 99 F | HEART RATE: 80 BPM | DIASTOLIC BLOOD PRESSURE: 65 MMHG

## 2019-06-13 PROBLEM — N28.89 OTHER SPECIFIED DISORDERS OF KIDNEY AND URETER: Chronic | Status: ACTIVE | Noted: 2019-06-04

## 2019-06-13 PROCEDURE — 99232 SBSQ HOSP IP/OBS MODERATE 35: CPT

## 2019-06-13 RX ORDER — DOCUSATE SODIUM 100 MG
1 CAPSULE ORAL
Qty: 0 | Refills: 0 | DISCHARGE
Start: 2019-06-13

## 2019-06-13 RX ORDER — ACETAMINOPHEN 500 MG
975 TABLET ORAL ONCE
Refills: 0 | Status: COMPLETED | OUTPATIENT
Start: 2019-06-13 | End: 2019-06-13

## 2019-06-13 RX ORDER — IBUPROFEN 200 MG
1 TABLET ORAL
Qty: 20 | Refills: 0
Start: 2019-06-13

## 2019-06-13 RX ORDER — APIXABAN 2.5 MG/1
1 TABLET, FILM COATED ORAL
Qty: 0 | Refills: 0 | DISCHARGE

## 2019-06-13 RX ADMIN — SODIUM CHLORIDE 75 MILLILITER(S): 9 INJECTION, SOLUTION INTRAVENOUS at 05:36

## 2019-06-13 RX ADMIN — PREGABALIN 1000 MICROGRAM(S): 225 CAPSULE ORAL at 11:30

## 2019-06-13 RX ADMIN — FAMOTIDINE 20 MILLIGRAM(S): 10 INJECTION INTRAVENOUS at 05:35

## 2019-06-13 RX ADMIN — HEPARIN SODIUM 5000 UNIT(S): 5000 INJECTION INTRAVENOUS; SUBCUTANEOUS at 05:36

## 2019-06-13 RX ADMIN — Medication 975 MILLIGRAM(S): at 12:40

## 2019-06-13 RX ADMIN — Medication 25 MILLIGRAM(S): at 05:35

## 2019-06-13 RX ADMIN — AMIODARONE HYDROCHLORIDE 100 MILLIGRAM(S): 400 TABLET ORAL at 05:35

## 2019-06-13 RX ADMIN — ANASTROZOLE 1 MILLIGRAM(S): 1 TABLET ORAL at 11:29

## 2019-06-13 RX ADMIN — Medication 975 MILLIGRAM(S): at 11:42

## 2019-06-13 RX ADMIN — Medication 100 MILLIGRAM(S): at 11:30

## 2019-06-13 NOTE — PROGRESS NOTE ADULT - SUBJECTIVE AND OBJECTIVE BOX
Overnight events:  None, PEG removed without incident by GI yesterday    Subjective:  Pt offers no complaints, + flatus, no N/V, hungry, wants to eat    Objective:    Vital signs  T(C): , Max: 36.9 (06-12-19 @ 09:13)  HR: 81 (06-13-19 @ 05:33)  BP: 109/61 (06-13-19 @ 05:33)  SpO2: 95% (06-13-19 @ 05:33)  Wt(kg): --    Output   Void: NR  06-11 @ 07:01  -  06-12 @ 07:00  --------------------------------------------------------  IN: 500 mL / OUT: 1390 mL / NET: -890 mL    06-12 @ 07:01  -  06-13 @ 06:11  --------------------------------------------------------  IN: 0 mL / OUT: 600 mL / NET: -600 mL        Gen: NAD in cervical collar  Abd: incisions c/d/i, PEG site dressing c/d/i, soft, nontender      Labs: None today

## 2019-06-13 NOTE — DISCHARGE NOTE PROVIDER - HOSPITAL COURSE
70 yo F underwent uncomplicated left lap alba-nephrectomy on 6/11/19.  Postoperative course uneventful, successful TOV on POD #1,  pain controlled, ambulating.  Return of GI function on POD #1, tolerated CLD.  Pt had pre-existing PEG (no longer in use) that was removed bedside by GI on 6/12, diet advanced without incident.   Pt d/c on POD #2 to f/u with Dr. Galindo.    I-stop checked.

## 2019-06-13 NOTE — PROGRESS NOTE ADULT - PROBLEM SELECTOR PLAN 2
post op management per urology, encourage ambulation, bowel function returning, pain control, IS, DVT ppx (SC heparin).

## 2019-06-13 NOTE — PROGRESS NOTE ADULT - SUBJECTIVE AND OBJECTIVE BOX
Patient is a 69y old  Female who presents with a chief complaint of Left lap alba-nephrectomy (13 Jun 2019 10:39)      SUBJECTIVE / OVERNIGHT EVENTS:  Sitting up in chair eating, in good spirits.  Tolerating solids, no nausea/vomiting.  Pain controlled.  PEG removed quickly yesterday.     MEDICATIONS  (STANDING):  amiodarone    Tablet 100 milliGRAM(s) Oral daily  anastrozole 1 milliGRAM(s) Oral daily  atorvastatin 40 milliGRAM(s) Oral at bedtime  cyanocobalamin 1000 MICROGram(s) Oral daily  docusate sodium 100 milliGRAM(s) Oral three times a day  famotidine    Tablet 20 milliGRAM(s) Oral daily  heparin  Injectable 5000 Unit(s) SubCutaneous every 8 hours  metoprolol succinate ER 25 milliGRAM(s) Oral daily  senna 1 Tablet(s) Oral at bedtime  sertraline 50 milliGRAM(s) Oral daily  thiamine 100 milliGRAM(s) Oral daily    MEDICATIONS  (PRN):      CAPILLARY BLOOD GLUCOSE          I&O's Summary    12 Jun 2019 07:01  -  13 Jun 2019 07:00  --------------------------------------------------------  IN: 0 mL / OUT: 600 mL / NET: -600 mL        Vital Signs Last 24 Hrs  T(C): 36.8 (13 Jun 2019 09:24), Max: 36.9 (12 Jun 2019 13:34)  T(F): 98.2 (13 Jun 2019 09:24), Max: 98.5 (12 Jun 2019 13:34)  HR: 82 (13 Jun 2019 09:24) (80 - 82)  BP: 121/77 (13 Jun 2019 09:24) (101/58 - 143/77)  BP(mean): 71 (12 Jun 2019 17:21) (71 - 71)  RR: 16 (13 Jun 2019 09:24) (16 - 17)  SpO2: 95% (13 Jun 2019 09:24) (95% - 98%)    PHYSICAL EXAM:  GENERAL: elderly WF, sitting up in chair, cervical neck collar on  HEAD:  Atraumatic, Normocephalic  EYES: EOMI, PERRLA, conjunctiva and sclera clear  NECK: Supple, No JVD  CHEST/LUNG: Clear to auscultation bilaterally; No wheeze  HEART: Regular rate and rhythm; No murmurs, rubs, or gallops  ABDOMEN: Soft, ND, incisions intact, PEG  EXTREMITIES:  2+ Peripheral Pulses, No clubbing, cyanosis, or edema  PSYCH: AAOx3  NEUROLOGY: non-focal  SKIN: No rashes or lesions  +matos    LABS:                        10.3   10.02 )-----------( 289      ( 12 Jun 2019 06:01 )             31.6     06-12    137  |  100  |  12  ----------------------------<  111<H>  4.6   |  24  |  0.60    Ca    9.8      12 Jun 2019 06:01                RADIOLOGY & ADDITIONAL TESTS:    Imaging Personally Reviewed:    Consultant(s) Notes Reviewed:      Care Discussed with Consultants/Other Providers: urology re overall care

## 2019-06-13 NOTE — DISCHARGE NOTE PROVIDER - CARE PROVIDER_API CALL
Xiang Galindo)  Urology  [address] [place] [address], [place] [address]  Phone: [phone]  Fax: [phone]  Follow Up Time:

## 2019-06-13 NOTE — DISCHARGE NOTE PROVIDER - NSDCCPCAREPLAN_GEN_ALL_CORE_FT
PRINCIPAL DISCHARGE DIAGNOSIS  Diagnosis: Kidney mass  Assessment and Plan of Treatment: Drink plenty of fluids.  No heavy lifting (greater than 10 pounds) or straining for 4 to 6 weeks.  You may shower, just pat white strips dry, they will fall off in a few weeks. Do not drive when taking pain medication.  Call Dr. Galindo's office  to schedule a follow up appointment.  Call the office if you have fever greater than 101, difficulty urinating, pain not relieved with pain medication, nausea/vomiting.        SECONDARY DISCHARGE DIAGNOSES  Diagnosis: Gastroesophageal reflux disease, esophagitis presence not specified  Assessment and Plan of Treatment: Continue current home medications and follow up with your primary care provider      Diagnosis: Essential hypertension  Assessment and Plan of Treatment: Continue current home medications and follow up with your primary care provider      Diagnosis: Vitamin D deficiency  Assessment and Plan of Treatment: Continue current home medications and follow up with your primary care provider      Diagnosis: Depression, unspecified depression type  Assessment and Plan of Treatment: Continue current home medications and follow up with your primary care provider      Diagnosis: Atrial fibrillation, unspecified type  Assessment and Plan of Treatment:     Diagnosis: Hyperlipidemia, unspecified hyperlipidemia type  Assessment and Plan of Treatment: Continue current home medications and follow up with your primary care provider PRINCIPAL DISCHARGE DIAGNOSIS  Diagnosis: Kidney mass  Assessment and Plan of Treatment: Drink plenty of fluids.  No heavy lifting (greater than 10 pounds) or straining for 4 to 6 weeks.  You may shower, just pat white strips dry, they will fall off in a few weeks. Do not drive when taking pain medication.  Call Dr. Galindo's office  to schedule a follow up appointment.  Call the office if you have fever greater than 101, difficulty urinating, your urine becomes bloody, pain not relieved with pain medication, nausea/vomiting.        SECONDARY DISCHARGE DIAGNOSES  Diagnosis: Gastroesophageal reflux disease, esophagitis presence not specified  Assessment and Plan of Treatment: Continue current home medications and follow up with your primary care provider      Diagnosis: Essential hypertension  Assessment and Plan of Treatment: Continue current home medications and follow up with your primary care provider      Diagnosis: Vitamin D deficiency  Assessment and Plan of Treatment: Continue current home medications and follow up with your primary care provider      Diagnosis: Depression, unspecified depression type  Assessment and Plan of Treatment: Continue current home medications and follow up with your primary care provider      Diagnosis: Atrial fibrillation, unspecified type  Assessment and Plan of Treatment: DO NOT RESTART YOUR ELIQUIS UNTIL TUESDAY, 6/18    Diagnosis: Hyperlipidemia, unspecified hyperlipidemia type  Assessment and Plan of Treatment: Continue current home medications and follow up with your primary care provider

## 2019-06-13 NOTE — DISCHARGE NOTE PROVIDER - NSDCACTIVITY_GEN_ALL_CORE
Showering allowed/Walking - Outdoors allowed/Stairs allowed/No heavy lifting/straining/Walking - Indoors allowed

## 2019-06-13 NOTE — PROGRESS NOTE ADULT - ASSESSMENT
68 yo F POD #2 L. lap alba-neph, PEG removal by GI 6/12      -Will d/w GI advancing diet  -F/U med  -DVT prophy, IS, OOB, ambulate  -Possible d/c home later today

## 2019-06-13 NOTE — PROGRESS NOTE ADULT - ASSESSMENT
69F Hx of Hodgkins 1981, breast ca with lumpectomy right 2014, HTN, HLD, osteoporosis, found to have cervical stenosis with compression Nov 2018, s/p C4-6 ACDF 12/4/18.  Pt was admitted to [facility] 4/5-4/20/19 underwent revision of anterior cervical discectomy with fusion of C4-C6 with extension to C3, as well as C2-T2 posterior fusion with right iliac crest autograft;  course complicated by JAXON treated with amio/anticoagulation, dysphagia treated for aspiration pneumonia, PEG placed.  Pt also incidentally found to have L renal mass.  6/11 s/p left lap alba-nephrectomy.

## 2019-06-13 NOTE — PROGRESS NOTE ADULT - SUBJECTIVE AND OBJECTIVE BOX
INTERVAL HPI/OVERNIGHT EVENTS:    no pain at old peg site   feeling well   tolerating PO intake     MEDICATIONS  (STANDING):  amiodarone    Tablet 100 milliGRAM(s) Oral daily  anastrozole 1 milliGRAM(s) Oral daily  atorvastatin 40 milliGRAM(s) Oral at bedtime  cyanocobalamin 1000 MICROGram(s) Oral daily  docusate sodium 100 milliGRAM(s) Oral three times a day  famotidine    Tablet 20 milliGRAM(s) Oral daily  heparin  Injectable 5000 Unit(s) SubCutaneous every 8 hours  metoprolol succinate ER 25 milliGRAM(s) Oral daily  senna 1 Tablet(s) Oral at bedtime  sertraline 50 milliGRAM(s) Oral daily  thiamine 100 milliGRAM(s) Oral daily    MEDICATIONS  (PRN):      Allergies    penicillin (Rash)    Intolerances        Review of Systems:    General:  No wt loss, fevers, chills, night sweats, fatigue   Eyes:  Good vision, no reported pain  ENT:  No sore throat, pain, runny nose, dysphagia  CV:  No pain, palpitations, hypo/hypertension  Resp:  No dyspnea, cough, tachypnea, wheezing  GI:  No pain, No nausea, No vomiting, No diarrhea, No constipation, No weight loss, No fever, No pruritis, No rectal bleeding, No melena, No dysphagia  :  No pain, bleeding, incontinence, nocturia  Muscle:  No pain, weakness  Neuro:  No weakness, tingling, memory problems  Psych:  No fatigue, insomnia, mood problems, depression  Endocrine:  No polyuria, polydypsia, cold/heat intolerance  Heme:  No petechiae, ecchymosis, easy bruisability  Skin:  No rash, tattoos, scars, edema      Vital Signs Last 24 Hrs  T(C): 36.8 (13 Jun 2019 09:24), Max: 36.9 (12 Jun 2019 13:34)  T(F): 98.2 (13 Jun 2019 09:24), Max: 98.5 (12 Jun 2019 13:34)  HR: 82 (13 Jun 2019 09:24) (80 - 82)  BP: 121/77 (13 Jun 2019 09:24) (101/58 - 143/77)  BP(mean): 71 (12 Jun 2019 17:21) (71 - 71)  RR: 16 (13 Jun 2019 09:24) (16 - 17)  SpO2: 95% (13 Jun 2019 09:24) (95% - 98%)    PHYSICAL EXAM:    Constitutional: NAD  HEENT: EOMI, throat clear  Neck: No LAD, supple  Respiratory: CTA and P  Cardiovascular: S1 and S2, RRR, no M  Gastrointestinal: BS+, soft, NT/ND, neg HSM,  Extremities: No peripheral edema, neg clubbing, cyanosis  Vascular: 2+ peripheral pulses  Neurological: A/O x 3, no focal deficits  Psychiatric: Normal mood, normal affect  Skin: No rashes      LABS:                        10.3   10.02 )-----------( 289      ( 12 Jun 2019 06:01 )             31.6     06-12    137  |  100  |  12  ----------------------------<  111<H>  4.6   |  24  |  0.60    Ca    9.8      12 Jun 2019 06:01            RADIOLOGY & ADDITIONAL TESTS:

## 2019-06-18 LAB — SURGICAL PATHOLOGY STUDY: SIGNIFICANT CHANGE UP

## 2019-07-01 ENCOUNTER — FORM ENCOUNTER (OUTPATIENT)
Age: 69
End: 2019-07-01

## 2019-07-01 ENCOUNTER — OTHER (OUTPATIENT)
Age: 69
End: 2019-07-01

## 2019-07-02 ENCOUNTER — APPOINTMENT (OUTPATIENT)
Dept: NEUROSURGERY | Facility: CLINIC | Age: 69
End: 2019-07-02
Payer: MEDICARE

## 2019-07-02 ENCOUNTER — APPOINTMENT (OUTPATIENT)
Dept: CT IMAGING | Facility: CLINIC | Age: 69
End: 2019-07-02
Payer: MEDICARE

## 2019-07-02 ENCOUNTER — APPOINTMENT (OUTPATIENT)
Dept: UROLOGY | Facility: CLINIC | Age: 69
End: 2019-07-02
Payer: MEDICARE

## 2019-07-02 ENCOUNTER — OUTPATIENT (OUTPATIENT)
Dept: OUTPATIENT SERVICES | Facility: HOSPITAL | Age: 69
LOS: 1 days | End: 2019-07-02
Payer: MEDICARE

## 2019-07-02 DIAGNOSIS — Z98.1 ARTHRODESIS STATUS: ICD-10-CM

## 2019-07-02 DIAGNOSIS — Z85.3 PERSONAL HISTORY OF MALIGNANT NEOPLASM OF BREAST: ICD-10-CM

## 2019-07-02 DIAGNOSIS — Z78.9 OTHER SPECIFIED HEALTH STATUS: ICD-10-CM

## 2019-07-02 DIAGNOSIS — M50.20 OTHER CERVICAL DISC DISPLACEMENT, UNSPECIFIED CERVICAL REGION: ICD-10-CM

## 2019-07-02 DIAGNOSIS — Z80.0 FAMILY HISTORY OF MALIGNANT NEOPLASM OF DIGESTIVE ORGANS: ICD-10-CM

## 2019-07-02 DIAGNOSIS — Z98.1 ARTHRODESIS STATUS: Chronic | ICD-10-CM

## 2019-07-02 DIAGNOSIS — Z98.890 OTHER SPECIFIED POSTPROCEDURAL STATES: Chronic | ICD-10-CM

## 2019-07-02 DIAGNOSIS — R92.0 MAMMOGRAPHIC MICROCALCIFICATION FOUND ON DIAGNOSTIC IMAGING OF BREAST: ICD-10-CM

## 2019-07-02 DIAGNOSIS — Z85.828 PERSONAL HISTORY OF OTHER MALIGNANT NEOPLASM OF SKIN: Chronic | ICD-10-CM

## 2019-07-02 DIAGNOSIS — Z90.89 ACQUIRED ABSENCE OF OTHER ORGANS: Chronic | ICD-10-CM

## 2019-07-02 PROCEDURE — 99024 POSTOP FOLLOW-UP VISIT: CPT

## 2019-07-02 PROCEDURE — 76376 3D RENDER W/INTRP POSTPROCES: CPT

## 2019-07-02 PROCEDURE — 76376 3D RENDER W/INTRP POSTPROCES: CPT | Mod: 26

## 2019-07-02 PROCEDURE — 72125 CT NECK SPINE W/O DYE: CPT | Mod: 26

## 2019-07-02 PROCEDURE — 72125 CT NECK SPINE W/O DYE: CPT

## 2019-07-03 VITALS
RESPIRATION RATE: 18 BRPM | DIASTOLIC BLOOD PRESSURE: 80 MMHG | OXYGEN SATURATION: 98 % | HEART RATE: 85 BPM | SYSTOLIC BLOOD PRESSURE: 142 MMHG | TEMPERATURE: 98 F

## 2019-07-03 PROBLEM — Z80.0 FAMILY HISTORY OF COLON CANCER: Status: ACTIVE | Noted: 2017-08-16

## 2019-07-03 PROBLEM — Z78.9 DOES NOT USE ILLICIT DRUGS: Status: ACTIVE | Noted: 2018-11-02

## 2019-07-03 PROBLEM — M50.20 CERVICAL HERNIATED DISC: Status: ACTIVE | Noted: 2018-12-04

## 2019-07-03 PROBLEM — Z85.3 HISTORY OF MALIGNANT NEOPLASM OF RIGHT BREAST: Status: RESOLVED | Noted: 2017-08-16 | Resolved: 2019-07-03

## 2019-07-03 RX ORDER — METOPROLOL SUCCINATE 25 MG/1
25 TABLET, EXTENDED RELEASE ORAL
Qty: 28 | Refills: 0 | Status: ACTIVE | COMMUNITY
Start: 2019-04-12

## 2019-07-03 RX ORDER — CHROMIUM 200 MCG
TABLET ORAL
Refills: 0 | Status: ACTIVE | COMMUNITY

## 2019-07-03 RX ORDER — SERTRALINE HYDROCHLORIDE 50 MG/1
50 TABLET, FILM COATED ORAL
Qty: 90 | Refills: 0 | Status: ACTIVE | COMMUNITY
Start: 2019-06-06

## 2019-07-03 RX ORDER — ROSUVASTATIN CALCIUM 5 MG/1
TABLET, FILM COATED ORAL
Refills: 0 | Status: ACTIVE | COMMUNITY

## 2019-07-10 ENCOUNTER — APPOINTMENT (OUTPATIENT)
Dept: SURGICAL ONCOLOGY | Facility: CLINIC | Age: 69
End: 2019-07-10
Payer: MEDICARE

## 2019-07-10 VITALS
HEIGHT: 67 IN | HEART RATE: 60 BPM | OXYGEN SATURATION: 98 % | TEMPERATURE: 98 F | DIASTOLIC BLOOD PRESSURE: 99 MMHG | WEIGHT: 145 LBS | RESPIRATION RATE: 16 BRPM | BODY MASS INDEX: 22.76 KG/M2 | SYSTOLIC BLOOD PRESSURE: 166 MMHG

## 2019-07-10 PROCEDURE — 99214 OFFICE O/P EST MOD 30 MIN: CPT

## 2019-07-10 NOTE — REASON FOR VISIT
[Post Op: _________] : a [unfilled] post op visit [Spouse] : spouse [FreeTextEntry1] : s/p C4-C6 ACDF on 12/15/2018 and subsequent revision of C4-C6 ACDF with extension to C3 and C2-T2 posterior fusion with right iliac crest autograft on 4/5/2019

## 2019-07-10 NOTE — ASSESSMENT
[FreeTextEntry1] : IMP:\par MARIA ESTHER- hx of invasive ductal carcinoma\par Nodule lateral aspect right breast noted on CT abd 4/19\par On Anastrozole 1 mg daily\par \par \par PLAN:\par BW now\par RTO yearly with BW\par B/L mammogram/sonogram  now to evaluate right breast nodule

## 2019-07-10 NOTE — HISTORY OF PRESENT ILLNESS
[FreeTextEntry1] : Ms. HILARIA KUMAR is a 68 yo female with PMH of osteoporosis, HTN, HLD, R breast lumpectomy s/p RT, Hodgkins lymphoma s/p RT, bilateral CTS, ulnar surgery, and panic attacks who initially underwent C4-C6 ACDF for severe cervical stenosis with cord compression on 12/15/18. However, subsequent post op CT in March showed loosening of hardware and she underwent revision on 4/6/2019 with Dr. Parra/Dr. Orozco/Dr. Brambila. Post op hospital stay was c/b dysphagia (s/p PEG then subsequent d/c), aspiration pneumonia (completed abx), new onset of rapid A.fib (on Eliquis), new kidney mass found on CT C/A/P (s/p laparoscopic left nephrectomy on 6/11/2019). \par Today she returns for post op follow up and states she is doing well at home. Her previous symptoms (bilateral upper extremities weakness and difficulty walking) has been markedly improved and denies new/worsening neurological deficits. Surgical incision appears to be healed well.

## 2019-07-10 NOTE — PHYSICAL EXAM
[Normal] : supple, no neck mass and thyroid not enlarged [Normal Supraclavicular Lymph Nodes] : normal supraclavicular lymph nodes [Normal Axillary Lymph Nodes] : normal axillary lymph nodes [Normal] : grossly intact [de-identified] : No masses or nipple discharge bilaterally.  [de-identified] : port sites from nephrectomy but no masses [FreeTextEntry1] : Deferred

## 2019-07-10 NOTE — REVIEW OF SYSTEMS
[As Noted in HPI] : as noted in HPI [Arm Weakness] : arm weakness [Negative] : Musculoskeletal [Abdominal Pain] : no abdominal pain [Diarrhea] : no diarrhea [Vomiting] : no vomiting [Dysuria] : no dysuria [Incontinence] : no incontinence [Easy Bruising] : no tendency for easy bruising [Easy Bleeding] : no tendency for easy bleeding

## 2019-07-10 NOTE — PHYSICAL EXAM
[General Appearance - Well Nourished] : well nourished [General Appearance - In No Acute Distress] : in no acute distress [General Appearance - Alert] : alert [Clean] : clean [General Appearance - Well Developed] : well developed [Dry] : dry [No Drainage] : without drainage [Normal Skin] : normal [Oriented To Time, Place, And Person] : oriented to person, place, and time [Impaired Insight] : insight and judgment were intact [Affect] : the affect was normal [Mood] : the mood was normal [Cranial Nerves Oculomotor (III)] : extraocular motion intact [Cranial Nerves Optic (II)] : visual acuity intact bilaterally,  pupils equal round and reactive to light [Cranial Nerves Vestibulocochlear (VIII)] : hearing was intact bilaterally [Cranial Nerves Facial (VII)] : face symmetrical [Cranial Nerves Trigeminal (V)] : facial sensation intact symmetrically [Cranial Nerves Hypoglossal (XII)] : there was no tongue deviation with protrusion [Cranial Nerves Accessory (XI - Cranial And Spinal)] : head turning and shoulder shrug symmetric [Motor Tone] : muscle tone was normal in all four extremities [Cranial Nerves Glossopharyngeal (IX)] : tongue and palate midline [2] : C5 deltoid 2/5 [4] : C7 triceps 4/5 [5] : S1 ankle flexors 5/5 [Abnormal Walk] : normal gait [Sensation Tactile Decrease] : light touch was intact [Balance] : balance was intact [Non- Antalgic] : non-antalgic [Normal] : normal [Able to heel walk] : the patient was able to heel walk [Able to toe walk] : the patient was able to toe walk [Intact] : all sensory within normal limits bilaterally [PERRL With Normal Accommodation] : pupils were equal in size, round, reactive to light, with normal accommodation [Hearing Threshold Finger Rub Not Moody] : hearing was normal [Respiration, Rhythm And Depth] : normal respiratory rhythm and effort [] : no respiratory distress [Neck Appearance] : the appearance of the neck was normal [Edema] : there was no peripheral edema [Heart Sounds] : normal S1 and S2 [Auscultation Breath Sounds / Voice Sounds] : lungs were clear to auscultation bilaterally [Involuntary Movements] : no involuntary movements were seen [Skin Color & Pigmentation] : normal skin color and pigmentation [Transverse] : transverse [Longitudinal] : longitudinal [Well-Healed] : well-healed [Person] : oriented to person [Place] : oriented to place [Time] : oriented to time [Short Term Intact] : short term memory intact [Registration Intact] : recent registration memory intact [Remote Intact] : remote memory intact [Span Intact] : the attention span was normal [Concentration Intact] : normal concentrating ability [Fluency] : fluency intact [Comprehension] : comprehension intact [Current Events] : adequate knowledge of current events [Past History] : adequate knowledge of personal past history [Vocabulary] : adequate range of vocabulary [1+] : Patella left 1+ [No Visual Abnormalities] : no visible abnormailities [No CVA Tenderness] : no ~M costovertebral angle tenderness [Extraocular Movements] : extraocular movements were intact [No Spinal Tenderness] : no spinal tenderness [Erythema] : not erythematous [Warm] : not warm [FreeTextEntry1] : posterior neck/anterior neck [Tremor] : no tremor present [Romberg's Sign] : Romberg's sign was negtive [Plantar Reflex Left Only] : normal on the left [___] : absent on the right [Plantar Reflex Right Only] : normal on the right [___] : absent on the left [Carlos] : Carlos's sign was not demonstrated [L'Hermitte's] : neck flexion did not produce tingling down the spine/arms [Spurling's - Opposite Side] : Negative Spurling's on opposite side [Spurling's Same Side] : Negative Spurling's on same side

## 2019-07-10 NOTE — ASSESSMENT
[FreeTextEntry1] : This is a 68 yo female with cervical stenosis with myelopathy who is s/p ACDF with revision and posterior fusion. She is recovering well and her symptoms are improving with persistent left arm stiffness/numbness/weakness. CT C/T spine today showed stable fusion. \par \par - continue PT/OT\par - off c-collar\par - Xray cspine flex/ext in 6 weeks\par - RTC after image

## 2019-07-10 NOTE — HISTORY OF PRESENT ILLNESS
[de-identified] : Jonna is a 69 year old female who presents for her follow up breast exam. \par \par She is s/p right lumpectomy and right axillary sentinel lymph node biopsy for T1 N0 right breast cancer on 10/16/2013.  Final pathology revealed invasive moderately differentiated ductal carcinoma measuring 0.5 cm, ER/WA(+) HER2 (-), one lymph node was negative for carcinoma, negative margins. She is currently on Anastrozole 1 mg daily.  She received adjuvant XRT under the supervision of Dr. Botello.  She is also s/p benign left breast stereotactic biopsy in 2014.\par \par B/L mammogram performed in Aug 2018 revealed no evidence of malignancy, BIRADS 2 findings. \par \par Bloodwork 5/3/18:\par CEA =4.9 (elevated)\par CA 27.29 = 20.3\par LFTs WNL\par No recent repeat BW.\par \par Normal colonoscopy 2 years ago.\par \par She underwent a bone scan on 11/2/18 which showed abnormal radiotracer accumulation within the pelvis and the Left scapula.  CT and possible biopsy recommended for further evaluation.\par \par She is s/p left partial nephrectomy for renal cell carcinoma on 6/11/19.  S/P cervical fusion Dec 2018 and April 2019.\par \par 1.7 cm nodule in the lateral aspect of the right breast noted on CT abd/pelvis 4/17/19.  Today she is without any complaints. Denies palpable breast masses, nipple discharge, skin changes, inversion or breast pain.

## 2019-07-15 ENCOUNTER — FORM ENCOUNTER (OUTPATIENT)
Age: 69
End: 2019-07-15

## 2019-07-16 ENCOUNTER — OUTPATIENT (OUTPATIENT)
Dept: OUTPATIENT SERVICES | Facility: HOSPITAL | Age: 69
LOS: 1 days | End: 2019-07-16
Payer: MEDICARE

## 2019-07-16 ENCOUNTER — APPOINTMENT (OUTPATIENT)
Dept: MAMMOGRAPHY | Facility: IMAGING CENTER | Age: 69
End: 2019-07-16
Payer: MEDICARE

## 2019-07-16 ENCOUNTER — APPOINTMENT (OUTPATIENT)
Dept: ULTRASOUND IMAGING | Facility: IMAGING CENTER | Age: 69
End: 2019-07-16
Payer: MEDICARE

## 2019-07-16 DIAGNOSIS — Z08 ENCOUNTER FOR FOLLOW-UP EXAMINATION AFTER COMPLETED TREATMENT FOR MALIGNANT NEOPLASM: ICD-10-CM

## 2019-07-16 DIAGNOSIS — Z98.1 ARTHRODESIS STATUS: Chronic | ICD-10-CM

## 2019-07-16 DIAGNOSIS — Z90.89 ACQUIRED ABSENCE OF OTHER ORGANS: Chronic | ICD-10-CM

## 2019-07-16 DIAGNOSIS — Z85.828 PERSONAL HISTORY OF OTHER MALIGNANT NEOPLASM OF SKIN: Chronic | ICD-10-CM

## 2019-07-16 DIAGNOSIS — Z98.890 OTHER SPECIFIED POSTPROCEDURAL STATES: Chronic | ICD-10-CM

## 2019-07-16 PROCEDURE — G0279: CPT

## 2019-07-16 PROCEDURE — 77066 DX MAMMO INCL CAD BI: CPT | Mod: 26

## 2019-07-16 PROCEDURE — 77066 DX MAMMO INCL CAD BI: CPT

## 2019-07-16 PROCEDURE — G0279: CPT | Mod: 26

## 2019-07-16 PROCEDURE — 76641 ULTRASOUND BREAST COMPLETE: CPT | Mod: 26,50

## 2019-07-16 PROCEDURE — 76641 ULTRASOUND BREAST COMPLETE: CPT

## 2019-07-17 LAB
ALBUMIN SERPL ELPH-MCNC: 4.5 G/DL
ALP BLD-CCNC: 99 U/L
ALT SERPL-CCNC: 13 U/L
AST SERPL-CCNC: 15 U/L
BILIRUB DIRECT SERPL-MCNC: 0.2 MG/DL
BILIRUB INDIRECT SERPL-MCNC: 0.5 MG/DL
BILIRUB SERPL-MCNC: 0.6 MG/DL
CANCER AG27-29 SERPL-ACNC: 17.2 U/ML
CEA SERPL-MCNC: 2.9 NG/ML
PROT SERPL-MCNC: 6.8 G/DL

## 2019-08-02 ENCOUNTER — RX RENEWAL (OUTPATIENT)
Age: 69
End: 2019-08-02

## 2019-08-05 ENCOUNTER — RX CHANGE (OUTPATIENT)
Age: 69
End: 2019-08-05

## 2019-08-05 ENCOUNTER — FORM ENCOUNTER (OUTPATIENT)
Age: 69
End: 2019-08-05

## 2019-08-06 ENCOUNTER — APPOINTMENT (OUTPATIENT)
Dept: NEUROSURGERY | Facility: CLINIC | Age: 69
End: 2019-08-06
Payer: MEDICARE

## 2019-08-06 ENCOUNTER — APPOINTMENT (OUTPATIENT)
Dept: RADIOLOGY | Facility: CLINIC | Age: 69
End: 2019-08-06
Payer: MEDICARE

## 2019-08-06 ENCOUNTER — APPOINTMENT (OUTPATIENT)
Dept: CT IMAGING | Facility: CLINIC | Age: 69
End: 2019-08-06
Payer: MEDICARE

## 2019-08-06 ENCOUNTER — OUTPATIENT (OUTPATIENT)
Dept: OUTPATIENT SERVICES | Facility: HOSPITAL | Age: 69
LOS: 1 days | End: 2019-08-06
Payer: MEDICARE

## 2019-08-06 VITALS — SYSTOLIC BLOOD PRESSURE: 141 MMHG | DIASTOLIC BLOOD PRESSURE: 91 MMHG | HEART RATE: 78 BPM

## 2019-08-06 DIAGNOSIS — M48.02 SPINAL STENOSIS, CERVICAL REGION: ICD-10-CM

## 2019-08-06 DIAGNOSIS — Z98.1 ARTHRODESIS STATUS: Chronic | ICD-10-CM

## 2019-08-06 DIAGNOSIS — Z87.891 PERSONAL HISTORY OF NICOTINE DEPENDENCE: ICD-10-CM

## 2019-08-06 DIAGNOSIS — Z85.828 PERSONAL HISTORY OF OTHER MALIGNANT NEOPLASM OF SKIN: Chronic | ICD-10-CM

## 2019-08-06 DIAGNOSIS — Z98.1 ARTHRODESIS STATUS: ICD-10-CM

## 2019-08-06 DIAGNOSIS — G95.20 UNSPECIFIED CORD COMPRESSION: ICD-10-CM

## 2019-08-06 DIAGNOSIS — Z90.89 ACQUIRED ABSENCE OF OTHER ORGANS: Chronic | ICD-10-CM

## 2019-08-06 DIAGNOSIS — Z98.890 OTHER SPECIFIED POSTPROCEDURAL STATES: Chronic | ICD-10-CM

## 2019-08-06 DIAGNOSIS — G95.9 DISEASE OF SPINAL CORD, UNSPECIFIED: ICD-10-CM

## 2019-08-06 PROCEDURE — 99213 OFFICE O/P EST LOW 20 MIN: CPT

## 2019-08-06 PROCEDURE — 72040 X-RAY EXAM NECK SPINE 2-3 VW: CPT | Mod: 26

## 2019-08-06 PROCEDURE — 72040 X-RAY EXAM NECK SPINE 2-3 VW: CPT

## 2019-08-08 PROBLEM — G95.20 CERVICAL SPINAL CORD COMPRESSION: Status: ACTIVE | Noted: 2018-12-04

## 2019-08-08 PROBLEM — M48.02 CERVICAL STENOSIS OF SPINE: Status: ACTIVE | Noted: 2018-11-02

## 2019-08-08 PROBLEM — G95.9 CERVICAL MYELOPATHY: Status: ACTIVE | Noted: 2018-12-04

## 2019-08-30 NOTE — HISTORY OF PRESENT ILLNESS
[FreeTextEntry1] : Ms. HILARIA KUMAR is a 68 yo female with PMH of osteoporosis, HTN, HLD, R breast lumpectomy s/p RT, Hodgkins lymphoma s/p RT, bilateral CTS, ulnar surgery, and panic attacks who initially underwent C4-C6 ACDF for severe cervical stenosis with cord compression on 12/15/18. However, subsequent post op CT in March showed loosening of hardware and she underwent revision on 4/6/2019 with Dr. Parra/Dr. Orozco/Dr. Brambila. Post op hospital stay was c/b dysphagia (s/p PEG then subsequent d/c), aspiration pneumonia (completed abx), new onset of rapid A.fib (on Eliquis), new kidney mass found on CT C/A/P (s/p laparoscopic left nephrectomy on 6/11/2019). \par \par Today, she arrives with her  for review of CT and Xrays.  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  She had a partial nephrectomy, with no new mets after recent PET/CT, and negative mammo/sono.  \par Her previous symptoms (bilateral upper extremities weakness and difficulty walking) has been markedly improved and denies new/worsening neurological deficits. Surgical incision appears to be healed well.

## 2019-08-30 NOTE — REVIEW OF SYSTEMS
[As Noted in HPI] : as noted in HPI [Negative] : Endocrine [Abdominal Pain] : no abdominal pain [Vomiting] : no vomiting [Diarrhea] : no diarrhea [Dysuria] : no dysuria [Incontinence] : no incontinence [Easy Bleeding] : no tendency for easy bleeding [Easy Bruising] : no tendency for easy bruising

## 2019-08-30 NOTE — DATA REVIEWED
[de-identified] : 7/2/19 CT Cervical - PACS [de-identified] : 8/6/19 Xrays Cervical flex/ext - PACS

## 2019-08-30 NOTE — ASSESSMENT
[FreeTextEntry1] : Repeat Xrays today show no instability, CT of 7/2/19 showed good bony fusion, and we discontinued the Cervical collar at that time.  We will see her back in 1 year and reassess at that time.\par \par 1)  RTO 1 year

## 2019-08-30 NOTE — PHYSICAL EXAM
[General Appearance - Alert] : alert [General Appearance - In No Acute Distress] : in no acute distress [General Appearance - Well Nourished] : well nourished [General Appearance - Well Developed] : well developed [Longitudinal] : longitudinal [Transverse] : transverse [No Drainage] : without drainage [Normal Skin] : normal [Oriented To Time, Place, And Person] : oriented to person, place, and time [Impaired Insight] : insight and judgment were intact [Affect] : the affect was normal [Mood] : the mood was normal [Cranial Nerves Optic (II)] : visual acuity intact bilaterally,  pupils equal round and reactive to light [Cranial Nerves Oculomotor (III)] : extraocular motion intact [Cranial Nerves Trigeminal (V)] : facial sensation intact symmetrically [Cranial Nerves Facial (VII)] : face symmetrical [Cranial Nerves Vestibulocochlear (VIII)] : hearing was intact bilaterally [Cranial Nerves Glossopharyngeal (IX)] : tongue and palate midline [Cranial Nerves Accessory (XI - Cranial And Spinal)] : head turning and shoulder shrug symmetric [Cranial Nerves Hypoglossal (XII)] : there was no tongue deviation with protrusion [2] : C5 deltoid 2/5 [4] : C7 triceps 4/5 [5] : S1 ankle flexors 5/5 [Sensation Tactile Decrease] : light touch was intact [Balance] : balance was intact [No Visual Abnormalities] : no visible abnormailities [Normal] : normal [Non- Antalgic] : non-antalgic [Able to toe walk] : the patient was able to toe walk [Able to heel walk] : the patient was able to heel walk [Intact] : all sensory within normal limits bilaterally [PERRL With Normal Accommodation] : pupils were equal in size, round, reactive to light, with normal accommodation [Hearing Threshold Finger Rub Not Nicollet] : hearing was normal [Neck Appearance] : the appearance of the neck was normal [] : no respiratory distress [Respiration, Rhythm And Depth] : normal respiratory rhythm and effort [Auscultation Breath Sounds / Voice Sounds] : lungs were clear to auscultation bilaterally [Heart Sounds] : normal S1 and S2 [Edema] : there was no peripheral edema [Involuntary Movements] : no involuntary movements were seen [Skin Color & Pigmentation] : normal skin color and pigmentation [Motor Strength] : muscle strength was normal in all four extremities [Abnormal Walk] : normal gait [Motor Tone] : muscle strength and tone were normal [Erythema] : not erythematous [Warm] : not warm [FreeTextEntry1] : posterior neck/anterior neck [Romberg's Sign] : Romberg's sign was negtive [Tremor] : no tremor present [Plantar Reflex Right Only] : normal on the right [Plantar Reflex Left Only] : normal on the left [___] : absent on the right [___] : absent on the left [Carlos] : Carlos's sign was not demonstrated [L'Hermitte's] : neck flexion did not produce tingling down the spine/arms [Spurling's - Opposite Side] : Negative Spurling's on opposite side [Spurling's Same Side] : Negative Spurling's on same side

## 2019-09-03 ENCOUNTER — APPOINTMENT (OUTPATIENT)
Dept: ULTRASOUND IMAGING | Facility: CLINIC | Age: 69
End: 2019-09-03

## 2019-09-03 ENCOUNTER — APPOINTMENT (OUTPATIENT)
Dept: MAMMOGRAPHY | Facility: CLINIC | Age: 69
End: 2019-09-03

## 2019-09-17 ENCOUNTER — APPOINTMENT (OUTPATIENT)
Dept: ORTHOPEDIC SURGERY | Facility: CLINIC | Age: 69
End: 2019-09-17
Payer: MEDICARE

## 2019-09-17 DIAGNOSIS — S46.012A STRAIN OF MUSCLE(S) AND TENDON(S) OF THE ROTATOR CUFF OF LEFT SHOULDER, INITIAL ENCOUNTER: ICD-10-CM

## 2019-09-17 DIAGNOSIS — S42.022G: ICD-10-CM

## 2019-09-17 PROCEDURE — 99214 OFFICE O/P EST MOD 30 MIN: CPT | Mod: 25

## 2019-09-17 PROCEDURE — 73030 X-RAY EXAM OF SHOULDER: CPT | Mod: LT

## 2019-09-17 PROCEDURE — 20610 DRAIN/INJ JOINT/BURSA W/O US: CPT | Mod: LT

## 2019-09-17 NOTE — END OF VISIT
[FreeTextEntry3] : All medical record entries made by the Binhibe were at my, Dr. Robert Walker, direction and personally dictated by me on 09/17/2019. I have reviewed the chart and agree that the record accurately reflects my personal performance of the history, physical exam, assessment and plan. I have also personally directed, reviewed, and agreed with the chart.

## 2019-09-17 NOTE — PROCEDURE
[de-identified] : At this point I recommended a therapeutic injection and under sterile precautions an injection of 4 cc 1% lidocaine with 0.5 cc of Kenalog and 0.5 cc of Dexamethasone- was placed into the subacromial space of the Left shoulder without complication, and after several minutes, the patient felt significant relief.

## 2019-09-17 NOTE — DISCUSSION/SUMMARY
[de-identified] : The underlying pathophysiology was reviewed in great detail with the patient as well as the various treatment options, including analgesics, NSAIDS, Physical therapy, steroid injections.\par \par The patient wishes to proceed with an INJECTION of the left shoulder. \par \par FU PRN.

## 2019-09-17 NOTE — PHYSICAL EXAM
[Normal] : Gait: normal [de-identified] : Left Upper Extremity \par o Shoulder :\par ¦ Inspection/Palpation : tenderness over the greater tuberosity, no swelling, + deformity of the midshaft of the clavicle\par ¦ Range of Motion : PASSIVE FORWARD ELEVATION: Measured at 110 degrees,  ACTIVE FORWARD ELEVATION: Measured at 30 degrees, ACTIVE EXTERNAL ROTATION: Measured at 30 degrees, ACTIVE INTERNAL ROTATION: Measured at L1\par ¦ Strength : internal rotation 5/5, external rotation 3/5, supraspinatus 0/5.\par ¦ Stability : no joint instability on provocative testing\par o Upper Arm : no tenderness, swelling or deformities\par o Muscle Bulk : no atrophy \par o Sensation : sensation intact to light touch \par o Skin : no skin rash or discoloration\par o Vascular Exam : no edema or cyanosis, radial and ulnar pulses normal  [de-identified] : o Left Shoulder : Internal/External rotation, and outlet views were obtained, there are no soft tissue abnormalities, 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

## 2019-09-17 NOTE — ADDENDUM
[FreeTextEntry1] : I, Felicita Mares, acted solely as a scribe for Dr. Robert Walker on this date 09/17/2019.

## 2019-09-18 ENCOUNTER — FORM ENCOUNTER (OUTPATIENT)
Age: 69
End: 2019-09-18

## 2019-09-19 ENCOUNTER — OUTPATIENT (OUTPATIENT)
Dept: OUTPATIENT SERVICES | Facility: HOSPITAL | Age: 69
LOS: 1 days | End: 2019-09-19
Payer: MEDICARE

## 2019-09-19 ENCOUNTER — APPOINTMENT (OUTPATIENT)
Dept: CT IMAGING | Facility: IMAGING CENTER | Age: 69
End: 2019-09-19
Payer: MEDICARE

## 2019-09-19 ENCOUNTER — APPOINTMENT (OUTPATIENT)
Dept: UROLOGY | Facility: CLINIC | Age: 69
End: 2019-09-19
Payer: MEDICARE

## 2019-09-19 VITALS
HEART RATE: 91 BPM | WEIGHT: 145 LBS | DIASTOLIC BLOOD PRESSURE: 71 MMHG | HEIGHT: 67 IN | BODY MASS INDEX: 22.76 KG/M2 | SYSTOLIC BLOOD PRESSURE: 117 MMHG

## 2019-09-19 DIAGNOSIS — Z98.890 OTHER SPECIFIED POSTPROCEDURAL STATES: Chronic | ICD-10-CM

## 2019-09-19 DIAGNOSIS — Z98.1 ARTHRODESIS STATUS: Chronic | ICD-10-CM

## 2019-09-19 DIAGNOSIS — Z90.89 ACQUIRED ABSENCE OF OTHER ORGANS: Chronic | ICD-10-CM

## 2019-09-19 DIAGNOSIS — C64.2 MALIGNANT NEOPLASM OF LEFT KIDNEY, EXCEPT RENAL PELVIS: ICD-10-CM

## 2019-09-19 DIAGNOSIS — Z85.828 PERSONAL HISTORY OF OTHER MALIGNANT NEOPLASM OF SKIN: Chronic | ICD-10-CM

## 2019-09-19 PROCEDURE — 71260 CT THORAX DX C+: CPT

## 2019-09-19 PROCEDURE — 74178 CT ABD&PLV WO CNTR FLWD CNTR: CPT | Mod: 26

## 2019-09-19 PROCEDURE — 71260 CT THORAX DX C+: CPT | Mod: 26

## 2019-09-19 PROCEDURE — 74178 CT ABD&PLV WO CNTR FLWD CNTR: CPT

## 2019-09-19 PROCEDURE — 99213 OFFICE O/P EST LOW 20 MIN: CPT

## 2019-09-22 NOTE — PHYSICAL EXAM
[General Appearance - Well Developed] : well developed [General Appearance - Well Nourished] : well nourished [Normal Appearance] : normal appearance [General Appearance - In No Acute Distress] : no acute distress [Abdomen Soft] : soft [Well Groomed] : well groomed [Urinary Bladder Findings] : the bladder was normal on palpation [Abdomen Tenderness] : non-tender [Costovertebral Angle Tenderness] : no ~M costovertebral angle tenderness [Edema] : no peripheral edema [] : no respiratory distress [Respiration, Rhythm And Depth] : normal respiratory rhythm and effort [Oriented To Time, Place, And Person] : oriented to person, place, and time [Exaggerated Use Of Accessory Muscles For Inspiration] : no accessory muscle use [Affect] : the affect was normal [Mood] : the mood was normal [Not Anxious] : not anxious [No Focal Deficits] : no focal deficits [Normal Station and Gait] : the gait and station were normal for the patient's age [No Palpable Adenopathy] : no palpable adenopathy

## 2019-09-22 NOTE — ASSESSMENT
[FreeTextEntry1] : CT imaging of the chest, abdomen, and pelvis was done earlier today for assessment of any evidence of metastatic disease or recurrence. There is no evidence of any local recurrence on the CT scan. She does have a left lower pole renal cyst which was not previously which is nonenhancing and there is no evidence of malignancy with this cystic lesion. This will be observed but no further management is required at this time. 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 This had been discussed previously with her neurosurgeon, Dr. Daley, who had taken a bone graft from this area in order to perform her cervical spine surgery. This was not thought to truly represent metastatic disease and is likely related to the bone harvesting.\par \par Findings also included cystic nodule in the right breast which is increased in size compared to her prior CT scan. The patient has seen her breast surgeon previously in regard to this, Dr. Duval, and I encouraged her to make him aware of this finding to ensure that there is no further evaluation warranted.\par \par I will plan to see her back in 6 months for reassessment and she will undergo additional imaging at that time with another CT scan.

## 2019-10-28 ENCOUNTER — RX RENEWAL (OUTPATIENT)
Age: 69
End: 2019-10-28

## 2020-03-16 DIAGNOSIS — N28.89 OTHER SPECIFIED DISORDERS OF KIDNEY AND URETER: ICD-10-CM

## 2020-06-04 NOTE — PHYSICAL THERAPY INITIAL EVALUATION ADULT - PERTINENT HX OF CURRENT PROBLEM, REHAB EVAL
admitted with neck pain, R acetabular lesion.  + Herniation of C5-C6 causing cord compression.  Pt. awaiting biopsy of acetabular lesion 04-Jun-2020 19:00

## 2020-06-16 ENCOUNTER — RESULT REVIEW (OUTPATIENT)
Age: 70
End: 2020-06-16

## 2020-06-16 ENCOUNTER — APPOINTMENT (OUTPATIENT)
Dept: UROLOGY | Facility: CLINIC | Age: 70
End: 2020-06-16
Payer: MEDICARE

## 2020-06-16 ENCOUNTER — APPOINTMENT (OUTPATIENT)
Dept: CT IMAGING | Facility: IMAGING CENTER | Age: 70
End: 2020-06-16
Payer: MEDICARE

## 2020-06-16 ENCOUNTER — OUTPATIENT (OUTPATIENT)
Dept: OUTPATIENT SERVICES | Facility: HOSPITAL | Age: 70
LOS: 1 days | End: 2020-06-16
Payer: MEDICARE

## 2020-06-16 VITALS — HEART RATE: 101 BPM | SYSTOLIC BLOOD PRESSURE: 145 MMHG | DIASTOLIC BLOOD PRESSURE: 88 MMHG

## 2020-06-16 VITALS — TEMPERATURE: 98 F

## 2020-06-16 DIAGNOSIS — Z90.89 ACQUIRED ABSENCE OF OTHER ORGANS: Chronic | ICD-10-CM

## 2020-06-16 DIAGNOSIS — N28.89 OTHER SPECIFIED DISORDERS OF KIDNEY AND URETER: ICD-10-CM

## 2020-06-16 DIAGNOSIS — Z98.890 OTHER SPECIFIED POSTPROCEDURAL STATES: Chronic | ICD-10-CM

## 2020-06-16 DIAGNOSIS — Z85.828 PERSONAL HISTORY OF OTHER MALIGNANT NEOPLASM OF SKIN: Chronic | ICD-10-CM

## 2020-06-16 DIAGNOSIS — Z98.1 ARTHRODESIS STATUS: Chronic | ICD-10-CM

## 2020-06-16 PROCEDURE — 82565 ASSAY OF CREATININE: CPT

## 2020-06-16 PROCEDURE — 74178 CT ABD&PLV WO CNTR FLWD CNTR: CPT

## 2020-06-16 PROCEDURE — 99213 OFFICE O/P EST LOW 20 MIN: CPT

## 2020-06-16 PROCEDURE — 74178 CT ABD&PLV WO CNTR FLWD CNTR: CPT | Mod: 26

## 2020-08-05 ENCOUNTER — INPATIENT (INPATIENT)
Facility: HOSPITAL | Age: 70
LOS: 14 days | Discharge: HOME | End: 2020-08-20
Attending: STUDENT IN AN ORGANIZED HEALTH CARE EDUCATION/TRAINING PROGRAM | Admitting: STUDENT IN AN ORGANIZED HEALTH CARE EDUCATION/TRAINING PROGRAM
Payer: MEDICARE

## 2020-08-05 ENCOUNTER — TRANSCRIPTION ENCOUNTER (OUTPATIENT)
Age: 70
End: 2020-08-05

## 2020-08-05 VITALS
RESPIRATION RATE: 18 BRPM | DIASTOLIC BLOOD PRESSURE: 85 MMHG | HEART RATE: 114 BPM | SYSTOLIC BLOOD PRESSURE: 128 MMHG | OXYGEN SATURATION: 99 % | TEMPERATURE: 99 F

## 2020-08-05 DIAGNOSIS — Z98.890 OTHER SPECIFIED POSTPROCEDURAL STATES: Chronic | ICD-10-CM

## 2020-08-05 DIAGNOSIS — L02.91 CUTANEOUS ABSCESS, UNSPECIFIED: ICD-10-CM

## 2020-08-05 DIAGNOSIS — Z98.1 ARTHRODESIS STATUS: Chronic | ICD-10-CM

## 2020-08-05 DIAGNOSIS — Z90.89 ACQUIRED ABSENCE OF OTHER ORGANS: Chronic | ICD-10-CM

## 2020-08-05 DIAGNOSIS — Z85.828 PERSONAL HISTORY OF OTHER MALIGNANT NEOPLASM OF SKIN: Chronic | ICD-10-CM

## 2020-08-05 LAB
ALBUMIN SERPL ELPH-MCNC: 3.7 G/DL — SIGNIFICANT CHANGE UP (ref 3.3–5)
ALP SERPL-CCNC: 192 U/L — HIGH (ref 40–120)
ALT FLD-CCNC: 125 U/L — HIGH (ref 4–33)
ANION GAP SERPL CALC-SCNC: 17 MMO/L — HIGH (ref 7–14)
AST SERPL-CCNC: 69 U/L — HIGH (ref 4–32)
BASE EXCESS BLDV CALC-SCNC: 2.9 MMOL/L — SIGNIFICANT CHANGE UP
BASOPHILS # BLD AUTO: 0.07 K/UL — SIGNIFICANT CHANGE UP (ref 0–0.2)
BASOPHILS NFR BLD AUTO: 0.5 % — SIGNIFICANT CHANGE UP (ref 0–2)
BILIRUB SERPL-MCNC: 0.9 MG/DL — SIGNIFICANT CHANGE UP (ref 0.2–1.2)
BLOOD GAS VENOUS - CREATININE: 0.7 MG/DL — SIGNIFICANT CHANGE UP (ref 0.5–1.3)
BLOOD GAS VENOUS - FIO2: 21 — SIGNIFICANT CHANGE UP
BUN SERPL-MCNC: 17 MG/DL — SIGNIFICANT CHANGE UP (ref 7–23)
CALCIUM SERPL-MCNC: 10.1 MG/DL — SIGNIFICANT CHANGE UP (ref 8.4–10.5)
CHLORIDE BLDV-SCNC: 103 MMOL/L — SIGNIFICANT CHANGE UP (ref 96–108)
CHLORIDE SERPL-SCNC: 97 MMOL/L — LOW (ref 98–107)
CO2 SERPL-SCNC: 25 MMOL/L — SIGNIFICANT CHANGE UP (ref 22–31)
CREAT SERPL-MCNC: 0.66 MG/DL — SIGNIFICANT CHANGE UP (ref 0.5–1.3)
EOSINOPHIL # BLD AUTO: 0.08 K/UL — SIGNIFICANT CHANGE UP (ref 0–0.5)
EOSINOPHIL NFR BLD AUTO: 0.5 % — SIGNIFICANT CHANGE UP (ref 0–6)
GAS PNL BLDV: 138 MMOL/L — SIGNIFICANT CHANGE UP (ref 136–146)
GLUCOSE BLDV-MCNC: 123 MG/DL — HIGH (ref 70–99)
GLUCOSE SERPL-MCNC: 128 MG/DL — HIGH (ref 70–99)
HCO3 BLDV-SCNC: 26 MMOL/L — SIGNIFICANT CHANGE UP (ref 20–27)
HCT VFR BLD CALC: 37.8 % — SIGNIFICANT CHANGE UP (ref 34.5–45)
HCT VFR BLDV CALC: 37.9 % — SIGNIFICANT CHANGE UP (ref 34.5–45)
HGB BLD-MCNC: 12 G/DL — SIGNIFICANT CHANGE UP (ref 11.5–15.5)
HGB BLDV-MCNC: 12.3 G/DL — SIGNIFICANT CHANGE UP (ref 11.5–15.5)
IMM GRANULOCYTES NFR BLD AUTO: 1.1 % — SIGNIFICANT CHANGE UP (ref 0–1.5)
LACTATE BLDV-MCNC: 1.7 MMOL/L — SIGNIFICANT CHANGE UP (ref 0.5–2)
LYMPHOCYTES # BLD AUTO: 13.7 % — SIGNIFICANT CHANGE UP (ref 13–44)
LYMPHOCYTES # BLD AUTO: 2.05 K/UL — SIGNIFICANT CHANGE UP (ref 1–3.3)
MCHC RBC-ENTMCNC: 30.4 PG — SIGNIFICANT CHANGE UP (ref 27–34)
MCHC RBC-ENTMCNC: 31.7 % — LOW (ref 32–36)
MCV RBC AUTO: 95.7 FL — SIGNIFICANT CHANGE UP (ref 80–100)
MONOCYTES # BLD AUTO: 1.49 K/UL — HIGH (ref 0–0.9)
MONOCYTES NFR BLD AUTO: 10 % — SIGNIFICANT CHANGE UP (ref 2–14)
NEUTROPHILS # BLD AUTO: 11.12 K/UL — HIGH (ref 1.8–7.4)
NEUTROPHILS NFR BLD AUTO: 74.2 % — SIGNIFICANT CHANGE UP (ref 43–77)
NRBC # FLD: 0 K/UL — SIGNIFICANT CHANGE UP (ref 0–0)
PCO2 BLDV: 47 MMHG — SIGNIFICANT CHANGE UP (ref 41–51)
PH BLDV: 7.39 PH — SIGNIFICANT CHANGE UP (ref 7.32–7.43)
PLATELET # BLD AUTO: 556 K/UL — HIGH (ref 150–400)
PMV BLD: 9.5 FL — SIGNIFICANT CHANGE UP (ref 7–13)
PO2 BLDV: 32 MMHG — LOW (ref 35–40)
POTASSIUM BLDV-SCNC: 3 MMOL/L — LOW (ref 3.4–4.5)
POTASSIUM SERPL-MCNC: 3.1 MMOL/L — LOW (ref 3.5–5.3)
POTASSIUM SERPL-SCNC: 3.1 MMOL/L — LOW (ref 3.5–5.3)
PROT SERPL-MCNC: 7.8 G/DL — SIGNIFICANT CHANGE UP (ref 6–8.3)
RBC # BLD: 3.95 M/UL — SIGNIFICANT CHANGE UP (ref 3.8–5.2)
RBC # FLD: 13.8 % — SIGNIFICANT CHANGE UP (ref 10.3–14.5)
SAO2 % BLDV: 54.5 % — LOW (ref 60–85)
SARS-COV-2 RNA SPEC QL NAA+PROBE: SIGNIFICANT CHANGE UP
SODIUM SERPL-SCNC: 139 MMOL/L — SIGNIFICANT CHANGE UP (ref 135–145)
WBC # BLD: 14.97 K/UL — HIGH (ref 3.8–10.5)
WBC # FLD AUTO: 14.97 K/UL — HIGH (ref 3.8–10.5)

## 2020-08-05 PROCEDURE — 72129 CT CHEST SPINE W/DYE: CPT | Mod: 26

## 2020-08-05 PROCEDURE — 99223 1ST HOSP IP/OBS HIGH 75: CPT

## 2020-08-05 PROCEDURE — 72126 CT NECK SPINE W/DYE: CPT | Mod: 26

## 2020-08-05 PROCEDURE — 99285 EMERGENCY DEPT VISIT HI MDM: CPT

## 2020-08-05 RX ORDER — POTASSIUM CHLORIDE 20 MEQ
20 PACKET (EA) ORAL ONCE
Refills: 0 | Status: DISCONTINUED | OUTPATIENT
Start: 2020-08-05 | End: 2020-08-05

## 2020-08-05 RX ORDER — SODIUM CHLORIDE 9 MG/ML
1000 INJECTION INTRAMUSCULAR; INTRAVENOUS; SUBCUTANEOUS
Refills: 0 | Status: DISCONTINUED | OUTPATIENT
Start: 2020-08-05 | End: 2020-08-05

## 2020-08-05 RX ORDER — CEFEPIME 1 G/1
2000 INJECTION, POWDER, FOR SOLUTION INTRAMUSCULAR; INTRAVENOUS EVERY 8 HOURS
Refills: 0 | Status: DISCONTINUED | OUTPATIENT
Start: 2020-08-05 | End: 2020-08-10

## 2020-08-05 RX ORDER — ACETAMINOPHEN 500 MG
650 TABLET ORAL EVERY 6 HOURS
Refills: 0 | Status: DISCONTINUED | OUTPATIENT
Start: 2020-08-05 | End: 2020-08-20

## 2020-08-05 RX ORDER — SODIUM CHLORIDE 9 MG/ML
1000 INJECTION INTRAMUSCULAR; INTRAVENOUS; SUBCUTANEOUS
Refills: 0 | Status: DISCONTINUED | OUTPATIENT
Start: 2020-08-05 | End: 2020-08-07

## 2020-08-05 RX ORDER — POTASSIUM CHLORIDE 20 MEQ
40 PACKET (EA) ORAL ONCE
Refills: 0 | Status: COMPLETED | OUTPATIENT
Start: 2020-08-05 | End: 2020-08-05

## 2020-08-05 RX ORDER — METOPROLOL TARTRATE 50 MG
0.5 TABLET ORAL
Qty: 0 | Refills: 0 | DISCHARGE

## 2020-08-05 RX ORDER — CEFEPIME 1 G/1
2000 INJECTION, POWDER, FOR SOLUTION INTRAMUSCULAR; INTRAVENOUS ONCE
Refills: 0 | Status: COMPLETED | OUTPATIENT
Start: 2020-08-05 | End: 2020-08-05

## 2020-08-05 RX ORDER — SODIUM CHLORIDE 9 MG/ML
1000 INJECTION INTRAMUSCULAR; INTRAVENOUS; SUBCUTANEOUS ONCE
Refills: 0 | Status: COMPLETED | OUTPATIENT
Start: 2020-08-05 | End: 2020-08-05

## 2020-08-05 RX ORDER — VANCOMYCIN HCL 1 G
1000 VIAL (EA) INTRAVENOUS ONCE
Refills: 0 | Status: COMPLETED | OUTPATIENT
Start: 2020-08-05 | End: 2020-08-05

## 2020-08-05 RX ORDER — VANCOMYCIN HCL 1 G
1000 VIAL (EA) INTRAVENOUS EVERY 12 HOURS
Refills: 0 | Status: DISCONTINUED | OUTPATIENT
Start: 2020-08-05 | End: 2020-08-10

## 2020-08-05 RX ADMIN — SODIUM CHLORIDE 1000 MILLILITER(S): 9 INJECTION INTRAMUSCULAR; INTRAVENOUS; SUBCUTANEOUS at 13:30

## 2020-08-05 RX ADMIN — Medication 40 MILLIEQUIVALENT(S): at 18:14

## 2020-08-05 RX ADMIN — CEFEPIME 100 MILLIGRAM(S): 1 INJECTION, POWDER, FOR SOLUTION INTRAMUSCULAR; INTRAVENOUS at 21:53

## 2020-08-05 RX ADMIN — Medication 250 MILLIGRAM(S): at 14:10

## 2020-08-05 RX ADMIN — CEFEPIME 2000 MILLIGRAM(S): 1 INJECTION, POWDER, FOR SOLUTION INTRAMUSCULAR; INTRAVENOUS at 13:30

## 2020-08-05 RX ADMIN — SODIUM CHLORIDE 50 MILLILITER(S): 9 INJECTION INTRAMUSCULAR; INTRAVENOUS; SUBCUTANEOUS at 14:57

## 2020-08-05 RX ADMIN — Medication 40 MILLIEQUIVALENT(S): at 21:53

## 2020-08-05 RX ADMIN — SODIUM CHLORIDE 1000 MILLILITER(S): 9 INJECTION INTRAMUSCULAR; INTRAVENOUS; SUBCUTANEOUS at 12:54

## 2020-08-05 RX ADMIN — CEFEPIME 100 MILLIGRAM(S): 1 INJECTION, POWDER, FOR SOLUTION INTRAMUSCULAR; INTRAVENOUS at 12:54

## 2020-08-05 RX ADMIN — SODIUM CHLORIDE 50 MILLILITER(S): 9 INJECTION INTRAMUSCULAR; INTRAVENOUS; SUBCUTANEOUS at 19:26

## 2020-08-05 NOTE — ED PROVIDER NOTE - CLINICAL SUMMARY MEDICAL DECISION MAKING FREE TEXT BOX
74 y/o female pmh hodgins, breast ca, multiple cervical spine surgeries c/o weakness, nausea and posterior neck drainage- large draining abscess on posterior neck w/ connective tissue visualized, concern for deep space infection- labs, cultures, CT, Neurosurg consult

## 2020-08-05 NOTE — ED ADULT TRIAGE NOTE - CHIEF COMPLAINT QUOTE
weakness several days ago and had episode of falling  could not get up after falling last saturday weakness several days ago and had episode of falling  could not get up after falling last saturday (  nayana )

## 2020-08-05 NOTE — ED ADULT NURSE NOTE - PSH
History of carpal tunnel surgery  10/2018 left hand  History of lumpectomy of right breast  2013  History of Mohs micrographic surgery for skin cancer    History of tonsillectomy    S/P cervical spinal fusion  12/15/2018 C4-C6 ACDF, 4/19 - plates and screws

## 2020-08-05 NOTE — H&P ADULT - NSHPPHYSICALEXAM_GEN_ALL_CORE
awake, alert, affect appropriate, PERRL, DOZIER X4 5/5 strength UE and LE  No clonus, no hoffmans    large cervical area wound dehisence noted w/ presley pus (approx 0mqk2nt)  Non tender, no streaking, slightly warm

## 2020-08-05 NOTE — H&P ADULT - HISTORY OF PRESENT ILLNESS
70y female w/ hx of 2018 ACDF that was subsequently revised/extended 4/2019 at [facility] to C3-C6 with posterior fusion of C2-T2 w/ right ilac crest graft done. Post op course complicated with afib, was sent to Rehab. Patient presents today to [facility] ED with complaints of neck swelling and drainage from wound. Patient with pmhx of Hodgkins (in remission) c/o nausea, dizziness, generalized weakness.

## 2020-08-05 NOTE — ED PROVIDER NOTE - ATTENDING CONTRIBUTION TO CARE
69 yo F  female pmh hodgkin's in remission, breast CA in remission, cervical stenosis s/p ACDF C4-C6 in 2018 c/o neck swelling and weakness. pt nosed drainage from the back of her neck. examas above. concern for sepsis 2/2 abscess, close to hardware. labs, abx, ct, nsx consult.

## 2020-08-05 NOTE — CHART NOTE - NSCHARTNOTEFT_GEN_A_CORE
CAPRINI SCORE [CLOT]    AGE RELATED RISK FACTORS                                                       MOBILITY RELATED FACTORS  [ ] Age 41-60 years                                            (1 Point)                  [ ] Bed rest                                                        (1 Point)  [ X] Age: 61-74 years                                           (2 Points)                 [ ] Plaster cast                                                   (2 Points)  [ ] Age= 75 years                                              (3 Points)                 [ ] Bed bound for more than 72 hours                 (2 Points)    DISEASE RELATED RISK FACTORS                                               GENDER SPECIFIC FACTORS  [ ] Edema in the lower extremities                       (1 Point)                  [ ] Pregnancy                                                     (1 Point)  [ ] Varicose veins                                               (1 Point)                  [ ] Post-partum < 6 weeks                                   (1 Point)             [ ] BMI > 25 Kg/m2                                            (1 Point)                  [X ] Hormonal therapy  or oral contraception          (1 Point)                 [ ] Sepsis (in the previous month)                        (1 Point)                  [ ] History of pregnancy complications                 (1 point)  [ ] Pneumonia or serious lung disease                                               [ ] Unexplained or recurrent                     (1 Point)           (in the previous month)                               (1 Point)  [ ] Abnormal pulmonary function test                     (1 Point)                 SURGERY RELATED RISK FACTORS  [ ] Acute myocardial infarction                              (1 Point)                 [ ]  Section                                             (1 Point)  [ ] Congestive heart failure (in the previous month)  (1 Point)               [ ] Minor surgery                                                  (1 Point)   [ ] Inflammatory bowel disease                             (1 Point)                 [ ] Arthroscopic surgery                                        (2 Points)  [ ] Central venous access                                      (2 Points)                [ ] General surgery lasting more than 45 minutes   (2 Points)       [ ] Stroke (in the previous month)                          (5 Points)               [ ] Elective arthroplasty                                         (5 Points)                                                                                                                                               HEMATOLOGY RELATED FACTORS                                                 TRAUMA RELATED RISK FACTORS  [ ] Prior episodes of VTE                                     (3 Points)                [ ] Fracture of the hip, pelvis, or leg                       (5 Points)  [ ] Positive family history for VTE                         (3 Points)                 [ ] Acute spinal cord injury (in the previous month)  (5 Points)  [ ] Prothrombin 99310 A                                     (3 Points)                 [ ] Paralysis  (less than 1 month)                             (5 Points)  [ ] Factor V Leiden                                             (3 Points)                  [ ] Multiple Trauma within 1 month                        (5 Points)  [ ] Lupus anticoagulants                                     (3 Points)                                                           [ ] Anticardiolipin antibodies                               (3 Points)                                                       [ ] High homocysteine in the blood                      (3 Points)                                             [ ] Other congenital or acquired thrombophilia      (3 Points)                                                [ ] Heparin induced thrombocytopenia                  (3 Points)                                          Total Score [    3      ]    Caprini Score 0 - 2:  Low Risk, No VTE Prophylaxis required for most patients, encourage ambulation  Caprini Score 3 - 6:  At Risk, pharmacologic VTE prophylaxis is indicated for most patients (in the absence of a contraindication)  Caprini Score Greater than or = 7:  High Risk, pharmacologic VTE prophylaxis is indicated for most patients (in the absence of a contraindication)

## 2020-08-05 NOTE — ED PROVIDER NOTE - OBJECTIVE STATEMENT
71 y/o female pmh hodgkin's in remission, breast CA in remission, cervical stenosis s/p ACDF C4-C6 in 2018 c/o neck swelling and weakness. Pt admits to not feeling well over the last 1 week. Pt admits to generalized weakness, dizziness and nausea. Pt states that she fell x3 days ago but was caught by her , denies head trauma or LOC. Pt states that she woke up today and had a lot of purulent drainage from her posterior neck, prompting ER visit. Pt denies chest pain, sob, abd pain, v/d, numbness, tingling, headache, fever or chills.

## 2020-08-05 NOTE — ED ADULT NURSE NOTE - CHIEF COMPLAINT QUOTE
weakness several days ago and had episode of falling  could not get up after falling last saturday (  nayana )

## 2020-08-05 NOTE — CONSULT NOTE ADULT - SUBJECTIVE AND OBJECTIVE BOX
History of Present Illness:  History of Present Illness		  68 yr old female with HTN, HLD, breast ca, right lumpectomy, RT, Hodgkin's lymphoma, underwent C4-C6 ACDF on 12/15/18, and posterior spinal fusion in 04/2019, who has been doing well at home the last few months but comes in today because her  noted a wound over her cervical spine, where she had hardware placed. She had radiation in her back in the 1980s for her Hodgkin's lymphoma, but otherwise denies active smoking, weight loss, steroid use, constant pressure from being bedbound. she lives with her  and is ambulatory at baseline. doesn't use any assistive devices to walk around. last year she had issues during the recovery from her surgery that required her to get a G tube placed for some time, but has since been removed, she is eating well and her weight is stable. She denies fevers, chills, sweats, etc, though admits she felt a bit lightheaded this morning and felt some palpitations. she first noted when she went to go put on her shirt that there was something "mucky" so she had her  assess, and he noted there was a wound and came to the ED. She has been started on vanc and cefepime and is getting admitted to the neurosurgery service.     Of note, my colleague, Dr. Osiel Brambila, did a bilateral paraspinal muscle closure over her hardware in April of last year at the time of the index operation. She has not undergone any other operations on her back and notes that her incision healed without incident in 2019.       Allergies/Medications:   Allergies:        Allergies:  	penicillin: Drug, Rash    Home Medications:   * Patient Currently Takes Medications as of 29-Mar-2019 09:21 documented in Structured Notes  · 	gabapentin 600 mg oral tablet: Last Dose Taken:  , 1 tab(s) orally 2 times a day  · 	metoprolol succinate 25 mg oral tablet, extended release: Last Dose Taken:  , 1 tab(s) orally once a day  · 	rosuvastatin 10 mg oral tablet: Last Dose Taken:  , 1 tab(s) orally once a day (at bedtime)  · 	anastrozole 1 mg oral tablet: Last Dose Taken:  , 1 tab(s) orally once a day  · 	Lyrica 100 mg oral capsule: Last Dose Taken:  , 1 cap(s) orally 2 times a day  · 	Vitamin D3 400 intl units oral tablet: 2 tab(s) orally once a day    PMH/PSH/FH/SH:   Past Medical, Past Surgical, and Family History:  PAST MEDICAL HISTORY:  Anxiety     Carpal tunnel syndrome b/l    Cervical disc disease     Cervical disc herniation     History of breast cancer s/p right lumpectomy , RT    HLD (hyperlipidemia)     Hodgkin disease 1981, underwent radiation therapy    HTN (hypertension).     PAST SURGICAL HISTORY:  History of carpal tunnel surgery 10/2018 left hand  History of lumpectomy of right breast 2013  History of Mohs micrographic surgery for skin cancer   History of tonsillectomy   S/P cervical spinal fusion 12/15/2018 C4-C6 ACDF.   Posterior cervical spinal fusion, paraspinal muscle closure, 04/2019    FAMILY HISTORY:  Mother  Still living? Unknown  Family history of colon cancer in mother, Age at diagnosis: Age Unknown.    Social: . lives with her . former smoker. hasn't smoked since she was in college.     Exam:   elderly female in NAD, breathing comfortably  AOx3, moving all extremiteis  no abdominal distention or jaundice  MMM  Extremities without any significant swelling, pink and perfused  Back/Neck: superior portion of neck incision well healed, lower aspect has a 2 cm opening, circular in nature, with purulent discharge. no significant malodor. some hyperemia surrounding the opening without tenderness. skin is indurated and shows chronic radiation changes. no visible hardware, though a spinal process prominence is palpable. History of Present Illness:  History of Present Illness		  70 yr old female with HTN, HLD, breast ca, right lumpectomy, RT, Hodgkin's lymphoma, underwent C4-C6 ACDF on 12/15/18, and posterior spinal fusion in 04/2019, who has been doing well at home the last few months but comes in today because her  noted a wound over her cervical spine, where she had hardware placed. She had radiation in her back in the 1980s for her Hodgkin's lymphoma, but otherwise denies active smoking, weight loss, steroid use, constant pressure from being bedbound. she lives with her  and is ambulatory at baseline. doesn't use any assistive devices to walk around. last year she had issues during the recovery from her surgery that required her to get a G tube placed for some time, but has since been removed, she is eating well and her weight is stable. She denies fevers, chills, sweats, etc, though admits she felt a bit lightheaded this morning and felt some palpitations. she first noted when she went to go put on her shirt that there was something "mucky" so she had her  assess, and he noted there was a wound and came to the ED. She has been started on vanc and cefepime and is getting admitted to the neurosurgery service.     Of note, my colleague, Dr. Osiel Brambila, did a bilateral paraspinal muscle closure over her hardware in April of last year at the time of the index operation. She has not undergone any other operations on her back and notes that her incision healed without incident in 2019.       Allergies/Medications:   Allergies:        Allergies:  	penicillin: Drug, Rash    Home Medications:   * Patient Currently Takes Medications as of 29-Mar-2019 09:21 documented in Structured Notes  · 	gabapentin 600 mg oral tablet: Last Dose Taken:  , 1 tab(s) orally 2 times a day  · 	metoprolol succinate 25 mg oral tablet, extended release: Last Dose Taken:  , 1 tab(s) orally once a day  · 	rosuvastatin 10 mg oral tablet: Last Dose Taken:  , 1 tab(s) orally once a day (at bedtime)  · 	anastrozole 1 mg oral tablet: Last Dose Taken:  , 1 tab(s) orally once a day  · 	Lyrica 100 mg oral capsule: Last Dose Taken:  , 1 cap(s) orally 2 times a day  · 	Vitamin D3 400 intl units oral tablet: 2 tab(s) orally once a day    PMH/PSH/FH/SH:   Past Medical, Past Surgical, and Family History:  PAST MEDICAL HISTORY:  Anxiety     Carpal tunnel syndrome b/l    Cervical disc disease     Cervical disc herniation     History of breast cancer s/p right lumpectomy , RT    HLD (hyperlipidemia)     Hodgkin disease 1981, underwent radiation therapy    HTN (hypertension).     PAST SURGICAL HISTORY:  History of carpal tunnel surgery 10/2018 left hand  History of lumpectomy of right breast 2013  History of Mohs micrographic surgery for skin cancer   History of tonsillectomy   S/P cervical spinal fusion 12/15/2018 C4-C6 ACDF.   Posterior cervical spinal fusion, paraspinal muscle closure, 04/2019    FAMILY HISTORY:  Mother  Still living? Unknown  Family history of colon cancer in mother, Age at diagnosis: Age Unknown.    Social: . lives with her . former smoker. hasn't smoked since she was in college.     Exam:   elderly female in NAD, breathing comfortably  AOx3, moving all extremiteis  no abdominal distention or jaundice  MMM  Extremities without any significant swelling, pink and perfused  Back/Neck: superior portion of neck incision well healed, lower aspect has a 2 cm opening, circular in nature, with purulent discharge. no significant malodor. some hyperemia surrounding the opening without tenderness. skin is indurated and shows chronic radiation changes. no visible hardware, though a spinal process prominence is palpable. History of Present Illness:  History of Present Illness		  70 yr old female with HTN, HLD, breast ca, right lumpectomy, RT, Hodgkin's lymphoma, underwent C4-C6 ACDF on 12/15/18, and posterior spinal fusion in 04/2019, who has been doing well at home the last few months but comes in today because her  noted a wound over her cervical spine, where she had hardware placed. She had radiation in her back in the 1980s for her Hodgkin's lymphoma, but otherwise denies active smoking, weight loss, steroid use, constant pressure from being bedbound. she lives with her  and is ambulatory at baseline. doesn't use any assistive devices to walk around. last year she had issues during the recovery from her surgery that required her to get a G tube placed for some time, but has since been removed, she is eating well and her weight is stable. She denies fevers, chills, sweats, etc, though admits she felt a bit lightheaded this morning and felt some palpitations. she first noted when she went to go put on her shirt that there was something "mucky" so she had her  assess, and he noted there was a wound and came to the ED. She has been started on vanc and cefepime and is getting admitted to the neurosurgery service.     Of note, my colleague, Dr. Osiel Brambila, did a bilateral paraspinal muscle closure over her hardware in April of last year at the time of the index operation. She has not undergone any other operations on her back and notes that her incision healed without incident in 2019.       Allergies/Medications:   Allergies:        Allergies:  	penicillin: Drug, Rash    Home Medications:   * Patient Currently Takes Medications as of 29-Mar-2019 09:21 documented in Structured Notes  · 	gabapentin 600 mg oral tablet: Last Dose Taken:  , 1 tab(s) orally 2 times a day  · 	metoprolol succinate 25 mg oral tablet, extended release: Last Dose Taken:  , 1 tab(s) orally once a day  · 	rosuvastatin 10 mg oral tablet: Last Dose Taken:  , 1 tab(s) orally once a day (at bedtime)  · 	anastrozole 1 mg oral tablet: Last Dose Taken:  , 1 tab(s) orally once a day  · 	Lyrica 100 mg oral capsule: Last Dose Taken:  , 1 cap(s) orally 2 times a day  · 	Vitamin D3 400 intl units oral tablet: 2 tab(s) orally once a day    PMH/PSH/FH/SH:   Past Medical, Past Surgical, and Family History:  PAST MEDICAL HISTORY:  Anxiety     Carpal tunnel syndrome b/l    Cervical disc disease     Cervical disc herniation     History of breast cancer s/p right lumpectomy , RT    HLD (hyperlipidemia)     Hodgkin disease 1981, underwent radiation therapy    HTN (hypertension).     PAST SURGICAL HISTORY:  History of carpal tunnel surgery 10/2018 left hand  History of lumpectomy of right breast 2013  History of Mohs micrographic surgery for skin cancer   History of tonsillectomy   S/P cervical spinal fusion 12/15/2018 C4-C6 ACDF.   Posterior cervical spinal fusion, paraspinal muscle closure, 04/2019    FAMILY HISTORY:  Mother  Still living? Unknown  Family history of colon cancer in mother, Age at diagnosis: Age Unknown.    Social: . lives with her . former smoker. hasn't smoked since she was in college.     Exam:   elderly female in NAD, breathing comfortably  AOx3, moving all extremiteis  no abdominal distention or jaundice  MMM  Extremities without any significant swelling, pink and perfused  Back/Neck: superior portion of neck incision well healed, lower aspect has a 2 cm opening, circular in nature, with purulent discharge. no significant malodor. some hyperemia surrounding the opening without tenderness. skin is indurated and shows chronic radiation changes. no visible hardware, though a spinal process prominence is palpable.     CT C spine and thoracic spine, 8/5/20:   IMPRESSION:    1. Dorsal soft tissue abscess at the level of T1 with further asymmetric cephalad extension on the left side with associated surrounding soft tissue inflammatory changes. Please see discussion in the body of the report.  2. Superimposed infected posterior fusion hardware cannot be excluded. Superimposed osteomyelitis in these areas cannot also be excluded. Further evaluation with a contrast-enhanced MRI examination can be obtained, as clinically warranted, and if there are no clinical contraindications.  3. Unchanged mild chronic compression deformities involving the T1, T3, T4, T5, and T9 vertebral bodies.

## 2020-08-05 NOTE — H&P ADULT - ASSESSMENT
70y female w/ hx of posterior cervical fusion in 2019 (C2-T2) presenting with draining wound  -CT C spine and T spine w/ contrast  -ER sent cultures, will follow up results  -Vanco/Cef started   -Pain control   -Plastics surgery consult   -Seen w/ Dr Daley

## 2020-08-05 NOTE — CONSULT NOTE ADULT - ASSESSMENT
Assessment and plan: 70 yr old female with HTN, HLD, breast ca, right lumpectomy, RT, Hodgkin's lymphoma, who underwent staged C4-C6 ACDF on 12/15/18 followed by posterior spinal fusion in 04/2019, with history of radiation, who previously had healed well and was doing well at home, but presents now with an open wound in the lower aspect of her incision, with a fluid collection on imaging, and concerning for infection that is tracking to hardware. Our team was involved in the bilateral paraspinal muscle closure last time and I am available to help with the care of this patient when she returns to the operating room. she will be admitted to the neurosurgery service and is on broad spectrum antibiotics.      -recommend a nutrition consult to assess her nutritional status and ensure she is optimized to heal after treatment  -will continue to follow along   -please keep me updated of any timelines in plan to return to the OR    Norbert Zhou MD  Plastic Surgeon  [facility]  cell: [phone]

## 2020-08-05 NOTE — ED PROVIDER NOTE - PMH
Anxiety    Carpal tunnel syndrome  b/l  Cervical disc disease    Cervical disc herniation    History of breast cancer  s/p right lumpectomy , RT  HLD (hyperlipidemia)    Hodgkin disease  1981, underwent radiation therapy  HTN (hypertension)    Kidney mass

## 2020-08-05 NOTE — H&P ADULT - ATTENDING COMMENTS
Patient to be debrided and re-closed by plastic surgery  No plan for spine instrumentation revision  Will likely need long term antibiotics

## 2020-08-06 ENCOUNTER — RECORD ABSTRACTING (OUTPATIENT)
Age: 70
End: 2020-08-06

## 2020-08-06 DIAGNOSIS — L02.91 CUTANEOUS ABSCESS, UNSPECIFIED: ICD-10-CM

## 2020-08-06 LAB
ANION GAP SERPL CALC-SCNC: 13 MMO/L — SIGNIFICANT CHANGE UP (ref 7–14)
APTT BLD: 25.6 SEC — LOW (ref 27–36.3)
BLD GP AB SCN SERPL QL: NEGATIVE — SIGNIFICANT CHANGE UP
BUN SERPL-MCNC: 12 MG/DL — SIGNIFICANT CHANGE UP (ref 7–23)
CALCIUM SERPL-MCNC: 9.1 MG/DL — SIGNIFICANT CHANGE UP (ref 8.4–10.5)
CHLORIDE SERPL-SCNC: 104 MMOL/L — SIGNIFICANT CHANGE UP (ref 98–107)
CO2 SERPL-SCNC: 22 MMOL/L — SIGNIFICANT CHANGE UP (ref 22–31)
CREAT SERPL-MCNC: 0.53 MG/DL — SIGNIFICANT CHANGE UP (ref 0.5–1.3)
GLUCOSE SERPL-MCNC: 114 MG/DL — HIGH (ref 70–99)
HCT VFR BLD CALC: 31.1 % — LOW (ref 34.5–45)
HCV AB S/CO SERPL IA: 0.06 S/CO — SIGNIFICANT CHANGE UP (ref 0–0.99)
HCV AB SERPL-IMP: SIGNIFICANT CHANGE UP
HGB BLD-MCNC: 10.1 G/DL — LOW (ref 11.5–15.5)
INR BLD: 1.2 — HIGH (ref 0.88–1.16)
MCHC RBC-ENTMCNC: 30.1 PG — SIGNIFICANT CHANGE UP (ref 27–34)
MCHC RBC-ENTMCNC: 32.5 % — SIGNIFICANT CHANGE UP (ref 32–36)
MCV RBC AUTO: 92.6 FL — SIGNIFICANT CHANGE UP (ref 80–100)
NRBC # FLD: 0 K/UL — SIGNIFICANT CHANGE UP (ref 0–0)
PLATELET # BLD AUTO: 472 K/UL — HIGH (ref 150–400)
PMV BLD: 9.1 FL — SIGNIFICANT CHANGE UP (ref 7–13)
POTASSIUM SERPL-MCNC: 4.1 MMOL/L — SIGNIFICANT CHANGE UP (ref 3.5–5.3)
POTASSIUM SERPL-SCNC: 4.1 MMOL/L — SIGNIFICANT CHANGE UP (ref 3.5–5.3)
PROTHROM AB SERPL-ACNC: 13.6 SEC — SIGNIFICANT CHANGE UP (ref 10.6–13.6)
RBC # BLD: 3.36 M/UL — LOW (ref 3.8–5.2)
RBC # FLD: 13.9 % — SIGNIFICANT CHANGE UP (ref 10.3–14.5)
RH IG SCN BLD-IMP: POSITIVE — SIGNIFICANT CHANGE UP
SODIUM SERPL-SCNC: 139 MMOL/L — SIGNIFICANT CHANGE UP (ref 135–145)
WBC # BLD: 12 K/UL — HIGH (ref 3.8–10.5)
WBC # FLD AUTO: 12 K/UL — HIGH (ref 3.8–10.5)

## 2020-08-06 PROCEDURE — 99233 SBSQ HOSP IP/OBS HIGH 50: CPT

## 2020-08-06 PROCEDURE — 72156 MRI NECK SPINE W/O & W/DYE: CPT | Mod: 26

## 2020-08-06 RX ORDER — HYDROMORPHONE HYDROCHLORIDE 2 MG/ML
0.5 INJECTION INTRAMUSCULAR; INTRAVENOUS; SUBCUTANEOUS
Refills: 0 | Status: DISCONTINUED | OUTPATIENT
Start: 2020-08-06 | End: 2020-08-07

## 2020-08-06 RX ORDER — HYDROMORPHONE HYDROCHLORIDE 2 MG/ML
1 INJECTION INTRAMUSCULAR; INTRAVENOUS; SUBCUTANEOUS
Refills: 0 | Status: DISCONTINUED | OUTPATIENT
Start: 2020-08-06 | End: 2020-08-07

## 2020-08-06 RX ADMIN — HYDROMORPHONE HYDROCHLORIDE 1 MILLIGRAM(S): 2 INJECTION INTRAMUSCULAR; INTRAVENOUS; SUBCUTANEOUS at 21:15

## 2020-08-06 RX ADMIN — Medication 250 MILLIGRAM(S): at 22:45

## 2020-08-06 RX ADMIN — CEFEPIME 100 MILLIGRAM(S): 1 INJECTION, POWDER, FOR SOLUTION INTRAMUSCULAR; INTRAVENOUS at 05:54

## 2020-08-06 RX ADMIN — Medication 250 MILLIGRAM(S): at 02:40

## 2020-08-06 RX ADMIN — SODIUM CHLORIDE 50 MILLILITER(S): 9 INJECTION INTRAMUSCULAR; INTRAVENOUS; SUBCUTANEOUS at 13:07

## 2020-08-06 RX ADMIN — HYDROMORPHONE HYDROCHLORIDE 1 MILLIGRAM(S): 2 INJECTION INTRAMUSCULAR; INTRAVENOUS; SUBCUTANEOUS at 21:30

## 2020-08-06 RX ADMIN — CEFEPIME 100 MILLIGRAM(S): 1 INJECTION, POWDER, FOR SOLUTION INTRAMUSCULAR; INTRAVENOUS at 15:21

## 2020-08-06 NOTE — PROGRESS NOTE PEDS - PROBLEM SELECTOR PLAN 1
1. MRI C/T spine with sedation today  2. Follow up Plastics  3. NPO for sedation  Case d/w Dr. Daley

## 2020-08-06 NOTE — PROGRESS NOTE PEDS - SUBJECTIVE AND OBJECTIVE BOX
OVERNIGHT EVENTS:  No acute events overnight. Pt kept NPO for MRI. Wound washout TBD    HPI:  70y female w/ hx of 2018 ACDF that was subsequently revised/extended 4/2019 at [facility] to C3-C6 with posterior fusion of C2-T2 w/ right ilac crest graft done. Post op course complicated with afib, was sent to Rehab. Patient presents today to [facility] ED with complaints of neck swelling and drainage from wound. Patient with pmhx of Hodgkins (in remission) c/o nausea, dizziness, generalized weakness. (05 Aug 2020 13:22)      Vital Signs Last 24 Hrs  T(C): 36.8 (06 Aug 2020 09:23), Max: 36.9 (05 Aug 2020 14:58)  T(F): 98.3 (06 Aug 2020 09:23), Max: 98.5 (05 Aug 2020 14:58)  HR: 102 (06 Aug 2020 09:23) (87 - 110)  BP: 139/71 (06 Aug 2020 09:23) (121/71 - 139/71)  BP(mean): --  RR: 18 (06 Aug 2020 09:23) (16 - 18)  SpO2: 98% (06 Aug 2020 09:23) (98% - 100%)    I&O's Summary    05 Aug 2020 07:01  -  06 Aug 2020 07:00  --------------------------------------------------------  IN: 0 mL / OUT: 1050 mL / NET: -1050 mL        PHYSICAL EXAM:  awake, alert, affect appropriate  PERRL  DOZIER X4 5/5 strength UE and LE  No clonus, no hoffmans    large cervical area wound dehisence noted w/ presley pus (approx [id number])  Non tender, no streaking, slightly warm    LABS:                        10.1   12.00 )-----------( 472      ( 06 Aug 2020 05:30 )             31.1     08-06    139  |  104  |  12  ----------------------------<  114<H>  4.1   |  22  |  0.53    Ca    9.1      06 Aug 2020 05:30    TPro  7.8  /  Alb  3.7  /  TBili  0.9  /  DBili  x   /  AST  69<H>  /  ALT  125<H>  /  AlkPhos  192<H>  08-05    PT/INR - ( 06 Aug 2020 05:30 )   PT: 13.6 SEC;   INR: 1.20          PTT - ( 06 Aug 2020 05:30 )  PTT:25.6 SEC    MEDICATIONS:  cefepime   IVPB 2000 milliGRAM(s) IV Intermittent every 8 hours  vancomycin  IVPB 1000 milliGRAM(s) IV Intermittent every 12 hours    Neuro:  acetaminophen   Tablet .. 650 milliGRAM(s) Oral every 6 hours PRN    IVF:  sodium chloride 0.9%. 1000 milliLiter(s) IV Continuous <Continuous>      RADIOLOGY & ADDITIONAL TESTS:  < from: CT Thoracic Spine w/ IV Cont (08.05.20 @ 15:52) >  IMPRESSION:  1. Dorsal soft tissue abscess at the level of T1 with further asymmetric cephalad extension on the left side with associated surrounding soft tissue inflammatory changes. Please see discussion in the body of the report.  2. Superimposed infected posterior fusion hardware cannot be excluded. Superimposed osteomyelitis in these areas cannot also be excluded. Further evaluation with a contrast-enhanced MRI examination can be obtained, as clinically warranted, and if there are no clinical contraindications.  3. Unchanged mild chronic compression deformities involving the T1, T3, T4, T5, and T9 vertebral bodies.  < end of copied text >

## 2020-08-06 NOTE — PROGRESS NOTE ADULT - ASSESSMENT
69 yo F now POD0 from washout of cervical/thoracic spine down to her hardware, where the infection was tracking to, but no pus was found at the hardware level. only superficially above the muscle did I find induration and some pus. cultures were obtained and we will follow.     plan:   back to neurosurgery team  will follow along closely  abx per ID recommendations, f/u cultures  prophylactic anticoagulation per primary, no need to hold for OR  Plan to return to OR saturday for repeat washout, vac change    Norbert Zhou MD  Plastic Surgery  [phone]

## 2020-08-06 NOTE — BRIEF OPERATIVE NOTE - NSEVIDNCEINFORABSCESSFT_GEN_ALL_CORE
in soft tissue above paraspinous muscles, there was a pocket of pus, and it tracked down to the hardware under the muscles.

## 2020-08-06 NOTE — CHART NOTE - NSCHARTNOTEFT_GEN_A_CORE
CAPRINI SCORE [CLOT]    AGE RELATED RISK FACTORS                                                       MOBILITY RELATED FACTORS  [ ] Age 41-60 years                                            (1 Point)                  [ ] Bed rest                                                        (1 Point)  [x] Age: 61-74 years                                           (2 Points)                 [ ] Plaster cast                                                   (2 Points)  [ ] Age= 75 years                                              (3 Points)                 [ ] Bed bound for more than 72 hours                 (2 Points)    DISEASE RELATED RISK FACTORS                                               GENDER SPECIFIC FACTORS  [ ] Edema in the lower extremities                       (1 Point)                  [ ] Pregnancy                                                     (1 Point)  [ ] Varicose veins                                               (1 Point)                  [ ] Post-partum < 6 weeks                                   (1 Point)             [ ] BMI > 25 Kg/m2                                            (1 Point)                  [ ] Hormonal therapy  or oral contraception          (1 Point)                 [ ] Sepsis (in the previous month)                        (1 Point)                  [x] History of pregnancy complications                 (1 point)  [ ] Pneumonia or serious lung disease                                               [ ] Unexplained or recurrent                     (1 Point)           (in the previous month)                               (1 Point)  [ ] Abnormal pulmonary function test                     (1 Point)                 SURGERY RELATED RISK FACTORS  [ ] Acute myocardial infarction                              (1 Point)                 [ ]  Section                                             (1 Point)  [ ] Congestive heart failure (in the previous month)  (1 Point)               [ ] Minor surgery                                                  (1 Point)   [ ] Inflammatory bowel disease                             (1 Point)                 [ ] Arthroscopic surgery                                        (2 Points)  [ ] Central venous access                                      (2 Points)                [ ] General surgery lasting more than 45 minutes   (2 Points)       [ ] Stroke (in the previous month)                          (5 Points)               [ ] Elective arthroplasty                                         (5 Points)                                                                                                                                               HEMATOLOGY RELATED FACTORS                                                 TRAUMA RELATED RISK FACTORS  [ ] Prior episodes of VTE                                     (3 Points)                [ ] Fracture of the hip, pelvis, or leg                       (5 Points)  [ ] Positive family history for VTE                         (3 Points)                 [ ] Acute spinal cord injury (in the previous month)  (5 Points)  [ ] Prothrombin 77797 A                                     (3 Points)                 [ ] Paralysis  (less than 1 month)                             (5 Points)  [ ] Factor V Leiden                                             (3 Points)                  [ ] Multiple Trauma within 1 month                        (5 Points)  [ ] Lupus anticoagulants                                     (3 Points)                                                           [ ] Anticardiolipin antibodies                               (3 Points)                                                       [ ] High homocysteine in the blood                      (3 Points)                                             [ ] Other congenital or acquired thrombophilia      (3 Points)                                                [ ] Heparin induced thrombocytopenia                  (3 Points)                                          Total Score [ 3  ]    Caprini Score 0 - 2:  Low Risk, No VTE Prophylaxis required for most patients, encourage ambulation  Caprini Score 3 - 6:  At Risk, pharmacologic VTE prophylaxis is indicated for most patients (in the absence of a contraindication)  Caprini Score Greater than or = 7:  High Risk, pharmacologic VTE prophylaxis is indicated for most patients (in the absence of a contraindication)

## 2020-08-07 ENCOUNTER — TRANSCRIPTION ENCOUNTER (OUTPATIENT)
Age: 70
End: 2020-08-07

## 2020-08-07 ENCOUNTER — APPOINTMENT (OUTPATIENT)
Dept: NEUROSURGERY | Facility: CLINIC | Age: 70
End: 2020-08-07

## 2020-08-07 LAB
CRP SERPL-MCNC: 102.6 MG/L — HIGH
CULTURE RESULTS: SIGNIFICANT CHANGE UP
ERYTHROCYTE [SEDIMENTATION RATE] IN BLOOD: 87 MM/HR — HIGH (ref 4–25)
GRAM STN FLD: SIGNIFICANT CHANGE UP
HCT VFR BLD CALC: 29.6 % — LOW (ref 34.5–45)
HGB BLD-MCNC: 9.4 G/DL — LOW (ref 11.5–15.5)
MCHC RBC-ENTMCNC: 29.8 PG — SIGNIFICANT CHANGE UP (ref 27–34)
MCHC RBC-ENTMCNC: 31.8 % — LOW (ref 32–36)
MCV RBC AUTO: 94 FL — SIGNIFICANT CHANGE UP (ref 80–100)
NRBC # FLD: 0 K/UL — SIGNIFICANT CHANGE UP (ref 0–0)
PLATELET # BLD AUTO: 474 K/UL — HIGH (ref 150–400)
PMV BLD: 8.9 FL — SIGNIFICANT CHANGE UP (ref 7–13)
RBC # BLD: 3.15 M/UL — LOW (ref 3.8–5.2)
RBC # FLD: 14.1 % — SIGNIFICANT CHANGE UP (ref 10.3–14.5)
SPECIMEN SOURCE: SIGNIFICANT CHANGE UP
VANCOMYCIN TROUGH SERPL-MCNC: 10 UG/ML — SIGNIFICANT CHANGE UP (ref 10–20)
WBC # BLD: 12.76 K/UL — HIGH (ref 3.8–10.5)
WBC # FLD AUTO: 12.76 K/UL — HIGH (ref 3.8–10.5)

## 2020-08-07 PROCEDURE — 99223 1ST HOSP IP/OBS HIGH 75: CPT | Mod: GC

## 2020-08-07 RX ORDER — APIXABAN 2.5 MG/1
1 TABLET, FILM COATED ORAL
Qty: 0 | Refills: 0 | DISCHARGE

## 2020-08-07 RX ORDER — CHOLECALCIFEROL (VITAMIN D3) 125 MCG
2 CAPSULE ORAL
Qty: 0 | Refills: 0 | DISCHARGE

## 2020-08-07 RX ORDER — ATORVASTATIN CALCIUM 80 MG/1
40 TABLET, FILM COATED ORAL AT BEDTIME
Refills: 0 | Status: DISCONTINUED | OUTPATIENT
Start: 2020-08-07 | End: 2020-08-20

## 2020-08-07 RX ORDER — POLYETHYLENE GLYCOL 3350 17 G/17G
17 POWDER, FOR SOLUTION ORAL DAILY
Refills: 0 | Status: DISCONTINUED | OUTPATIENT
Start: 2020-08-07 | End: 2020-08-20

## 2020-08-07 RX ORDER — DIAZEPAM 5 MG
5 TABLET ORAL THREE TIMES A DAY
Refills: 0 | Status: DISCONTINUED | OUTPATIENT
Start: 2020-08-07 | End: 2020-08-09

## 2020-08-07 RX ORDER — AMIODARONE HYDROCHLORIDE 400 MG/1
1 TABLET ORAL
Qty: 0 | Refills: 0 | DISCHARGE

## 2020-08-07 RX ORDER — METOPROLOL TARTRATE 50 MG
12.5 TABLET ORAL DAILY
Refills: 0 | Status: DISCONTINUED | OUTPATIENT
Start: 2020-08-07 | End: 2020-08-20

## 2020-08-07 RX ORDER — ACETAMINOPHEN 500 MG
3 TABLET ORAL
Qty: 0 | Refills: 0 | DISCHARGE

## 2020-08-07 RX ORDER — OXYCODONE HYDROCHLORIDE 5 MG/1
5 TABLET ORAL EVERY 6 HOURS
Refills: 0 | Status: DISCONTINUED | OUTPATIENT
Start: 2020-08-07 | End: 2020-08-14

## 2020-08-07 RX ORDER — OXYCODONE HYDROCHLORIDE 5 MG/1
10 TABLET ORAL EVERY 6 HOURS
Refills: 0 | Status: DISCONTINUED | OUTPATIENT
Start: 2020-08-07 | End: 2020-08-14

## 2020-08-07 RX ORDER — ENOXAPARIN SODIUM 100 MG/ML
40 INJECTION SUBCUTANEOUS AT BEDTIME
Refills: 0 | Status: DISCONTINUED | OUTPATIENT
Start: 2020-08-07 | End: 2020-08-07

## 2020-08-07 RX ORDER — SODIUM CHLORIDE 9 MG/ML
1000 INJECTION INTRAMUSCULAR; INTRAVENOUS; SUBCUTANEOUS
Refills: 0 | Status: DISCONTINUED | OUTPATIENT
Start: 2020-08-07 | End: 2020-08-08

## 2020-08-07 RX ORDER — ANASTROZOLE 1 MG/1
1 TABLET ORAL DAILY
Refills: 0 | Status: DISCONTINUED | OUTPATIENT
Start: 2020-08-07 | End: 2020-08-20

## 2020-08-07 RX ADMIN — Medication 250 MILLIGRAM(S): at 13:59

## 2020-08-07 RX ADMIN — Medication 650 MILLIGRAM(S): at 18:30

## 2020-08-07 RX ADMIN — CEFEPIME 100 MILLIGRAM(S): 1 INJECTION, POWDER, FOR SOLUTION INTRAMUSCULAR; INTRAVENOUS at 08:45

## 2020-08-07 RX ADMIN — SODIUM CHLORIDE 50 MILLILITER(S): 9 INJECTION INTRAMUSCULAR; INTRAVENOUS; SUBCUTANEOUS at 08:46

## 2020-08-07 RX ADMIN — OXYCODONE HYDROCHLORIDE 5 MILLIGRAM(S): 5 TABLET ORAL at 14:15

## 2020-08-07 RX ADMIN — OXYCODONE HYDROCHLORIDE 5 MILLIGRAM(S): 5 TABLET ORAL at 23:18

## 2020-08-07 RX ADMIN — OXYCODONE HYDROCHLORIDE 10 MILLIGRAM(S): 5 TABLET ORAL at 05:28

## 2020-08-07 RX ADMIN — CEFEPIME 100 MILLIGRAM(S): 1 INJECTION, POWDER, FOR SOLUTION INTRAMUSCULAR; INTRAVENOUS at 17:43

## 2020-08-07 RX ADMIN — Medication 650 MILLIGRAM(S): at 10:00

## 2020-08-07 RX ADMIN — OXYCODONE HYDROCHLORIDE 5 MILLIGRAM(S): 5 TABLET ORAL at 14:46

## 2020-08-07 RX ADMIN — Medication 650 MILLIGRAM(S): at 09:02

## 2020-08-07 RX ADMIN — CEFEPIME 100 MILLIGRAM(S): 1 INJECTION, POWDER, FOR SOLUTION INTRAMUSCULAR; INTRAVENOUS at 01:11

## 2020-08-07 RX ADMIN — ANASTROZOLE 1 MILLIGRAM(S): 1 TABLET ORAL at 17:43

## 2020-08-07 RX ADMIN — OXYCODONE HYDROCHLORIDE 10 MILLIGRAM(S): 5 TABLET ORAL at 04:27

## 2020-08-07 RX ADMIN — Medication 650 MILLIGRAM(S): at 17:42

## 2020-08-07 NOTE — CONSULT NOTE ADULT - CONSULT REASON
- ID team consulted for antibiotic recommendations/duration/PICC line ID team consulted for antibiotic recommendations/duration/PICC line

## 2020-08-07 NOTE — CONSULT NOTE ADULT - ATTENDING COMMENTS
69 yo F Hx of Hodgkins 1981, breast ca with lumpectomy right 2014, HTN, HLD, osteoporosis, found to have cervical stenosis with compression Nov 2018, s/p C4-6 ACDF 12/4/18  Leukocytosis, no fever  CT with T1 abscess, concern for possible hardware infection  8/6 OR with washout/debridement to back abscess site; hardware retained  Culture from OR pending  Overall,  1) Infected hardware/abscess  - Vanco 1g q 12 (monitor levels)  - Cefepime 2g q 8  - F/U pending OR cultures  - Wound care per primary team  2) Leukocytosis  - Trend to normal  - F/U BCXs  3) Elevated ESR  - Trend to normal    Xiang Alvarado MD  Pager [phone]  After 5pm and on weekends call [phone]    I was physically present for the key portions of the evaluation and management service provided. I saw and examined the patient. I agree with the above history, physical, and plan except for any discrepancies which I have documented in “Attending Attestation.” Please refer to “Attending Attestation” for final plan.

## 2020-08-07 NOTE — CONSULT NOTE ADULT - ASSESSMENT
ASSESSMENT:  71 yo F Hx of Hodgkins 1981, breast ca with lumpectomy right 2014, HTN, HLD, osteoporosis,   cervical stenosis with compression s/p C4-6 ACDF 12/4/2018  s/p revision of anterior cervical discectomy with fusion of C4-C6 with extension to C3, as well as C2-T2 posterior fusion with right iliac crest autograft in 04/2019 (course complicated by Afib, aspiration pneumonia s/p PEG, which is removed on 06//12/2020)  L renal mass s/p left lap alba-nephrectomy on 6/11/2020    Patient presents on 8/5 to [facility] ED with complaints of neck swelling and drainage from wound. c/o nausea, dizziness, generalized weakness.  She was started on Vanc/Cefepime.    s/p wound washout with plastics, wound vacc placed on 8/6, purulence in soft tissue above paraspinous muscles, there was a pocket of pus, and it tracked down to the hardware under the muscles.    ID consulted for abx duration and PICC.    #Neck abscess s/p washout on 8/6/2020 with hardward infection    RECOMMENDATIONS:    #PENDING RECS. PLEASE WAIT FOR FINAL RECS AFTER DISCUSSION WITH ATTENDING#    Alexander Quiros MD, PGY4  Fellow, Infectious Diseases  Pager: [phone]  If no response, after 5pm and on Weekends: Call [phone] ASSESSMENT:  69 yo F Hx of Hodgkins 1981, breast ca with lumpectomy right 2014, HTN, HLD, osteoporosis,   cervical stenosis with compression s/p C4-6 ACDF 12/4/2018  s/p revision of anterior cervical discectomy with fusion of C4-C6 with extension to C3, as well as C2-T2 posterior fusion with right iliac crest autograft in 04/2019 (course complicated by Afib, aspiration pneumonia s/p PEG, which is removed on 06//12/2020)  L renal mass s/p left lap alba-nephrectomy on 6/11/2020    Patient presents on 8/5 to [facility] ED with complaints of neck swelling and drainage from wound. c/o nausea, dizziness, generalized weakness.  She was started on Vanc/Cefepime.    s/p wound washout with plastics, wound vacc placed on 8/6, purulence in soft tissue above paraspinous muscles, there was a pocket of pus, and it tracked down to the hardware under the muscles.    ID consulted for abx duration and PICC.    #Neck abscess s/p washout on 8/6/2020 with hardward infection  #Left hand swelling    RECOMMENDATIONS:  - Definate plan ideally would be removing hardware, but will treat with IV abx for now and monitor  - Keep Vanc/Cefepime for now. Please check Vancomycin trough before 4th sequential dose. Target trough levels 15-20  - Follow up OR cultures  - Tentative plan is to do IV abx at least 6-8 weeks, probably will need a PICC line but will follow up culture result first, and then do PO abx and follow up in the clinic  - Monitor left hand phlebitis, does not look infected but would recommend to place IV on right hand instead    Alexander Quiros MD, PGY4  Fellow, Infectious Diseases  Pager: [phone]  If no response, after 5pm and on Weekends: Call [phone]    d/w Dr. Alvarado

## 2020-08-07 NOTE — PROGRESS NOTE ADULT - ASSESSMENT
71 yo F now POD1 from washout of cervical/thoracic spine down to her hardware, where the infection was tracking to, but no pus was found at the hardware level. only superficially above the muscle did I find induration and some pus. cultures were obtained and we will follow.  awaiting final cultures, so far no growth.     plan:   will follow along closely  abx per ID recommendations, f/u cultures  prophylactic anticoagulation per primary, no need to hold for OR  Plan to return to OR saturday for repeat washout, vac change, please make NPO at midnight  Call my cell phone below directly with any questions or concerns    Norbert Zhou MD  Plastic Surgery  [phone] Risks/benefits discussed with patient or patient surrogate/Vaccine Information Sheet (VIS) provided-VIS date: 8/07/15

## 2020-08-07 NOTE — SWALLOW BEDSIDE ASSESSMENT ADULT - COMMENTS
Per charting, patient is a "70y female w/ hx of 2018 ACDF that was subsequently revised/extended 4/2019 at [facility] to C3-C6 with posterior fusion of C2-T2 w/ right ilac crest graft done. Post op course complicated with afib, was sent to Rehab. Patient presents today to [facility] ED with complaints of neck swelling and drainage from wound. Patient with pmhx of Hodgkins (in remission) c/o nausea, dizziness, generalized weakness."    Consult received and chart reviewed. The patient was seen at bedside this AM to assess swallow function, at which time she was awake/alert and orientedX3. The patient was able to follow simple directives and maintain conversational discourse with clinician. The patient denies difficulty swallowing. RN present for evaluation.

## 2020-08-07 NOTE — SWALLOW BEDSIDE ASSESSMENT ADULT - SWALLOW EVAL: DIAGNOSIS
1. The patient presents with functional oral management with puree, mechanical soft and thin liquid textures marked by adequate bolus collection, transfer and delayed transport with adequate clearance post swallow. 2. The patient demonstrates mild oral stage dysphagia for regular solids marked by increased mastication time, delayed bolus collection, transfer and timely posterior transport. 3. The patient demonstrates adequate pharyngeal skills for all consistencies trialed marked by timely pharyngeal swallow and adequate hyolaryngeal elevation upon digital palpation with no evidence of laryngeal penetration.

## 2020-08-07 NOTE — CONSULT NOTE ADULT - SUBJECTIVE AND OBJECTIVE BOX
Patient is a 70y old  Female who presents with a chief complaint of neck abscess (07 Aug 2020 02:06)    HPI:  71 yo F Hx of Hodgkins 1981, breast ca with lumpectomy right 2014, HTN, HLD, osteoporosis, found to have cervical stenosis with compression Nov 2018, s/p C4-6 ACDF 12/4/18.  Pt was admitted to [facility] 4/5-4/20/2019 underwent revision of anterior cervical discectomy with fusion of C4-C6 with extension to C3, as well as C2-T2 posterior fusion with right iliac crest autograft;  course complicated by JAXON treated with amio/anticoagulation, dysphagia treated for aspiration pneumonia, PEG placed.  Pt also incidentally found to have L renal mass.  6/11/2020 s/p left lap alba-nephrectomy, PEG removed on 6/12/2020.     Patient presents on 8/5 to [facility] ED with complaints of neck swelling and drainage from wound. c/o nausea, dizziness, generalized weakness.  She was started on Vanc/Cefepime.  s/p wound washout with plastics, wound vacc placed on 8/6, purulence in soft tissue above paraspinous muscles, there was a pocket of pus, and it tracked down to the hardware under the muscles.    ID consulted for abx duration and PICC.    prior hospital charts reviewed [V]  primary team notes reviewed [V]  other consultant notes reviewed [V]    PAST MEDICAL & SURGICAL HISTORY:  Kidney mass  Anxiety  History of breast cancer: s/p right lumpectomy , RT  Hodgkin disease: 1981, underwent radiation therapy  HLD (hyperlipidemia)  HTN (hypertension)  Cervical disc herniation  Carpal tunnel syndrome: b/l  Cervical disc disease  History of carpal tunnel surgery: 10/2018 left hand  History of tonsillectomy  S/P cervical spinal fusion: 12/15/2018 C4-C6 ACDF, 4/19 - plates and screws  History of Mohs micrographic surgery for skin cancer  History of lumpectomy of right breast: 2013    SOCIAL HISTORY:    - denied smoking/vaping/alcohol/recreational drug use    FAMILY HISTORY:  Family history of colon cancer in mother      Allergies  penicillin (Rash)      ANTIMICROBIALS:  cefepime   IVPB 2000 every 8 hours  vancomycin  IVPB 1000 every 12 hours      ANTIMICROBIALS (past 90 days):  MEDICATIONS  (STANDING):  cefepime   IVPB   100 mL/Hr IV Intermittent (08-05-20 @ 12:54)    cefepime   IVPB   100 mL/Hr IV Intermittent (08-07-20 @ 08:45)   100 mL/Hr IV Intermittent (08-07-20 @ 01:11)   100 mL/Hr IV Intermittent (08-06-20 @ 15:21)   100 mL/Hr IV Intermittent (08-06-20 @ 05:54)   100 mL/Hr IV Intermittent (08-05-20 @ 21:53)    vancomycin  IVPB   250 mL/Hr IV Intermittent (08-05-20 @ 14:10)    vancomycin  IVPB   250 mL/Hr IV Intermittent (08-06-20 @ 22:45)   250 mL/Hr IV Intermittent (08-06-20 @ 02:40)      OTHER MEDS:   MEDICATIONS  (STANDING):  acetaminophen   Tablet .. 650 every 6 hours PRN  oxyCODONE    IR 5 every 6 hours PRN  oxyCODONE    IR 10 every 6 hours PRN      REVIEW OF SYSTEMS  [  ] ROS unobtainable because:    [ V ] All other systems negative except as noted below:	    Constitutional:  [ ] fever [ ] chills  [ ] weight loss  [ ] weakness  Skin:  [ ] rash [ ] phlebitis	  Eyes: [ ] icterus [ ] pain  [ ] discharge	  ENMT: [ ] sore throat  [ ] thrush [ ] ulcers [ ] exudates  Respiratory: [ ] dyspnea [ ] hemoptysis [ ] cough [ ] sputum	  Cardiovascular:  [ ] chest pain [ ] palpitations [ ] edema	  Gastrointestinal:  [ ] nausea [ ] vomiting [ ] diarrhea [ ] constipation [ ] pain	  Genitourinary:  [ ] dysuria [ ] frequency [ ] hematuria [ ] discharge [ ] flank pain  [ ] incontinence  Musculoskeletal:  [ ] myalgias [ ] arthralgias [ ] arthritis  [ ] back pain  Neurological:  [ ] headache [ ] seizures  [ ] confusion/altered mental status  Psychiatric:  [ ] anxiety [ ] depression	  Hematology/Lymphatics:  [ ] lymphadenopathy  Endocrine:  [ ] adrenal [ ] thyroid  Allergic/Immunologic:	 [ ] transplant [ ] seasonal    Vital Signs Last 24 Hrs  T(F): 98.7 (08-07-20 @ 04:27), Max: 99.1 (08-05-20 @ 11:38)    Vital Signs Last 24 Hrs  HR: 100 (08-07-20 @ 04:27) (92 - 102)  BP: 115/70 (08-07-20 @ 04:27) (86/68 - 171/83)  RR: 18 (08-07-20 @ 04:27)  SpO2: 98% (08-07-20 @ 04:27) (93% - 100%)  Wt(kg): --    EXAM:  awake, alert, affect appropriate, Speech is clear  DOZIER x4 with good strength equally   No drift   Incision C/D/I wound vacc on.    Labs:                        9.4    12.76 )-----------( 474      ( 07 Aug 2020 06:00 )             29.6     08-06    139  |  104  |  12  ----------------------------<  114<H>  4.1   |  22  |  0.53    Ca    9.1      06 Aug 2020 05:30    TPro  7.8  /  Alb  3.7  /  TBili  0.9  /  DBili  x   /  AST  69<H>  /  ALT  125<H>  /  AlkPhos  192<H>  08-05      Auto Eosinophil %: 0.5 % (08-05-20 @ 12:30)      WBC Trend:  WBC Count: 12.76 (08-07-20 @ 06:00)  WBC Count: 12.00 (08-06-20 @ 05:30)  WBC Count: 14.97 (08-05-20 @ 12:30)      Creatine Trend:  Creatinine, Serum: 0.53 (08-06)  Creatinine, Serum: 0.66 (08-05)      MICROBIOLOGY:      Culture - Tissue with Gram Stain (collected 07 Aug 2020 05:57)  Source: .Tissue CERVICAL SPINE SOFT TISSUE PRE WASH  Gram Stain (07 Aug 2020 06:55):    Rare polymorphonuclear leukocytes seen per low power field    No organisms seen per oil power field    Culture - Other (collected 05 Aug 2020 21:07)  Source: .Other upper back draining wound  Preliminary Report (06 Aug 2020 20:55):    Normal skin enedelia isolated    Culture - Blood (collected 05 Aug 2020 14:34)  Source: .Blood Blood-Venous  Preliminary Report (06 Aug 2020 15:01):    No growth to date.    Culture - Blood (collected 05 Aug 2020 14:34)  Source: .Blood Blood-Peripheral  Preliminary Report (06 Aug 2020 15:01):    No growth to date.      RADIOLOGY:  imaging below personally reviewed    < from: MR Cervical Spine w/wo IV Cont (08.06.20 @ 15:25) >  IMPRESSION: Extensive postop changes and degenerative changes are identified described above.    There is evidence of an abnormal collection seen in the central to right posterior paraspinal soft tissue region. This subcutaneous collection does appear to demonstrate associated air and peripheral enhancement. This could be compatible with an underlying abscess. Please correlate clinically.    Compression deformity seen involving the superior endplate of L5 with associated abnormal T1 and T2 prolongation seen as well.    < end of copied text >      < from: CT Thoracic Spine w/ IV Cont (08.05.20 @ 15:52) >  IMPRESSION:    1. Dorsal soft tissue abscess at the level of T1 with further asymmetric cephalad extension on the left side with associated surrounding soft tissue inflammatory changes. Please see discussion in the body of the report.    2. Superimposed infected posterior fusion hardware cannot be excluded. Superimposed osteomyelitis in these areas cannot also be excluded. Further evaluation with a contrast-enhanced MRI examination can be obtained, as clinically warranted, and if there are no clinical contraindications.    3. Unchanged mild chronic compression deformities involving the T1, T3, T4, T5, and T9 vertebral bodies.    < end of copied text >      OTHER TESTS:  COVID-19 PCR: NotDetec (08-05-20 @ 13:45)  Sedimentation Rate, Erythrocyte: 87 (08-07 @ 06:00)  C-Reactive Protein, Serum: 102.6 (08-07 @ 06:00)  Blood Gas Venous - Lactate: 1.7 (08-05 @ 12:20) Patient is a 70y old  Female who presents with a chief complaint of neck abscess (07 Aug 2020 02:06)    HPI:  69 yo F Hx of Hodgkins 1981, breast ca with lumpectomy right 2014, HTN, HLD, osteoporosis, found to have cervical stenosis with compression Nov 2018, s/p C4-6 ACDF 12/4/18.  Pt was admitted to [facility] 4/5-4/20/2019 underwent revision of anterior cervical discectomy with fusion of C4-C6 with extension to C3, as well as C2-T2 posterior fusion with right iliac crest autograft;  course complicated by JAXON treated with amio/anticoagulation, dysphagia treated for aspiration pneumonia, PEG placed.  Pt also incidentally found to have L renal mass.  6/11/2020 s/p left lap alba-nephrectomy, PEG removed on 6/12/2020.     Patient presents on 8/5 to [facility] ED with complaints of neck swelling and drainage from wound. c/o nausea, dizziness, generalized weakness.  She was started on Vanc/Cefepime.  s/p wound washout with plastics, wound vacc placed on 8/6, purulence in soft tissue above paraspinous muscles, there was a pocket of pus, and it tracked down to the hardware under the muscles.    ID consulted for abx duration and PICC.    prior hospital charts reviewed [V]  primary team notes reviewed [V]  other consultant notes reviewed [V]    PAST MEDICAL & SURGICAL HISTORY:  Kidney mass  Anxiety  History of breast cancer: s/p right lumpectomy , RT  Hodgkin disease: 1981, underwent radiation therapy  HLD (hyperlipidemia)  HTN (hypertension)  Cervical disc herniation  Carpal tunnel syndrome: b/l  Cervical disc disease  History of carpal tunnel surgery: 10/2018 left hand  History of tonsillectomy  S/P cervical spinal fusion: 12/15/2018 C4-C6 ACDF, 4/19 - plates and screws  History of Mohs micrographic surgery for skin cancer  History of lumpectomy of right breast: 2013    SOCIAL HISTORY:    - Smoked cigarettes in college. Social EtOH. No IL or IVD use.  - Lives with  in [facility].  - Retired     FAMILY HISTORY:  Family history of colon cancer in mother    Allergies  penicillin (Rash at 3 yo)    ANTIMICROBIALS:  cefepime   IVPB 2000 every 8 hours  vancomycin  IVPB 1000 every 12 hours    ANTIMICROBIALS (past 90 days):  MEDICATIONS  (STANDING):  cefepime   IVPB   100 mL/Hr IV Intermittent (08-05-20 @ 12:54)    cefepime   IVPB   100 mL/Hr IV Intermittent (08-07-20 @ 08:45)   100 mL/Hr IV Intermittent (08-07-20 @ 01:11)   100 mL/Hr IV Intermittent (08-06-20 @ 15:21)   100 mL/Hr IV Intermittent (08-06-20 @ 05:54)   100 mL/Hr IV Intermittent (08-05-20 @ 21:53)    vancomycin  IVPB   250 mL/Hr IV Intermittent (08-05-20 @ 14:10)    vancomycin  IVPB   250 mL/Hr IV Intermittent (08-06-20 @ 22:45)   250 mL/Hr IV Intermittent (08-06-20 @ 02:40)      OTHER MEDS:   MEDICATIONS  (STANDING):  acetaminophen   Tablet .. 650 every 6 hours PRN  oxyCODONE    IR 5 every 6 hours PRN  oxyCODONE    IR 10 every 6 hours PRN      REVIEW OF SYSTEMS  [  ] ROS unobtainable because:    [ V ] All other systems negative except as noted below:	    Constitutional:  [ ] fever [ ] chills  [ ] weight loss  [ ] weakness  Skin:  [ ] rash [ ] phlebitis	  Eyes: [ ] icterus [ ] pain  [ ] discharge	  ENMT: [ ] sore throat  [ ] thrush [ ] ulcers [ ] exudates  Respiratory: [ ] dyspnea [ ] hemoptysis [ ] cough [ ] sputum	  Cardiovascular:  [ ] chest pain [ ] palpitations [ ] edema	  Gastrointestinal:  [ ] nausea [ ] vomiting [ ] diarrhea [ ] constipation [ ] pain	  Genitourinary:  [ ] dysuria [ ] frequency [ ] hematuria [ ] discharge [ ] flank pain  [ ] incontinence  Musculoskeletal:  [ ] myalgias [ ] arthralgias [ ] arthritis  [ ] back pain  Neurological:  [ ] headache [ ] seizures  [ ] confusion/altered mental status  Psychiatric:  [ ] anxiety [ ] depression	  Hematology/Lymphatics:  [ ] lymphadenopathy  Endocrine:  [ ] adrenal [ ] thyroid  Allergic/Immunologic:	 [ ] transplant [ ] seasonal    Vital Signs Last 24 Hrs  T(F): 98.7 (08-07-20 @ 04:27), Max: 99.1 (08-05-20 @ 11:38)    Vital Signs Last 24 Hrs  HR: 100 (08-07-20 @ 04:27) (92 - 102)  BP: 115/70 (08-07-20 @ 04:27) (86/68 - 171/83)  RR: 18 (08-07-20 @ 04:27)  SpO2: 98% (08-07-20 @ 04:27) (93% - 100%)  Wt(kg): --    EXAM:  GA: NAD, A&O*3, moving 4 limbs freely  Neck: VAC in place, redness, slightly warm  CV: tachycardia, S1/S2 normal  Lungs: decreased breathing sounds but clear  Abd: soft, nontender  Ext: no leg edema, left arm looks swollen comparing to right arm, non-pitting edema, left hand dorsum more swollen, but no phlebitis.    Labs:                        9.4    12.76 )-----------( 474      ( 07 Aug 2020 06:00 )             29.6     08-06    139  |  104  |  12  ----------------------------<  114<H>  4.1   |  22  |  0.53    Ca    9.1      06 Aug 2020 05:30    TPro  7.8  /  Alb  3.7  /  TBili  0.9  /  DBili  x   /  AST  69<H>  /  ALT  125<H>  /  AlkPhos  192<H>  08-05      Auto Eosinophil %: 0.5 % (08-05-20 @ 12:30)      WBC Trend:  WBC Count: 12.76 (08-07-20 @ 06:00)  WBC Count: 12.00 (08-06-20 @ 05:30)  WBC Count: 14.97 (08-05-20 @ 12:30)      Creatine Trend:  Creatinine, Serum: 0.53 (08-06)  Creatinine, Serum: 0.66 (08-05)      MICROBIOLOGY:      Culture - Tissue with Gram Stain (collected 07 Aug 2020 05:57)  Source: .Tissue CERVICAL SPINE SOFT TISSUE PRE WASH  Gram Stain (07 Aug 2020 06:55):    Rare polymorphonuclear leukocytes seen per low power field    No organisms seen per oil power field    Culture - Other (collected 05 Aug 2020 21:07)  Source: .Other upper back draining wound  Preliminary Report (06 Aug 2020 20:55):    Normal skin enedelia isolated    Culture - Blood (collected 05 Aug 2020 14:34)  Source: .Blood Blood-Venous  Preliminary Report (06 Aug 2020 15:01):    No growth to date.    Culture - Blood (collected 05 Aug 2020 14:34)  Source: .Blood Blood-Peripheral  Preliminary Report (06 Aug 2020 15:01):    No growth to date.      RADIOLOGY:  imaging below personally reviewed    < from: MR Cervical Spine w/wo IV Cont (08.06.20 @ 15:25) >  IMPRESSION: Extensive postop changes and degenerative changes are identified described above.    There is evidence of an abnormal collection seen in the central to right posterior paraspinal soft tissue region. This subcutaneous collection does appear to demonstrate associated air and peripheral enhancement. This could be compatible with an underlying abscess. Please correlate clinically.    Compression deformity seen involving the superior endplate of L5 with associated abnormal T1 and T2 prolongation seen as well.    < end of copied text >      < from: CT Thoracic Spine w/ IV Cont (08.05.20 @ 15:52) >  IMPRESSION:    1. Dorsal soft tissue abscess at the level of T1 with further asymmetric cephalad extension on the left side with associated surrounding soft tissue inflammatory changes. Please see discussion in the body of the report.    2. Superimposed infected posterior fusion hardware cannot be excluded. Superimposed osteomyelitis in these areas cannot also be excluded. Further evaluation with a contrast-enhanced MRI examination can be obtained, as clinically warranted, and if there are no clinical contraindications.    3. Unchanged mild chronic compression deformities involving the T1, T3, T4, T5, and T9 vertebral bodies.    < end of copied text >      OTHER TESTS:  COVID-19 PCR: NotDetec (08-05-20 @ 13:45)  Sedimentation Rate, Erythrocyte: 87 (08-07 @ 06:00)  C-Reactive Protein, Serum: 102.6 (08-07 @ 06:00)  Blood Gas Venous - Lactate: 1.7 (08-05 @ 12:20)

## 2020-08-08 LAB
CULTURE RESULTS: SIGNIFICANT CHANGE UP
GRAM STN FLD: SIGNIFICANT CHANGE UP
SPECIMEN SOURCE: SIGNIFICANT CHANGE UP
SPECIMEN SOURCE: SIGNIFICANT CHANGE UP

## 2020-08-08 RX ORDER — SODIUM CHLORIDE 9 MG/ML
3 INJECTION INTRAMUSCULAR; INTRAVENOUS; SUBCUTANEOUS EVERY 8 HOURS
Refills: 0 | Status: DISCONTINUED | OUTPATIENT
Start: 2020-08-08 | End: 2020-08-20

## 2020-08-08 RX ORDER — HYDROMORPHONE HYDROCHLORIDE 2 MG/ML
0.5 INJECTION INTRAMUSCULAR; INTRAVENOUS; SUBCUTANEOUS
Refills: 0 | Status: DISCONTINUED | OUTPATIENT
Start: 2020-08-08 | End: 2020-08-08

## 2020-08-08 RX ORDER — ONDANSETRON 8 MG/1
4 TABLET, FILM COATED ORAL ONCE
Refills: 0 | Status: COMPLETED | OUTPATIENT
Start: 2020-08-08 | End: 2020-08-08

## 2020-08-08 RX ADMIN — Medication 250 MILLIGRAM(S): at 02:09

## 2020-08-08 RX ADMIN — HYDROMORPHONE HYDROCHLORIDE 0.5 MILLIGRAM(S): 2 INJECTION INTRAMUSCULAR; INTRAVENOUS; SUBCUTANEOUS at 13:54

## 2020-08-08 RX ADMIN — SODIUM CHLORIDE 3 MILLILITER(S): 9 INJECTION INTRAMUSCULAR; INTRAVENOUS; SUBCUTANEOUS at 21:08

## 2020-08-08 RX ADMIN — OXYCODONE HYDROCHLORIDE 5 MILLIGRAM(S): 5 TABLET ORAL at 05:23

## 2020-08-08 RX ADMIN — CEFEPIME 100 MILLIGRAM(S): 1 INJECTION, POWDER, FOR SOLUTION INTRAMUSCULAR; INTRAVENOUS at 16:00

## 2020-08-08 RX ADMIN — OXYCODONE HYDROCHLORIDE 5 MILLIGRAM(S): 5 TABLET ORAL at 06:18

## 2020-08-08 RX ADMIN — SODIUM CHLORIDE 75 MILLILITER(S): 9 INJECTION INTRAMUSCULAR; INTRAVENOUS; SUBCUTANEOUS at 15:23

## 2020-08-08 RX ADMIN — CEFEPIME 100 MILLIGRAM(S): 1 INJECTION, POWDER, FOR SOLUTION INTRAMUSCULAR; INTRAVENOUS at 08:46

## 2020-08-08 RX ADMIN — SODIUM CHLORIDE 75 MILLILITER(S): 9 INJECTION INTRAMUSCULAR; INTRAVENOUS; SUBCUTANEOUS at 08:47

## 2020-08-08 RX ADMIN — OXYCODONE HYDROCHLORIDE 10 MILLIGRAM(S): 5 TABLET ORAL at 13:54

## 2020-08-08 RX ADMIN — Medication 12.5 MILLIGRAM(S): at 05:22

## 2020-08-08 RX ADMIN — ANASTROZOLE 1 MILLIGRAM(S): 1 TABLET ORAL at 15:55

## 2020-08-08 RX ADMIN — OXYCODONE HYDROCHLORIDE 10 MILLIGRAM(S): 5 TABLET ORAL at 14:22

## 2020-08-08 RX ADMIN — Medication 250 MILLIGRAM(S): at 13:01

## 2020-08-08 RX ADMIN — SODIUM CHLORIDE 75 MILLILITER(S): 9 INJECTION INTRAMUSCULAR; INTRAVENOUS; SUBCUTANEOUS at 00:15

## 2020-08-08 RX ADMIN — HYDROMORPHONE HYDROCHLORIDE 0.5 MILLIGRAM(S): 2 INJECTION INTRAMUSCULAR; INTRAVENOUS; SUBCUTANEOUS at 14:22

## 2020-08-08 RX ADMIN — ONDANSETRON 4 MILLIGRAM(S): 8 TABLET, FILM COATED ORAL at 13:47

## 2020-08-08 RX ADMIN — CEFEPIME 100 MILLIGRAM(S): 1 INJECTION, POWDER, FOR SOLUTION INTRAMUSCULAR; INTRAVENOUS at 00:28

## 2020-08-08 RX ADMIN — OXYCODONE HYDROCHLORIDE 5 MILLIGRAM(S): 5 TABLET ORAL at 00:11

## 2020-08-08 NOTE — PROGRESS NOTE ADULT - ASSESSMENT
69 yo F now from washout of cervical/thoracic spine down to her hardware on 8/6/20 and repeat washout and vac change on 8/8, where the infection was tracking to, but no pus was found at the hardware level. only superficially above the muscle did I find induration and some pus. cultures were obtained and we will follow.  awaiting final cultures, so far no growth.     plan:   will follow along closely  abx per ID recommendations, f/u cultures  prophylactic anticoagulation per primary, no need to hold for OR  Plan to return to OR Tuesday if cultures are negative by then for definitive closure  Call my cell phone below directly with any questions or concerns    Norbert Zhou MD  Plastic Surgery  [phone]

## 2020-08-08 NOTE — PROGRESS NOTE ADULT - ASSESSMENT
P;  - q4 neuro checks  - q4 vitals  - plastic sf/u  - pain control  - f/u OR cltrs  - S/S eval for dysphagia done, on diet  - f/u AM labs  - will need PICC  - NPO for possible repeat washout in AM

## 2020-08-09 DIAGNOSIS — R93.89 ABNORMAL FINDINGS ON DIAGNOSTIC IMAGING OF OTHER SPECIFIED BODY STRUCTURES: ICD-10-CM

## 2020-08-09 DIAGNOSIS — R13.10 DYSPHAGIA, UNSPECIFIED: ICD-10-CM

## 2020-08-09 LAB — VANCOMYCIN TROUGH SERPL-MCNC: 17.2 UG/ML — SIGNIFICANT CHANGE UP (ref 10–20)

## 2020-08-09 RX ORDER — ENOXAPARIN SODIUM 100 MG/ML
40 INJECTION SUBCUTANEOUS AT BEDTIME
Refills: 0 | Status: DISCONTINUED | OUTPATIENT
Start: 2020-08-09 | End: 2020-08-11

## 2020-08-09 RX ADMIN — CEFEPIME 100 MILLIGRAM(S): 1 INJECTION, POWDER, FOR SOLUTION INTRAMUSCULAR; INTRAVENOUS at 00:59

## 2020-08-09 RX ADMIN — Medication 12.5 MILLIGRAM(S): at 05:02

## 2020-08-09 RX ADMIN — CEFEPIME 100 MILLIGRAM(S): 1 INJECTION, POWDER, FOR SOLUTION INTRAMUSCULAR; INTRAVENOUS at 17:33

## 2020-08-09 RX ADMIN — SODIUM CHLORIDE 3 MILLILITER(S): 9 INJECTION INTRAMUSCULAR; INTRAVENOUS; SUBCUTANEOUS at 14:53

## 2020-08-09 RX ADMIN — SODIUM CHLORIDE 3 MILLILITER(S): 9 INJECTION INTRAMUSCULAR; INTRAVENOUS; SUBCUTANEOUS at 21:42

## 2020-08-09 RX ADMIN — OXYCODONE HYDROCHLORIDE 10 MILLIGRAM(S): 5 TABLET ORAL at 23:45

## 2020-08-09 RX ADMIN — OXYCODONE HYDROCHLORIDE 5 MILLIGRAM(S): 5 TABLET ORAL at 16:24

## 2020-08-09 RX ADMIN — ANASTROZOLE 1 MILLIGRAM(S): 1 TABLET ORAL at 11:06

## 2020-08-09 RX ADMIN — OXYCODONE HYDROCHLORIDE 5 MILLIGRAM(S): 5 TABLET ORAL at 09:20

## 2020-08-09 RX ADMIN — OXYCODONE HYDROCHLORIDE 5 MILLIGRAM(S): 5 TABLET ORAL at 01:13

## 2020-08-09 RX ADMIN — SODIUM CHLORIDE 3 MILLILITER(S): 9 INJECTION INTRAMUSCULAR; INTRAVENOUS; SUBCUTANEOUS at 05:01

## 2020-08-09 RX ADMIN — OXYCODONE HYDROCHLORIDE 5 MILLIGRAM(S): 5 TABLET ORAL at 02:00

## 2020-08-09 RX ADMIN — OXYCODONE HYDROCHLORIDE 10 MILLIGRAM(S): 5 TABLET ORAL at 23:14

## 2020-08-09 RX ADMIN — Medication 250 MILLIGRAM(S): at 02:27

## 2020-08-09 RX ADMIN — OXYCODONE HYDROCHLORIDE 5 MILLIGRAM(S): 5 TABLET ORAL at 08:50

## 2020-08-09 RX ADMIN — Medication 5 MILLIGRAM(S): at 05:02

## 2020-08-09 RX ADMIN — OXYCODONE HYDROCHLORIDE 5 MILLIGRAM(S): 5 TABLET ORAL at 16:54

## 2020-08-09 RX ADMIN — Medication 250 MILLIGRAM(S): at 14:53

## 2020-08-09 RX ADMIN — CEFEPIME 100 MILLIGRAM(S): 1 INJECTION, POWDER, FOR SOLUTION INTRAMUSCULAR; INTRAVENOUS at 08:50

## 2020-08-09 NOTE — PROGRESS NOTE ADULT - ASSESSMENT
71 YO female, S/P washout of cervical/thoracic spine down to her hardware on 8/6/20 and repeat washout and vac change on 8/8  Concerns expressed regarding mild dysphagia  MRI noted abnormal signal in Thyroid, recommended further imaging

## 2020-08-09 NOTE — CHART NOTE - NSCHARTNOTEFT_GEN_A_CORE
Discussed MBS with patient to further address dysphagia despite evaluation by S&S (requested by family) however patient refusing study stating shes had it before and did not like the taste of the dye.   MBS cancelled for now. Patient agreed for thyroid US

## 2020-08-09 NOTE — PROVIDER CONTACT NOTE (OTHER) - SITUATION
Pt NPO for MRCP, RN unable to get in contact with IR regarding timing of procedure. Pt states he is hungry

## 2020-08-09 NOTE — PROGRESS NOTE ADULT - ASSESSMENT
71 yo F now from washout of cervical/thoracic spine down to her hardware on 8/6/20 and repeat washout and vac change on 8/8, where the infection was tracking to, but no pus was found at the hardware level. only superficially above the muscle did I find induration and some pus. cultures were obtained and we will follow.  awaiting final cultures, so far no growth.     plan:   will follow along closely  abx per ID recommendations, f/u cultures  Encourage side to side positioning for pressure offloading x4 weeks  prophylactic anticoagulation per primary, no need to hold for OR  Plan to return to OR Tuesday if cultures are negative by then for definitive closure  OK to take NSAIDs from my standpoint as needed for pain  Call my cell phone below directly with any questions or concerns  Please call [phone] to schedule follow up appointment at time of discharge, to be seen in clinic 1 week after surgery.     Norbert Zhou MD  Plastic Surgery  [phone]

## 2020-08-09 NOTE — PROVIDER CONTACT NOTE (MEDICATION) - ACTION/TREATMENT ORDERED:
as per ROLY Young, pt tolerating abx well, pt has not had a reaction, will continue with abx; monitor for any side effects of a reaction

## 2020-08-09 NOTE — PROVIDER CONTACT NOTE (MEDICATION) - ASSESSMENT
pt in NAD; VSS. Pt states she has not had a reaction to the abx since she has been on here in the hospital

## 2020-08-10 LAB
ANION GAP SERPL CALC-SCNC: 12 MMO/L — SIGNIFICANT CHANGE UP (ref 7–14)
APTT BLD: 26.3 SEC — LOW (ref 27–36.3)
BLD GP AB SCN SERPL QL: NEGATIVE — SIGNIFICANT CHANGE UP
BUN SERPL-MCNC: 7 MG/DL — SIGNIFICANT CHANGE UP (ref 7–23)
CALCIUM SERPL-MCNC: 9.5 MG/DL — SIGNIFICANT CHANGE UP (ref 8.4–10.5)
CHLORIDE SERPL-SCNC: 101 MMOL/L — SIGNIFICANT CHANGE UP (ref 98–107)
CO2 SERPL-SCNC: 24 MMOL/L — SIGNIFICANT CHANGE UP (ref 22–31)
CREAT SERPL-MCNC: 0.48 MG/DL — LOW (ref 0.5–1.3)
CULTURE RESULTS: SIGNIFICANT CHANGE UP
CULTURE RESULTS: SIGNIFICANT CHANGE UP
GLUCOSE SERPL-MCNC: 95 MG/DL — SIGNIFICANT CHANGE UP (ref 70–99)
HCT VFR BLD CALC: 29.3 % — LOW (ref 34.5–45)
HGB BLD-MCNC: 9.3 G/DL — LOW (ref 11.5–15.5)
INR BLD: 1.15 — SIGNIFICANT CHANGE UP (ref 0.88–1.16)
MAGNESIUM SERPL-MCNC: 1.8 MG/DL — SIGNIFICANT CHANGE UP (ref 1.6–2.6)
MCHC RBC-ENTMCNC: 30.2 PG — SIGNIFICANT CHANGE UP (ref 27–34)
MCHC RBC-ENTMCNC: 31.7 % — LOW (ref 32–36)
MCV RBC AUTO: 95.1 FL — SIGNIFICANT CHANGE UP (ref 80–100)
NRBC # FLD: 0 K/UL — SIGNIFICANT CHANGE UP (ref 0–0)
PHOSPHATE SERPL-MCNC: 2.4 MG/DL — LOW (ref 2.5–4.5)
PLATELET # BLD AUTO: 562 K/UL — HIGH (ref 150–400)
PMV BLD: 8.8 FL — SIGNIFICANT CHANGE UP (ref 7–13)
POTASSIUM SERPL-MCNC: 3.7 MMOL/L — SIGNIFICANT CHANGE UP (ref 3.5–5.3)
POTASSIUM SERPL-SCNC: 3.7 MMOL/L — SIGNIFICANT CHANGE UP (ref 3.5–5.3)
PROTHROM AB SERPL-ACNC: 13 SEC — SIGNIFICANT CHANGE UP (ref 10.6–13.6)
RBC # BLD: 3.08 M/UL — LOW (ref 3.8–5.2)
RBC # FLD: 14.3 % — SIGNIFICANT CHANGE UP (ref 10.3–14.5)
RH IG SCN BLD-IMP: POSITIVE — SIGNIFICANT CHANGE UP
SODIUM SERPL-SCNC: 137 MMOL/L — SIGNIFICANT CHANGE UP (ref 135–145)
SPECIMEN SOURCE: SIGNIFICANT CHANGE UP
SPECIMEN SOURCE: SIGNIFICANT CHANGE UP
T3 SERPL-MCNC: 74.9 NG/DL — LOW (ref 80–200)
T4 AB SER-ACNC: 5.51 UG/DL — SIGNIFICANT CHANGE UP (ref 5.1–13)
T4 FREE SERPL-MCNC: 1.04 NG/DL — SIGNIFICANT CHANGE UP (ref 0.9–1.8)
TSH SERPL-MCNC: 4.59 UIU/ML — HIGH (ref 0.27–4.2)
WBC # BLD: 13.72 K/UL — HIGH (ref 3.8–10.5)
WBC # FLD AUTO: 13.72 K/UL — HIGH (ref 3.8–10.5)

## 2020-08-10 PROCEDURE — 99223 1ST HOSP IP/OBS HIGH 75: CPT

## 2020-08-10 PROCEDURE — 99232 SBSQ HOSP IP/OBS MODERATE 35: CPT

## 2020-08-10 PROCEDURE — 76536 US EXAM OF HEAD AND NECK: CPT | Mod: 26

## 2020-08-10 RX ORDER — CEFTRIAXONE 500 MG/1
2000 INJECTION, POWDER, FOR SOLUTION INTRAMUSCULAR; INTRAVENOUS EVERY 24 HOURS
Refills: 0 | Status: DISCONTINUED | OUTPATIENT
Start: 2020-08-10 | End: 2020-08-20

## 2020-08-10 RX ADMIN — OXYCODONE HYDROCHLORIDE 5 MILLIGRAM(S): 5 TABLET ORAL at 23:33

## 2020-08-10 RX ADMIN — CEFEPIME 100 MILLIGRAM(S): 1 INJECTION, POWDER, FOR SOLUTION INTRAMUSCULAR; INTRAVENOUS at 09:20

## 2020-08-10 RX ADMIN — CEFEPIME 100 MILLIGRAM(S): 1 INJECTION, POWDER, FOR SOLUTION INTRAMUSCULAR; INTRAVENOUS at 01:24

## 2020-08-10 RX ADMIN — ANASTROZOLE 1 MILLIGRAM(S): 1 TABLET ORAL at 12:23

## 2020-08-10 RX ADMIN — SODIUM CHLORIDE 3 MILLILITER(S): 9 INJECTION INTRAMUSCULAR; INTRAVENOUS; SUBCUTANEOUS at 05:40

## 2020-08-10 RX ADMIN — Medication 250 MILLIGRAM(S): at 02:03

## 2020-08-10 RX ADMIN — OXYCODONE HYDROCHLORIDE 5 MILLIGRAM(S): 5 TABLET ORAL at 16:00

## 2020-08-10 RX ADMIN — Medication 12.5 MILLIGRAM(S): at 05:45

## 2020-08-10 RX ADMIN — CEFTRIAXONE 100 MILLIGRAM(S): 500 INJECTION, POWDER, FOR SOLUTION INTRAMUSCULAR; INTRAVENOUS at 16:46

## 2020-08-10 RX ADMIN — OXYCODONE HYDROCHLORIDE 5 MILLIGRAM(S): 5 TABLET ORAL at 08:18

## 2020-08-10 RX ADMIN — ENOXAPARIN SODIUM 40 MILLIGRAM(S): 100 INJECTION SUBCUTANEOUS at 21:22

## 2020-08-10 RX ADMIN — OXYCODONE HYDROCHLORIDE 5 MILLIGRAM(S): 5 TABLET ORAL at 09:18

## 2020-08-10 RX ADMIN — OXYCODONE HYDROCHLORIDE 5 MILLIGRAM(S): 5 TABLET ORAL at 15:06

## 2020-08-10 RX ADMIN — SODIUM CHLORIDE 3 MILLILITER(S): 9 INJECTION INTRAMUSCULAR; INTRAVENOUS; SUBCUTANEOUS at 15:29

## 2020-08-10 RX ADMIN — SODIUM CHLORIDE 3 MILLILITER(S): 9 INJECTION INTRAMUSCULAR; INTRAVENOUS; SUBCUTANEOUS at 21:23

## 2020-08-10 NOTE — PROGRESS NOTE ADULT - ASSESSMENT
71 YO female, S/P washout of cervical/thoracic spine down to her hardware on 8/6/20 and repeat washout and vac change on 8/8  MRI noted abnormal signal in Thyroid, recommended further imaging, ultrasound pending

## 2020-08-10 NOTE — CONSULT NOTE ADULT - ASSESSMENT
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), breast ca s/p right lumpectomy 2014 and radiation currently on anastrazole, L renal mass s/p left lap alba-nephrectomy 6/2020, skin cancer s/p Moh's surgery, HTN, HLD, osteoporosis, cervical stenosis with compression Nov 2018 s/p C4-6 ACDF 12/4/18 and revision of anterior cervical discectomy with fusion of C4-C6 with extension to C3, as well as C2-T2 posterior fusion with right iliac crest autograft in 2019 which was c/b Rapid Afib and treated with amio/anticoagulation, dysphagia treated for aspiration pneumonia, had PEG which was removed on 6/12/2020 presents this admission with complaints of swelling and drainage from neck wound. Incidentally found to have thyroid nodules    # thyroid nodules  unlikely to be cause of patient's dysphagia  Thyroid US with heterogeneous thyroid, b/l nodules. Right lobe: 3.2x1.3x1.2 with TIRADS 4 and left lobe: 3.5x1.6x1.5cm with TIRADS 2  recommend FNA biopsy: can be done as outpatient with IR  check TSH, FT4    # dysphagia: possibly contributed by history of radiation  seen by S/S, currently on pureed diet  Patient refusing MBS study    # Neck abscess with hardware infection s/p OR washout and wound vac placement on 8/6/2020  - Started Ceftriaxone 2g q 24 per ID recs  - Tentative plan is to do IV abx at least 6-8 weeks, probably will need a PICC line  - Culture from OR with strep intermedius  - Plan for return to OR tomorrow 8/11 per Nsx  - 8/5 Bcx NGTD  - wound care per primary team  - pain management per primary team    # tachycardia, h/o Afib previously on AC and s/p amio  - obtain EKG  - currently on metoprolol    # h/o breast ca  - c/w anastrazole  - followup with Onc as outpatient    # prophylactic measure  on Lovenox for DVT ppx  pureed diet as tolerated  Will need to have advance care directives/goals of care discussed with patient given her refusal for tests/procedures stating she has had enough    Discussed with patient and daughter at bedside

## 2020-08-10 NOTE — PROGRESS NOTE ADULT - ASSESSMENT
69 yo F now from washout of cervical/thoracic spine down to her hardware on 8/6/20 and repeat washout and vac change on 8/8, where the infection was tracking to, but no pus was found at the hardware level. only superficially above the muscle did I find induration and some pus. cultures were obtained and we will follow.  awaiting final cultures, so far no growth.     plan:   will follow along closely  abx per ID recommendations, f/u cultures  Encourage side to side positioning for pressure offloading x4 weeks  prophylactic anticoagulation per primary, no need to hold for OR  Plan to return to OR Wednesday after cultures finalized, hoping to close back if post wash cultures remain negative.   OK to take NSAIDs from my standpoint as needed for pain  Call my cell phone below directly with any questions or concerns  Please call [phone] to schedule follow up appointment at time of discharge, to be seen in clinic 1 week after surgery.     Norbert Zhou MD  Plastic Surgery  [phone]

## 2020-08-10 NOTE — CONSULT NOTE ADULT - SUBJECTIVE AND OBJECTIVE BOX
CHIEF COMPLAINT: Patient is a 70y old  Female who presents with a chief complaint of neck abscess (10 Aug 2020 09:54)      HPI: HPI:  71 yo F PMHx of Hodgkins  s/p radiation (in remission), breast ca s/p right lumpectomy  and radiation currently on anastrazole, L renal mass s/p left lap alba-nephrectomy 2020, skin cancer s/p Moh's surgery, HTN, HLD, osteoporosis, cervical stenosis with compression 2018 s/p C4-6 ACDF 18 and revision of anterior cervical discectomy with fusion of C4-C6 with extension to C3, as well as C2-T2 posterior fusion with right iliac crest autograft in  which was c/b Rapid Afib and treated with amio/anticoagulation, dysphagia treated for aspiration pneumonia, had PEG which was removed on 2020 presents this admission with complaints of swelling and drainage from neck wound. She was started on Vanc/Cefepime. Incidentally found on imaging to have thyroid nodules and had thyroid US confirmed left and right thyroid nodules with TIRADS 2 and 4 respectively. Patient denies chest pain, SOB, reports she is "cold" all the time, has always had significant hair loss but no recent weight changes. Reports difficulty swallowing solids and liquids but more so liquids that has been worsening over past 3 years. Denies pain with swallowing and reports she has mostly been tolerating the pureed food, has some difficulty with thin liquids. Patient has been refusing MBS study stating she doesn't want any further tests.    Pain Symptoms if applicable:             	                         none	   mild         moderate         severe  	                            0	    1-3	     4-6	         7-10  Pain:  Location:	  Modifying factors:	  Associated symptoms:	    Allergies    penicillin (Rash)    Intolerances        HOME MEDICATIONS: [x] Reviewed    MEDICATIONS  (STANDING):  anastrozole 1 milliGRAM(s) Oral daily  atorvastatin 40 milliGRAM(s) Oral at bedtime  cefTRIAXone   IVPB 2000 milliGRAM(s) IV Intermittent every 24 hours  enoxaparin Injectable 40 milliGRAM(s) SubCutaneous at bedtime  metoprolol tartrate 12.5 milliGRAM(s) Oral daily  sodium chloride 0.9% lock flush 3 milliLiter(s) IV Push every 8 hours    MEDICATIONS  (PRN):  acetaminophen   Tablet .. 650 milliGRAM(s) Oral every 6 hours PRN Temp greater or equal to 38C (100.4F), Mild Pain (1 - 3)  diazepam    Tablet 5 milliGRAM(s) Oral three times a day PRN muscle spasms  oxyCODONE    IR 5 milliGRAM(s) Oral every 6 hours PRN Moderate Pain (4 - 6)  oxyCODONE    IR 10 milliGRAM(s) Oral every 6 hours PRN Severe Pain (7 - 10)  polyethylene glycol 3350 17 Gram(s) Oral daily PRN Constipation      PAST MEDICAL & SURGICAL HISTORY:  Kidney mass  Anxiety  History of breast cancer: s/p right lumpectomy , RT  Hodgkin disease: , underwent radiation therapy  HLD (hyperlipidemia)  HTN (hypertension)  Cervical disc herniation  Carpal tunnel syndrome: b/l  Cervical disc disease  History of carpal tunnel surgery: 10/2018 left hand  History of tonsillectomy  S/P cervical spinal fusion: 12/15/2018 C4-C6 ACDF,  - plates and screws  History of Mohs micrographic surgery for skin cancer  History of lumpectomy of right breast:   [X ] Reviewed     SOCIAL HISTORY:  [x] Substance abuse: denies  [x] Tobacco: smoked occasionally when she was young but has had no cigarettes since   [x] Alcohol use: occasional ETOH use    FAMILY HISTORY:  Family history of colon cancer and MS in mother  CHF in father who  at age 94  [x] No pertinent family history in first degree relatives     REVIEW OF SYSTEMS:    [x] All other ROS negative  [  ] Unable to obtain due to poor mental status    Vital Signs Last 24 Hrs  T(C): 37 (10 Aug 2020 13:00), Max: 37 (10 Aug 2020 13:00)  T(F): 98.6 (10 Aug 2020 13:00), Max: 98.6 (10 Aug 2020 13:00)  HR: 103 (10 Aug 2020 13:00) (71 - 103)  BP: 93/57 (10 Aug 2020 13:00) (93/57 - 121/71)  BP(mean): --  RR: 16 (10 Aug 2020 13:00) (16 - 18)  SpO2: 100% (10 Aug 2020 13:00) (94% - 100%)    PHYSICAL EXAM:    GENERAL: NAD, on room air, seated in bedside chair  HEENT: sclera clear, EOMI, PERRL, MMM  NECK: posterior neck incision with wound vac attached, posterior approach to thyroid exam done: no thyromegaly, no significant thyroid nodules palpable  RESPIRATORY: Clear to auscultation bilaterally; No crackles or wheezing  CARDIOVASCULAR: s1/s2, regular, tachycardic; No murmurs appreciated, trace pitting edema of legs  GASTROINTESTINAL: Soft, Nontender, Nondistended; Bowel sounds present  GENITOURINARY: no matos  EXTREMITIES:  WWP, trace pitting edema b/l lower legs  NERVOUS SYSTEM:  awake, alert, answers Qs appropriately, no gross deficits  PSYCH: calm cooperative  SKIN: No rashes or lesions; Incisions C/D/I    LABS:                        9.3    13.72 )-----------( 562      ( 10 Aug 2020 05:15 )             29.3     08-10    137  |  101  |  7   ----------------------------<  95  3.7   |  24  |  0.48<L>    Ca    9.5      10 Aug 2020 05:15  Phos  2.4     08-10  Mg     1.8     08-10      PT/INR - ( 10 Aug 2020 05:15 )   PT: 13.0 SEC;   INR: 1.15          PTT - ( 10 Aug 2020 05:15 )  PTT:26.3 SEC    CAPILLARY BLOOD GLUCOSE          RADIOLOGY & ADDITIONAL STUDIES:    EKG:   Personally Reviewed:  [x] YES     Imaging:   Personally Reviewed:  [x] YES               [ ] Consultant(s) Notes Reviewed  [x] Care Discussed with Consultants/Other Providers:

## 2020-08-10 NOTE — PROGRESS NOTE ADULT - PROBLEM SELECTOR PLAN 1
Continue antibiotics  Continue wound vac  Plan for return to OR tomorrow  Preop labs  NPO after midnight

## 2020-08-10 NOTE — PROGRESS NOTE ADULT - ASSESSMENT
71 yo F Hx of Hodgkins 1981, breast ca with lumpectomy right 2014, HTN, HLD, osteoporosis, found to have cervical stenosis with compression Nov 2018, s/p C4-6 ACDF 12/4/18  Leukocytosis, no fever  CT with T1 abscess, concern for possible hardware infection  8/6 OR with washout/debridement to back abscess site; hardware retained  Culture from OR with strep intermedius  Overall,  1) Infected hardware/abscess  - Ceftriaxone 2g q 24  - DC Vanco/Cefepime  - F/U pending OR cultures  - Wound care per primary team  2) Leukocytosis  - Trend to normal  - F/U BCXs  3) Elevated ESR  - Trend to normal    Xiang Alvarado MD  Pager [phone]  After 5pm and on weekends call [phone]

## 2020-08-11 PROCEDURE — 99232 SBSQ HOSP IP/OBS MODERATE 35: CPT

## 2020-08-11 PROCEDURE — 74230 X-RAY XM SWLNG FUNCJ C+: CPT | Mod: 26

## 2020-08-11 PROCEDURE — 93010 ELECTROCARDIOGRAM REPORT: CPT

## 2020-08-11 RX ORDER — SENNA PLUS 8.6 MG/1
2 TABLET ORAL AT BEDTIME
Refills: 0 | Status: DISCONTINUED | OUTPATIENT
Start: 2020-08-11 | End: 2020-08-20

## 2020-08-11 RX ORDER — ONDANSETRON 8 MG/1
4 TABLET, FILM COATED ORAL EVERY 8 HOURS
Refills: 0 | Status: DISCONTINUED | OUTPATIENT
Start: 2020-08-11 | End: 2020-08-11

## 2020-08-11 RX ADMIN — SODIUM CHLORIDE 3 MILLILITER(S): 9 INJECTION INTRAMUSCULAR; INTRAVENOUS; SUBCUTANEOUS at 21:12

## 2020-08-11 RX ADMIN — CEFTRIAXONE 100 MILLIGRAM(S): 500 INJECTION, POWDER, FOR SOLUTION INTRAMUSCULAR; INTRAVENOUS at 18:08

## 2020-08-11 RX ADMIN — SENNA PLUS 2 TABLET(S): 8.6 TABLET ORAL at 21:12

## 2020-08-11 RX ADMIN — Medication 12.5 MILLIGRAM(S): at 05:59

## 2020-08-11 RX ADMIN — OXYCODONE HYDROCHLORIDE 5 MILLIGRAM(S): 5 TABLET ORAL at 12:51

## 2020-08-11 RX ADMIN — OXYCODONE HYDROCHLORIDE 5 MILLIGRAM(S): 5 TABLET ORAL at 06:39

## 2020-08-11 RX ADMIN — OXYCODONE HYDROCHLORIDE 5 MILLIGRAM(S): 5 TABLET ORAL at 13:21

## 2020-08-11 RX ADMIN — OXYCODONE HYDROCHLORIDE 5 MILLIGRAM(S): 5 TABLET ORAL at 06:00

## 2020-08-11 RX ADMIN — ANASTROZOLE 1 MILLIGRAM(S): 1 TABLET ORAL at 12:53

## 2020-08-11 RX ADMIN — SODIUM CHLORIDE 3 MILLILITER(S): 9 INJECTION INTRAMUSCULAR; INTRAVENOUS; SUBCUTANEOUS at 14:43

## 2020-08-11 RX ADMIN — OXYCODONE HYDROCHLORIDE 5 MILLIGRAM(S): 5 TABLET ORAL at 00:25

## 2020-08-11 RX ADMIN — SODIUM CHLORIDE 3 MILLILITER(S): 9 INJECTION INTRAMUSCULAR; INTRAVENOUS; SUBCUTANEOUS at 06:00

## 2020-08-11 RX ADMIN — OXYCODONE HYDROCHLORIDE 5 MILLIGRAM(S): 5 TABLET ORAL at 18:55

## 2020-08-11 RX ADMIN — OXYCODONE HYDROCHLORIDE 5 MILLIGRAM(S): 5 TABLET ORAL at 19:25

## 2020-08-11 NOTE — DIETITIAN INITIAL EVALUATION ADULT. - OTHER INFO
RD visited with patient for LOS nutrition assessment.      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 s/p radiation (in remission), breast ca s/p right lumpectomy 2014 and radiation (currently on anastrazole), L renal mass s/p left lap alba-nephrectomy 6/2020, skin cancer s/p Moh's surgery, HTN, HLD, osteoporosis, cervical stenosis with compression Nov 2018 s/p C4-6 ACDF 12/4/18 and revision of anterior cervical discectomy with fusion of C4-C6 with extension to C3, as well as C2-T2 posterior fusion with right iliac crest autograft in 2019 which was c/b Rapid Afib and treated with amio/anticoagulation, dysphagia treated for aspiration pneumonia, had PEG which was removed on 6/12/2020 presents this admission with complaints of swelling and drainage from neck wound. Incidentally found to have thyroid nodules.  Patient with neck abscess noted with hardware infection, s/p OR washout and wound vac placement on 8/6/20.      Patient endorses a good appetite, was previously NPO, awaiting lunch meal.  Patient with dysphagia, noted possibly secondary to h/o radiation tx.  Food preferences made known by patient.  Denies any GI distress i.e. nausea, vomiting diarrhea.  Reported usual body weight 150 pounds and denies weight change.  Patient noted with 1+ edema to lt arm.  No food allergies reported.  Discussed with patient potential acceptance to oral nutrition supplement i.e. Ensure, for which patient was amenable. MBS from 8/11/20 reviewed - Dysphagia 2 diet with thin liquids recommended.

## 2020-08-11 NOTE — PROGRESS NOTE ADULT - ASSESSMENT
69 yo F now from washout of cervical/thoracic spine down to her hardware on 8/6/20 and repeat washout and vac change on 8/8, where the infection was tracking to, but no pus was found at the hardware level. only superficially above the muscle did I find induration and some pus. cultures were obtained and we will follow.  awaiting final cultures, so far no growth.     plan:   Plan for OR tomorrow, 8/12, for likely definitive closure, please make NPO at midnight  will follow along closely  abx per ID recommendations, f/u cultures  Encourage side to side positioning for pressure offloading x4 weeks  prophylactic anticoagulation per primary, no need to hold for OR  OK to take NSAIDs from my standpoint as needed for pain  Call my cell phone below directly with any questions or concerns  Please call [phone] to schedule follow up appointment at time of discharge, to be seen in clinic 1 week after surgery.     Norbert Zhou MD  Plastic Surgery  [phone]

## 2020-08-11 NOTE — DIETITIAN INITIAL EVALUATION ADULT. - PERTINENT MEDS FT
MEDICATIONS  (STANDING):  anastrozole 1 milliGRAM(s) Oral daily  atorvastatin 40 milliGRAM(s) Oral at bedtime  cefTRIAXone   IVPB 2000 milliGRAM(s) IV Intermittent every 24 hours  enoxaparin Injectable 40 milliGRAM(s) SubCutaneous at bedtime  metoprolol tartrate 12.5 milliGRAM(s) Oral daily  senna 2 Tablet(s) Oral at bedtime  sodium chloride 0.9% lock flush 3 milliLiter(s) IV Push every 8 hours    MEDICATIONS  (PRN):  acetaminophen   Tablet .. 650 milliGRAM(s) Oral every 6 hours PRN Temp greater or equal to 38C (100.4F), Mild Pain (1 - 3)  diazepam    Tablet 5 milliGRAM(s) Oral three times a day PRN muscle spasms  oxyCODONE    IR 5 milliGRAM(s) Oral every 6 hours PRN Moderate Pain (4 - 6)  oxyCODONE    IR 10 milliGRAM(s) Oral every 6 hours PRN Severe Pain (7 - 10)  polyethylene glycol 3350 17 Gram(s) Oral daily PRN Constipation

## 2020-08-11 NOTE — DIETITIAN INITIAL EVALUATION ADULT. - PERTINENT LABORATORY DATA
08-10 Na137 mmol/L Glu 95 mg/dL K+ 3.7 mmol/L Cr  0.48 mg/dL<L> BUN 7 mg/dL 08-10 Phos 2.4 mg/dL<L> 08-05 Alb 3.7 g/dL

## 2020-08-11 NOTE — PROGRESS NOTE ADULT - ASSESSMENT
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), breast ca s/p right lumpectomy 2014 and radiation currently on anastrazole, L renal mass s/p left lap alba-nephrectomy 6/2020, skin cancer s/p Moh's surgery, HTN, HLD, osteoporosis, cervical stenosis with compression Nov 2018 s/p C4-6 ACDF 12/4/18 and revision of anterior cervical discectomy with fusion of C4-C6 with extension to C3, as well as C2-T2 posterior fusion with right iliac crest autograft in 2019 which was c/b Rapid Afib and treated with amio/anticoagulation, dysphagia treated for aspiration pneumonia, had PEG which was removed on 6/12/2020 presents this admission with complaints of swelling and drainage from neck wound. Incidentally found to have thyroid nodules    # thyroid nodules  unlikely to be cause of patient's dysphagia  Thyroid US with heterogeneous thyroid, b/l nodules. Right lobe: 3.2x1.3x1.2 with TIRADS 4 and left lobe: 3.5x1.6x1.5cm with TIRADS 2  recommend FNA biopsy: can be done as outpatient with IR  TSH elevated at 4.59, FT4 WNL suggesting subclinical hypothyroidism. Recommend repeat TFTs in 4 weeks    # dysphagia: possibly contributed by history of radiation  MBS study done today and diet advanced to dysphagia  2 with thin liquids    # Neck abscess with hardware infection s/p OR washout and wound vac placement on 8/6/2020  - on Ceftriaxone 2g q 24 per ID recs  - Tentative plan is to do IV abx at least 6-8 weeks, probably will need a PICC line  - Culture from OR with strep intermedius  - Plan for return to OR when scheduled by plastics  - 8/5 Bcx NGTD  - wound care per primary team  - pain management per primary team    # tachycardia, h/o Afib previously on AC and s/p amio  - patient is asymptomatic  - obtain EKG  - currently on metoprolol    # h/o breast ca  - c/w anastrazole  - followup with Onc as outpatient    # prophylactic measure  on Lovenox for DVT ppx  pureed diet as tolerated  Advance directives

## 2020-08-11 NOTE — SWALLOW VFSS/MBS ASSESSMENT ADULT - PHARYNGEAL PHASE COMMENTS
adequate initiation of the pharyngeal swallow, adequate laryngeal elevation, reduced tongue base retraction, adequate pharyngeal constriction delayed initiation of the pharyngeal swallow, adequate laryngeal elevation, reduced tongue base retraction, adequate pharyngeal constriction

## 2020-08-11 NOTE — PROGRESS NOTE ADULT - ASSESSMENT
69 YO female, S/P washout of cervical/thoracic spine down to her hardware on 8/6/20 and repeat washout and vac change on 8/8  Thyroid nodules seen on ultrasound, outpatient follow up

## 2020-08-11 NOTE — SWALLOW VFSS/MBS ASSESSMENT ADULT - COMMENTS
NeuroSurgery Note 8/11/2020 - 71 YO female, S/P washout of cervical/thoracic spine down to her hardware on 8/6/20 and repeat washout and vac change on 8/8.    Plastic Surgery Note 8/10/2020 - 69 yo F now from washout of cervical/thoracic spine down to her hardware on 8/6/20 and repeat washout and vac change on 8/8, where the infection was tracking to, but no pus was found at the hardware level. only superficially above the muscle did I find induration and some pus. cultures were obtained and we will follow.  awaiting final cultures, so far no growth.     Hospitalist Consult 8/10/2020 - 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 s/p radiation (in remission), breast ca s/p right lumpectomy 2014 and radiation currently on anastrazole, L renal mass s/p left lap alba-nephrectomy 6/2020, skin cancer s/p Moh's surgery, HTN, HLD, osteoporosis, cervical stenosis with compression Nov 2018 s/p C4-6 ACDF 12/4/18 and revision of anterior cervical discectomy with fusion of C4-C6 with extension to C3, as well as C2-T2 posterior fusion with right iliac crest autograft in 2019 which was c/b Rapid Afib and treated with amio/anticoagulation, dysphagia treated for aspiration pneumonia, had PEG which was removed on 6/12/2020 presents this admission with complaints of swelling and drainage from neck wound. Incidentally found to have thyroid.    Patient was seen for a Clinical Swallow Eval on 8/7/2020 (See Consult). NeuroSurgery Note 8/11/2020 - 71 YO female, S/P washout of cervical/thoracic spine down to her hardware on 8/6/20 and repeat washout and vac change on 8/8.    Plastic Surgery Note 8/10/2020 - 71 yo F now from washout of cervical/thoracic spine down to her hardware on 8/6/20 and repeat washout and vac change on 8/8, where the infection was tracking to, but no pus was found at the hardware level. only superficially above the muscle did I find induration and some pus. cultures were obtained and we will follow.  awaiting final cultures, so far no growth.     Hospitalist Consult 8/10/2020 - 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), breast ca s/p right lumpectomy 2014 and radiation currently on anastrazole, L renal mass s/p left lap alba-nephrectomy 6/2020, skin cancer s/p Moh's surgery, HTN, HLD, osteoporosis, cervical stenosis with compression Nov 2018 s/p C4-6 ACDF 12/4/18 and revision of anterior cervical discectomy with fusion of C4-C6 with extension to C3, as well as C2-T2 posterior fusion with right iliac crest autograft in 2019 which was c/b Rapid Afib and treated with amio/anticoagulation, dysphagia treated for aspiration pneumonia, had PEG which was removed on 6/12/2020 presents this admission with complaints of swelling and drainage from neck wound. Incidentally found to have thyroid.    Patient was seen for a Clinical Swallow Eval on 8/7/2020 (See Consult).    Of Note: Cervical Hardware is visualized on  view image.

## 2020-08-11 NOTE — SWALLOW VFSS/MBS ASSESSMENT ADULT - DIAGNOSTIC IMPRESSIONS
Patient presents with Mild Oral Stage and Mild to Moderate Pharyngeal Stage Dysphagia with a suspected/questionable esophageal dysphagia component. The Oral Stage is characterized by adequate oral containment, slow chewing for solid with ability to break down solid, slow bolus manipulation, slow tongue motion with slow anterior to posterior transfer of the bolus for solid/puree with adequate oral clearance post swallow.   The Pharyngeal Stage is characterized by delayed initiation of the pharyngeal swallow (Bolus head is at the vallecular for Thin Liquids), adequate laryngeal elevation, reduced tongue base retraction resulting in mild/moderate vallecular residue post primary swallow for puree/solids and adequate pharyngeal constriction.  There is mild to moderate pharyngeal clearance deficits located primarily in the vallecular post primary swallow for puree/solids. A liquid wash assist with partial pharyngeal clearance.  There was No Aspiration observed before, during or after the swallow for puree/solids/thin liquids.  Of Note: There appears to be a suspected esophageal dysphagia component given proximal/cervical esophageal retention is visualized post primary swallow for puree/solids. A Liquid Wash does not effectively clear the residue retention in the proximal/cervical esophagus post swallow which may be exacerbating the swallowing mechanism. Patient presents with Mild Oral Stage and Mild to Moderate Pharyngeal Stage Dysphagia with a suspected/questionable esophageal dysphagia component. The Oral Stage is characterized by adequate oral containment, slow chewing for solid with ability to break down solid, slow bolus manipulation, slow tongue motion with slow anterior to posterior transfer of the bolus for solid/puree with adequate oral clearance post swallow.   The Pharyngeal Stage is characterized by delayed initiation of the pharyngeal swallow (Bolus head is at the vallecular for Thin Liquids), adequate laryngeal elevation, reduced tongue base retraction resulting in mild/moderate vallecular residue post primary swallow for puree/solids and adequate pharyngeal constriction.  There is mild to moderate pharyngeal clearance deficits located primarily in the vallecular post primary swallow for puree/solids. A liquid wash assist with partial pharyngeal (vallecular) clearance.  There was No Aspiration observed before, during or after the swallow for puree/solids/thin liquids.  Of Note: There is a suspected/questionable esophageal dysphagia component given proximal/cervical esophageal retention of mild to moderate residue is visualized post primary swallow for puree/solids. A Liquid Wash does not effectively clear the residue retention in the proximal/cervical esophagus post swallow which may be exacerbating the swallowing mechanism. Patient presents with Mild Oral Stage and Mild to Moderate Pharyngeal Stage Dysphagia. There is a suspected/questionable Esophageal Dysphagia component. The Oral Stage is characterized by adequate oral containment, slow chewing for solid with ability to break down solid, slow bolus manipulation, slow tongue motion with slow anterior to posterior transfer of the bolus for solid/puree with adequate oral clearance post swallow.   The Pharyngeal Stage is characterized by delayed initiation of the pharyngeal swallow (Bolus head is at the vallecular for Thin Liquids), adequate laryngeal elevation, reduced tongue base retraction resulting in mild/moderate vallecular residue post primary swallow for puree/solids and adequate pharyngeal constriction.  There is mild to moderate pharyngeal clearance deficits located primarily in the vallecular post primary swallow for puree/solids. A liquid wash assist with partial pharyngeal (vallecular) clearance.  There was No Aspiration observed before, during or after the swallow for puree/solids/thin liquids.  Of Note: There is a suspected/questionable esophageal dysphagia component given proximal/cervical esophageal retention of mild to moderate residue is visualized post primary swallow for puree/solids. A Liquid Wash does not effectively clear the residue retention in the proximal/cervical esophagus post swallow which may be exacerbating the swallowing mechanism.

## 2020-08-11 NOTE — DIETITIAN INITIAL EVALUATION ADULT. - ADD RECOMMEND
1) Monitor weights, PO intake/diet tolerance, skin integrity, pertinent labs. 2) Suggest Ensure Enlive 240mls 2x daily (700kcal, 40g protein) to optimize overall nutritional intake.

## 2020-08-11 NOTE — SWALLOW VFSS/MBS ASSESSMENT ADULT - RECOMMENDED CONSISTENCY
1.) Mechanical Soft with Thin liquids  2.) Consider GI Consultation to assess and rule out an esophageal dysphagia component given the aforementioned retention of residue in the proximal/cervical esophagus at MD's discretion.   3.) Aspiration and Reflux Precautions  4.) Feeding/Swallowing Guidelines: Sit upright, small bites, chew mechanical soft solids well, single cup sip of thin liquids; ALTERNATE FOOD AND LIQUID CONSISTENCIES. 1.) Mechanical Soft with Thin liquids  2.) Consider GI Consultation to assess and rule out an esophageal dysphagia component given incidental finding of the aforementioned above regarding retention of residue in the proximal/cervical esophagus at MD's discretion.   3.) Aspiration and Reflux Precautions  4.) Feeding/Swallowing Guidelines: Sit upright, small bites, chew mechanical soft solids well, single cup sip of thin liquids; ALTERNATE FOOD AND LIQUID CONSISTENCIES.

## 2020-08-11 NOTE — PROGRESS NOTE ADULT - PROBLEM SELECTOR PLAN 1
Continue antibiotics  Continue wound vac  Plan for return to OR when scheduled by plastics  Follow up cultures from second washout

## 2020-08-12 ENCOUNTER — TRANSCRIPTION ENCOUNTER (OUTPATIENT)
Age: 70
End: 2020-08-12

## 2020-08-12 DIAGNOSIS — Z71.89 OTHER SPECIFIED COUNSELING: ICD-10-CM

## 2020-08-12 DIAGNOSIS — R13.10 DYSPHAGIA, UNSPECIFIED: ICD-10-CM

## 2020-08-12 DIAGNOSIS — E04.1 NONTOXIC SINGLE THYROID NODULE: ICD-10-CM

## 2020-08-12 DIAGNOSIS — L02.11 CUTANEOUS ABSCESS OF NECK: ICD-10-CM

## 2020-08-12 LAB
-  CEFTRIAXONE: SIGNIFICANT CHANGE UP
-  CLINDAMYCIN: SIGNIFICANT CHANGE UP
-  ERYTHROMYCIN: SIGNIFICANT CHANGE UP
-  LEVOFLOXACIN: SIGNIFICANT CHANGE UP
-  PENICILLIN: SIGNIFICANT CHANGE UP
-  VANCOMYCIN: SIGNIFICANT CHANGE UP
ANION GAP SERPL CALC-SCNC: 12 MMO/L — SIGNIFICANT CHANGE UP (ref 7–14)
BASOPHILS # BLD AUTO: 0.05 K/UL — SIGNIFICANT CHANGE UP (ref 0–0.2)
BASOPHILS NFR BLD AUTO: 0.5 % — SIGNIFICANT CHANGE UP (ref 0–2)
BUN SERPL-MCNC: 9 MG/DL — SIGNIFICANT CHANGE UP (ref 7–23)
CALCIUM SERPL-MCNC: 9.3 MG/DL — SIGNIFICANT CHANGE UP (ref 8.4–10.5)
CHLORIDE SERPL-SCNC: 99 MMOL/L — SIGNIFICANT CHANGE UP (ref 98–107)
CO2 SERPL-SCNC: 29 MMOL/L — SIGNIFICANT CHANGE UP (ref 22–31)
CREAT SERPL-MCNC: 0.53 MG/DL — SIGNIFICANT CHANGE UP (ref 0.5–1.3)
CULTURE RESULTS: SIGNIFICANT CHANGE UP
EOSINOPHIL # BLD AUTO: 0.21 K/UL — SIGNIFICANT CHANGE UP (ref 0–0.5)
EOSINOPHIL NFR BLD AUTO: 1.9 % — SIGNIFICANT CHANGE UP (ref 0–6)
GLUCOSE SERPL-MCNC: 99 MG/DL — SIGNIFICANT CHANGE UP (ref 70–99)
HCT VFR BLD CALC: 27.6 % — LOW (ref 34.5–45)
HGB BLD-MCNC: 8.8 G/DL — LOW (ref 11.5–15.5)
IMM GRANULOCYTES NFR BLD AUTO: 1.6 % — HIGH (ref 0–1.5)
INR BLD: 1.15 — SIGNIFICANT CHANGE UP (ref 0.88–1.16)
LYMPHOCYTES # BLD AUTO: 1.33 K/UL — SIGNIFICANT CHANGE UP (ref 1–3.3)
LYMPHOCYTES # BLD AUTO: 12.2 % — LOW (ref 13–44)
MAGNESIUM SERPL-MCNC: 2 MG/DL — SIGNIFICANT CHANGE UP (ref 1.6–2.6)
MCHC RBC-ENTMCNC: 29.9 PG — SIGNIFICANT CHANGE UP (ref 27–34)
MCHC RBC-ENTMCNC: 31.9 % — LOW (ref 32–36)
MCV RBC AUTO: 93.9 FL — SIGNIFICANT CHANGE UP (ref 80–100)
METHOD TYPE: SIGNIFICANT CHANGE UP
METHOD TYPE: SIGNIFICANT CHANGE UP
MONOCYTES # BLD AUTO: 0.83 K/UL — SIGNIFICANT CHANGE UP (ref 0–0.9)
MONOCYTES NFR BLD AUTO: 7.6 % — SIGNIFICANT CHANGE UP (ref 2–14)
NEUTROPHILS # BLD AUTO: 8.35 K/UL — HIGH (ref 1.8–7.4)
NEUTROPHILS NFR BLD AUTO: 76.2 % — SIGNIFICANT CHANGE UP (ref 43–77)
NRBC # FLD: 0 K/UL — SIGNIFICANT CHANGE UP (ref 0–0)
ORGANISM # SPEC MICROSCOPIC CNT: SIGNIFICANT CHANGE UP
PHOSPHATE SERPL-MCNC: 3.1 MG/DL — SIGNIFICANT CHANGE UP (ref 2.5–4.5)
PLATELET # BLD AUTO: 529 K/UL — HIGH (ref 150–400)
PMV BLD: 8.7 FL — SIGNIFICANT CHANGE UP (ref 7–13)
POTASSIUM SERPL-MCNC: 3.7 MMOL/L — SIGNIFICANT CHANGE UP (ref 3.5–5.3)
POTASSIUM SERPL-SCNC: 3.7 MMOL/L — SIGNIFICANT CHANGE UP (ref 3.5–5.3)
PROTHROM AB SERPL-ACNC: 13.1 SEC — SIGNIFICANT CHANGE UP (ref 10.6–13.6)
RBC # BLD: 2.94 M/UL — LOW (ref 3.8–5.2)
RBC # FLD: 14.1 % — SIGNIFICANT CHANGE UP (ref 10.3–14.5)
SODIUM SERPL-SCNC: 140 MMOL/L — SIGNIFICANT CHANGE UP (ref 135–145)
SPECIMEN SOURCE: SIGNIFICANT CHANGE UP
WBC # BLD: 10.94 K/UL — HIGH (ref 3.8–10.5)
WBC # FLD AUTO: 10.94 K/UL — HIGH (ref 3.8–10.5)

## 2020-08-12 PROCEDURE — 99232 SBSQ HOSP IP/OBS MODERATE 35: CPT

## 2020-08-12 RX ORDER — SODIUM CHLORIDE 9 MG/ML
1000 INJECTION INTRAMUSCULAR; INTRAVENOUS; SUBCUTANEOUS
Refills: 0 | Status: DISCONTINUED | OUTPATIENT
Start: 2020-08-12 | End: 2020-08-15

## 2020-08-12 RX ADMIN — OXYCODONE HYDROCHLORIDE 5 MILLIGRAM(S): 5 TABLET ORAL at 01:30

## 2020-08-12 RX ADMIN — OXYCODONE HYDROCHLORIDE 5 MILLIGRAM(S): 5 TABLET ORAL at 09:20

## 2020-08-12 RX ADMIN — OXYCODONE HYDROCHLORIDE 5 MILLIGRAM(S): 5 TABLET ORAL at 00:59

## 2020-08-12 RX ADMIN — CEFTRIAXONE 100 MILLIGRAM(S): 500 INJECTION, POWDER, FOR SOLUTION INTRAMUSCULAR; INTRAVENOUS at 18:14

## 2020-08-12 RX ADMIN — SENNA PLUS 2 TABLET(S): 8.6 TABLET ORAL at 21:43

## 2020-08-12 RX ADMIN — OXYCODONE HYDROCHLORIDE 5 MILLIGRAM(S): 5 TABLET ORAL at 15:41

## 2020-08-12 RX ADMIN — SODIUM CHLORIDE 3 MILLILITER(S): 9 INJECTION INTRAMUSCULAR; INTRAVENOUS; SUBCUTANEOUS at 21:43

## 2020-08-12 RX ADMIN — SODIUM CHLORIDE 3 MILLILITER(S): 9 INJECTION INTRAMUSCULAR; INTRAVENOUS; SUBCUTANEOUS at 05:23

## 2020-08-12 RX ADMIN — OXYCODONE HYDROCHLORIDE 5 MILLIGRAM(S): 5 TABLET ORAL at 09:55

## 2020-08-12 RX ADMIN — Medication 12.5 MILLIGRAM(S): at 05:22

## 2020-08-12 RX ADMIN — ANASTROZOLE 1 MILLIGRAM(S): 1 TABLET ORAL at 12:12

## 2020-08-12 RX ADMIN — SODIUM CHLORIDE 3 MILLILITER(S): 9 INJECTION INTRAMUSCULAR; INTRAVENOUS; SUBCUTANEOUS at 14:00

## 2020-08-12 RX ADMIN — OXYCODONE HYDROCHLORIDE 5 MILLIGRAM(S): 5 TABLET ORAL at 16:11

## 2020-08-12 NOTE — PROGRESS NOTE ADULT - ASSESSMENT
71 yo F Hx of Hodgkins 1981, breast ca with lumpectomy right 2014, HTN, HLD, osteoporosis, found to have cervical stenosis with compression Nov 2018, s/p C4-6 ACDF 12/4/18  Leukocytosis, no fever  CT with T1 abscess, concern for possible hardware infection  8/6 OR with washout/debridement to back abscess site; hardware retained  Culture from OR with strep intermedius  Overall,  1) Abscess +/- hardware infection  - Ceftriaxone 2g q 24  - F/U pending OR cultures  - Wound care/further OR per primary team/surgery--today for closure?  2) Leukocytosis  - Trend to normal  - F/U BCXs  3) Elevated ESR  - Trend to normal    Xiang Alvarado MD  Pager [phone]  After 5pm and on weekends call [phone]

## 2020-08-12 NOTE — CONSULT NOTE ADULT - PROBLEM SELECTOR RECOMMENDATION 2
-w/hardware infection s/p OR washout and wound vac placement on 8/6/2020  -cont abx per primary team  -bacid tid   -cont management per neurosx

## 2020-08-12 NOTE — CONSULT NOTE ADULT - ASSESSMENT
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), breast ca s/p right lumpectomy 2014 and radiation currently on anastrazole, L renal mass s/p left lap alba-nephrectomy 6/2020, skin cancer s/p Moh's surgery, HTN, HLD, osteoporosis, cervical stenosis with compression Nov 2018 s/p C4-6 ACDF 12/4/18 and revision of anterior cervical discectomy with fusion of C4-C6 with extension to C3, as well as C2-T2 posterior fusion with right iliac crest autograft in 2019 which was c/b Rapid Afib and treated with amio/anticoagulation, dysphagia treated for aspiration pneumonia, had PEG which was removed on 6/12/2020 presents this admission with complaints of swelling and drainage from neck wound. Incidentally found to have thyroid nodules. GI consulted for dysphagia

## 2020-08-12 NOTE — CONSULT NOTE ADULT - SUBJECTIVE AND OBJECTIVE BOX
Chief Complaint:  Patient is a 70y old  Female who presents with a chief complaint of neck abscess (12 Aug 2020 12:22)    Kidney mass  Anxiety  History of breast cancer  Hodgkin disease  HLD (hyperlipidemia)  HTN (hypertension)  Breast CA  Cervical disc herniation  Carpal tunnel syndrome  Cervical disc disease  Hodgkin disease  Breast cancer  History of carpal tunnel surgery  History of tonsillectomy  S/P cervical spinal fusion  History of Mohs micrographic surgery for skin cancer  History of lumpectomy of right breast  H/O lumpectomy  No significant past surgical history  History of lumpectomy  S/P appendectomy     HPI:  70y female w/ hx of 2018 ACDF that was subsequently revised/extended 2019 at [facility] to C3-C6 with posterior fusion of C2-T2 w/ right ilac crest graft done. Post op course complicated with afib, was sent to Rehab. Patient presents today to [facility] ED with complaints of neck swelling and drainage from wound. Patient with pmhx of Hodgkins (in remission) c/o nausea, dizziness, generalized weakness. (05 Aug 2020 13:22)   GI consulted for dysphagia. The patient known to us from a previous admission for successful peg removal. The patient is seen with daughter bedside with new c/o dysphagia for months mostly to solids. She states that when she eats more solid foods such as steak and bagels or "crunchier/harder" foods that the food gets stuck down in the middle of her esophagus and sometimes it will slowly pass with the help of liquid intake or on its own and more frequently she ends up having to vomit to get the food out. Liquids tends to go down smoother and "usually" dont feel like they are getting stuck.       penicillin (Rash)      acetaminophen   Tablet .. 650 milliGRAM(s) Oral every 6 hours PRN  anastrozole 1 milliGRAM(s) Oral daily  atorvastatin 40 milliGRAM(s) Oral at bedtime  cefTRIAXone   IVPB 2000 milliGRAM(s) IV Intermittent every 24 hours  diazepam    Tablet 5 milliGRAM(s) Oral three times a day PRN  metoprolol tartrate 12.5 milliGRAM(s) Oral daily  oxyCODONE    IR 5 milliGRAM(s) Oral every 6 hours PRN  oxyCODONE    IR 10 milliGRAM(s) Oral every 6 hours PRN  polyethylene glycol 3350 17 Gram(s) Oral daily PRN  senna 2 Tablet(s) Oral at bedtime  sodium chloride 0.9% lock flush 3 milliLiter(s) IV Push every 8 hours  sodium chloride 0.9%. 1000 milliLiter(s) IV Continuous <Continuous>        FAMILY HISTORY:  Family history of colon cancer in mother        Review of Systems:    General:  No wt loss, fevers, chills, night sweats, fatigue  Eyes:  Good vision, no reported pain  ENT:  No sore throat, pain, runny nose, dysphagia  CV:  No pain, palpitations, no lightheadedness  Resp:  No dyspnea, cough, tachypnea, wheezing  GI: as above  :  No pain, bleeding, incontinence, nocturia  Muscle:  No pain, weakness  Neuro:  No weakness, tingling, memory problems  Psych:  No fatigue, insomnia, mood problems, depression  Endocrine:  No polyuria, polydypsia, cold/heat intolerance  Heme:  No petechiae, ecchymosis, easy bruisability  Skin:  No rash, tattoos, scars, edema    Relevant Family History:   n/c    Relevant Social History: n/c      Physical Exam:    Vital Signs:  Vital Signs Last 24 Hrs  T(C): 36.8 (12 Aug 2020 10:43), Max: 37.1 (12 Aug 2020 05:22)  T(F): 98.3 (12 Aug 2020 10:43), Max: 98.7 (12 Aug 2020 05:22)  HR: 95 (12 Aug 2020 10:43) (95 - 106)  BP: 141/90 (12 Aug 2020 10:43) (109/70 - 141/90)  BP(mean): --  RR: 18 (12 Aug 2020 10:43) (16 - 18)  SpO2: 95% (12 Aug 2020 10:43) (94% - 95%)  Daily Height in cm: 170.2 (12 Aug 2020 00:32)    Daily Weight in k (11 Aug 2020 15:41)    General:  Appears stated age, well-groomed, nad  HEENT:  NC/AT,  conjunctivae clear and pink, no thyromegaly, nodules, adenopathy, no JVD  Chest:  Full & symmetric excursion, no increased effort, breath sounds clear  Cardiovascular:  Regular rhythm, S1, S2, no murmur/rub/S3/S4, no abdominal bruit, no edema  Abdomen:  Soft, non-tender, non-distended, normoactive bowel sounds,  no masses ,no hepatosplenomeagaly, no signs of chronic liver disease  Extremities:  no cyanosis,clubbing or edema  Skin:  No rash/erythema/ecchymoses/petechiae/wounds/abscess/warm/dry  Neuro/Psych:  A&Ox3  , no asterixis, no tremor, no encephalopathy    Laboratory:                            8.8    10.94 )-----------( 529      ( 12 Aug 2020 06:00 )             27.6     08-12    140  |  99  |  9   ----------------------------<  99  3.7   |  29  |  0.53    Ca    9.3      12 Aug 2020 06:00  Phos  3.1     08-12  Mg     2.0     08-12        PT/INR - ( 12 Aug 2020 06:00 )   PT: 13.1 SEC;   INR: 1.15                Imaging:      < from: CT Cervical Spine w/ IV Cont (20 @ 15:52) >    EXAM:  CT THORACIC SPINE IC      EXAM:  CT CERVICAL SPINE IC        PROCEDURE DATE:  Aug  5 2020         INTERPRETATION:  .    CLINICAL INFORMATION: Neck abscess. History of anterior cervical discectomy and fusion in 2018 which was subsequently revised/expanded on 2019. History of Hodgkin lymphoma in remission. Neck swelling and drainage from wound.    TECHNIQUE: Axial sections were obtained through the cervical and thoracic spine with the administration of intravenous contrast. 90 cc's of IVOmnipaque-350  was administered without immediate complication and 10 cc's was discarded. Sagittal and coronal reformatted images were obtained from the source data.    COMPARISON: Prior chest, abdomen, and pelvis CT examination from 2019. Priornoncontrast cervical spine CT exam from 2019.    FINDINGS: Again seen is that the patient is status post anterior cervical discectomy and fusion from C3-C6. Interbody fusion grafts are noted at the C4-C5 and C5-C6 levels.    The patient is also status post multilevel posterior fusion from C2-T2. A left-sided C6 transfacet is not seen. A right-sided transfacet screw is not seen. Adjacent bone graft material is in place. Streak and beam hardening artifact is generated by orthopedic hardware, limiting evaluation of the surrounding structures. The screws do not traverse into the spinal canal or neural foramina.    There is a rim-enhancing superficial dorsal collection at the level of T1 which measures 1.4 x 3.8 x 5.0 cm (AP x TRV x CC). This collection is contiguous with multiple locules of air which are asymmetrically placed within the left dorsal soft tissues approximating the spinous processes of the lower cervical vertebral levels. Superimposed orthopedic hardware cannot be excluded. Surrounding inflammatory changes are seen affecting the subcutaneous and deeper soft tissues and also the paraspinal muscles, left side worse than right.    There is nonspecific straightening of the cervical alignment. The thoracic kyphotic curvature is preserved. Chronic loss of height within the T1, T3, T4, T5, and T9 vertebral bodies are again seen which appear grossly unchanged. Scattered degenerative lucencies and areas of sclerosis are seen. The disc spaces appear unchanged when compared to the prior studies.    Evaluation of the intraspinal contents is limited secondary to streak and beam hardening artifact generated by the orthopedic hardware in the cervical spine and also secondary to CT modality.    A nonspecific mixed lucent and sclerotic appearance to the clavicles, first and second ribs, T2 spinous process, and scapula are again seen.    There are heterogeneous appearing thyroid gland is notable which contains multiple hypodense and calcified nodules. Appearance is unchanged when compared to the prior CT exam.    Biapical pleural thickening and/or scarring are noted, left side worse than right. Scattered areas of linear atelectasis are seen throughout both lungs.    Elevation of the left hemiabdomen is noted.    Cardiomegaly is seen.    IMPRESSION:    1. Dorsal soft tissue abscess at the level of T1 with further asymmetric cephalad extension on the left side with associated surrounding soft tissue inflammatory changes. Please see discussion in the body of the report.    2. Superimposed infected posterior fusion hardware cannot be excluded. Superimposed osteomyelitis in these areas cannot also be excluded. Further evaluation with a contrast-enhanced MRI examination can be obtained, as clinically warranted, and if there are no clinical contraindications.    3. Unchanged mild chronic compression deformities involving the T1, T3, T4, T5, and T9 vertebral bodies.                          ANNA LU M.D., ATTENDING RADIOLOGIST  This document has been electronically signed. Aug  5 2020  4:40PM                  < end of copied text >

## 2020-08-12 NOTE — CONSULT NOTE ADULT - PROBLEM SELECTOR RECOMMENDATION 9
-aspiration precautions   -CT reviewed   -?stricture/narrowing from external compression or radiation tx vs schatzki's ring vs esoph spasm vs achalasia   -esophagram ordered/pending  -possible EGD on Friday if cleared by neurosurgery and medicine teams -aspiration precautions   -CT reviewed   -?stricture/narrowing from external compression or radiation tx vs schatzki's ring vs esoph spasm vs achalasia   -esophagram ordered/pending  -possible EGD on Friday

## 2020-08-12 NOTE — PROGRESS NOTE ADULT - ASSESSMENT
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), breast ca s/p right lumpectomy 2014 and radiation currently on anastrazole, L renal mass s/p left lap alba-nephrectomy 6/2020, skin cancer s/p Moh's surgery, HTN, HLD, osteoporosis, cervical stenosis with compression Nov 2018 s/p C4-6 ACDF 12/4/18 and revision of anterior cervical discectomy with fusion of C4-C6 with extension to C3, as well as C2-T2 posterior fusion with right iliac crest autograft in 2019 which was c/b Rapid Afib and treated with amio/anticoagulation, dysphagia treated for aspiration pneumonia, had PEG which was removed on 6/12/2020 presents this admission with complaints of swelling and drainage from neck wound. Incidentally found to have thyroid nodules    # Neck abscess with hardware infection s/p OR washout and wound vac placement on 8/6/2020  - on Ceftriaxone 2g q 24 per ID recs  - Tentative plan is to do IV abx at least 6-8 weeks, probably will need a PICC line  - Culture from OR with strep intermedius  - Plan for return to OR for wound closure for 8/12 per primary team  - 8/5 Bcx NGTD  - wound care per primary team  - pain management per primary team    # thyroid nodules  unlikely to be cause of patient's dysphagia  Thyroid US with heterogeneous thyroid, b/l nodules. Right lobe: 3.2x1.3x1.2 with TIRADS 4 and left lobe: 3.5x1.6x1.5cm with TIRADS 2  recommend FNA biopsy: can be done as outpatient with IR  TSH elevated at 4.59, FT4 WNL suggesting subclinical hypothyroidism. Recommend repeat TFTs in 4 weeks    # dysphagia: possibly contributed by history of radiation  MBS study done 8/11 and diet advanced to dysphagia  2 with thin liquids: tolerating so far per patient    # tachycardia, h/o Afib previously on AC and s/p amio  - patient is asymptomatic  - obtain EKG  - currently on metoprolol    # h/o breast ca  - c/w anastrazole  - followup with Onc as outpatient    # prophylactic measure  on Lovenox for DVT ppx  diet as tolerated 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 s/p radiation (in remission), breast ca s/p right lumpectomy 2014 and radiation currently on anastrazole, L renal mass s/p left lap alba-nephrectomy 6/2020, skin cancer s/p Moh's surgery, HTN, HLD, osteoporosis, cervical stenosis with compression Nov 2018 s/p C4-6 ACDF 12/4/18 and revision of anterior cervical discectomy with fusion of C4-C6 with extension to C3, as well as C2-T2 posterior fusion with right iliac crest autograft in 2019 which was c/b Rapid Afib and treated with amio/anticoagulation, dysphagia treated for aspiration pneumonia, had PEG which was removed on 6/12/2020 presents this admission with complaints of swelling and drainage from neck wound. Incidentally found to have thyroid nodules  # Neck abscess with hardware infection s/p OR washout and wound vac placement on 8/6/2020  - on Ceftriaxone 2g q 24 per ID recs  - Tentative plan is to do IV abx at least 6-8 weeks, probably will need a PICC line  - Culture from OR with strep intermedius  - Plan for return to OR for wound closure for 8/12 per primary team  - 8/5 Bcx NGTD  - wound care per primary team  - pain management per primary team    # thyroid nodules  unlikely to be cause of patient's dysphagia  Thyroid US with heterogeneous thyroid, b/l nodules. Right lobe: 3.2x1.3x1.2 with TIRADS 4 and left lobe: 3.5x1.6x1.5cm with TIRADS 2  recommend FNA biopsy: can be done as outpatient with IR  TSH elevated at 4.59, FT4 WNL suggesting subclinical hypothyroidism. Recommend repeat TFTs in 4 weeks    # dysphagia: possibly contributed by history of radiation  MBS study done 8/11 and diet advanced to dysphagia  2 with thin liquids: tolerating so far per patient    # tachycardia, h/o Afib previously on AC and s/p amio  - patient is asymptomatic  - obtain EKG  - currently on metoprolol    # h/o breast ca  - c/w anastrazole  - followup with Onc as outpatient    # prophylactic measure  on Lovenox for DVT ppx  diet as tolerated

## 2020-08-12 NOTE — PROGRESS NOTE ADULT - ASSESSMENT
71 yo F now from washout of cervical/thoracic spine down to her hardware on 8/6/20 and repeat washout and vac change on 8/8, where the infection was tracking to, but no pus was found at the hardware level. only superficially above the muscle did I find induration and some pus. cultures were obtained and we will follow.  awaiting final cultures, so far no growth.     plan:   OR tomorrow, 8/13 at 1:30 pm. NPO past midnight for OR tomorrow.  will follow along closely  abx per ID recommendations, f/u cultures  Encourage side to side positioning for pressure offloading x4 weeks  prophylactic anticoagulation per primary, no need to hold for OR  OK to take NSAIDs from my standpoint as needed for pain  Call my cell phone below directly with any questions or concerns  Please call [phone] to schedule follow up appointment at time of discharge, to be seen in clinic 1 week after surgery.     Norbert Zhou MD  Plastic Surgery  [phone]

## 2020-08-12 NOTE — PROGRESS NOTE ADULT - PROBLEM SELECTOR PLAN 1
- f/u with core lab for OR clts from 8/8  - keep NPO preop for OR today for wound closure if cltrs negative  - f/u with ID for Abx duration, PICC pending  - plastics f/u    d/w attending

## 2020-08-13 LAB
BLD GP AB SCN SERPL QL: NEGATIVE — SIGNIFICANT CHANGE UP
GRAM STN FLD: SIGNIFICANT CHANGE UP
HCT VFR BLD CALC: 33.2 % — LOW (ref 34.5–45)
HGB BLD-MCNC: 10.1 G/DL — LOW (ref 11.5–15.5)
MCHC RBC-ENTMCNC: 29.9 PG — SIGNIFICANT CHANGE UP (ref 27–34)
MCHC RBC-ENTMCNC: 30.4 % — LOW (ref 32–36)
MCV RBC AUTO: 98.2 FL — SIGNIFICANT CHANGE UP (ref 80–100)
NRBC # FLD: 0 K/UL — SIGNIFICANT CHANGE UP (ref 0–0)
PLATELET # BLD AUTO: 553 K/UL — HIGH (ref 150–400)
PMV BLD: 9.2 FL — SIGNIFICANT CHANGE UP (ref 7–13)
RBC # BLD: 3.38 M/UL — LOW (ref 3.8–5.2)
RBC # FLD: 14.2 % — SIGNIFICANT CHANGE UP (ref 10.3–14.5)
RH IG SCN BLD-IMP: POSITIVE — SIGNIFICANT CHANGE UP
SPECIMEN SOURCE: SIGNIFICANT CHANGE UP
WBC # BLD: 12.87 K/UL — HIGH (ref 3.8–10.5)
WBC # FLD AUTO: 12.87 K/UL — HIGH (ref 3.8–10.5)

## 2020-08-13 PROCEDURE — 99232 SBSQ HOSP IP/OBS MODERATE 35: CPT

## 2020-08-13 RX ORDER — HYDROMORPHONE HYDROCHLORIDE 2 MG/ML
0.5 INJECTION INTRAMUSCULAR; INTRAVENOUS; SUBCUTANEOUS
Refills: 0 | Status: DISCONTINUED | OUTPATIENT
Start: 2020-08-13 | End: 2020-08-13

## 2020-08-13 RX ORDER — HYDROMORPHONE HYDROCHLORIDE 2 MG/ML
1 INJECTION INTRAMUSCULAR; INTRAVENOUS; SUBCUTANEOUS
Refills: 0 | Status: DISCONTINUED | OUTPATIENT
Start: 2020-08-13 | End: 2020-08-13

## 2020-08-13 RX ADMIN — SODIUM CHLORIDE 3 MILLILITER(S): 9 INJECTION INTRAMUSCULAR; INTRAVENOUS; SUBCUTANEOUS at 21:25

## 2020-08-13 RX ADMIN — OXYCODONE HYDROCHLORIDE 5 MILLIGRAM(S): 5 TABLET ORAL at 07:05

## 2020-08-13 RX ADMIN — SODIUM CHLORIDE 3 MILLILITER(S): 9 INJECTION INTRAMUSCULAR; INTRAVENOUS; SUBCUTANEOUS at 12:51

## 2020-08-13 RX ADMIN — OXYCODONE HYDROCHLORIDE 5 MILLIGRAM(S): 5 TABLET ORAL at 22:15

## 2020-08-13 RX ADMIN — SODIUM CHLORIDE 75 MILLILITER(S): 9 INJECTION INTRAMUSCULAR; INTRAVENOUS; SUBCUTANEOUS at 12:51

## 2020-08-13 RX ADMIN — ANASTROZOLE 1 MILLIGRAM(S): 1 TABLET ORAL at 21:58

## 2020-08-13 RX ADMIN — OXYCODONE HYDROCHLORIDE 5 MILLIGRAM(S): 5 TABLET ORAL at 00:40

## 2020-08-13 RX ADMIN — Medication 12.5 MILLIGRAM(S): at 05:07

## 2020-08-13 RX ADMIN — SENNA PLUS 2 TABLET(S): 8.6 TABLET ORAL at 21:58

## 2020-08-13 RX ADMIN — SODIUM CHLORIDE 75 MILLILITER(S): 9 INJECTION INTRAMUSCULAR; INTRAVENOUS; SUBCUTANEOUS at 21:59

## 2020-08-13 RX ADMIN — OXYCODONE HYDROCHLORIDE 5 MILLIGRAM(S): 5 TABLET ORAL at 07:40

## 2020-08-13 RX ADMIN — OXYCODONE HYDROCHLORIDE 5 MILLIGRAM(S): 5 TABLET ORAL at 00:09

## 2020-08-13 RX ADMIN — OXYCODONE HYDROCHLORIDE 5 MILLIGRAM(S): 5 TABLET ORAL at 21:31

## 2020-08-13 RX ADMIN — SODIUM CHLORIDE 3 MILLILITER(S): 9 INJECTION INTRAMUSCULAR; INTRAVENOUS; SUBCUTANEOUS at 05:08

## 2020-08-13 RX ADMIN — SODIUM CHLORIDE 75 MILLILITER(S): 9 INJECTION INTRAMUSCULAR; INTRAVENOUS; SUBCUTANEOUS at 00:00

## 2020-08-13 NOTE — PROGRESS NOTE ADULT - ASSESSMENT
69 yo F now from washout of cervical/thoracic spine down to her hardware on 8/6/20 and repeat washout and vac change on 8/8, 8/13. the wounds now look very clean and will be ready for closure this Saturday.     plan:   OR saturday, 8/15. NPO past midnight on Friday   will follow along closely  abx per ID recommendations, f/u cultures  Encourage side to side positioning for pressure offloading x4 weeks  prophylactic anticoagulation per primary, no need to hold for OR  OK to take NSAIDs from my standpoint as needed for pain  Call my cell phone below directly with any questions or concerns  Please call [phone] to schedule follow up appointment at time of discharge, to be seen in clinic 1 week after surgery.     Norbert Zhou MD  Plastic Surgery  [phone]

## 2020-08-13 NOTE — PROGRESS NOTE ADULT - ASSESSMENT
69 yo F now from washout of cervical/thoracic spine down to her hardware on 8/6/20 and repeat washout and vac change on 8/8, 8/13. the wounds now look very clean and will be ready for closure this Saturday.     plan:   OR saturday, 8/15. NPO past midnight on Friday   will follow along closely  abx per ID recommendations, f/u cultures  Encourage side to side positioning for pressure offloading x4 weeks  prophylactic anticoagulation per primary, no need to hold for OR  OK to take NSAIDs from my standpoint as needed for pain  Call my cell phone below directly with any questions or concerns  Please call [phone] to schedule follow up appointment at time of discharge, to be seen in clinic 1 week after surgery.     Norbert Zhou MD  Plastic Surgery  [phone]

## 2020-08-13 NOTE — PHYSICAL THERAPY INITIAL EVALUATION ADULT - MANUAL MUSCLE TESTING RESULTS, REHAB EVAL
right UE 3+/5, left shoulder 2/5, left elbow/hand/wrist 3+/5, Right LE 4-/5, Left LE 4/5 spinal precautions; right UE 3+/5, left shoulder 2/5, left elbow/hand/wrist 3+/5, Right LE 4-/5, Left LE 4/5/grossly assessed due to

## 2020-08-13 NOTE — PHYSICAL THERAPY INITIAL EVALUATION ADULT - DIAGNOSIS, PT EVAL
Pt admitted for Neck Abscess; pt presents with decreased strength, decreased left shoulder ROM, and slight scissoring gait (right LE)

## 2020-08-13 NOTE — PHYSICAL THERAPY INITIAL EVALUATION ADULT - RANGE OF MOTION EXAMINATION, REHAB EVAL
Right UE ROM was WFL (within functional limits)/bilateral lower extremity ROM was WFL (within functional limits)

## 2020-08-13 NOTE — BRIEF OPERATIVE NOTE - NSICDXBRIEFPROCEDURE_GEN_ALL_CORE_FT
PROCEDURES:  Incision and drainage with application of wound vacuum assisted closure device 13-Aug-2020 16:36:45  Aicha Henderson  Closure of wound of cervical spine 13-Aug-2020 16:35:34  Aicha Henderson  Incision and drainage, abscess, spine, cervical, posterior 13-Aug-2020 16:33:49  Aicha Henderson

## 2020-08-13 NOTE — BRIEF OPERATIVE NOTE - OPERATION/FINDINGS
Incision and drainage, with antibiotic irrigation of open back wound with exposed hardware. The wound was closed partially on the superior aspect, leaving a 4 cm area open inferiorly. A wound vac was applied.
Preoperatively, patient and  were present for written consent. Risks and benefits discussed, including need for multiple operations and risk of recurrent infection. Patient agreed to proceed. She was brought into the operating room and general anesthesia was induced, endotrachel tube inserted, and then she was positioned prone. all pressure points padded. the field was prepped with betadine and draped accordingly. A time out was performed. Incision was carried superior and inferior to the open wound in her cervical/thoracic spine, a total of 15 cm. Indurated tissue was found superficial to the paraspinous muscles mostly, and a small area tracked down to the hardware. It was explored in all directions above and below the paraspinous muscles to make sure there was no residual pockets. After confirming nothing else was present, the indurated and infected tissue was excised until sharp bleeding was identified above the paraspinous and trapezius muscles. dissection was carried out for at least 5 cm in each direction away from midline over the muscles. The paraspinous muscles were also mobilized underneath by tracking along the ribs and transverse processes. 0 PDS was used in figure 8 sutures to approximate the bilateral paraspinous muscles in the midline, except for a small opening in the middle of the wound to allow the vac to aspirate from underneath the muscle as well. Exparel and 0.25% marcaine were mixed and 25 cc were injected along the incisions. A vac was placed coming out of the inferior aspect of the wound. the skin was partially closed for 10 cm of the superior aspect of the wound, using 2-0 PDS, followed by 2-0 nylon running suture. vac dressing was covered and the seal was confirmed. The patient was transferred to the stretcher and extubated without issue. She was transferred to the recovery room.

## 2020-08-13 NOTE — PHYSICAL THERAPY INITIAL EVALUATION ADULT - ACTIVE RANGE OF MOTION EXAMINATION, REHAB EVAL
Right UE Active ROM was WFL (within functional limits)/Left shoulder ~20 degrees of flexion, left elbow/hand/wrist WFL/bilateral  lower extremity Active ROM was WFL (within functional limits)

## 2020-08-13 NOTE — PROGRESS NOTE ADULT - ASSESSMENT
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 s/p radiation (in remission), breast ca s/p right lumpectomy 2014 and radiation currently on anastrazole, L renal mass s/p left lap alba-nephrectomy 6/2020, skin cancer s/p Moh's surgery, HTN, HLD, osteoporosis, cervical stenosis with compression Nov 2018 s/p C4-6 ACDF 12/4/18 and revision of anterior cervical discectomy with fusion of C4-C6 with extension to C3, as well as C2-T2 posterior fusion with right iliac crest autograft in 2019 which was c/b Rapid Afib and treated with amio/anticoagulation, dysphagia treated for aspiration pneumonia, had PEG which was removed on 6/12/2020 presents this admission with complaints of swelling and drainage from neck wound. Incidentally found to have thyroid nodules  # Neck abscess with hardware infection s/p OR washout and wound vac placement on 8/6/2020  - on Ceftriaxone 2g q 24 per ID recs  - Tentative plan is to do IV abx at least 6-8 weeks, probably will need a PICC line  - Culture from OR with strep intermedius, 8/5 Bcx NGTD  - Plan for return to OR for wound closure per primary team  - wound care and pain management per primary team  # dysphagia: possibly contributed by history of radiation  MBS study done 8/11 and diet advanced to dysphagia  2 with thin liquids  pending EGD on 8/14 with GI    # leukocytosis  ? reactive vs hemoconcentration  monitor for fevers, diarrhea  Continue current Abx, trend WBC    # thyroid nodules  unlikely to be cause of patient's dysphagia  Thyroid US with heterogeneous thyroid, b/l nodules. Right lobe: 3.2x1.3x1.2 with TIRADS 4 and left lobe: 3.5x1.6x1.5cm with TIRADS 2  recommend FNA biopsy: can be done as outpatient with IR  TSH elevated at 4.59, FT4 WNL suggesting subclinical hypothyroidism. Recommend repeat TFTs in 4 weeks    # tachycardia, h/o Afib previously on AC and s/p amio  - patient is asymptomatic  - 8/11 EKG with sinus tach, no acute ST-T changes, asymptomatic  - currently on metoprolol    # h/o breast ca  - c/w anastrazole  - followup with Onc as outpatient    # prophylactic measure  on Lovenox for DVT ppx  diet as tolerated, presently NPO for OR  PT consult

## 2020-08-13 NOTE — PROGRESS NOTE ADULT - ASSESSMENT
69 yo F Hx of Hodgkins 1981, breast ca with lumpectomy right 2014, HTN, HLD, osteoporosis, found to have cervical stenosis with compression Nov 2018, s/p C4-6 ACDF 12/4/18  Leukocytosis, no fever  CT with T1 abscess, concern for possible hardware infection  8/6 OR with washout/debridement to back abscess site; hardware retained  Culture from OR with strep intermedius  Overall,  1) Abscess +/- hardware infection  - Ceftriaxone 2g q 24  - F/U pending OR cultures  - Wound care/further OR per primary team/surgery--today for closure?  - Anticipate will need 6-8 week course IV CTX, pending cultures  2) Leukocytosis  - Trend to normal  - F/U BCXs  3) Elevated ESR  - Trend to normal    Xiang Alvarado MD  Pager [phone]  After 5pm and on weekends call [phone]

## 2020-08-13 NOTE — PHYSICAL THERAPY INITIAL EVALUATION ADULT - ADDITIONAL COMMENTS
Pt reports that she lives in a private house with her  with ~7 steps to enter through the garage; (+)1 handrail; and ~5 steps to negotiate to get to her bedroom; (+)bilateral handrails. Prior to hospital admission pt was completely independent and used no assistive devic with ambulation. Pt reports that her last fall was last week where she lost her balance.    Pt left comfortable in chair, NAD, all lines intact, all precautions maintained, with call bell in reach, and RN aware of PT evaluation. Pt reports that she lives in a private house with her  with ~7 steps to enter through the garage; (+)1 handrail; and ~5 steps to negotiate to get to her bedroom; (+)bilateral handrails. Prior to hospital admission pt was completely independent and used no assistive device with ambulation. Pt reports that her last fall was last week where she lost her balance.    Pt left comfortable in chair, NAD, all lines intact, all precautions maintained, with call bell in reach, and RN aware of PT evaluation.

## 2020-08-13 NOTE — PROVIDER CONTACT NOTE (OTHER) - RECOMMENDATIONS
To take Lovenox. Placed Venodynes.
N/a, HR increased, pt in NAD. Maintain safety; educate pt on s/s of hypotension
Provider visit.
increase PO fluids.
try to contact IR multiple times, RN unable to contact IR with a time.

## 2020-08-13 NOTE — PHYSICAL THERAPY INITIAL EVALUATION ADULT - PASSIVE RANGE OF MOTION EXAMINATION, REHAB EVAL
Right UE Passive ROM was WFL (within functional limits)/bilateral lower extremity Passive ROM was WFL (within functional limits)/Left shoulder ~45 degrees of flexion, left elbow/hand/wrist WFL

## 2020-08-13 NOTE — PHYSICAL THERAPY INITIAL EVALUATION ADULT - PATIENT PROFILE REVIEW, REHAB EVAL
yes/ACTIVITY: Ambulate with Assistance; spoke with RN Angi Everett prior to PT evaluation-->Pt OK for PT consult/OOB activity.

## 2020-08-13 NOTE — PROGRESS NOTE ADULT - PROBLEM SELECTOR PLAN 1
-aspiration precautions   -CT reviewed   -?stricture/narrowing from external compression or radiation tx vs schatzki's ring vs esoph spasm vs achalasia   -esophagram ordered/pending; if not done today please cancel as to not interfere with endoscopy plans tomorrow   -possible EGD on Friday if cleared by neurosurgery -aspiration precautions   -CT reviewed   -?stricture/narrowing from external compression or radiation tx vs schatzki's ring vs esoph spasm vs achalasia   -esophagram ordered/pending; if not done today please cancel as to not interfere with endoscopy plans tomorrow   -NPO p MN for EGD tomorrow

## 2020-08-14 ENCOUNTER — TRANSCRIPTION ENCOUNTER (OUTPATIENT)
Age: 70
End: 2020-08-14

## 2020-08-14 PROCEDURE — 99232 SBSQ HOSP IP/OBS MODERATE 35: CPT

## 2020-08-14 RX ORDER — OXYCODONE HYDROCHLORIDE 5 MG/1
5 TABLET ORAL EVERY 6 HOURS
Refills: 0 | Status: DISCONTINUED | OUTPATIENT
Start: 2020-08-14 | End: 2020-08-20

## 2020-08-14 RX ORDER — OXYCODONE HYDROCHLORIDE 5 MG/1
10 TABLET ORAL EVERY 6 HOURS
Refills: 0 | Status: DISCONTINUED | OUTPATIENT
Start: 2020-08-14 | End: 2020-08-20

## 2020-08-14 RX ADMIN — OXYCODONE HYDROCHLORIDE 5 MILLIGRAM(S): 5 TABLET ORAL at 10:09

## 2020-08-14 RX ADMIN — CEFTRIAXONE 100 MILLIGRAM(S): 500 INJECTION, POWDER, FOR SOLUTION INTRAMUSCULAR; INTRAVENOUS at 15:18

## 2020-08-14 RX ADMIN — SODIUM CHLORIDE 3 MILLILITER(S): 9 INJECTION INTRAMUSCULAR; INTRAVENOUS; SUBCUTANEOUS at 21:28

## 2020-08-14 RX ADMIN — OXYCODONE HYDROCHLORIDE 5 MILLIGRAM(S): 5 TABLET ORAL at 23:19

## 2020-08-14 RX ADMIN — SODIUM CHLORIDE 75 MILLILITER(S): 9 INJECTION INTRAMUSCULAR; INTRAVENOUS; SUBCUTANEOUS at 21:30

## 2020-08-14 RX ADMIN — ANASTROZOLE 1 MILLIGRAM(S): 1 TABLET ORAL at 21:30

## 2020-08-14 RX ADMIN — SENNA PLUS 2 TABLET(S): 8.6 TABLET ORAL at 21:30

## 2020-08-14 RX ADMIN — OXYCODONE HYDROCHLORIDE 5 MILLIGRAM(S): 5 TABLET ORAL at 22:49

## 2020-08-14 RX ADMIN — SODIUM CHLORIDE 3 MILLILITER(S): 9 INJECTION INTRAMUSCULAR; INTRAVENOUS; SUBCUTANEOUS at 05:29

## 2020-08-14 RX ADMIN — SODIUM CHLORIDE 3 MILLILITER(S): 9 INJECTION INTRAMUSCULAR; INTRAVENOUS; SUBCUTANEOUS at 15:16

## 2020-08-14 RX ADMIN — Medication 12.5 MILLIGRAM(S): at 05:29

## 2020-08-14 RX ADMIN — OXYCODONE HYDROCHLORIDE 5 MILLIGRAM(S): 5 TABLET ORAL at 10:50

## 2020-08-14 NOTE — PROGRESS NOTE ADULT - ASSESSMENT
71 yo F Hx of Hodgkins 1981, breast ca with lumpectomy right 2014, HTN, HLD, osteoporosis, found to have cervical stenosis with compression Nov 2018, s/p C4-6 ACDF 12/4/18  Leukocytosis, no fever  CT with T1 abscess, concern for possible hardware infection  8/6 OR with washout/debridement to back abscess site; hardware retained  Culture from OR with strep intermedius  Remains well today, had further I+D to wound 8/13  Overall,  1) Abscess +/- hardware infection  - Ceftriaxone 2g q 24  - F/U pending OR cultures  - Wound care/further OR per primary team/surgery--today for closure?  - Anticipate will need 6-8 week course IV CTX, pending cultures  2) Leukocytosis  - Trend to normal  - F/U BCXs  3) Elevated ESR  - Trend to normal    Xiang Alvarado MD  Pager [phone]  After 5pm and on weekends call [phone]

## 2020-08-14 NOTE — PROGRESS NOTE ADULT - PROBLEM SELECTOR PLAN 1
-aspiration precautions   -CT reviewed   -?external compression or radiation vs schatzki's ring vs esoph spasm vs achalasia   -esophagram ordered/pending  -NPO p MN Sunday for EGD Monday

## 2020-08-14 NOTE — PROGRESS NOTE ADULT - ASSESSMENT
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 s/p radiation (in remission), breast ca s/p right lumpectomy 2014 and radiation currently on anastrazole, L renal mass s/p left lap alba-nephrectomy 6/2020, skin cancer s/p Moh's surgery, HTN, HLD, osteoporosis, cervical stenosis with compression Nov 2018 s/p C4-6 ACDF 12/4/18 and revision of anterior cervical discectomy with fusion of C4-C6 with extension to C3, as well as C2-T2 posterior fusion with right iliac crest autograft in 2019 which was c/b Rapid Afib and treated with amio/anticoagulation, dysphagia treated for aspiration pneumonia, had PEG which was removed on 6/12/2020 presents this admission with complaints of swelling and drainage from neck wound. Incidentally found to have thyroid nodules. GI consulted for dysphagia

## 2020-08-14 NOTE — PROVIDER CONTACT NOTE (OTHER) - BACKGROUND
Pt is s/p c-spine I&D
Incision and drainage, with antibiotic irrigation of open back wound with exposed hardware.
Patient is s/p C spine I&D.
S/P C spine I & D
pt s/p I&D of neck abscess
s/p I&D of cervical abscess
s/p lap charly, scheduled for MRCP due to increase in liver enzymes

## 2020-08-14 NOTE — PROGRESS NOTE ADULT - ASSESSMENT
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), breast ca s/p right lumpectomy 2014 and radiation currently on anastrazole, L renal mass s/p left lap alba-nephrectomy 6/2020, skin cancer s/p Moh's surgery, HTN, HLD, osteoporosis, cervical stenosis with compression Nov 2018 s/p C4-6 ACDF 12/4/18 and revision of anterior cervical discectomy with fusion of C4-C6 with extension to C3, as well as C2-T2 posterior fusion with right iliac crest autograft in 2019 which was c/b Rapid Afib and treated with amio/anticoagulation, dysphagia treated for aspiration pneumonia, had PEG which was removed on 6/12/2020 presents this admission with complaints of swelling and drainage from neck wound. Incidentally found to have thyroid nodules  # Neck abscess with hardware infection s/p OR washout and wound vac placement on 8/6/2020  - on Ceftriaxone 2g q 24 per ID recs  - Tentative plan is to do IV abx at least 6-8 weeks, probably will need a PICC line  - Culture from OR with strep intermedius, 8/5 Bcx NGTD  - s/p partial closure of wound with wound vac attached on 08/14, plan for complete closure tentatively next week   - wound care and pain management per primary team    # dysphagia: possibly contributed by history of radiation  MBS study done 8/11 and diet advanced to dysphagia  2 with thin liquids  anticipate EGD will be cancelled today since pt had breakfast, GI to reschedule EGD possibly ?monday    # leukocytosis  ? reactive vs hemoconcentration  monitor for fevers, diarrhea  Continue current Abx, trend WBC    # thyroid nodules  unlikely to be cause of patient's dysphagia  Thyroid US with heterogeneous thyroid, b/l nodules. Right lobe: 3.2x1.3x1.2 with TIRADS 4 and left lobe: 3.5x1.6x1.5cm with TIRADS 2  recommend FNA biopsy: can be done as outpatient with IR  TSH elevated at 4.59, FT4 WNL suggesting subclinical hypothyroidism. Recommend repeat TFTs in 4 weeks    # tachycardia, h/o Afib previously on AC and s/p amio  - patient is asymptomatic  - 8/11 EKG with sinus tach, no acute ST-T changes, asymptomatic  - currently on metoprolol    # h/o breast ca  - c/w anastrazole  - followup with Onc as outpatient    # prophylactic measure  on Lovenox for DVT ppx  diet as tolerated  PT consult

## 2020-08-15 RX ORDER — ONDANSETRON 8 MG/1
4 TABLET, FILM COATED ORAL ONCE
Refills: 0 | Status: DISCONTINUED | OUTPATIENT
Start: 2020-08-15 | End: 2020-08-15

## 2020-08-15 RX ORDER — HYDROMORPHONE HYDROCHLORIDE 2 MG/ML
1 INJECTION INTRAMUSCULAR; INTRAVENOUS; SUBCUTANEOUS
Refills: 0 | Status: DISCONTINUED | OUTPATIENT
Start: 2020-08-15 | End: 2020-08-15

## 2020-08-15 RX ORDER — HYDROMORPHONE HYDROCHLORIDE 2 MG/ML
0.5 INJECTION INTRAMUSCULAR; INTRAVENOUS; SUBCUTANEOUS
Refills: 0 | Status: DISCONTINUED | OUTPATIENT
Start: 2020-08-15 | End: 2020-08-15

## 2020-08-15 RX ADMIN — SENNA PLUS 2 TABLET(S): 8.6 TABLET ORAL at 22:20

## 2020-08-15 RX ADMIN — OXYCODONE HYDROCHLORIDE 10 MILLIGRAM(S): 5 TABLET ORAL at 23:39

## 2020-08-15 RX ADMIN — SODIUM CHLORIDE 3 MILLILITER(S): 9 INJECTION INTRAMUSCULAR; INTRAVENOUS; SUBCUTANEOUS at 22:15

## 2020-08-15 RX ADMIN — OXYCODONE HYDROCHLORIDE 10 MILLIGRAM(S): 5 TABLET ORAL at 18:00

## 2020-08-15 RX ADMIN — HYDROMORPHONE HYDROCHLORIDE 1 MILLIGRAM(S): 2 INJECTION INTRAMUSCULAR; INTRAVENOUS; SUBCUTANEOUS at 16:20

## 2020-08-15 RX ADMIN — ANASTROZOLE 1 MILLIGRAM(S): 1 TABLET ORAL at 22:20

## 2020-08-15 RX ADMIN — HYDROMORPHONE HYDROCHLORIDE 1 MILLIGRAM(S): 2 INJECTION INTRAMUSCULAR; INTRAVENOUS; SUBCUTANEOUS at 16:06

## 2020-08-15 RX ADMIN — OXYCODONE HYDROCHLORIDE 10 MILLIGRAM(S): 5 TABLET ORAL at 17:31

## 2020-08-15 RX ADMIN — SODIUM CHLORIDE 3 MILLILITER(S): 9 INJECTION INTRAMUSCULAR; INTRAVENOUS; SUBCUTANEOUS at 05:31

## 2020-08-15 NOTE — PRE-OP CHECKLIST - 1.
Received patient with wound vac in place in cervical spine posteriorly, and sutures in place
wound vac in place

## 2020-08-15 NOTE — PROGRESS NOTE ADULT - ASSESSMENT
69 yo F now from washout of cervical/thoracic spine down to her hardware on 8/6/20 and repeat washout and vac change on 8/8, 8/13, and now b/l paraspinous flaps with R trapezius flap on 8/15.    plan:   will follow along closely  no arm elevation  abx per ID recommendations, f/u cultures  Encourage side to side positioning for pressure offloading x4 weeks  prophylactic anticoagulation per primary, no need to hold for OR  OK to take NSAIDs from my standpoint as needed for pain  Call my cell phone below directly with any questions or concerns  Please call [phone] to schedule follow up appointment at time of discharge, to be seen in clinic 1 week after surgery.     Norbert Zhou MD  Plastic Surgery  [phone]

## 2020-08-15 NOTE — PROGRESS NOTE ADULT - ASSESSMENT
69 YO female, S/P washout of cervical/thoracic spine X 3, placement of wound vac  Dysphagia  - NPO for final wound closure today  - cont atbx  - f/u GI tests for chronic swallowing issues  - will need PICC line

## 2020-08-16 PROCEDURE — 99232 SBSQ HOSP IP/OBS MODERATE 35: CPT

## 2020-08-16 RX ORDER — SODIUM CHLORIDE 9 MG/ML
1000 INJECTION INTRAMUSCULAR; INTRAVENOUS; SUBCUTANEOUS
Refills: 0 | Status: DISCONTINUED | OUTPATIENT
Start: 2020-08-16 | End: 2020-08-17

## 2020-08-16 RX ORDER — DIAZEPAM 5 MG
5 TABLET ORAL ONCE
Refills: 0 | Status: DISCONTINUED | OUTPATIENT
Start: 2020-08-16 | End: 2020-08-16

## 2020-08-16 RX ADMIN — SODIUM CHLORIDE 100 MILLILITER(S): 9 INJECTION INTRAMUSCULAR; INTRAVENOUS; SUBCUTANEOUS at 22:23

## 2020-08-16 RX ADMIN — OXYCODONE HYDROCHLORIDE 10 MILLIGRAM(S): 5 TABLET ORAL at 12:00

## 2020-08-16 RX ADMIN — SODIUM CHLORIDE 3 MILLILITER(S): 9 INJECTION INTRAMUSCULAR; INTRAVENOUS; SUBCUTANEOUS at 14:10

## 2020-08-16 RX ADMIN — Medication 12.5 MILLIGRAM(S): at 06:09

## 2020-08-16 RX ADMIN — OXYCODONE HYDROCHLORIDE 10 MILLIGRAM(S): 5 TABLET ORAL at 18:05

## 2020-08-16 RX ADMIN — ANASTROZOLE 1 MILLIGRAM(S): 1 TABLET ORAL at 22:22

## 2020-08-16 RX ADMIN — Medication 5 MILLIGRAM(S): at 14:21

## 2020-08-16 RX ADMIN — OXYCODONE HYDROCHLORIDE 10 MILLIGRAM(S): 5 TABLET ORAL at 17:14

## 2020-08-16 RX ADMIN — OXYCODONE HYDROCHLORIDE 10 MILLIGRAM(S): 5 TABLET ORAL at 00:15

## 2020-08-16 RX ADMIN — SODIUM CHLORIDE 100 MILLILITER(S): 9 INJECTION INTRAMUSCULAR; INTRAVENOUS; SUBCUTANEOUS at 14:21

## 2020-08-16 RX ADMIN — SODIUM CHLORIDE 3 MILLILITER(S): 9 INJECTION INTRAMUSCULAR; INTRAVENOUS; SUBCUTANEOUS at 06:02

## 2020-08-16 RX ADMIN — OXYCODONE HYDROCHLORIDE 10 MILLIGRAM(S): 5 TABLET ORAL at 11:00

## 2020-08-16 RX ADMIN — CEFTRIAXONE 100 MILLIGRAM(S): 500 INJECTION, POWDER, FOR SOLUTION INTRAMUSCULAR; INTRAVENOUS at 14:21

## 2020-08-16 RX ADMIN — SODIUM CHLORIDE 3 MILLILITER(S): 9 INJECTION INTRAMUSCULAR; INTRAVENOUS; SUBCUTANEOUS at 22:20

## 2020-08-16 NOTE — PROGRESS NOTE ADULT - ASSESSMENT
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), breast ca s/p right lumpectomy 2014 and radiation currently on anastrazole, L renal mass s/p left lap alba-nephrectomy 6/2020, skin cancer s/p Moh's surgery, HTN, HLD, osteoporosis, cervical stenosis with compression Nov 2018 s/p C4-6 ACDF 12/4/18 and revision of anterior cervical discectomy with fusion of C4-C6 with extension to C3, as well as C2-T2 posterior fusion with right iliac crest autograft in 2019 which was c/b Rapid Afib and treated with amio/anticoagulation, dysphagia treated for aspiration pneumonia, had PEG which was removed on 6/12/2020 presents this admission with complaints of swelling and drainage from neck wound diagnosed with neck abscess with hardware infection s/p OR washout and wound vac placement on 8/6/2020. with repeat complete closure of wound on 08/15 with wound vacs, Incidentally found to have thyroid nodules  # Neck abscess with hardware infection s/p OR washout and wound vac placement on 8/6/2020 with complete closure of wound 08/15  - on Ceftriaxone 2g q 24 per ID recs  - Tentative plan is to do IV abx at least 6-8 weeks, probably will need a PICC line  - Culture from OR with strep intermedius, 8/5 Bcx NGTD  - s/p compelte closure of wound with wound vac attached on 08/15  - wound care and pain management per primary team    # dysphagia: possibly contributed by history of radiation  MBS study done 8/11 and diet advanced to dysphagia  2 with thin liquids  GI recc EGD tomorrow, will be NPO after MN    # leukocytosis  ? reactive vs hemoconcentration  monitor for fevers, diarrhea  Continue current Abx, trend WBC    # thyroid nodules  unlikely to be cause of patient's dysphagia  Thyroid US with heterogeneous thyroid, b/l nodules. Right lobe: 3.2x1.3x1.2 with TIRADS 4 and left lobe: 3.5x1.6x1.5cm with TIRADS 2  recommend FNA biopsy: can be done as outpatient with IR  TSH elevated at 4.59, FT4 WNL suggesting subclinical hypothyroidism. Recommend repeat TFTs in 4 weeks    # tachycardia, h/o Afib previously on AC and s/p amio  - patient is asymptomatic  - 8/11 EKG with sinus tach, no acute ST-T changes, asymptomatic  - currently on metoprolol    # h/o breast ca  - c/w anastrazole  - followup with Onc as outpatient    # prophylactic measure  on Lovenox for DVT ppx  diet as tolerated  PT consult

## 2020-08-16 NOTE — PROGRESS NOTE ADULT - ASSESSMENT
69 yo F now from washout of cervical/thoracic spine down to her hardware on 8/6/20 and repeat washout and vac change on 8/8, 8/13, and definitive closure with bilateral paraspinous and R trapezius flap on 8/15, doing well.     plan:   will follow along closely  no lifting elbows to shoulder level, should remain relatively close to her side  abx per ID recommendations, f/u cultures  Encourage side to side positioning for pressure offloading x4 weeks  OK to take NSAIDs from my standpoint as needed for pain  Call my cell phone below directly with any questions or concerns  Please call [phone] to schedule follow up appointment at time of discharge, to be seen in clinic 1 week after surgery.     Norbert Zhou MD  Plastic Surgery  [phone]

## 2020-08-16 NOTE — PROGRESS NOTE ADULT - ASSESSMENT
69 YO female, S/P washout of cervical/thoracic spine X 3, placement of wound vac, S/P wound closure  Dysphagia

## 2020-08-17 ENCOUNTER — RESULT REVIEW (OUTPATIENT)
Age: 70
End: 2020-08-17

## 2020-08-17 PROCEDURE — 99232 SBSQ HOSP IP/OBS MODERATE 35: CPT

## 2020-08-17 PROCEDURE — 88312 SPECIAL STAINS GROUP 1: CPT | Mod: 26

## 2020-08-17 PROCEDURE — 88305 TISSUE EXAM BY PATHOLOGIST: CPT | Mod: 26

## 2020-08-17 RX ORDER — SODIUM CHLORIDE 9 MG/ML
3 INJECTION INTRAMUSCULAR; INTRAVENOUS; SUBCUTANEOUS EVERY 8 HOURS
Refills: 0 | Status: DISCONTINUED | OUTPATIENT
Start: 2020-08-17 | End: 2020-08-20

## 2020-08-17 RX ORDER — PANTOPRAZOLE SODIUM 20 MG/1
40 TABLET, DELAYED RELEASE ORAL
Refills: 0 | Status: DISCONTINUED | OUTPATIENT
Start: 2020-08-17 | End: 2020-08-20

## 2020-08-17 RX ADMIN — Medication 12.5 MILLIGRAM(S): at 05:48

## 2020-08-17 RX ADMIN — CEFTRIAXONE 100 MILLIGRAM(S): 500 INJECTION, POWDER, FOR SOLUTION INTRAMUSCULAR; INTRAVENOUS at 14:15

## 2020-08-17 RX ADMIN — OXYCODONE HYDROCHLORIDE 10 MILLIGRAM(S): 5 TABLET ORAL at 21:38

## 2020-08-17 RX ADMIN — SODIUM CHLORIDE 75 MILLILITER(S): 9 INJECTION INTRAMUSCULAR; INTRAVENOUS; SUBCUTANEOUS at 00:34

## 2020-08-17 RX ADMIN — OXYCODONE HYDROCHLORIDE 10 MILLIGRAM(S): 5 TABLET ORAL at 13:06

## 2020-08-17 RX ADMIN — OXYCODONE HYDROCHLORIDE 10 MILLIGRAM(S): 5 TABLET ORAL at 00:34

## 2020-08-17 RX ADMIN — OXYCODONE HYDROCHLORIDE 5 MILLIGRAM(S): 5 TABLET ORAL at 07:45

## 2020-08-17 RX ADMIN — OXYCODONE HYDROCHLORIDE 5 MILLIGRAM(S): 5 TABLET ORAL at 06:45

## 2020-08-17 RX ADMIN — ANASTROZOLE 1 MILLIGRAM(S): 1 TABLET ORAL at 21:39

## 2020-08-17 RX ADMIN — SODIUM CHLORIDE 3 MILLILITER(S): 9 INJECTION INTRAMUSCULAR; INTRAVENOUS; SUBCUTANEOUS at 13:05

## 2020-08-17 RX ADMIN — SODIUM CHLORIDE 3 MILLILITER(S): 9 INJECTION INTRAMUSCULAR; INTRAVENOUS; SUBCUTANEOUS at 05:53

## 2020-08-17 RX ADMIN — OXYCODONE HYDROCHLORIDE 10 MILLIGRAM(S): 5 TABLET ORAL at 14:18

## 2020-08-17 RX ADMIN — OXYCODONE HYDROCHLORIDE 10 MILLIGRAM(S): 5 TABLET ORAL at 01:30

## 2020-08-17 RX ADMIN — SODIUM CHLORIDE 3 MILLILITER(S): 9 INJECTION INTRAMUSCULAR; INTRAVENOUS; SUBCUTANEOUS at 21:37

## 2020-08-17 RX ADMIN — OXYCODONE HYDROCHLORIDE 10 MILLIGRAM(S): 5 TABLET ORAL at 22:08

## 2020-08-17 RX ADMIN — SENNA PLUS 2 TABLET(S): 8.6 TABLET ORAL at 21:39

## 2020-08-17 NOTE — PROGRESS NOTE ADULT - ASSESSMENT
71 YO female, S/P washout of cervical/thoracic spine X 3, placement of wound vac, S/P wound closure  Dysphagia

## 2020-08-17 NOTE — PRE-OP CHECKLIST - PATIENT PROBLEMS/NEEDS
Patient expressed no known problems or needs
Patient expressed no known problems or needs
repositioned for comfort

## 2020-08-17 NOTE — PROGRESS NOTE ADULT - ASSESSMENT
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), breast ca s/p right lumpectomy 2014 and radiation currently on anastrazole, L renal mass s/p left lap alba-nephrectomy 6/2020, skin cancer s/p Moh's surgery, HTN, HLD, osteoporosis, cervical stenosis with compression Nov 2018 s/p C4-6 ACDF 12/4/18 and revision of anterior cervical discectomy with fusion of C4-C6 with extension to C3, as well as C2-T2 posterior fusion with right iliac crest autograft in 2019 which was c/b Rapid Afib and treated with amio/anticoagulation, dysphagia treated for aspiration pneumonia, had PEG which was removed on 6/12/2020 presents this admission with complaints of swelling and drainage from neck wound diagnosed with neck abscess with hardware infection s/p OR washout and wound vac placement on 8/6/2020. with repeat complete closure of wound on 08/15 with wound vacs, Incidentally found to have thyroid nodules  # Neck abscess with hardware infection s/p OR washout and wound vac placement on 8/6/2020 with complete closure of wound 08/15  - on Ceftriaxone 2g q 24 per ID recs  - Tentative plan is to do IV abx at least 6-8 weeks, probably will need a PICC line  - Culture from OR with strep intermedius, 8/5 Bcx NGTD  - s/p compelte closure of wound with wound vac attached on 08/15  - wound care and pain management per primary team    # dysphagia: possibly contributed by history of radiation  MBS study done 8/11 and diet advanced to dysphagia  2 with thin liquids  GI recc EGD today    # leukocytosis  ? reactive vs hemoconcentration  monitor for fevers, diarrhea    # thyroid nodules  unlikely to be cause of patient's dysphagia  Thyroid US with heterogeneous thyroid, b/l nodules. Right lobe: 3.2x1.3x1.2 with TIRADS 4 and left lobe: 3.5x1.6x1.5cm with TIRADS 2  recommend FNA biopsy: can be done as outpatient with IR  TSH elevated at 4.59, FT4 WNL suggesting subclinical hypothyroidism. Recommend repeat TFTs in 4 weeks    # tachycardia, h/o Afib previously on AC and s/p amio  - patient is asymptomatic  - 8/11 EKG with sinus tach, no acute ST-T changes, asymptomatic  - currently on metoprolol    # h/o breast ca  - c/w anastrazole  - followup with Onc as outpatient    # prophylactic measure  on Lovenox for DVT ppx  diet as tolerated  PT consult

## 2020-08-17 NOTE — PROGRESS NOTE ADULT - ASSESSMENT
71 yo F Hx of Hodgkins 1981, breast ca with lumpectomy right 2014, HTN, HLD, osteoporosis, found to have cervical stenosis with compression Nov 2018, s/p C4-6 ACDF 12/4/18  Leukocytosis, no fever  CT with T1 abscess, concern for possible hardware infection  8/6 OR with washout/debridement to back abscess site; hardware retained  Culture from OR with strep intermedius  Remains well today  Overall,  1) Abscess +/- hardware infection  - Ceftriaxone 2g q 24 through 10/7/20  - Check CBC, BUN, Cr, LFTs weekly while on IV abx--fax to [phone]  - F/U pending OR cultures  - Wound care/further OR per primary team/surgery  - Okay for PICC as long as BCXs clear and no clinical signs sepsis  2) Leukocytosis  - Trend to normal  - F/U BCXs  3) Elevated ESR  - Trend to normal    Patient should follow up with me in 6 weeks to determine further antibiotic plan (PO suppression vs observation?)    Xiang Alvarado MD  Pager [phone]  After 5pm and on weekends call [phone]

## 2020-08-17 NOTE — CHART NOTE - NSCHARTNOTEFT_GEN_A_CORE
PRE-INTERVENTIONAL RADIOLOGY PROCEDURE NOTE      Patient Age: 70y    Patient Gender: female    Procedure: PICC     Diagnosis/Indication: cervical abscess     Ordering Attending Physician: karen     Pertinent Medical History:  breast CA, ACDF C4-6 2018, rev C3-6 ACDF 2019 w/ post C2-T2 fusion w/ plastics (Dr. 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 Yes

## 2020-08-17 NOTE — PRE-OP CHECKLIST - INTERNAL PROSTHESES
yes(specify)/cervical fusion plates and screws
yes(specify)/dental implants and plates in neck
no
yes(specify)/cervical fusion plates and screws
yes(specify)/cervical hardware

## 2020-08-18 ENCOUNTER — TRANSCRIPTION ENCOUNTER (OUTPATIENT)
Age: 70
End: 2020-08-18

## 2020-08-18 LAB
CULTURE RESULTS: SIGNIFICANT CHANGE UP
CULTURE RESULTS: SIGNIFICANT CHANGE UP
SPECIMEN SOURCE: SIGNIFICANT CHANGE UP
SPECIMEN SOURCE: SIGNIFICANT CHANGE UP

## 2020-08-18 PROCEDURE — 36573 INSJ PICC RS&I 5 YR+: CPT

## 2020-08-18 PROCEDURE — 99232 SBSQ HOSP IP/OBS MODERATE 35: CPT

## 2020-08-18 RX ORDER — CHLORHEXIDINE GLUCONATE 213 G/1000ML
1 SOLUTION TOPICAL DAILY
Refills: 0 | Status: DISCONTINUED | OUTPATIENT
Start: 2020-08-18 | End: 2020-08-20

## 2020-08-18 RX ADMIN — ANASTROZOLE 1 MILLIGRAM(S): 1 TABLET ORAL at 21:37

## 2020-08-18 RX ADMIN — SODIUM CHLORIDE 3 MILLILITER(S): 9 INJECTION INTRAMUSCULAR; INTRAVENOUS; SUBCUTANEOUS at 21:39

## 2020-08-18 RX ADMIN — OXYCODONE HYDROCHLORIDE 5 MILLIGRAM(S): 5 TABLET ORAL at 15:24

## 2020-08-18 RX ADMIN — SODIUM CHLORIDE 3 MILLILITER(S): 9 INJECTION INTRAMUSCULAR; INTRAVENOUS; SUBCUTANEOUS at 05:58

## 2020-08-18 RX ADMIN — OXYCODONE HYDROCHLORIDE 10 MILLIGRAM(S): 5 TABLET ORAL at 06:29

## 2020-08-18 RX ADMIN — PANTOPRAZOLE SODIUM 40 MILLIGRAM(S): 20 TABLET, DELAYED RELEASE ORAL at 06:01

## 2020-08-18 RX ADMIN — POLYETHYLENE GLYCOL 3350 17 GRAM(S): 17 POWDER, FOR SOLUTION ORAL at 15:15

## 2020-08-18 RX ADMIN — CEFTRIAXONE 100 MILLIGRAM(S): 500 INJECTION, POWDER, FOR SOLUTION INTRAMUSCULAR; INTRAVENOUS at 15:26

## 2020-08-18 RX ADMIN — OXYCODONE HYDROCHLORIDE 10 MILLIGRAM(S): 5 TABLET ORAL at 05:59

## 2020-08-18 RX ADMIN — OXYCODONE HYDROCHLORIDE 5 MILLIGRAM(S): 5 TABLET ORAL at 16:00

## 2020-08-18 RX ADMIN — SODIUM CHLORIDE 3 MILLILITER(S): 9 INJECTION INTRAMUSCULAR; INTRAVENOUS; SUBCUTANEOUS at 15:24

## 2020-08-18 NOTE — PROGRESS NOTE ADULT - PROBLEM SELECTOR PLAN 1
-aspiration precautions   -CT reviewed   -s/p EGD w/gastritis and Extrinsic narrowing of esophagus s/p Dilation   -cont ppi   -soft diet  -repeat EGD in 3 months -aspiration precautions   -s/p EGD w/gastritis and Extrinsic narrowing of esophagus s/p Dilation   -per d/w radiology, unclear of external compression on imaging as it does not appear to be from cervical hardware; consider checking Esophagram for further assessment per radiology recommendations   -cont ppi   -soft diet  -will need repeat EGD in 3 months -aspiration precautions   -s/p EGD w/gastritis and Extrinsic narrowing of esophagus s/p Dilation   -unclear etiology of extrinsic compression; will d/w my attending further imaging is necessary   -cont ppi   -soft diet  -will need repeat EGD in 3 months

## 2020-08-18 NOTE — DISCHARGE NOTE PROVIDER - PROVIDER TOKENS
PROVIDER:[TOKEN:[[id number]],FOLLOWUP:[1 week]],PROVIDER:[TOKEN:[[id number]],FOLLOWUP:[1 month]],PROVIDER:[TOKEN:[[id number]],FOLLOWUP:[1 month]] PROVIDER:[TOKEN:[[id number]],FOLLOWUP:[1 week]],PROVIDER:[TOKEN:[[id number]],FOLLOWUP:[1 month]],PROVIDER:[TOKEN:[[id number]],SCHEDULEDAPPT:[09/03/2020],SCHEDULEDAPPTTIME:[10:30 AM]]

## 2020-08-18 NOTE — DISCHARGE NOTE PROVIDER - CARE PROVIDER_API CALL
Norbert Zhou)  Plastic Surgery Surgery  [address] [place] [address]  Phone: [phone]  Fax: [phone]  Follow Up Time: 1 week    Xiang Alvarado  INFECTIOUS DISEASE  [address] [place] [address] [place] [address]  Phone: [phone]  Fax: [phone]  Follow Up Time: 1 month    Glenroy Fontana  GASTROENTEROLOGY  [address] [place] [address]  Phone: [phone]  Fax: [phone]  Follow Up Time: 1 month Norbert Zhou)  Plastic Surgery Surgery  [address] [place] [address]  Phone: [phone]  Fax: [phone]  Follow Up Time: 1 week    Xiang Alvarado  INFECTIOUS DISEASE  [address] [place] [address] [place] [address]  Phone: [phone]  Fax: [phone]  Follow Up Time: 1 month    Glenroy Fontana  GASTROENTEROLOGY  [address] [place] [address]  Phone: [phone]  Fax: [phone]  Scheduled Appointment: 09/03/2020 10:30 AM

## 2020-08-18 NOTE — DISCHARGE NOTE NURSING/CASE MANAGEMENT/SOCIAL WORK - NSDPDISTO_GEN_ALL_CORE
Home with home care/Pt posterior cervical incision with dressing intact, positive NV status. VS stable Afebrile. pt jose po diet, voiding without difficulty. Seen by MD and cleared for DC to home as per safe DC plan with Home Care for PICC line and IVABx infusion, MASOOD X 3 care.

## 2020-08-18 NOTE — DISCHARGE NOTE NURSING/CASE MANAGEMENT/SOCIAL WORK - NSDCPEEMAIL_GEN_ALL_CORE
[facility] email [email or web address]

## 2020-08-18 NOTE — DISCHARGE NOTE NURSING/CASE MANAGEMENT/SOCIAL WORK - PATIENT PORTAL LINK FT
You can access the FollowMyHealth Patient Portal offered by [facility] by registering at the following website: [email or web address]. By joining [employer]’s FollowMyHealth portal, you will also be able to view your health information using other applications (apps) compatible with our system.

## 2020-08-18 NOTE — DISCHARGE NOTE PROVIDER - NSDCFUADDINST_GEN_ALL_CORE_FT
-PICC LINE PLACED 8/18/20 WITH INTERVENTIONAL RADIOLOGY  -OR CULTURES STREP INTERMEDIUS  -IV Ceftriaxone through 10/7/20  - OUTPATIENT FOLLOW UP WITH DR. YOST in 1 WEEK [phone] FOR DRAIN REMOVAL AND WOUND CHECK  - OUTPATIENT FOLLOW UP WITH INFECTIOUS DISEASE DR. ATKINS FOR ANTIBIOTIC MANAGEMENT  - EMPTY POST SURGICAL DRAINS AS INTRUSTED IN THE HOSPITAL  - AVOID LIFTING ARMS ABOVE SHOULDERS TO ALLOW WOUND TO HEAL AND NOT BE STRETCHED

## 2020-08-18 NOTE — DISCHARGE NOTE NURSING/CASE MANAGEMENT/SOCIAL WORK - NSDCFUADDAPPT_GEN_ALL_CORE_FT
[facility] : nurse to visit friday 8/20 to follow up on jessi drain home management    PLEASE FOLLOW UP WITH YOUR PMD. SUBCLINICAL HYPOTHYROIDISM. NO MEDICATION STARTED. WILL NEED THYROID FUNCTION TESTS  REPEATED IN 4 WEEKS [facility] : nurse to call to set up visit date and time  to follow up on jessi drain home management    PLEASE FOLLOW UP WITH YOUR PMD. SUBCLINICAL HYPOTHYROIDISM. NO MEDICATION STARTED. WILL NEED THYROID FUNCTION TESTS  REPEATED IN 4 WEEKS

## 2020-08-18 NOTE — DISCHARGE NOTE PROVIDER - HOSPITAL COURSE
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), breast ca s/p right lumpectomy 2014 and radiation currently on anastrazole, L renal mass s/p left lap alba-nephrectomy 6/2020, skin cancer s/p Moh's surgery, HTN, HLD, osteoporosis, cervical stenosis with compression Nov 2018 s/p C4-6 ACDF 12/4/18 and revision of anterior cervical discectomy with fusion of C4-C6 with extension to C3, as well as C2-T2 posterior fusion with right iliac crest autograft in 2019 which was c/b Rapid Afib and treated with amio/anticoagulation, dysphagia treated for aspiration pneumonia, had PEG which was removed on 6/12/2020 presents this admission with complaints of swelling and drainage from neck wound diagnosed with neck abscess with hardware infection s/p OR washout and wound vac placement on 8/6/2020. with repeat complete closure of wound on 08/15 with wound vacs, Incidentally found to have thyroid nodules        # Neck abscess with hardware infection s/p OR washout and wound vac placement on 8/6/2020 with complete closure of wound 08/15    - on Ceftriaxone 2g q 24 per ID recs through 10/7    - Tentative plan is to do IV abx at least 6-8 weeks, probably will need a PICC line    - Culture from OR with strep intermedius, 8/5 Bcx NGTD    - s/p complete closure of wound with wound vac attached on 08/15    - wound care and pain management per primary team        # dysphagia: possibly contributed by history of radiation    MBS study done 8/11 and diet advanced to dysphagia  2 with thin liquids    Gastroenterology consult recommended EGD. Performed on 8/17  showing narrowing and will need to follow up in 3 months        # leukocytosis    ? reactive vs hemoconcentration    monitor for fevers, diarrhea        # thyroid nodules    unlikely to be cause of patient's dysphagia    Thyroid US with heterogeneous thyroid, b/l nodules. Right lobe: 3.2x1.3x1.2 with TIRADS 4 and left lobe: 3.5x1.6x1.5cm with TIRADS 2    recommend FNA biopsy: can be done as outpatient with IR    TSH elevated at 4.59, FT4 WNL suggesting subclinical hypothyroidism. Recommend repeat TFTs in 4 weeks        # tachycardia, h/o Afib previously on AC and s/p amio    - patient is asymptomatic    - 8/11 EKG with sinus tach, no acute ST-T changes, asymptomatic    - currently on metoprolol        # h/o breast ca    - c/w anastrazole    - followup with Onc as outpatient        # prophylactic measure    on Lovenox for DVT ppx    diet as tolerated    PT consult

## 2020-08-18 NOTE — DISCHARGE NOTE PROVIDER - NSDCCPCAREPLAN_GEN_ALL_CORE_FT
PRINCIPAL DISCHARGE DIAGNOSIS  Diagnosis: Abscess  Assessment and Plan of Treatment: -PICC LINE PLACED 8/18/20 WITH INTERVENTIONAL RADIOLOGY  -OR CULTUREST STREP INTERMEDIUS  -IV Ceftriaxone throught 10/7/20  - OUTPATIENT FOLLOW UP WITH DR. YOST in 1 WEEK [phone] FOR DRAIN REMOVAL AND WOUND CHECK  - OUPATIENT FOLLOW UP WITH INFECTIOUS DISEASE DR. ATKINS FOR ANTTIOTIC MANAGMENT

## 2020-08-18 NOTE — PROGRESS NOTE ADULT - ASSESSMENT
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), breast ca s/p right lumpectomy 2014 and radiation currently on anastrazole, L renal mass s/p left lap alba-nephrectomy 6/2020, skin cancer s/p Moh's surgery, HTN, HLD, osteoporosis, cervical stenosis with compression Nov 2018 s/p C4-6 ACDF 12/4/18 and revision of anterior cervical discectomy with fusion of C4-C6 with extension to C3, as well as C2-T2 posterior fusion with right iliac crest autograft in 2019 which was c/b Rapid Afib and treated with amio/anticoagulation, dysphagia treated for aspiration pneumonia, had PEG which was removed on 6/12/2020 presents this admission with complaints of swelling and drainage from neck wound diagnosed with neck abscess with hardware infection s/p OR washout and wound vac placement on 8/6/2020. with repeat complete closure of wound on 08/15 with wound vacs, Incidentally found to have thyroid nodules  # Neck abscess with hardware infection s/p OR washout and wound vac placement on 8/6/2020 with complete closure of wound 08/15  - on Ceftriaxone 2g q 24 per ID recs till 10/7/20 with poss subsequent   - Culture from OR with strep intermedius, 8/5 Bcx NGTD  - s/p complete closure of wound with wound vac attached on 08/15  - wound care and pain management per primary team    # dysphagia: possibly contributed by history of radiation/poss extrinsic compression due to hardware??  - MBS study done 8/11 and diet advanced to dysphagia  2 with thin liquids  - EGD done showed extrinsic compression s/p dilatation with improvement of dysphagia   - f/u GI recs    # leukocytosis  ? reactive vs hemoconcentration  monitor for fevers, diarrhea    # thyroid nodules  unlikely to be cause of patient's dysphagia  Thyroid US with heterogeneous thyroid, b/l nodules. Right lobe: 3.2x1.3x1.2 with TIRADS 4 and left lobe: 3.5x1.6x1.5cm with TIRADS 2  recommend FNA biopsy: can be done as outpatient with IR  TSH elevated at 4.59, FT4 WNL suggesting subclinical hypothyroidism. Recommend repeat TFTs in 4 weeks    # tachycardia, h/o Afib previously on AC and s/p amio  - patient is asymptomatic  - 8/11 EKG with sinus tach, no acute ST-T changes, asymptomatic  - currently on metoprolol    # h/o breast ca  - c/w anastrazole  - followup with Onc as outpatient    # prophylactic measure  on Lovenox for DVT ppx  diet as tolerated  PT consult

## 2020-08-18 NOTE — DISCHARGE NOTE PROVIDER - CARE PROVIDERS DIRECT ADDRESSES
,DirectAddress_Unknown,DirectAddress_Unknown,[email or web address]

## 2020-08-18 NOTE — PROGRESS NOTE ADULT - ASSESSMENT
71 yo F now from washout of cervical/thoracic spine down to her hardware on 8/6/20 and repeat washout and vac change on 8/8, 8/13, and definitive closure with bilateral paraspinous and R trapezius flap on 8/15, doing well.     plan:   will follow along closely  no lifting elbows to shoulder level, should remain relatively close to her side  abx per ID recommendations, f/u cultures  Encourage side to side positioning for pressure offloading x4 weeks  OK to take NSAIDs from my standpoint as needed for pain  Call my cell phone below directly with any questions or concerns  Please call [phone] to schedule follow up appointment at time of discharge, to be seen in clinic 1 week after surgery.     Norbert Zhou MD  Plastic Surgery  [phone]

## 2020-08-18 NOTE — DISCHARGE NOTE PROVIDER - NSDCFUADDAPPT_GEN_ALL_CORE_FT
PLEASE FOLLOW UP WITH YOUR PMD. SUBCLINICAL HYPOTHYROIDISM. NO MEDICATION STARTED. WILL NEED THYROID FUNCTION TESTS  REPEATED IN 4 WEEKS

## 2020-08-18 NOTE — DISCHARGE NOTE NURSING/CASE MANAGEMENT/SOCIAL WORK - NSDCPEWEB_GEN_ALL_CORE
[place] website --- [email or web address] Tobacco [email or web address] website --- [email or web address]

## 2020-08-18 NOTE — PROGRESS NOTE ADULT - ASSESSMENT
Documents faxed.    71 YO female, S/P washout of cervical/thoracic spine X 3, placement of wound vac, S/P wound closure  Dysphagia, S/P EGD yesterday  PICC today sudden onset

## 2020-08-18 NOTE — DISCHARGE NOTE NURSING/CASE MANAGEMENT/SOCIAL WORK - NSDCPECAREGIVERED_GEN_ALL_CORE
Medline and carenotes for surgical procedure abscess, MASOOD drain care, PICC line and care,  as well as DC Medications and Rocephin and side effects literature for patient reference./Yes

## 2020-08-18 NOTE — DISCHARGE NOTE PROVIDER - NSDCMRMEDTOKEN_GEN_ALL_CORE_FT
anastrozole 1 mg oral tablet: 1 tab(s) orally once a day in am   metoprolol tartrate 25 mg oral tablet: 0.5 tab(s) orally once a day  Multiple Vitamins oral tablet, chewable: 1 tab(s) orally once a day  rosuvastatin 10 mg oral tablet: 1 tab(s) orally once a day acetaminophen 325 mg oral tablet: 2 tab(s) orally every 6 hours, As needed, Temp greater or equal to 38C (100.4F), Mild Pain (1 - 3)  anastrozole 1 mg oral tablet: 1 tab(s) orally once a day in am   cefTRIAXone 2 g intravenous injection: 2 gram(s) intravenous every 24 hours, THROUGH 10/7/20  metoprolol tartrate 25 mg oral tablet: 0.5 tab(s) orally once a day  Multiple Vitamins oral tablet, chewable: 1 tab(s) orally once a day  oxyCODONE 5 mg oral tablet: 1 tab(s) orally every 6 hours, As needed, Moderate Pain (4 - 6) MDD:4 tabs  pantoprazole 40 mg oral delayed release tablet: 1 tab(s) orally once a day (before a meal)  polyethylene glycol 3350 oral powder for reconstitution: 17 gram(s) orally once a day, As needed, Constipation  rosuvastatin 10 mg oral tablet: 1 tab(s) orally once a day

## 2020-08-18 NOTE — DISCHARGE NOTE NURSING/CASE MANAGEMENT/SOCIAL WORK - NSDCPNINST_GEN_ALL_CORE
Maintain incision clean and dry, call MD with any signs of infection such as fever, redness or drainage from site from site.  Keep Russ Daniels drains and dressings clean and dry, empty and monitor drainage as instructed. Call MD with any signs of infection.  Maintain PICC line dressing clean dry and intact, call MD with any signs of infection ie. redness or drainage at site.   See discharge care instructions and literature in orange DC Folder for detailed information to reference.

## 2020-08-19 PROCEDURE — 99232 SBSQ HOSP IP/OBS MODERATE 35: CPT

## 2020-08-19 RX ORDER — ONDANSETRON 8 MG/1
4 TABLET, FILM COATED ORAL ONCE
Refills: 0 | Status: COMPLETED | OUTPATIENT
Start: 2020-08-19 | End: 2020-08-19

## 2020-08-19 RX ADMIN — CHLORHEXIDINE GLUCONATE 1 APPLICATION(S): 213 SOLUTION TOPICAL at 09:07

## 2020-08-19 RX ADMIN — CEFTRIAXONE 100 MILLIGRAM(S): 500 INJECTION, POWDER, FOR SOLUTION INTRAMUSCULAR; INTRAVENOUS at 13:59

## 2020-08-19 RX ADMIN — OXYCODONE HYDROCHLORIDE 5 MILLIGRAM(S): 5 TABLET ORAL at 19:45

## 2020-08-19 RX ADMIN — SENNA PLUS 2 TABLET(S): 8.6 TABLET ORAL at 22:11

## 2020-08-19 RX ADMIN — ONDANSETRON 4 MILLIGRAM(S): 8 TABLET, FILM COATED ORAL at 00:38

## 2020-08-19 RX ADMIN — POLYETHYLENE GLYCOL 3350 17 GRAM(S): 17 POWDER, FOR SOLUTION ORAL at 11:37

## 2020-08-19 RX ADMIN — OXYCODONE HYDROCHLORIDE 5 MILLIGRAM(S): 5 TABLET ORAL at 11:37

## 2020-08-19 RX ADMIN — SODIUM CHLORIDE 3 MILLILITER(S): 9 INJECTION INTRAMUSCULAR; INTRAVENOUS; SUBCUTANEOUS at 06:00

## 2020-08-19 RX ADMIN — SODIUM CHLORIDE 3 MILLILITER(S): 9 INJECTION INTRAMUSCULAR; INTRAVENOUS; SUBCUTANEOUS at 13:31

## 2020-08-19 RX ADMIN — SODIUM CHLORIDE 3 MILLILITER(S): 9 INJECTION INTRAMUSCULAR; INTRAVENOUS; SUBCUTANEOUS at 21:52

## 2020-08-19 RX ADMIN — OXYCODONE HYDROCHLORIDE 5 MILLIGRAM(S): 5 TABLET ORAL at 12:00

## 2020-08-19 RX ADMIN — OXYCODONE HYDROCHLORIDE 5 MILLIGRAM(S): 5 TABLET ORAL at 00:28

## 2020-08-19 RX ADMIN — OXYCODONE HYDROCHLORIDE 5 MILLIGRAM(S): 5 TABLET ORAL at 20:20

## 2020-08-19 RX ADMIN — SODIUM CHLORIDE 3 MILLILITER(S): 9 INJECTION INTRAMUSCULAR; INTRAVENOUS; SUBCUTANEOUS at 05:59

## 2020-08-19 RX ADMIN — PANTOPRAZOLE SODIUM 40 MILLIGRAM(S): 20 TABLET, DELAYED RELEASE ORAL at 06:11

## 2020-08-19 RX ADMIN — ANASTROZOLE 1 MILLIGRAM(S): 1 TABLET ORAL at 22:10

## 2020-08-19 NOTE — PROGRESS NOTE ADULT - ASSESSMENT
71 yo F Hx of Hodgkins 1981, breast ca with lumpectomy right 2014, HTN, HLD, osteoporosis, found to have cervical stenosis with compression Nov 2018, s/p C4-6 ACDF 12/4/18  Leukocytosis, no fever  CT with T1 abscess, concern for possible hardware infection  8/6 OR with washout/debridement to back abscess site; hardware retained  Culture from OR with strep intermedius  Remains well today  Overall,  1) Abscess +/- hardware infection  - Ceftriaxone 2g q 24 through 10/7/20  - Check CBC, BUN, Cr, LFTs weekly while on IV abx--fax to [phone]  - F/U pending OR cultures  - Wound care/further OR per primary team/surgery  2) Leukocytosis  - Trend to normal  - F/U BCXs  3) Elevated ESR  - Trend to normal    Patient should follow up with me in 6 weeks to determine further antibiotic plan (PO suppression vs observation?)    Xiang Alvarado MD  Pager [phone]  After 5pm and on weekends call [phone] 69 yo F Hx of Hodgkins 1981, breast ca with lumpectomy right 2014, HTN, HLD, osteoporosis, found to have cervical stenosis with compression Nov 2018, s/p C4-6 ACDF 12/4/18  Leukocytosis, no fever  CT with T1 abscess, concern for possible hardware infection  8/6 OR with washout/debridement to back abscess site; hardware retained  Culture from OR with strep intermedius  Remains well today  Overall,  1) Abscess +/- hardware infection  - Ceftriaxone 2g q 24 through 10/7/20  - Check CBC, BUN, Cr, LFTs weekly while on IV abx--fax to [phone]  - F/U pending OR cultures  - Wound care/further OR per primary team/surgery  2) Leukocytosis  - Trend to normal  - F/U BCXs  3) Elevated ESR  - Trend to normal    Patient should follow up with me in 6 weeks to determine further antibiotic plan (PO suppression vs observation?)    My colleagues will be covering this patient on 8/20/20, I will return on 8/21/20. Please call on call fellow with any questions or change in status.     Xiang Alvarado MD  Pager [phone]  After 5pm and on weekends call [phone]

## 2020-08-19 NOTE — PROVIDER CONTACT NOTE (OTHER) - ACTION/TREATMENT ORDERED:
PA made aware. No new orders received. Will continue to monitor.
PA made aware. No new orders received. Will continue to monitor.
No interventions ordered at this time.
PA aware. No orders made.
PA notified, no new orders as per PA.  Will continue to monitor.
As per Mykel Owen; will go to IR & see if MRCP will be able to be done tonight, if not, will put pt to clear diet for dinner until midnight, NPO after midnight; awaiting clarification.
As per PA Hannah, will continue to monitor BP, no further interventions @ this time.
MD Souza says she will visit the pt. No further orders at this time. Ongoing monitoring.
as Per ROLY Young, no interventions @ this time, will continue to monitor.

## 2020-08-19 NOTE — PROGRESS NOTE ADULT - ATTENDING COMMENTS
I have personally seen and examined this patient.

## 2020-08-19 NOTE — PROGRESS NOTE ADULT - PROBLEM SELECTOR PLAN 1
- c/w neuro observation  - plastics f/u for drains  - c/w IV abx per ID  - Dc planning    d/w attending

## 2020-08-19 NOTE — PROGRESS NOTE ADULT - PROBLEM SELECTOR PLAN 1
-aspiration precautions   -s/p EGD w/gastritis and extrinsic narrowing of esophagus s/p Dilation   -unclear etiology of extrinsic compression; will need further imaging with CT Neck with PO/IV contrast, however, pt is wishing to go home; agreeable to outpt fu   -outpatient fu in our office on September    @      with    -will need repeat EGD in 3 months  -cont ppi and soft diet on discharge -aspiration precautions   -s/p EGD w/gastritis and extrinsic narrowing of esophagus s/p Dilation   -unclear etiology of extrinsic compression; will need further imaging with CT Neck with PO/IV contrast, however, pt is wishing to go home; agreeable to outpt fu   -outpatient fu in our office on September 3rd  @ 10:30am with Dr. Fontana  -will need repeat EGD in 3 months  -cont ppi and soft diet on discharge

## 2020-08-19 NOTE — PROGRESS NOTE ADULT - ASSESSMENT
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), breast ca s/p right lumpectomy 2014 and radiation currently on anastrazole, L renal mass s/p left lap alba-nephrectomy 6/2020, skin cancer s/p Moh's surgery, HTN, HLD, osteoporosis, cervical stenosis with compression Nov 2018 s/p C4-6 ACDF 12/4/18 and revision of anterior cervical discectomy with fusion of C4-C6 with extension to C3, as well as C2-T2 posterior fusion with right iliac crest autograft in 2019 which was c/b Rapid Afib and treated with amio/anticoagulation, dysphagia treated for aspiration pneumonia, had PEG which was removed on 6/12/2020 presents this admission with complaints of swelling and drainage from neck wound diagnosed with neck abscess with hardware infection s/p OR washout and wound vac placement on 8/6/2020. with repeat complete closure of wound on 08/15 with wound vacs, Incidentally found to have thyroid nodules  # Neck abscess with hardware infection s/p OR washout and wound vac placement on 8/6/2020 with complete closure of wound 08/15  - on Ceftriaxone 2g q 24 per ID recs till 10/7/20 with poss subsequent lifetime suppression   - Culture from OR with strep intermedius, 8/5 Bcx NGTD  - s/p complete closure of wound on 08/15  - PICC line to be placed  - wound care and pain management per primary team    # dysphagia: possibly contributed by history of radiation/poss extrinsic compression due to hardware??  - MBS study done 8/11 and diet advanced to dysphagia  2 with thin liquids  - EGD done showed extrinsic compression s/p dilatation with improvement of dysphagia   - GI rec further imaging but pt refusing rec outpt f/u in 3 mo for EGD     #constipation   bowel regimen     # leukocytosis  monitor for fevers, diarrhea    # thyroid nodules  unlikely to be cause of patient's dysphagia  Thyroid US with heterogeneous thyroid, b/l nodules. Right lobe: 3.2x1.3x1.2 with TIRADS 4 and left lobe: 3.5x1.6x1.5cm with TIRADS 2  recommend FNA biopsy: can be done as outpatient with IR  TSH elevated at 4.59, FT4 WNL suggesting subclinical hypothyroidism. Recommend repeat TFTs in 4 weeks    # tachycardia, h/o Afib previously on AC and s/p amio  - patient is asymptomatic  - 8/11 EKG with sinus tach, no acute ST-T changes, asymptomatic  - currently on metoprolol    # h/o breast ca  - c/w anastrazole  - followup with Onc as outpatient    # prophylactic measure  on Lovenox for DVT ppx  diet as tolerated  PT consult-outpt PT

## 2020-08-19 NOTE — PROGRESS NOTE ADULT - ASSESSMENT
70y F s/p posterior cervical multiple wound washout  and wound closure by plastics on 8/13. Picc line done, On long term Abx.

## 2020-08-19 NOTE — PROVIDER CONTACT NOTE (OTHER) - ASSESSMENT
Pt A&Ox4. Vitals stable. No complaints of chest pain, shortness of breath or nausea/vomiting. Pt had just walked to the bathroom.
Pt A&Ox4. Vitals stable. No complaints of chest pain, shortness of breath or nausea/vomiting. Pt had just walked to the bathroom.
Pt is resting in bed. Denies pain/discomfort. VSS. Pt refusing to take Lovenox because she is scheduled to go to the OR on Tuesday. Pt was told they would stop it the night before the procedure.  Pt was explained the risk/benefits of Lovenox.
AOX4, BP=93/57, QJ=146 asymptomatic, denies pain, no chest pain, no SOB
Patient resting comfortably offering no complaints, NAD noted.
Pt. other V/S stable; pt. denies any s/s of increased HR. Denies chest pain & discomfort. No signs of acute distress. Pt. stated that at home her baseline HR runs between 100 and 110.
pt in NAD; Pt VS otherwise stable, pt denies SOB, dizziness or difficulty breathing
pt in NAD; VSS. Pt denies pain. Pt states he is hungry
pt in NAD; pt denies dizziness, SOB or lethargy

## 2020-08-19 NOTE — PROGRESS NOTE ADULT - ASSESSMENT
71 yo F now from washout of cervical/thoracic spine down to her hardware on 8/6/20 and repeat washout and vac change on 8/8, 8/13, and definitive closure with bilateral paraspinous and R trapezius flap on 8/15, doing well.     plan:   removed drain 1 today, 2 other drains remain  will follow along closely  no lifting elbows to shoulder level, should remain relatively close to her side  abx per ID recommendations, f/u cultures  Encourage side to side positioning for pressure offloading x4 weeks  OK to take NSAIDs from my standpoint as needed for pain  Call my cell phone below directly with any questions or concerns  Please call [phone] to schedule follow up appointment at time of discharge for next Friday, 8/28 to be seen in clinic    Norbert Zhou MD  Plastic Surgery  [phone]

## 2020-08-20 VITALS — HEART RATE: 88 BPM

## 2020-08-20 PROCEDURE — 99232 SBSQ HOSP IP/OBS MODERATE 35: CPT

## 2020-08-20 RX ORDER — POLYETHYLENE GLYCOL 3350 17 G/17G
17 POWDER, FOR SOLUTION ORAL
Qty: 0 | Refills: 0 | DISCHARGE
Start: 2020-08-20

## 2020-08-20 RX ORDER — CEFTRIAXONE 500 MG/1
2 INJECTION, POWDER, FOR SOLUTION INTRAMUSCULAR; INTRAVENOUS
Qty: 0 | Refills: 0 | DISCHARGE
Start: 2020-08-20

## 2020-08-20 RX ORDER — PANTOPRAZOLE SODIUM 20 MG/1
1 TABLET, DELAYED RELEASE ORAL
Qty: 0 | Refills: 0 | DISCHARGE
Start: 2020-08-20

## 2020-08-20 RX ORDER — ACETAMINOPHEN 500 MG
2 TABLET ORAL
Qty: 0 | Refills: 0 | DISCHARGE
Start: 2020-08-20

## 2020-08-20 RX ORDER — OXYCODONE HYDROCHLORIDE 5 MG/1
1 TABLET ORAL
Qty: 20 | Refills: 0
Start: 2020-08-20 | End: 2020-08-24

## 2020-08-20 RX ADMIN — Medication 12.5 MILLIGRAM(S): at 05:46

## 2020-08-20 RX ADMIN — SODIUM CHLORIDE 3 MILLILITER(S): 9 INJECTION INTRAMUSCULAR; INTRAVENOUS; SUBCUTANEOUS at 05:47

## 2020-08-20 RX ADMIN — CEFTRIAXONE 100 MILLIGRAM(S): 500 INJECTION, POWDER, FOR SOLUTION INTRAMUSCULAR; INTRAVENOUS at 14:47

## 2020-08-20 RX ADMIN — OXYCODONE HYDROCHLORIDE 5 MILLIGRAM(S): 5 TABLET ORAL at 11:19

## 2020-08-20 RX ADMIN — CHLORHEXIDINE GLUCONATE 1 APPLICATION(S): 213 SOLUTION TOPICAL at 12:33

## 2020-08-20 RX ADMIN — SODIUM CHLORIDE 3 MILLILITER(S): 9 INJECTION INTRAMUSCULAR; INTRAVENOUS; SUBCUTANEOUS at 05:46

## 2020-08-20 RX ADMIN — SODIUM CHLORIDE 3 MILLILITER(S): 9 INJECTION INTRAMUSCULAR; INTRAVENOUS; SUBCUTANEOUS at 13:51

## 2020-08-20 RX ADMIN — Medication 1 ENEMA: at 10:43

## 2020-08-20 RX ADMIN — PANTOPRAZOLE SODIUM 40 MILLIGRAM(S): 20 TABLET, DELAYED RELEASE ORAL at 05:46

## 2020-08-20 RX ADMIN — POLYETHYLENE GLYCOL 3350 17 GRAM(S): 17 POWDER, FOR SOLUTION ORAL at 10:12

## 2020-08-20 RX ADMIN — OXYCODONE HYDROCHLORIDE 5 MILLIGRAM(S): 5 TABLET ORAL at 12:15

## 2020-08-20 NOTE — PROGRESS NOTE ADULT - PROVIDER SPECIALTY LIST ADULT
Anesthesia
Gastroenterology
Hospitalist
Infectious Disease
Internal Medicine
Internal Medicine
Neurosurgery
Plastic Surgery
Gastroenterology
Infectious Disease
Plastic Surgery
Gastroenterology
Gastroenterology
Neurosurgery

## 2020-08-20 NOTE — PROGRESS NOTE ADULT - PROBLEM SELECTOR PLAN 2
-w/hardware infection s/p OR washout and wound vac placement on 8/6/2020 and wound closure 8/15  -cont abx duration per primary team  -bacid tid   -cont management per neurosx/appreciated
-w/hardware infection s/p OR washout and wound vac placement on 8/6/2020  -cont abx per primary team  -bacid tid   -cont management per neurosx/appreciated
Thyroid ultrasound ordered
-w/hardware infection s/p OR washout and wound vac placement on 8/6/2020  -OR plans for wound closure 8/13  -cont abx per primary team  -bacid tid   -cont management per neurosx
-w/hardware infection s/p OR washout and wound vac placement on 8/6/2020  -cont abx per primary team  -bacid tid   -cont management per neurosx/appreciated
-w/hardware infection s/p OR washout and wound vac placement on 8/6/2020 and wound closure 8/15  -cont abx duration via PICC per primary team  -bacid tid   -cont management per neurosx/appreciated
-w/hardware infection s/p OR washout and wound vac placement on 8/6/2020 and wound closure 8/15  -cont abx per primary team  -bacid tid   -cont management per neurosx/appreciated
Continue pureed diet
EGD completed  Follow up with GI in 3 months
EGD scheduled for Monday
EGD today  Keep NPO
NPO for esophogram today
-w/hardware infection s/p OR washout and wound vac placement on 8/6/2020 and wound closure 8/15  -cont abx duration via PICC per primary team  -bacid tid   -cont management per neurosx/appreciated

## 2020-08-20 NOTE — PROGRESS NOTE ADULT - ASSESSMENT
70y F s/p multiple washout of posterior cervical wound by plastics, wound closure on 8/13. Picc, Long term Iv abx.

## 2020-08-20 NOTE — PROGRESS NOTE ADULT - ASSESSMENT
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), breast ca s/p right lumpectomy 2014 and radiation currently on anastrazole, L renal mass s/p left lap alba-nephrectomy 6/2020, skin cancer s/p Moh's surgery, HTN, HLD, osteoporosis, cervical stenosis with compression Nov 2018 s/p C4-6 ACDF 12/4/18 and revision of anterior cervical discectomy with fusion of C4-C6 with extension to C3, as well as C2-T2 posterior fusion with right iliac crest autograft in 2019 which was c/b Rapid Afib and treated with amio/anticoagulation, dysphagia treated for aspiration pneumonia, had PEG which was removed on 6/12/2020 presents this admission with complaints of swelling and drainage from neck wound diagnosed with neck abscess with hardware infection s/p OR washout and wound vac placement on 8/6/2020. with repeat complete closure of wound on 08/15 with wound vacs, Incidentally found to have thyroid nodules  # Neck abscess with hardware infection s/p OR washout and wound vac placement on 8/6/2020 with complete closure of wound 08/15  - on Ceftriaxone 2g q 24 per ID recs till 10/7/20 with poss subsequent lifetime suppression   - Culture from OR with strep intermedius, 8/5 Bcx NGTD  - s/p complete closure of wound on 08/15  - s/p PICC  - wound care and pain management per primary team    # dysphagia: possibly contributed by history of radiation/poss extrinsic compression due to hardware??  - MBS study done 8/11 and diet advanced to dysphagia  2 with thin liquids  - EGD done showed extrinsic compression s/p dilatation with improvement of dysphagia   - GI rec further imaging but pt refusing rec outpt f/u in 3 mo for EGD     #constipation   bowel regimen resolved     # leukocytosis  monitor for fevers, diarrhea    # thyroid nodules  unlikely to be cause of patient's dysphagia  Thyroid US with heterogeneous thyroid, b/l nodules. Right lobe: 3.2x1.3x1.2 with TIRADS 4 and left lobe: 3.5x1.6x1.5cm with TIRADS 2  recommend FNA biopsy: can be done as outpatient with IR  TSH elevated at 4.59, FT4 WNL suggesting subclinical hypothyroidism. Recommend repeat TFTs in 4 weeks    # tachycardia, h/o Afib previously on AC and s/p amio  - patient is asymptomatic  - 8/11 EKG with sinus tach, no acute ST-T changes, asymptomatic  - currently on metoprolol    # h/o breast ca  - c/w anastrazole  - followup with Onc as outpatient    # prophylactic measure  on Lovenox for DVT ppx  diet as tolerated  PT consult-outpt PT

## 2020-08-20 NOTE — PROGRESS NOTE ADULT - REASON FOR ADMISSION
neck abscess

## 2020-08-20 NOTE — PROGRESS NOTE ADULT - PROBLEM SELECTOR PROBLEM 3
Thyroid nodule
Dysphagia, unspecified type
Thyroid nodule
Abnormal MRI
Thyroid nodule

## 2020-08-20 NOTE — PROGRESS NOTE ADULT - PROBLEM SELECTOR PROBLEM 4
ACP (advance care planning)

## 2020-08-20 NOTE — PROGRESS NOTE ADULT - PROBLEM SELECTOR PLAN 1
-aspiration precautions   -s/p EGD w/gastritis and extrinsic narrowing of esophagus s/p Dilation   -unclear etiology of extrinsic compression; recommend CT Neck w/IV/PO contrast, however, pt is wishing to go home; agreeable to outpt fu   -outpatient fu in our office on September 3rd @10:30am with Dr. Fontana  -will need repeat EGD in 3 months  -cont ppi and soft diet on discharge

## 2020-08-20 NOTE — PROGRESS NOTE ADULT - PROBLEM SELECTOR PLAN 3
-per medicine outpt IR FNA biopsy
-per medicine outpt IR FNA biopsy
Diet changed to puree  Cinesophogram ordered
-per medicine outpt IR FNA biopsy
Thyroid ultrasound ordered
-per medicine outpt IR FNA biopsy

## 2020-08-20 NOTE — PROGRESS NOTE ADULT - PROBLEM SELECTOR PROBLEM 1
Dysphagia
Abscess
Dysphagia
Abscess
Abscess of neck
Dysphagia

## 2020-08-20 NOTE — PROGRESS NOTE ADULT - SUBJECTIVE AND OBJECTIVE BOX
CC: F/U for Wound infection    Saw/spoke to patient.  No new complaints. No fevers, no chills. S/p endoscopy today.    Allergies  penicillin (Rash)    ANTIMICROBIALS:  cefTRIAXone   IVPB 2000 every 24 hours    PE:    Vital Signs Last 24 Hrs  T(C): 36.7 (17 Aug 2020 13:07), Max: 37.2 (17 Aug 2020 02:38)  T(F): 98 (17 Aug 2020 13:07), Max: 99 (17 Aug 2020 02:38)  HR: 96 (17 Aug 2020 13:07) (85 - 100)  BP: 132/77 (17 Aug 2020 13:07) (96/53 - 152/73)  BP(mean): 100 (17 Aug 2020 11:45) (84 - 100)  RR: 18 (17 Aug 2020 13:07) (16 - 24)  SpO2: 95% (17 Aug 2020 13:07) (90% - 100%)    Gen: AOx3, NAD, non-toxic, pleasant  CV: S1+S2 normal, nontachycardic  Resp: Clear bilat, no resp distress, no crackles/wheezes  Abd: Soft, nontender, +BS  Ext: No LE edema, no wounds    LABS:    No new available    MICROBIOLOGY:    .Abscess CERVICAL WOUND POST WASH 1  08-13-20   No growth    .Smear CERVICAL WOUND POST WASH 2  08-13-20 --  --    No polymorphonuclear cells seen per low power field  No organisms seen per oil power field    .Other WOUND DEEP. DEEP POST-WASH CULTURE OF CERVICAL SPINE  08-07-20   No growth     .Other WOUND DEEP. DEEP PRE WASH CULTURE OF CERVICAL SPINE  08-07-20   No growth     .Surgical Swab WOUND DEEP. DEEP PRE WASH CULTURE OF CERVICAL SPINE  08-07-20   Few Streptococcus intermedius "Susceptibilities not performed"  --    Rare polymorphonuclear leukocytes per low power field  No organisms seen per oil power field    .Tissue CERVICAL SPINE SOFT TISSUE PRE WASH  08-06-20   Few Streptococcus intermedius  --  Streptococcus intermedius    (otherwise reviewed)    RADIOLOGY:    8/10 USG:    FINDINGS:  Right Lobe: 3.2 x 1.3 x 1.2  cm. Heterogeneous echotexture with 2 peripherally calcified hypoechoic nodules measuring 0.6 cm at the interpole and 0.6 cm at the lower pole. TIRADS 4.    Left Lobe: 3.5 x 1.6 x 1.5 cm. Heterogeneous echotexture with a dominant cystic nodule with a slightly irregular wall measuring 1 x 0.7 x 1.2 cm. TIRADS 2.    Isthmus: 2 mm. Within normal limits.    Cervical Lymph Nodes: No enlarged or abnormal morphology cervical nodes.    IMPRESSION:    Heterogeneous thyroid with bilateral nodules including two subcentimeter TIRADS 4 nodules on the right.
INTERVAL HPI/OVERNIGHT EVENTS:    egd cancelled today as pt did not want to maintain NPO  when seen bedside this afternoon reports dysphagia w/her lunch     MEDICATIONS  (STANDING):  anastrozole 1 milliGRAM(s) Oral daily  atorvastatin 40 milliGRAM(s) Oral at bedtime  cefTRIAXone   IVPB 2000 milliGRAM(s) IV Intermittent every 24 hours  metoprolol tartrate 12.5 milliGRAM(s) Oral daily  senna 2 Tablet(s) Oral at bedtime  sodium chloride 0.9% lock flush 3 milliLiter(s) IV Push every 8 hours  sodium chloride 0.9%. 1000 milliLiter(s) (75 mL/Hr) IV Continuous <Continuous>    MEDICATIONS  (PRN):  acetaminophen   Tablet .. 650 milliGRAM(s) Oral every 6 hours PRN Temp greater or equal to 38C (100.4F), Mild Pain (1 - 3)  diazepam    Tablet 5 milliGRAM(s) Oral three times a day PRN muscle spasms  oxyCODONE    IR 5 milliGRAM(s) Oral every 6 hours PRN Moderate Pain (4 - 6)  oxyCODONE    IR 10 milliGRAM(s) Oral every 6 hours PRN Severe Pain (7 - 10)  polyethylene glycol 3350 17 Gram(s) Oral daily PRN Constipation      Allergies    penicillin (Rash)    Intolerances        Review of Systems:    General:  No wt loss, fevers, chills, night sweats, fatigue   Eyes:  Good vision, no reported pain  ENT:  No sore throat, pain, runny nose, +dysphagia  CV:  No pain, palpitations, hypo/hypertension  Resp:  No dyspnea, cough, tachypnea, wheezing  GI:  No pain, No nausea, No vomiting, No diarrhea, No constipation, No weight loss, No fever, No pruritis, No rectal bleeding, No melena, +dysphagia  :  No pain, bleeding, incontinence, nocturia  Muscle:  No pain, weakness  Neuro:  No weakness, tingling, memory problems  Psych:  No fatigue, insomnia, mood problems, depression  Endocrine:  No polyuria, polydypsia, cold/heat intolerance  Heme:  No petechiae, ecchymosis, easy bruisability  Skin:  No rash, tattoos, scars, edema      Vital Signs Last 24 Hrs  T(C): 36.7 (14 Aug 2020 09:58), Max: 37.1 (13 Aug 2020 13:23)  T(F): 98.1 (14 Aug 2020 09:58), Max: 98.8 (13 Aug 2020 13:23)  HR: 83 (14 Aug 2020 09:58) (83 - 114)  BP: 100/60 (14 Aug 2020 09:58) (100/60 - 158/89)  BP(mean): 99 (13 Aug 2020 19:00) (89 - 106)  RR: 18 (14 Aug 2020 09:58) (12 - 20)  SpO2: 97% (14 Aug 2020 09:58) (92% - 99%)    PHYSICAL EXAM:    Constitutional: NAD  HEENT: EOMI, throat clear  Neck: No LAD, supple  Respiratory: CTA and P  Cardiovascular: S1 and S2, RRR, no M  Gastrointestinal: BS+, soft, NT/ND, neg HSM,  Extremities: No peripheral edema, neg clubbing, cyanosis  Vascular: 2+ peripheral pulses  Neurological: A/O x 3, no focal deficits  Psychiatric: Normal mood, normal affect  Skin: No rashes      LABS:                        10.1   12.87 )-----------( 553      ( 13 Aug 2020 05:15 )             33.2                 RADIOLOGY & ADDITIONAL TESTS:
Patient's family and Plastic surgery attending have raised concerns regarding patient's ability to swallow  Patient previously required PEG feeding due to aspiration issues  patient states she has difficulty swallowing hard foods, but can tolerate pureed or soft foods  No other issues overnight  Vital Signs Last 24 Hrs  T(C): 36.7 (08 Aug 2020 22:27), Max: 37.3 (08 Aug 2020 09:54)  T(F): 98.1 (08 Aug 2020 22:27), Max: 99.1 (08 Aug 2020 09:54)  HR: 92 (08 Aug 2020 22:27) (86 - 106)  BP: 111/67 (08 Aug 2020 22:27) (106/62 - 155/86)  BP(mean): 82 (08 Aug 2020 14:30) (59 - 102)  RR: 17 (08 Aug 2020 22:27) (12 - 25)  SpO2: 96% (08 Aug 2020 22:27) (94% - 100%)    AAO X 3  PERRLA, EOMI  DOZIER strength 5/5  SILT    MEDICATIONS  (STANDING):  anastrozole 1 milliGRAM(s) Oral daily  atorvastatin 40 milliGRAM(s) Oral at bedtime  cefepime   IVPB 2000 milliGRAM(s) IV Intermittent every 8 hours  metoprolol tartrate 12.5 milliGRAM(s) Oral daily  sodium chloride 0.9% lock flush 3 milliLiter(s) IV Push every 8 hours  vancomycin  IVPB 1000 milliGRAM(s) IV Intermittent every 12 hours    MEDICATIONS  (PRN):  acetaminophen   Tablet .. 650 milliGRAM(s) Oral every 6 hours PRN Temp greater or equal to 38C (100.4F), Mild Pain (1 - 3)  diazepam    Tablet 5 milliGRAM(s) Oral three times a day PRN muscle spasms  oxyCODONE    IR 5 milliGRAM(s) Oral every 6 hours PRN Moderate Pain (4 - 6)  oxyCODONE    IR 10 milliGRAM(s) Oral every 6 hours PRN Severe Pain (7 - 10)  polyethylene glycol 3350 17 Gram(s) Oral daily PRN Constipation                          9.4    12.76 )-----------( 474      ( 07 Aug 2020 06:00 )             29.6     Culture - Surgical Swab (08.07.20 @ 14:44)    Specimen Source: .Surgical Swab WOUND DEEP. DEEP POST-WASH CULTURE OF CERVICAL SPINE    Culture Results:   No growth    Culture - Fungal, Other (08.07.20 @ 10:12)    Specimen Source: .Other WOUND DEEP. DEEP PRE WASH CULTURE OF CERVICAL SPINE    Culture Results:   Testing in progress    < from: MR Cervical Spine w/wo IV Cont (08.06.20 @ 15:25) >  MRI of the cervical spine was performed using sagittal T1-T2 and STIR sequence. Axial T1 and T2-weighted sequences were performed as well. The patient was injected with approximately 7 cc Gadavist IV with 1 cc of contrast discarded. Sagittal (with and without fat suppression) and axial T1-weighted sequences were performed.    This exam is compared with prior preop MRI performed on October 22, 2018 and prior postop CT scan of the cervical spine performed on August 5, 2020.    Postop changes compatible with anterior spinal fusion and discectomy is seen extending from C3 to C5. Anterior spinal fusion is also seen extending from C7 to T1.    Posterior spinal fusion is seen extending from C3 to T2.    Please refer to CT scan of the cervical spine for adequate evaluation of the postop hardware.    Loss of of the normal cervical lordosis is seen.    Slight increased kyphosis of the thoracic spine is seen.    C2-3: Normal.    C3-4: Normal    C4-5: Disc bulge is seen. Hypertrophic change seen is involving the left uncovertebral joint. Minimal effacement of the ventral thecal sac is seen. Moderate to severe narrowing left neural foramen is seen.    C5-6: Disc bulge is identified. Bilateral hypertrophic facet joint changes are seen. Hypertrophic change is seen involving both uncovertebral joints. Mild narrowing of the spinal canal. Mild to moderate narrowing of both neural foramen.    Left-sided disc osteophyte complex is seen at the C6 level. This causes effacement of the ventral thecal sac and effacement of the ventral spinal cord with mild compression of the left spinal cord seen.    C6-7: Disc bulge and central disc protrusion is seen. This disc protrusion does cause effacement of the ventral thecal sac and nearly abuts the spinal cord. Bilateral hypertrophic facet changes are seen. Mild to moderate narrowing of the left neural foramen. Severe narrowing of the right neural foramen.    C7-T1: Mild disc bulge is seen without significant, minus of the spinal canal or either neural foramen    T1-2: Bilateral hypertrophic facet joint changes are seen. Mild narrowing of the right neural foramen mild to moderate narrowing of the left neural foramen.    Compression deformity is seen involving T1. No associated area of abnormal signal is seen to suggest acute or pathologic fracture. No significant retropulsed fragment is seen.    There is evidence of compression deformity seen involving the superior endplate of T5 with abnormal T1 and T2 prolongation identified. No significant retropulsed fragment is seen. This could be compatible with a posttraumatic or osteoporotic compression fracture though the possibility of a pathologic compression fracture cannot be entirely excluded. Clinical correlation and continued close interval follow-up is recommended.    There is evidence of an abnormal collection seen in the posterior paraspinal soft tissue region. The majority of this collection is seen subcutaneously at the T1 level and is centrally located and to the right. There is small amount of tracking of fluid seen along the left T1 spinous process as well. This finding does demonstrate smallamount of air and surrounding enhancement. This could be compatible with an underlying abscess. This corresponds to abnormality seen on prior CT scan. This collection measures approximately 5.0 x 0.7 cm.    The spinal cord demonstrates normal signal.    Abnormal heterogeneous signal seen involving both lobes of thyroid. This could be compatible underlying goiter though neoplastic processes cannot be entirely excluded. Dedicated imaging of the thyroid can be done if clinically indicated.    Bilateral apical pleural effusion versus pleural thickening and left-sided pleural effusion is seen.    IMPRESSION: Extensive postop changes and degenerative changes are identified described above.    There is evidence of an abnormal collection seen in the central to right posterior paraspinal soft tissue region. This subcutaneous collection does appear to demonstrate associated air and peripheral enhancement. This could be compatible with an underlying abscess. Please correlate clinically.    Compression deformity seen involving the superior endplate of L5 with associated abnormal T1 and T2 prolongation seen as well.    < end of copied text >
ANESTHESIA POSTOP CHECK    70y Female POSTOP DAY 1 S/P     Vital Signs Last 24 Hrs  T(C): 36.9 (10 Aug 2020 09:30), Max: 36.9 (09 Aug 2020 13:17)  T(F): 98.4 (10 Aug 2020 09:30), Max: 98.5 (09 Aug 2020 13:17)  HR: 71 (10 Aug 2020 09:30) (71 - 99)  BP: 96/84 (10 Aug 2020 09:30) (92/56 - 121/71)  BP(mean): --  RR: 16 (10 Aug 2020 09:30) (16 - 20)  SpO2: 95% (10 Aug 2020 09:30) (94% - 98%)  I&O's Summary    09 Aug 2020 07:01  -  10 Aug 2020 07:00  --------------------------------------------------------  IN: 1420 mL / OUT: 1760 mL / NET: -340 mL        [x ] NO APPARENT ANESTHESIA COMPLICATIONS      Comments:
ANESTHESIA POSTOP CHECK    70y Female POSTOP DAY 1 S/P   EGD with dilatation  Vital Signs Last 24 Hrs  T(C): 36.7 (18 Aug 2020 09:23), Max: 37.1 (17 Aug 2020 22:00)  T(F): 98.1 (18 Aug 2020 09:23), Max: 98.7 (17 Aug 2020 22:00)  HR: 92 (18 Aug 2020 09:23) (84 - 96)  BP: 109/64 (18 Aug 2020 09:23) (102/66 - 137/53)  BP(mean): --  RR: 17 (18 Aug 2020 09:23) (17 - 18)  SpO2: 93% (18 Aug 2020 09:23) (93% - 95%)  I&O's Summary    17 Aug 2020 07:01  -  18 Aug 2020 07:00  --------------------------------------------------------  IN: 600 mL / OUT: 1965 mL / NET: -1365 mL        [x ] NO APPARENT ANESTHESIA COMPLICATIONS      Comments:
ANESTHESIA POSTOP CHECK    70y Female POSTOP DAY 1 S/P Bilateral Paraspinous Muscle Closure, I& D    Vital Signs Last 24 Hrs  T(C): 36.6 (14 Aug 2020 14:06), Max: 36.7 (14 Aug 2020 09:58)  T(F): 97.8 (14 Aug 2020 14:06), Max: 98.1 (14 Aug 2020 09:58)  HR: 95 (14 Aug 2020 14:06) (83 - 110)  BP: 92/62 (14 Aug 2020 14:06) (92/62 - 158/89)  BP(mean): 99 (13 Aug 2020 19:00) (89 - 106)  RR: 17 (14 Aug 2020 14:06) (15 - 20)  SpO2: 96% (14 Aug 2020 14:06) (92% - 97%)  I&O's Summary    13 Aug 2020 07:01  -  14 Aug 2020 07:00  --------------------------------------------------------  IN: 1695 mL / OUT: 1230 mL / NET: 465 mL    14 Aug 2020 07:01  -  14 Aug 2020 16:44  --------------------------------------------------------  IN: 1000 mL / OUT: 600 mL / NET: 400 mL        [X] NO APPARENT ANESTHESIA COMPLICATIONS      Comments:
ANESTHESIA POSTOP CHECK    70y Female POSTOP DAY 1 S/P I&D neck    [x ] NO APPARENT ANESTHESIA COMPLICATIONS      Comments:
CC: F/U for Abscess    Saw/spoke to patient. No fevers, no chills. Doing well. Unchanged.    Allergies  penicillin (Rash)    ANTIMICROBIALS:  cefTRIAXone   IVPB 2000 every 24 hours    PE:    Vital Signs Last 24 Hrs  T(C): 37.1 (19 Aug 2020 09:10), Max: 37.1 (18 Aug 2020 21:43)  T(F): 98.7 (19 Aug 2020 09:10), Max: 98.8 (18 Aug 2020 21:43)  HR: 96 (19 Aug 2020 09:10) (93 - 113)  BP: 120/71 (19 Aug 2020 09:10) (100/52 - 122/96)  RR: 18 (19 Aug 2020 09:10) (16 - 18)  SpO2: 94% (19 Aug 2020 09:10) (94% - 95%)    Gen: AOx3, NAD, non-toxic, pleasant  CV: S1+S2 normal, nontachycardic  Resp: Clear bilat, no resp distress, no crackles/wheezes  Abd: Soft, nontender, +BS  Ext: RUE PICC    LABS:    No new available    MICROBIOLOGY:    .Abscess CERVICAL WOUND POST WASH 1  08-13-20   No growth at 5 days  --    No polymorphonuclear cells seen per low power field  No organisms seen per oil power field    .Other WOUND DEEP. DEEP POST-WASH CULTURE OF CERVICAL SPINE  08-07-20   No growth     .Other WOUND DEEP. DEEP PRE WASH CULTURE OF CERVICAL SPINE  08-07-20   No growth    .Surgical Swab WOUND DEEP. DEEP PRE WASH CULTURE OF CERVICAL SPINE  08-07-20   Few Streptococcus intermedius "Susceptibilities not performed"  --    Rare polymorphonuclear leukocytes per low power field  No organisms seen per oil power field    (otherwise reviewed)    RADIOLOGY:    8/10 USG:    FINDINGS:  Right Lobe: 3.2 x 1.3 x 1.2  cm. Heterogeneous echotexture with 2 peripherally calcified hypoechoic nodules measuring 0.6 cm at the interpole and 0.6 cm at the lower pole. TIRADS 4.    Left Lobe: 3.5 x 1.6 x 1.5 cm. Heterogeneous echotexture with a dominant cystic nodule with a slightly irregular wall measuring 1 x 0.7 x 1.2 cm. TIRADS 2.    Isthmus: 2 mm. Within normal limits.    Cervical Lymph Nodes: No enlarged or abnormal morphology cervical nodes.    IMPRESSION:    Heterogeneous thyroid with bilateral nodules including two subcentimeter TIRADS 4 nodules on the right.
CC: F/U for C spine infection    Saw/spoke to patient. No fevers, no chills. No new complaints. Unchanged.    Allergies  penicillin (Rash)    ANTIMICROBIALS:  cefTRIAXone   IVPB 2000 every 24 hours    PE:    Vital Signs Last 24 Hrs  T(C): 36.8 (11 Aug 2020 13:51), Max: 37.2 (11 Aug 2020 10:10)  T(F): 98.3 (11 Aug 2020 13:51), Max: 98.9 (11 Aug 2020 10:10)  HR: 100 (11 Aug 2020 13:51) (89 - 101)  BP: 101/58 (11 Aug 2020 13:51) (99/65 - 140/79)  RR: 16 (11 Aug 2020 13:51) (16 - 20)  SpO2: 95% (11 Aug 2020 13:51) (95% - 98%)    Gen: AOx3, NAD, non-toxic  CV: S1+S2 normal, tachycardic  Resp: Clear bilat, no resp distress, no crackles/wheezes  Abd: Soft, nontender, +BS  Ext: No LE edema, no wounds    LABS:                        9.3    13.72 )-----------( 562      ( 10 Aug 2020 05:15 )             29.3     08-10    137  |  101  |  7   ----------------------------<  95  3.7   |  24  |  0.48<L>    Ca    9.5      10 Aug 2020 05:15  Phos  2.4     08-10  Mg     1.8     08-10    MICROBIOLOGY:    .Other WOUND DEEP. DEEP POST-WASH CULTURE OF CERVICAL SPINE  08-07-20   Testing in progress    .Surgical Swab WOUND DEEP. DEEP POST-WASH CULTURE OF CERVICAL SPINE  08-07-20   No growth    .Surgical Swab WOUND DEEP. DEEP PRE WASH CULTURE OF CERVICAL SPINE  08-07-20   Few Streptococcus intermedius "Susceptibilities not performed"    .Smear DEEP WOUND. DEEP PRE WASH CULTURE OF CERVICAL SPINE  08-07-20 --  --    Rare polymorphonuclear leukocytes per low power field  No organisms seen per oil power field    .Tissue CERVICAL SPINE SOFT TISSUE PRE WASH  08-07-20   Few Streptococcus intermedius  --    Rare polymorphonuclear leukocytes seen per low power field  No organisms seen per oil power field    .Other DEEP PRE-WASH CULTURE OF CERVICAL SPINE  08-07-20   Testing in progress    .Abscess DEEP PRE- WASH CULTURE OF CURVICAL SPINE  08-07-20   Few Streptococcus intermedius "Susceptibilities not performed"    (otherwise reviewed)    RADIOLOGY:    8/6 MR:    IMPRESSION: Extensive postop changes and degenerative changes are identified described above.    There is evidence of an abnormal collection seen in the central to right posterior paraspinal soft tissue region. This subcutaneous collection does appear to demonstrate associated air and peripheral enhancement. This could be compatible with an underlying abscess. Please correlate clinically.    Compression deformity seen involving the superior endplate of L5 with associated abnormal T1 and T2 prolongation seen as well.
CC: F/U for Hardware infection    Saw/spoke to patient. No fevers, no chills. No new complaints. Unchanged.    Allergies  penicillin (Rash)    ANTIMICROBIALS:  cefTRIAXone   IVPB 2000 every 24 hours    PE:    Vital Signs Last 24 Hrs  T(C): 36.9 (10 Aug 2020 09:30), Max: 36.9 (09 Aug 2020 13:17)  T(F): 98.4 (10 Aug 2020 09:30), Max: 98.5 (09 Aug 2020 13:17)  HR: 71 (10 Aug 2020 09:30) (71 - 99)  BP: 96/84 (10 Aug 2020 09:30) (93/59 - 121/71)  RR: 16 (10 Aug 2020 09:30) (16 - 18)  SpO2: 95% (10 Aug 2020 09:30) (94% - 98%)    Gen: AOx3, NAD, non-toxic  HEENT: Wound vac, no spreading erythema  CV: S1+S2 normal, nontachycardic  Resp: Clear bilat, no resp distress, no crackles/wheezes  Abd: Soft, nontender, +BS  Ext: No LE edema, no wounds    LABS:                        9.3    13.72 )-----------( 562      ( 10 Aug 2020 05:15 )             29.3     08-10    137  |  101  |  7   ----------------------------<  95  3.7   |  24  |  0.48<L>    Ca    9.5      10 Aug 2020 05:15  Phos  2.4     08-10  Mg     1.8     08-10    MICROBIOLOGY:  Vancomycin Level, Trough: 17.2 ug/mL (08-09-20 @ 13:05)    .Other WOUND DEEP. DEEP POST-WASH CULTURE OF CERVICAL SPINE  08-07-20   Testing in progress    .Surgical Swab WOUND DEEP. DEEP POST-WASH CULTURE OF CERVICAL SPINE  08-07-20   No growth     .Surgical Swab WOUND DEEP. DEEP PRE WASH CULTURE OF CERVICAL SPINE  08-07-20   Few Streptococcus intermedius "Susceptibilities not performed"     .Smear DEEP WOUND. DEEP PRE WASH CULTURE OF CERVICAL SPINE  08-07-20 --  --    Rare polymorphonuclear leukocytes per low power field  No organisms seen per oil power field    .Tissue CERVICAL SPINE SOFT TISSUE PRE WASH  08-07-20   Few Streptococcus intermedius  --    Rare polymorphonuclear leukocytes seen per low power field  No organisms seen per oil power field    .Other DEEP PRE-WASH CULTURE OF CERVICAL SPINE  08-07-20   Testing in progress  --  --    (otherwise reviewed)    RADIOLOGY:    8/6 MR:      IMPRESSION: Extensive postop changes and degenerative changes are identified described above.    There is evidence of an abnormal collection seen in the central to right posterior paraspinal soft tissue region. This subcutaneous collection does appear to demonstrate associated air and peripheral enhancement. This could be compatible with an underlying abscess. Please correlate clinically.    Compression deformity seen involving the superior endplate of L5 with associated abnormal T1 and T2 prolongation seen as well.
CC: F/U for Wound infection    Saw/spoke to patient. No fevers, no chills. No new complaints. For OR today.     Allergies  penicillin (Rash)    ANTIMICROBIALS:  cefTRIAXone   IVPB 2000 every 24 hours    PE:    Vital Signs Last 24 Hrs  T(C): 36.8 (12 Aug 2020 10:43), Max: 37.1 (12 Aug 2020 05:22)  T(F): 98.3 (12 Aug 2020 10:43), Max: 98.7 (12 Aug 2020 05:22)  HR: 95 (12 Aug 2020 10:43) (95 - 106)  BP: 141/90 (12 Aug 2020 10:43) (109/70 - 141/90)  RR: 18 (12 Aug 2020 10:43) (16 - 18)  SpO2: 95% (12 Aug 2020 10:43) (94% - 95%)    Gen: AOx3, NAD, non-toxic  CV: S1+S2 normal, nontachycardic  Resp: Clear bilat, no resp distress, no crackles/wheezes  Abd: Soft, nontender, +BS  Ext: No LE edema, no wounds    LABS:                        8.8    10.94 )-----------( 529      ( 12 Aug 2020 06:00 )             27.6     08-12    140  |  99  |  9   ----------------------------<  99  3.7   |  29  |  0.53    Ca    9.3      12 Aug 2020 06:00  Phos  3.1     08-12  Mg     2.0     08-12    MICROBIOLOGY:    .Other WOUND DEEP. DEEP POST-WASH CULTURE OF CERVICAL SPINE  08-07-20   Testing in progress     .Other WOUND DEEP. DEEP PRE WASH CULTURE OF CERVICAL SPINE  08-07-20   Testing in progress    .Surgical Swab WOUND DEEP. DEEP PRE WASH CULTURE OF CERVICAL SPINE  08-07-20   Few Streptococcus intermedius "Susceptibilities not performed"  --    Rare polymorphonuclear leukocytes per low power field  No organisms seen per oil power field    (otherwise reviewed)    RADIOLOGY:    8/11 XR:    FINDINGS/  IMPRESSION:    There is no evidence of penetration or aspiration with puree, solids, and thin fluids.  Delayed oral transit time.    For further information and recommendations, please refer to the speech pathologist final report which is available for review in the electronic medical record.
CC: F/U for Wound infection    Saw/spoke to patient. No fevers, no chills. S/p OR, planned for further OR tomorrow.    Allergies  penicillin (Rash)    ANTIMICROBIALS:  cefTRIAXone   IVPB 2000 every 24 hours    PE:    Vital Signs Last 24 Hrs  T(C): 36.7 (14 Aug 2020 09:58), Max: 37.1 (13 Aug 2020 13:23)  T(F): 98.1 (14 Aug 2020 09:58), Max: 98.8 (13 Aug 2020 13:23)  HR: 83 (14 Aug 2020 09:58) (83 - 114)  BP: 100/60 (14 Aug 2020 09:58) (100/60 - 158/89)  BP(mean): 99 (13 Aug 2020 19:00) (89 - 106)  RR: 18 (14 Aug 2020 09:58) (12 - 20)  SpO2: 97% (14 Aug 2020 09:58) (92% - 99%)    Gen: AOx3, NAD, non-toxic  CV: S1+S2 normal, nontachycardic  Resp: Clear bilat, no resp distress, no crackles/wheezes  Abd: Soft, nontender, +BS  Ext: No LE edema, no wounds    LABS:                        10.1   12.87 )-----------( 553      ( 13 Aug 2020 05:15 )             33.2     MICROBIOLOGY:    .Smear CERVICAL WOUND POST WASH 2  08-13-20 --  --    No polymorphonuclear cells seen per low power field  No organisms seen per oil power field    .Other WOUND DEEP. DEEP POST-WASH CULTURE OF CERVICAL SPINE  08-07-20   Testing in progress  --  --    (otherwise reviewed)    RADIOLOGY:    8/6 MR:    IMPRESSION: Extensive postop changes and degenerative changes are identified described above.    There is evidence of an abnormal collection seen in the central to right posterior paraspinal soft tissue region. This subcutaneous collection does appear to demonstrate associated air and peripheral enhancement. This could be compatible with an underlying abscess. Please correlate clinically.    Compression deformity seen involving the superior endplate of L5 with associated abnormal T1 and T2 prolongation seen as well.
CC: F/U for abscess    Saw/spoke to patient. No fevers, no chills. Patient for OR today (yesterday's procedure delayed).    Allergies  penicillin (Rash)    ANTIMICROBIALS:  cefTRIAXone   IVPB 2000 every 24 hours    PE:    Vital Signs Last 24 Hrs  T(C): 37.1 (13 Aug 2020 11:16), Max: 37.4 (13 Aug 2020 05:06)  T(F): 98.8 (13 Aug 2020 11:16), Max: 99.4 (13 Aug 2020 05:06)  HR: 100 (13 Aug 2020 11:16) (67 - 108)  BP: 133/88 (13 Aug 2020 11:16) (107/74 - 139/77)  RR: 18 (13 Aug 2020 11:16) (17 - 18)  SpO2: 96% (13 Aug 2020 11:16) (95% - 96%)    Gen: AOx3, NAD, non-toxic  CV: S1+S2 normal, nontachycardic  Resp: Clear bilat, no resp distress, no crackles/wheezes  Abd: Soft, nontender, +BS  Ext: No LE edema, no wounds    LABS:                        10.1   12.87 )-----------( 553      ( 13 Aug 2020 05:15 )             33.2     08-12    140  |  99  |  9   ----------------------------<  99  3.7   |  29  |  0.53    Ca    9.3      12 Aug 2020 06:00  Phos  3.1     08-12  Mg     2.0     08-12    MICROBIOLOGY:    .Other WOUND DEEP. DEEP POST-WASH CULTURE OF CERVICAL SPINE  08-07-20   Testing in progress     .Other WOUND DEEP. DEEP PRE WASH CULTURE OF CERVICAL SPINE  08-07-20   Testing in progress     .Surgical Swab WOUND DEEP. DEEP PRE WASH CULTURE OF CERVICAL SPINE  08-07-20   Few Streptococcus intermedius "Susceptibilities not performed"  --    Rare polymorphonuclear leukocytes per low power field  No organisms seen per oil power field    (otherwise reviewed)    RADIOLOGY:    8/11 XR:    FINDINGS/  IMPRESSION:    There is no evidence of penetration or aspiration with puree, solids, and thin fluids.  Delayed oral transit time.    For further information and recommendations, please refer to the speech pathologist final report which is available for review in the electronic medical record.
CHIEF COMPLAINT: f/u     SUBJECTIVE / OVERNIGHT EVENTS: No acute events overnight. Denies chest pain, SOB, N/V, has some pain at wound vac site. States she is hungry    MEDICATIONS  (STANDING):  anastrozole 1 milliGRAM(s) Oral daily  atorvastatin 40 milliGRAM(s) Oral at bedtime  cefTRIAXone   IVPB 2000 milliGRAM(s) IV Intermittent every 24 hours  enoxaparin Injectable 40 milliGRAM(s) SubCutaneous at bedtime  metoprolol tartrate 12.5 milliGRAM(s) Oral daily  senna 2 Tablet(s) Oral at bedtime  sodium chloride 0.9% lock flush 3 milliLiter(s) IV Push every 8 hours    MEDICATIONS  (PRN):  acetaminophen   Tablet .. 650 milliGRAM(s) Oral every 6 hours PRN Temp greater or equal to 38C (100.4F), Mild Pain (1 - 3)  diazepam    Tablet 5 milliGRAM(s) Oral three times a day PRN muscle spasms  oxyCODONE    IR 5 milliGRAM(s) Oral every 6 hours PRN Moderate Pain (4 - 6)  oxyCODONE    IR 10 milliGRAM(s) Oral every 6 hours PRN Severe Pain (7 - 10)  polyethylene glycol 3350 17 Gram(s) Oral daily PRN Constipation      VITALS:  T(F): 98.9 (08-11-20 @ 10:10), Max: 98.9 (08-11-20 @ 10:10)  HR: 94 (08-11-20 @ 10:10) (89 - 103)  BP: 140/79 (08-11-20 @ 10:10) (93/57 - 140/79)  RR: 16 (08-11-20 @ 10:10) (16 - 20)  SpO2: 95% (08-11-20 @ 10:10)  Wt(kg): --      CAPILLARY BLOOD GLUCOSE      PHYSICAL EXAM:  GENERAL: NAD, on room air  HEENT: sclera clear  NECK: posterior neck incision with wound vac attached  RESPIRATORY: normal respiratory effort  CARDIOVASCULAR: s1/s2, regular, tachycardic; No murmurs appreciated, trace pitting edema of legs  GASTROINTESTINAL: Soft, Nontender, Nondistended; Bowel sounds present  EXTREMITIES:  WWP, trace pitting edema b/l lower legs  NERVOUS SYSTEM:  awake, alert, answers Qs appropriately, no gross deficits  PSYCH: calm cooperative  SKIN: No rashes or lesions; Incisions C/D/I    LABS:              9.3                  137  | 24   | 7            13.72 >-----------< 562     ------------------------< 95                    29.3                 3.7  | 101  | 0.48                                         Ca 9.5   Mg 1.8   Ph 2.4          INR: 1.15 ;    PT: 13.0 SEC;    PTT: 26.3 SEC<L>                MICROBIOLOGY:        RADIOLOGY & ADDITIONAL TESTS:  Imaging Personally Reviewed: [ ] Yes    [ ] Consultant(s) Notes Reviewed:  [x] Care Discussed with Consultants/Other Providers:
CHIEF COMPLAINT: f/u     SUBJECTIVE / OVERNIGHT EVENTS: No acute events overnight. Pain well controlled. No chest pain, SOB, N/V. NPO for OR today with plastics. Still reports intermittently feeling thin water and food getting stuck in middle of chest.    MEDICATIONS  (STANDING):  anastrozole 1 milliGRAM(s) Oral daily  atorvastatin 40 milliGRAM(s) Oral at bedtime  cefTRIAXone   IVPB 2000 milliGRAM(s) IV Intermittent every 24 hours  metoprolol tartrate 12.5 milliGRAM(s) Oral daily  senna 2 Tablet(s) Oral at bedtime  sodium chloride 0.9% lock flush 3 milliLiter(s) IV Push every 8 hours  sodium chloride 0.9%. 1000 milliLiter(s) (75 mL/Hr) IV Continuous <Continuous>    MEDICATIONS  (PRN):  acetaminophen   Tablet .. 650 milliGRAM(s) Oral every 6 hours PRN Temp greater or equal to 38C (100.4F), Mild Pain (1 - 3)  diazepam    Tablet 5 milliGRAM(s) Oral three times a day PRN muscle spasms  oxyCODONE    IR 5 milliGRAM(s) Oral every 6 hours PRN Moderate Pain (4 - 6)  oxyCODONE    IR 10 milliGRAM(s) Oral every 6 hours PRN Severe Pain (7 - 10)  polyethylene glycol 3350 17 Gram(s) Oral daily PRN Constipation      VITALS:  T(F): 99.4 (08-13-20 @ 05:06), Max: 99.4 (08-13-20 @ 05:06)  HR: 67 (08-13-20 @ 05:06) (67 - 108)  BP: 128/83 (08-13-20 @ 05:06) (107/74 - 139/77)  RR: 17 (08-13-20 @ 05:06) (17 - 18)  SpO2: 96% (08-13-20 @ 05:06)  Wt(kg): --      CAPILLARY BLOOD GLUCOSE      PHYSICAL EXAM:  GENERAL: NAD, on room air  HEENT: sclera clear  NECK: posterior neck incision with wound vac attached  RESPIRATORY: normal respiratory effort  CARDIOVASCULAR: s1/s2, regular, tachycardic; No murmurs appreciated, trace pitting edema of legs  GASTROINTESTINAL: Soft, Nontender, Nondistended; Bowel sounds present  EXTREMITIES:  WWP, trace pitting edema b/l lower legs  NERVOUS SYSTEM:  awake, alert, answers Qs appropriately, no gross deficits  PSYCH: calm cooperative  SKIN: No rashes or lesions; Incisions C/D/I    LABS:              10.1                 x    | x    | x            12.87 >-----------< 553     ------------------------< x                     33.2                 x    | x    | x                                            Ca x     Mg x     Ph x            INR: 1.15 ;    PT: 13.1 SEC;    PTT: x                    MICROBIOLOGY:        RADIOLOGY & ADDITIONAL TESTS:  Imaging Personally Reviewed: [ ] Yes    [ ] Consultant(s) Notes Reviewed:  [x] Care Discussed with Consultants/Other Providers:
CHIEF COMPLAINT: f/u     SUBJECTIVE / OVERNIGHT EVENTS: No acute events overnight. Reports pain is controlled and she is tolerating her diet. Denies coughing, chest pain, SOB, N/V. States its been a few days since her last BM    MEDICATIONS  (STANDING):  anastrozole 1 milliGRAM(s) Oral daily  atorvastatin 40 milliGRAM(s) Oral at bedtime  cefTRIAXone   IVPB 2000 milliGRAM(s) IV Intermittent every 24 hours  metoprolol tartrate 12.5 milliGRAM(s) Oral daily  senna 2 Tablet(s) Oral at bedtime  sodium chloride 0.9% lock flush 3 milliLiter(s) IV Push every 8 hours  sodium chloride 0.9%. 1000 milliLiter(s) (75 mL/Hr) IV Continuous <Continuous>    MEDICATIONS  (PRN):  acetaminophen   Tablet .. 650 milliGRAM(s) Oral every 6 hours PRN Temp greater or equal to 38C (100.4F), Mild Pain (1 - 3)  diazepam    Tablet 5 milliGRAM(s) Oral three times a day PRN muscle spasms  oxyCODONE    IR 5 milliGRAM(s) Oral every 6 hours PRN Moderate Pain (4 - 6)  oxyCODONE    IR 10 milliGRAM(s) Oral every 6 hours PRN Severe Pain (7 - 10)  polyethylene glycol 3350 17 Gram(s) Oral daily PRN Constipation      VITALS:  T(F): 98.3 (08-12-20 @ 10:43), Max: 98.7 (08-12-20 @ 05:22)  HR: 95 (08-12-20 @ 10:43) (95 - 106)  BP: 141/90 (08-12-20 @ 10:43) (101/58 - 141/90)  RR: 18 (08-12-20 @ 10:43) (16 - 18)  SpO2: 95% (08-12-20 @ 10:43)  Wt(kg): --  Height (cm): 170.2 (00:32)  Weight (kg): 68 (00:32)  BMI (kg/m2): 23.5 (00:32)    CAPILLARY BLOOD GLUCOSE      PHYSICAL EXAM:  GENERAL: NAD, on room air  HEENT: sclera clear  NECK: posterior neck incision with wound vac attached  RESPIRATORY: normal respiratory effort  CARDIOVASCULAR: s1/s2, regular, tachycardic; No murmurs appreciated, trace pitting edema of legs  GASTROINTESTINAL: Soft, Nontender, Nondistended; Bowel sounds present  EXTREMITIES:  WWP, trace pitting edema b/l lower legs  NERVOUS SYSTEM:  awake, alert, answers Qs appropriately, no gross deficits  PSYCH: calm cooperative  SKIN: No rashes or lesions; Incisions C/D/I    LABS:              8.8                  140  | 29   | 9            10.94 >-----------< 529     ------------------------< 99                    27.6                 3.7  | 99   | 0.53                                         Ca 9.3   Mg 2.0   Ph 3.1          INR: 1.15 ;    PT: 13.1 SEC;    PTT: x                    MICROBIOLOGY:        RADIOLOGY & ADDITIONAL TESTS:  Imaging Personally Reviewed: [ ] Yes    [ ] Consultant(s) Notes Reviewed:  [x] Care Discussed with Consultants/Other Providers:
INTERVAL HPI/OVERNIGHT EVENTS:    doing well   maintaining NPO for OR w/plastics later   no n/v      MEDICATIONS  (STANDING):  anastrozole 1 milliGRAM(s) Oral daily  atorvastatin 40 milliGRAM(s) Oral at bedtime  cefTRIAXone   IVPB 2000 milliGRAM(s) IV Intermittent every 24 hours  metoprolol tartrate 12.5 milliGRAM(s) Oral daily  senna 2 Tablet(s) Oral at bedtime  sodium chloride 0.9% lock flush 3 milliLiter(s) IV Push every 8 hours  sodium chloride 0.9%. 1000 milliLiter(s) (75 mL/Hr) IV Continuous <Continuous>    MEDICATIONS  (PRN):  acetaminophen   Tablet .. 650 milliGRAM(s) Oral every 6 hours PRN Temp greater or equal to 38C (100.4F), Mild Pain (1 - 3)  diazepam    Tablet 5 milliGRAM(s) Oral three times a day PRN muscle spasms  oxyCODONE    IR 5 milliGRAM(s) Oral every 6 hours PRN Moderate Pain (4 - 6)  oxyCODONE    IR 10 milliGRAM(s) Oral every 6 hours PRN Severe Pain (7 - 10)  polyethylene glycol 3350 17 Gram(s) Oral daily PRN Constipation      Allergies    penicillin (Rash)    Intolerances        Review of Systems:    General:  No wt loss, fevers, chills, night sweats, fatigue   Eyes:  Good vision, no reported pain  ENT:  No sore throat, pain, runny nose, dysphagia  CV:  No pain, palpitations, hypo/hypertension  Resp:  No dyspnea, cough, tachypnea, wheezing  GI:  No pain, No nausea, No vomiting, No diarrhea, No constipation, No weight loss, No fever, No pruritis, No rectal bleeding, No melena, No dysphagia  :  No pain, bleeding, incontinence, nocturia  Muscle:  No pain, weakness  Neuro:  No weakness, tingling, memory problems  Psych:  No fatigue, insomnia, mood problems, depression  Endocrine:  No polyuria, polydypsia, cold/heat intolerance  Heme:  No petechiae, ecchymosis, easy bruisability  Skin:  No rash, tattoos, scars, edema      Vital Signs Last 24 Hrs  T(C): 37.1 (13 Aug 2020 11:16), Max: 37.4 (13 Aug 2020 05:06)  T(F): 98.8 (13 Aug 2020 11:16), Max: 99.4 (13 Aug 2020 05:06)  HR: 100 (13 Aug 2020 11:16) (67 - 108)  BP: 133/88 (13 Aug 2020 11:16) (107/74 - 139/77)  BP(mean): --  RR: 18 (13 Aug 2020 11:16) (17 - 18)  SpO2: 96% (13 Aug 2020 11:16) (95% - 96%)    PHYSICAL EXAM:    Constitutional: NAD  HEENT: EOMI, throat clear  Neck: No LAD, supple  Respiratory: CTA and P  Cardiovascular: S1 and S2, RRR, no M  Gastrointestinal: BS+, soft, NT/ND, neg HSM,  Extremities: No peripheral edema, neg clubbing, cyanosis  Vascular: 2+ peripheral pulses  Neurological: A/O x 3, no focal deficits  Psychiatric: Normal mood, normal affect  Skin: No rashes      LABS:                        10.1   12.87 )-----------( 553      ( 13 Aug 2020 05:15 )             33.2     08-12    140  |  99  |  9   ----------------------------<  99  3.7   |  29  |  0.53    Ca    9.3      12 Aug 2020 06:00  Phos  3.1     08-12  Mg     2.0     08-12      PT/INR - ( 12 Aug 2020 06:00 )   PT: 13.1 SEC;   INR: 1.15                RADIOLOGY & ADDITIONAL TESTS:
INTERVAL HPI/OVERNIGHT EVENTS:    doing well   reports tolerating PO intake better   "i want to go home"    MEDICATIONS  (STANDING):  anastrozole 1 milliGRAM(s) Oral daily  atorvastatin 40 milliGRAM(s) Oral at bedtime  cefTRIAXone   IVPB 2000 milliGRAM(s) IV Intermittent every 24 hours  chlorhexidine 2% Cloths 1 Application(s) Topical daily  metoprolol tartrate 12.5 milliGRAM(s) Oral daily  pantoprazole    Tablet 40 milliGRAM(s) Oral before breakfast  senna 2 Tablet(s) Oral at bedtime  sodium chloride 0.9% lock flush 3 milliLiter(s) IV Push every 8 hours  sodium chloride 0.9% lock flush 3 milliLiter(s) IV Push every 8 hours    MEDICATIONS  (PRN):  acetaminophen   Tablet .. 650 milliGRAM(s) Oral every 6 hours PRN Temp greater or equal to 38C (100.4F), Mild Pain (1 - 3)  oxyCODONE    IR 5 milliGRAM(s) Oral every 6 hours PRN Moderate Pain (4 - 6)  oxyCODONE    IR 10 milliGRAM(s) Oral every 6 hours PRN Severe Pain (7 - 10)  polyethylene glycol 3350 17 Gram(s) Oral daily PRN Constipation      Allergies    penicillin (Rash)    Intolerances        Review of Systems:    General:  No wt loss, fevers, chills, night sweats, fatigue   Eyes:  Good vision, no reported pain  ENT:  No sore throat, pain, runny nose, dysphagia  CV:  No pain, palpitations, hypo/hypertension  Resp:  No dyspnea, cough, tachypnea, wheezing  GI:  No pain, No nausea, No vomiting, No diarrhea, No constipation, No weight loss, No fever, No pruritis, No rectal bleeding, No melena, No dysphagia  :  No pain, bleeding, incontinence, nocturia  Muscle:  No pain, weakness  Neuro:  No weakness, tingling, memory problems  Psych:  No fatigue, insomnia, mood problems, depression  Endocrine:  No polyuria, polydypsia, cold/heat intolerance  Heme:  No petechiae, ecchymosis, easy bruisability  Skin:  No rash, tattoos, scars, edema      Vital Signs Last 24 Hrs  T(C): 37.1 (19 Aug 2020 09:10), Max: 37.1 (18 Aug 2020 21:43)  T(F): 98.7 (19 Aug 2020 09:10), Max: 98.8 (18 Aug 2020 21:43)  HR: 96 (19 Aug 2020 09:10) (93 - 113)  BP: 120/71 (19 Aug 2020 09:10) (100/52 - 122/96)  BP(mean): --  RR: 18 (19 Aug 2020 09:10) (16 - 18)  SpO2: 94% (19 Aug 2020 09:10) (94% - 95%)    PHYSICAL EXAM:    Constitutional: NAD  HEENT: EOMI, throat clear  Neck: No LAD, supple  Respiratory: CTA and P  Cardiovascular: S1 and S2, RRR, no M  Gastrointestinal: BS+, soft, NT/ND, neg HSM,  Extremities: No peripheral edema, neg clubbing, cyanosis  Vascular: 2+ peripheral pulses  Neurological: A/O x 3, no focal deficits  Psychiatric: Normal mood, normal affect  Skin: No rashes      LABS:                RADIOLOGY & ADDITIONAL TESTS:
INTERVAL HPI/OVERNIGHT EVENTS:  chart reviewed events noted  egd cancelled yesterday  OR today for wound closure     MEDICATIONS  (STANDING):  anastrozole 1 milliGRAM(s) Oral daily  atorvastatin 40 milliGRAM(s) Oral at bedtime  cefTRIAXone   IVPB 2000 milliGRAM(s) IV Intermittent every 24 hours  metoprolol tartrate 12.5 milliGRAM(s) Oral daily  senna 2 Tablet(s) Oral at bedtime  sodium chloride 0.9% lock flush 3 milliLiter(s) IV Push every 8 hours  sodium chloride 0.9%. 1000 milliLiter(s) (75 mL/Hr) IV Continuous <Continuous>    MEDICATIONS  (PRN):  acetaminophen   Tablet .. 650 milliGRAM(s) Oral every 6 hours PRN Temp greater or equal to 38C (100.4F), Mild Pain (1 - 3)  diazepam    Tablet 5 milliGRAM(s) Oral three times a day PRN muscle spasms  oxyCODONE    IR 5 milliGRAM(s) Oral every 6 hours PRN Moderate Pain (4 - 6)  oxyCODONE    IR 10 milliGRAM(s) Oral every 6 hours PRN Severe Pain (7 - 10)  polyethylene glycol 3350 17 Gram(s) Oral daily PRN Constipation      Allergies    penicillin (Rash)    Intolerances        Review of Systems:    General:  No wt loss, fevers, chills, night sweats, fatigue   Eyes:  Good vision, no reported pain  ENT:  No sore throat, pain, runny nose, +dysphagia  CV:  No pain, palpitations, hypo/hypertension  Resp:  No dyspnea, cough, tachypnea, wheezing  GI:  No pain, No nausea, No vomiting, No diarrhea, No constipation, No weight loss, No fever, No pruritis, No rectal bleeding, No melena, +dysphagia  :  No pain, bleeding, incontinence, nocturia  Muscle:  No pain, weakness  Neuro:  No weakness, tingling, memory problems  Psych:  No fatigue, insomnia, mood problems, depression  Endocrine:  No polyuria, polydypsia, cold/heat intolerance  Heme:  No petechiae, ecchymosis, easy bruisability  Skin:  No rash, tattoos, scars, edema        RADIOLOGY & ADDITIONAL TESTS:  Vital Signs Last 24 Hrs  T(C): 36.7 (15 Aug 2020 08:49), Max: 36.7 (14 Aug 2020 18:12)  T(F): 98.1 (15 Aug 2020 08:49), Max: 98.1 (14 Aug 2020 18:12)  HR: 103 (15 Aug 2020 08:49) (82 - 105)  BP: 155/74 (15 Aug 2020 08:49) (92/62 - 155/74)  BP(mean): --  RR: 16 (15 Aug 2020 08:49) (16 - 18)  SpO2: 95% (15 Aug 2020 08:49) (95% - 98%)        PHYSICAL EXAM:    unable to examine in procedure         LABS:                        10.1   12.87 )-----------( 553      ( 13 Aug 2020 05:15 )             33.2
INTERVAL HPI/OVERNIGHT EVENTS:  chart reviewed events noted s/p wound closure yesterday  endorses minimal dysphagia, endorses water the most difficult to swallow  plan for EGD tomorrow         MEDICATIONS  (STANDING):  anastrozole 1 milliGRAM(s) Oral daily  atorvastatin 40 milliGRAM(s) Oral at bedtime  cefTRIAXone   IVPB 2000 milliGRAM(s) IV Intermittent every 24 hours  metoprolol tartrate 12.5 milliGRAM(s) Oral daily  senna 2 Tablet(s) Oral at bedtime  sodium chloride 0.9% lock flush 3 milliLiter(s) IV Push every 8 hours  sodium chloride 0.9%. 1000 milliLiter(s) (75 mL/Hr) IV Continuous <Continuous>    MEDICATIONS  (PRN):  acetaminophen   Tablet .. 650 milliGRAM(s) Oral every 6 hours PRN Temp greater or equal to 38C (100.4F), Mild Pain (1 - 3)  diazepam    Tablet 5 milliGRAM(s) Oral three times a day PRN muscle spasms  oxyCODONE    IR 5 milliGRAM(s) Oral every 6 hours PRN Moderate Pain (4 - 6)  oxyCODONE    IR 10 milliGRAM(s) Oral every 6 hours PRN Severe Pain (7 - 10)  polyethylene glycol 3350 17 Gram(s) Oral daily PRN Constipation      Allergies    penicillin (Rash)    Intolerances        Review of Systems:    General:  No wt loss, fevers, chills, night sweats, fatigue   Eyes:  Good vision, no reported pain  ENT:  No sore throat, pain, runny nose, +dysphagia  CV:  No pain, palpitations, hypo/hypertension  Resp:  No dyspnea, cough, tachypnea, wheezing  GI:  No pain, No nausea, No vomiting, No diarrhea, No constipation, No weight loss, No fever, No pruritis, No rectal bleeding, No melena, +dysphagia  :  No pain, bleeding, incontinence, nocturia  Muscle:  No pain, weakness  Neuro:  No weakness, tingling, memory problems  Psych:  No fatigue, insomnia, mood problems, depression  Endocrine:  No polyuria, polydypsia, cold/heat intolerance  Heme:  No petechiae, ecchymosis, easy bruisability  Skin:  No rash, tattoos, scars, edema        RADIOLOGY & ADDITIONAL TESTS:  Vital Signs Last 24 Hrs  T(C): 36.7 (16 Aug 2020 09:18), Max: 37.2 (15 Aug 2020 21:15)  T(F): 98 (16 Aug 2020 09:18), Max: 99 (15 Aug 2020 21:15)  HR: 84 (16 Aug 2020 09:18) (84 - 116)  BP: 97/58 (16 Aug 2020 09:18) (97/58 - 154/69)  BP(mean): 85 (15 Aug 2020 18:00) (71 - 94)  RR: 17 (16 Aug 2020 09:18) (17 - 27)  SpO2: 97% (16 Aug 2020 09:18) (93% - 98%)            Constitutional: Well developed / well nourished  Eyes: Anicteric, PERRLA  ENMT: nc/at  Neck: supple   Respiratory: CTA B/L  Cardiovascular: RRR  Gastrointestinal: Soft abdomen, non tender non distended  Genitourinary: wnl  Extremities: SCD's in place and working bilaterally  Vascular: Palpable dp pulses bilaterally  Neurological: A&O x3  Musculoskeletal: Moving all extremities  Psychiatric: Responsive            LABS:          08-15-20 @ 07:01  -  08-16-20 @ 07:00  --------------------------------------------------------  IN: 390 mL / OUT: 714 mL / NET: -324 mL                Culture - Surgical Swab (collected 13 Aug 2020 18:19)  Source: .Surgical Swab CERVICAL WOUND POST WASH 2  Preliminary Report (14 Aug 2020 18:10):    No growth    Culture - Abscess with Gram Stain (collected 13 Aug 2020 18:19)  Source: .Abscess CERVICAL WOUND POST WASH 1  Preliminary Report (14 Aug 2020 17:51):    No growth        RADIOLOGY
NEUROSURGERY POST OP CHECK 08-07-20 @ 02:06    Dx: 70y Female  s/p wound washout with plastics, wound vacc placed.    MEDICATIONS  (STANDING):  cefepime   IVPB 2000 milliGRAM(s) IV Intermittent every 8 hours  sodium chloride 0.9%. 1000 milliLiter(s) (50 mL/Hr) IV Continuous <Continuous>  vancomycin  IVPB 1000 milliGRAM(s) IV Intermittent every 12 hours    MEDICATIONS  (PRN):  acetaminophen   Tablet .. 650 milliGRAM(s) Oral every 6 hours PRN Temp greater or equal to 38C (100.4F), Mild Pain (1 - 3)  oxyCODONE    IR 5 milliGRAM(s) Oral every 6 hours PRN Moderate Pain (4 - 6)  oxyCODONE    IR 10 milliGRAM(s) Oral every 6 hours PRN Severe Pain (7 - 10)                            10.1   12.00 )-----------( 472      ( 06 Aug 2020 05:30 )             31.1       I&O's Summary    05 Aug 2020 07:01  -  06 Aug 2020 07:00  --------------------------------------------------------  IN: 0 mL / OUT: 1050 mL / NET: -1050 mL    06 Aug 2020 07:01  -  07 Aug 2020 02:06  --------------------------------------------------------  IN: 700 mL / OUT: 50 mL / NET: 650 mL        T(C): 36.4 (08-07-20 @ 01:43), Max: 36.6 (08-06-20 @ 23:32)  HR: 100 (08-07-20 @ 01:43) (99 - 100)  BP: 133/67 (08-07-20 @ 01:43) (99/58 - 133/67)  RR: 18 (08-07-20 @ 01:43) (17 - 18)  SpO2: 98% (08-07-20 @ 01:43) (94% - 100%)    PHYSICAL EXAM:   awake, alert, affect appropriate, Speech is clear  DOZIER x4 with good strength equally   No drift   Incision C/D/I wound vacc on.
NEUROSURGERY POST OP CHECK 08-07-20 @ 02:06    Dx: 70y Female  s/p wound washout with plastics, wound vacc placed.    MEDICATIONS  (STANDING):  cefepime   IVPB 2000 milliGRAM(s) IV Intermittent every 8 hours  sodium chloride 0.9%. 1000 milliLiter(s) (50 mL/Hr) IV Continuous <Continuous>  vancomycin  IVPB 1000 milliGRAM(s) IV Intermittent every 12 hours    MEDICATIONS  (PRN):  acetaminophen   Tablet .. 650 milliGRAM(s) Oral every 6 hours PRN Temp greater or equal to 38C (100.4F), Mild Pain (1 - 3)  oxyCODONE    IR 5 milliGRAM(s) Oral every 6 hours PRN Moderate Pain (4 - 6)  oxyCODONE    IR 10 milliGRAM(s) Oral every 6 hours PRN Severe Pain (7 - 10)                            10.1   12.00 )-----------( 472      ( 06 Aug 2020 05:30 )             31.1       I&O's Summary    05 Aug 2020 07:01  -  06 Aug 2020 07:00  --------------------------------------------------------  IN: 0 mL / OUT: 1050 mL / NET: -1050 mL    06 Aug 2020 07:01  -  07 Aug 2020 02:06  --------------------------------------------------------  IN: 700 mL / OUT: 50 mL / NET: 650 mL        T(C): 36.4 (08-07-20 @ 01:43), Max: 36.6 (08-06-20 @ 23:32)  HR: 100 (08-07-20 @ 01:43) (99 - 100)  BP: 133/67 (08-07-20 @ 01:43) (99/58 - 133/67)  RR: 18 (08-07-20 @ 01:43) (17 - 18)  SpO2: 98% (08-07-20 @ 01:43) (94% - 100%)    PHYSICAL EXAM:   awake, alert, affect appropriate, Speech is clear  DOZIER x4 with good strength equally   No drift   Incision C/D/I wound vacc on.    P;  - q4 neuro checks  - q4 vitals  - plastic sf/u  - pain control  - f/u OR cltrs  - S/S eval for dysphagia f/u  - f/u AM labs  - f/u ID consult am for Abx duration picc.     d/w attending
NPO for EGD today  No issues overnight  Vital Signs Last 24 Hrs  T(C): 36.7 (16 Aug 2020 22:17), Max: 36.7 (16 Aug 2020 09:18)  T(F): 98 (16 Aug 2020 22:17), Max: 98 (16 Aug 2020 09:18)  HR: 100 (16 Aug 2020 22:17) (84 - 102)  BP: 120/68 (16 Aug 2020 22:17) (97/58 - 122/77)  BP(mean): --  RR: 16 (16 Aug 2020 22:17) (15 - 18)  SpO2: 96% (16 Aug 2020 22:17) (95% - 97%)    AAO X 3  PERRLA, EOMI  DOZIER strength 5/5  SILT    MASOOD 1: 15cc  MASOOD 2: 37.5cc  MASOOD 3: 85cc    MEDICATIONS  (STANDING):  anastrozole 1 milliGRAM(s) Oral daily  atorvastatin 40 milliGRAM(s) Oral at bedtime  cefTRIAXone   IVPB 2000 milliGRAM(s) IV Intermittent every 24 hours  metoprolol tartrate 12.5 milliGRAM(s) Oral daily  senna 2 Tablet(s) Oral at bedtime  sodium chloride 0.9% lock flush 3 milliLiter(s) IV Push every 8 hours  sodium chloride 0.9%. 1000 milliLiter(s) (75 mL/Hr) IV Continuous <Continuous>  sodium chloride 0.9%. 1000 milliLiter(s) (100 mL/Hr) IV Continuous <Continuous>    MEDICATIONS  (PRN):  acetaminophen   Tablet .. 650 milliGRAM(s) Oral every 6 hours PRN Temp greater or equal to 38C (100.4F), Mild Pain (1 - 3)  oxyCODONE    IR 5 milliGRAM(s) Oral every 6 hours PRN Moderate Pain (4 - 6)  oxyCODONE    IR 10 milliGRAM(s) Oral every 6 hours PRN Severe Pain (7 - 10)  polyethylene glycol 3350 17 Gram(s) Oral daily PRN Constipation
No issues overnight  Vital Signs Last 24 Hrs  T(C): 36.7 (11 Aug 2020 01:18), Max: 37 (10 Aug 2020 13:00)  T(F): 98 (11 Aug 2020 01:18), Max: 98.6 (10 Aug 2020 13:00)  HR: 89 (11 Aug 2020 01:18) (71 - 103)  BP: 99/65 (11 Aug 2020 01:18) (93/57 - 131/76)  BP(mean): --  RR: 17 (11 Aug 2020 01:18) (16 - 20)  SpO2: 98% (11 Aug 2020 01:18) (95% - 100%)    AAO X 3  PERRLA, EOMI  DOZIER strength 5/5  SILT    MEDICATIONS  (STANDING):  anastrozole 1 milliGRAM(s) Oral daily  atorvastatin 40 milliGRAM(s) Oral at bedtime  cefTRIAXone   IVPB 2000 milliGRAM(s) IV Intermittent every 24 hours  enoxaparin Injectable 40 milliGRAM(s) SubCutaneous at bedtime  metoprolol tartrate 12.5 milliGRAM(s) Oral daily  sodium chloride 0.9% lock flush 3 milliLiter(s) IV Push every 8 hours    MEDICATIONS  (PRN):  acetaminophen   Tablet .. 650 milliGRAM(s) Oral every 6 hours PRN Temp greater or equal to 38C (100.4F), Mild Pain (1 - 3)  diazepam    Tablet 5 milliGRAM(s) Oral three times a day PRN muscle spasms  oxyCODONE    IR 5 milliGRAM(s) Oral every 6 hours PRN Moderate Pain (4 - 6)  oxyCODONE    IR 10 milliGRAM(s) Oral every 6 hours PRN Severe Pain (7 - 10)  polyethylene glycol 3350 17 Gram(s) Oral daily PRN Constipation                          9.3    13.72 )-----------( 562      ( 10 Aug 2020 05:15 )             29.3     08-10    137  |  101  |  7   ----------------------------<  95  3.7   |  24  |  0.48<L>    Ca    9.5      10 Aug 2020 05:15  Phos  2.4     08-10  Mg     1.8     08-10    < from: US Thyroid (08.10.20 @ 08:59) >  FINDINGS:  Right Lobe: 3.2 x 1.3 x 1.2  cm. Heterogeneous echotexture with 2 peripherally calcified hypoechoic nodules measuring 0.6 cm at the interpole and 0.6 cm at the lowerpole. TIRADS 4.    Left Lobe: 3.5 x 1.6 x 1.5 cm. Heterogeneous echotexture with a dominant cystic nodule with a slightly irregular wall measuring 1 x 0.7 x 1.2 cm. TIRADS 2.    Isthmus: 2 mm. Within normal limits.    Cervical Lymph Nodes: No enlarged or abnormal morphology cervical nodes.    IMPRESSION:    Heterogeneous thyroid with bilateral nodules including two subcentimeter TIRADS 4 nodules on the right.    TI-RAD 4: Moderately suspicious (FNA if > 1.5 cm, Follow if > 1 cm)  __________________________________  ACR Thyroid Imaging, Reporting and Data System (TI-RADS): White Paper of the ACR TI-RADS Committee. J Am Grisel Radiol 2017;14:587-595.    TI-RAD 1: Benign (No FNA)  TI-RAD 2: Not suspicious (No FNA)  TI-RAD 3: Mildly suspicious (FNAif > 2.5 cm, Follow if >1.5 cm)  TI-RAD 4: Moderately suspicious (FNA if > 1.5 cm, Follow if > 1 cm)  TI-RAD 5: Highly suspicious (FNA if > 1 cm, Follow if > 0.5 cm)    < end of copied text >
Patient declines to have MBS performed  Vital Signs Last 24 Hrs  T(C): 36.8 (10 Aug 2020 01:17), Max: 36.9 (09 Aug 2020 02:16)  T(F): 98.3 (10 Aug 2020 01:17), Max: 98.5 (09 Aug 2020 13:17)  HR: 94 (10 Aug 2020 01:17) (94 - 101)  BP: 106/58 (10 Aug 2020 01:17) (92/56 - 120/75)  BP(mean): --  RR: 17 (10 Aug 2020 01:17) (16 - 20)  SpO2: 97% (10 Aug 2020 01:17) (94% - 98%)    AAO X 3  PERRLA, EOMI  DOZIER strength 5/5  SILT    MEDICATIONS  (STANDING):  anastrozole 1 milliGRAM(s) Oral daily  atorvastatin 40 milliGRAM(s) Oral at bedtime  cefepime   IVPB 2000 milliGRAM(s) IV Intermittent every 8 hours  enoxaparin Injectable 40 milliGRAM(s) SubCutaneous at bedtime  metoprolol tartrate 12.5 milliGRAM(s) Oral daily  sodium chloride 0.9% lock flush 3 milliLiter(s) IV Push every 8 hours  vancomycin  IVPB 1000 milliGRAM(s) IV Intermittent every 12 hours    MEDICATIONS  (PRN):  acetaminophen   Tablet .. 650 milliGRAM(s) Oral every 6 hours PRN Temp greater or equal to 38C (100.4F), Mild Pain (1 - 3)  diazepam    Tablet 5 milliGRAM(s) Oral three times a day PRN muscle spasms  oxyCODONE    IR 5 milliGRAM(s) Oral every 6 hours PRN Moderate Pain (4 - 6)  oxyCODONE    IR 10 milliGRAM(s) Oral every 6 hours PRN Severe Pain (7 - 10)  polyethylene glycol 3350 17 Gram(s) Oral daily PRN Constipation    Culture - Tissue with Gram Stain (08.07.20 @ 05:57)    Gram Stain:   Rare polymorphonuclear leukocytes seen per low power field  No organisms seen per oil power field    Specimen Source: .Tissue CERVICAL SPINE SOFT TISSUE PRE WASH    Culture Results:   Few Streptococcus intermedius
Patient is a 70y old  Female who presents with a chief complaint of neck abscess (14 Aug 2020 01:57)      SUBJECTIVE / OVERNIGHT EVENTS: pt seen and examined at bedside. Was NPO after MN for EGD today however patient had breakfast today AM, "I have been starving for several days and wanted to eat." Pt aware that procedure will be delayed due to Po intake. Denies f/c/n/v/d. Pain tolerated with PRN oxycodone, presently has moderate back pain.    MEDICATIONS  (STANDING):  anastrozole 1 milliGRAM(s) Oral daily  atorvastatin 40 milliGRAM(s) Oral at bedtime  cefTRIAXone   IVPB 2000 milliGRAM(s) IV Intermittent every 24 hours  metoprolol tartrate 12.5 milliGRAM(s) Oral daily  senna 2 Tablet(s) Oral at bedtime  sodium chloride 0.9% lock flush 3 milliLiter(s) IV Push every 8 hours  sodium chloride 0.9%. 1000 milliLiter(s) (75 mL/Hr) IV Continuous <Continuous>    MEDICATIONS  (PRN):  acetaminophen   Tablet .. 650 milliGRAM(s) Oral every 6 hours PRN Temp greater or equal to 38C (100.4F), Mild Pain (1 - 3)  diazepam    Tablet 5 milliGRAM(s) Oral three times a day PRN muscle spasms  oxyCODONE    IR 5 milliGRAM(s) Oral every 6 hours PRN Moderate Pain (4 - 6)  oxyCODONE    IR 10 milliGRAM(s) Oral every 6 hours PRN Severe Pain (7 - 10)  polyethylene glycol 3350 17 Gram(s) Oral daily PRN Constipation        CAPILLARY BLOOD GLUCOSE        I&O's Summary    13 Aug 2020 07:01  -  14 Aug 2020 07:00  --------------------------------------------------------  IN: 1695 mL / OUT: 1230 mL / NET: 465 mL    14 Aug 2020 07:01  -  14 Aug 2020 10:47  --------------------------------------------------------  IN: 0 mL / OUT: 400 mL / NET: -400 mL    Vital Signs Last 24 Hrs  T(C): 36.7 (14 Aug 2020 09:58), Max: 37.1 (13 Aug 2020 11:16)  T(F): 98.1 (14 Aug 2020 09:58), Max: 98.8 (13 Aug 2020 11:16)  HR: 83 (14 Aug 2020 09:58) (83 - 114)  BP: 100/60 (14 Aug 2020 09:58) (100/60 - 158/89)  BP(mean): 99 (13 Aug 2020 19:00) (89 - 106)  RR: 18 (14 Aug 2020 09:58) (12 - 20)  SpO2: 97% (14 Aug 2020 09:58) (92% - 99%)    PHYSICAL EXAM:  GENERAL: NAD, on room air  HEENT: sclera clear  NECK: posterior neck incision with partial closure with wound vac attached inferiorily  RESPIRATORY: normal respiratory effort  CARDIOVASCULAR: s1/s2, regular, tachycardic; No murmurs appreciated, trace pitting edema of legs  GASTROINTESTINAL: Soft, Nontender, Nondistended; Bowel sounds present  EXTREMITIES:  WWP, trace pitting edema b/l lower legs  NERVOUS SYSTEM:  awake, alert, answers Qs appropriately, no gross deficits  PSYCH: calm cooperative  SKIN: No rashes or lesions; Incisions C/D/I    LABS:                        10.1   12.87 )-----------( 553      ( 13 Aug 2020 05:15 )             33.2
Patient is a 70y old  Female who presents with a chief complaint of neck abscess (16 Aug 2020 10:30)      SUBJECTIVE / OVERNIGHT EVENTS: pt seen and examined at bedside. reports severe back pain at surgical site, just received PO oxycodone. Otherwise able to tolerate PO without nausea/emesis. No BM recently, also has been NPO for most of past week.    MEDICATIONS  (STANDING):  anastrozole 1 milliGRAM(s) Oral daily  atorvastatin 40 milliGRAM(s) Oral at bedtime  cefTRIAXone   IVPB 2000 milliGRAM(s) IV Intermittent every 24 hours  metoprolol tartrate 12.5 milliGRAM(s) Oral daily  senna 2 Tablet(s) Oral at bedtime  sodium chloride 0.9% lock flush 3 milliLiter(s) IV Push every 8 hours  sodium chloride 0.9%. 1000 milliLiter(s) (75 mL/Hr) IV Continuous <Continuous>    MEDICATIONS  (PRN):  acetaminophen   Tablet .. 650 milliGRAM(s) Oral every 6 hours PRN Temp greater or equal to 38C (100.4F), Mild Pain (1 - 3)  oxyCODONE    IR 5 milliGRAM(s) Oral every 6 hours PRN Moderate Pain (4 - 6)  oxyCODONE    IR 10 milliGRAM(s) Oral every 6 hours PRN Severe Pain (7 - 10)  polyethylene glycol 3350 17 Gram(s) Oral daily PRN Constipation        CAPILLARY BLOOD GLUCOSE        I&O's Summary    15 Aug 2020 07:01  -  16 Aug 2020 07:00  --------------------------------------------------------  IN: 390 mL / OUT: 714 mL / NET: -324 mL    16 Aug 2020 07:01  -  16 Aug 2020 13:56  --------------------------------------------------------  IN: 280 mL / OUT: 190 mL / NET: 90 mL      Vital Signs Last 24 Hrs  T(C): 36.7 (16 Aug 2020 13:10), Max: 37.2 (15 Aug 2020 21:15)  T(F): 98 (16 Aug 2020 13:10), Max: 99 (15 Aug 2020 21:15)  HR: 92 (16 Aug 2020 13:10) (84 - 116)  BP: 100/56 (16 Aug 2020 13:10) (97/58 - 154/69)  BP(mean): 85 (15 Aug 2020 18:00) (71 - 94)  RR: 15 (16 Aug 2020 13:10) (15 - 27)  SpO2: 95% (16 Aug 2020 13:10) (93% - 98%)      PHYSICAL EXAM:  GENERAL: mod distress due to back pain, on room air  HEENT: sclera clear  NECK: posterior neck incision with complete closure with 3 wound vacs attached inferiorly draining serosanguinous fluid  RESPIRATORY: normal respiratory effort  CARDIOVASCULAR: s1/s2, regular, tachycardic; No murmurs appreciated, trace pitting edema of legs  GASTROINTESTINAL: Soft, Nontender, Nondistended; Bowel sounds present  EXTREMITIES:  WWP, trace pitting edema b/l lower legs  NERVOUS SYSTEM:  awake, alert, answers Qs appropriately, no gross deficits  PSYCH: calm cooperative  SKIN: No rashes or lesions; Incisions C/D/I
Patient is a 70y old  Female who presents with a chief complaint of neck abscess (17 Aug 2020 09:33)      SUBJECTIVE / OVERNIGHT EVENTS: going for EGD today  ADDITIONAL REVIEW OF SYSTEMS: denies CP/SOB/N/V    MEDICATIONS  (STANDING):  anastrozole 1 milliGRAM(s) Oral daily  atorvastatin 40 milliGRAM(s) Oral at bedtime  cefTRIAXone   IVPB 2000 milliGRAM(s) IV Intermittent every 24 hours  metoprolol tartrate 12.5 milliGRAM(s) Oral daily  senna 2 Tablet(s) Oral at bedtime  sodium chloride 0.9% lock flush 3 milliLiter(s) IV Push every 8 hours  sodium chloride 0.9%. 1000 milliLiter(s) (75 mL/Hr) IV Continuous <Continuous>  sodium chloride 0.9%. 1000 milliLiter(s) (100 mL/Hr) IV Continuous <Continuous>    MEDICATIONS  (PRN):  acetaminophen   Tablet .. 650 milliGRAM(s) Oral every 6 hours PRN Temp greater or equal to 38C (100.4F), Mild Pain (1 - 3)  oxyCODONE    IR 5 milliGRAM(s) Oral every 6 hours PRN Moderate Pain (4 - 6)  oxyCODONE    IR 10 milliGRAM(s) Oral every 6 hours PRN Severe Pain (7 - 10)  polyethylene glycol 3350 17 Gram(s) Oral daily PRN Constipation      CAPILLARY BLOOD GLUCOSE        I&O's Summary    16 Aug 2020 07:01  -  17 Aug 2020 07:00  --------------------------------------------------------  IN: 1265 mL / OUT: 814 mL / NET: 451 mL    17 Aug 2020 07:01  -  17 Aug 2020 10:32  --------------------------------------------------------  IN: 0 mL / OUT: 40 mL / NET: -40 mL        PHYSICAL EXAM:  Vital Signs Last 24 Hrs  T(C): 37.2 (17 Aug 2020 10:15), Max: 37.2 (17 Aug 2020 10:15)  T(F): 99 (17 Aug 2020 10:15), Max: 99 (17 Aug 2020 10:15)  HR: 97 (17 Aug 2020 10:15) (85 - 100)  BP: 152/73 (17 Aug 2020 10:15) (96/53 - 152/73)  BP(mean): 94 (17 Aug 2020 10:15) (94 - 94)  RR: 24 (17 Aug 2020 10:15) (15 - 24)  SpO2: 91% (17 Aug 2020 10:15) (91% - 97%)    GENERAL:  on room air  HEENT: sclera clear  NECK: posterior neck incision with complete closure with 3 wound vacs attached inferiorly draining serosanguinous fluid  RESPIRATORY: normal respiratory effort  CARDIOVASCULAR: s1/s2, regular, trace pitting edema of legs  GASTROINTESTINAL: Soft, Nontender, Nondistended; Bowel sounds present  EXTREMITIES:  WWP, trace pitting edema b/l lower legs  NERVOUS SYSTEM:  awake, alert, answers Qs appropriately, no gross deficits  PSYCH: calm cooperative  SKIN: No rashes or lesions; Incisions C/D/I    LABS:                      RADIOLOGY & ADDITIONAL TESTS:  Results Reviewed:   Imaging Personally Reviewed:  Electrocardiogram Personally Reviewed:    COORDINATION OF CARE:  Care Discussed with Consultants/Other Providers [Y/N]:  Prior or Outpatient Records Reviewed [Y/N]:
Patient is a 70y old  Female who presents with a chief complaint of neck abscess (18 Aug 2020 12:31)      SUBJECTIVE / OVERNIGHT EVENTS: pt in NAD walking around states she hasn't moved her bowel in a few days   ADDITIONAL REVIEW OF SYSTEMS: denies Cp/SOB/N    MEDICATIONS  (STANDING):  anastrozole 1 milliGRAM(s) Oral daily  atorvastatin 40 milliGRAM(s) Oral at bedtime  cefTRIAXone   IVPB 2000 milliGRAM(s) IV Intermittent every 24 hours  metoprolol tartrate 12.5 milliGRAM(s) Oral daily  pantoprazole    Tablet 40 milliGRAM(s) Oral before breakfast  senna 2 Tablet(s) Oral at bedtime  sodium chloride 0.9% lock flush 3 milliLiter(s) IV Push every 8 hours  sodium chloride 0.9% lock flush 3 milliLiter(s) IV Push every 8 hours    MEDICATIONS  (PRN):  acetaminophen   Tablet .. 650 milliGRAM(s) Oral every 6 hours PRN Temp greater or equal to 38C (100.4F), Mild Pain (1 - 3)  oxyCODONE    IR 5 milliGRAM(s) Oral every 6 hours PRN Moderate Pain (4 - 6)  oxyCODONE    IR 10 milliGRAM(s) Oral every 6 hours PRN Severe Pain (7 - 10)  polyethylene glycol 3350 17 Gram(s) Oral daily PRN Constipation      CAPILLARY BLOOD GLUCOSE        I&O's Summary    17 Aug 2020 07:01  -  18 Aug 2020 07:00  --------------------------------------------------------  IN: 600 mL / OUT: 1965 mL / NET: -1365 mL        PHYSICAL EXAM:  Vital Signs Last 24 Hrs  T(C): 36.7 (18 Aug 2020 09:23), Max: 37.1 (17 Aug 2020 22:00)  T(F): 98.1 (18 Aug 2020 09:23), Max: 98.7 (17 Aug 2020 22:00)  HR: 92 (18 Aug 2020 09:23) (84 - 96)  BP: 109/64 (18 Aug 2020 09:23) (102/66 - 137/53)  BP(mean): --  RR: 17 (18 Aug 2020 09:23) (17 - 18)  SpO2: 93% (18 Aug 2020 09:23) (93% - 95%)    GENERAL:  on room air  HEENT: sclera clear  NECK: posterior neck incision with complete closure with 3 wound vacs attached inferiorly draining serosanguinous fluid  RESPIRATORY: normal respiratory effort  CARDIOVASCULAR: s1/s2, regular, trace pitting edema of legs  GASTROINTESTINAL: Soft, Nontender, Nondistended; Bowel sounds present  EXTREMITIES:  WWP, trace pitting edema b/l lower legs  NERVOUS SYSTEM:  awake, alert, answers Qs appropriately, no gross deficits  PSYCH: calm cooperative  SKIN: No rashes or lesions; Incisions C/D/I    LABS:                      RADIOLOGY & ADDITIONAL TESTS:  Results Reviewed:   Imaging Personally Reviewed:  Electrocardiogram Personally Reviewed:    COORDINATION OF CARE:  Care Discussed with Consultants/Other Providers [Y/N]:  Prior or Outpatient Records Reviewed [Y/N]:
Patient is a 70y old  Female who presents with a chief complaint of neck abscess (19 Aug 2020 10:40)      SUBJECTIVE / OVERNIGHT EVENTS: Pt in NAD no acute events ON. eating well tolerating diet states she feels like she is going to move her bowels   ADDITIONAL REVIEW OF SYSTEMS: denies CP/SOB/N    MEDICATIONS  (STANDING):  anastrozole 1 milliGRAM(s) Oral daily  atorvastatin 40 milliGRAM(s) Oral at bedtime  cefTRIAXone   IVPB 2000 milliGRAM(s) IV Intermittent every 24 hours  chlorhexidine 2% Cloths 1 Application(s) Topical daily  metoprolol tartrate 12.5 milliGRAM(s) Oral daily  pantoprazole    Tablet 40 milliGRAM(s) Oral before breakfast  senna 2 Tablet(s) Oral at bedtime  sodium chloride 0.9% lock flush 3 milliLiter(s) IV Push every 8 hours  sodium chloride 0.9% lock flush 3 milliLiter(s) IV Push every 8 hours    MEDICATIONS  (PRN):  acetaminophen   Tablet .. 650 milliGRAM(s) Oral every 6 hours PRN Temp greater or equal to 38C (100.4F), Mild Pain (1 - 3)  oxyCODONE    IR 5 milliGRAM(s) Oral every 6 hours PRN Moderate Pain (4 - 6)  oxyCODONE    IR 10 milliGRAM(s) Oral every 6 hours PRN Severe Pain (7 - 10)  polyethylene glycol 3350 17 Gram(s) Oral daily PRN Constipation      CAPILLARY BLOOD GLUCOSE        I&O's Summary    18 Aug 2020 07:01  -  19 Aug 2020 07:00  --------------------------------------------------------  IN: 0 mL / OUT: 1509 mL / NET: -1509 mL    19 Aug 2020 07:01  -  19 Aug 2020 11:12  --------------------------------------------------------  IN: 0 mL / OUT: 700 mL / NET: -700 mL        PHYSICAL EXAM:  Vital Signs Last 24 Hrs  T(C): 37.1 (19 Aug 2020 09:10), Max: 37.1 (18 Aug 2020 21:43)  T(F): 98.7 (19 Aug 2020 09:10), Max: 98.8 (18 Aug 2020 21:43)  HR: 96 (19 Aug 2020 09:10) (93 - 113)  BP: 120/71 (19 Aug 2020 09:10) (100/52 - 122/96)  BP(mean): --  RR: 18 (19 Aug 2020 09:10) (16 - 18)  SpO2: 94% (19 Aug 2020 09:10) (94% - 95%)    GENERAL:  on room air  HEENT: sclera clear  NECK: posterior neck incision with complete closure with 2 drains in place  RESPIRATORY: normal respiratory effort  CARDIOVASCULAR: s1/s2, regular, trace pitting edema of legs  GASTROINTESTINAL: Soft, Nontender, Nondistended; Bowel sounds present  EXTREMITIES:  WWP, trace pitting edema b/l lower legs  NERVOUS SYSTEM:  awake, alert, answers Qs appropriately, no gross deficits  PSYCH: calm cooperative  SKIN: No rashes or lesions; Incisions C/D/I    LABS:                      RADIOLOGY & ADDITIONAL TESTS:  Results Reviewed:   Imaging Personally Reviewed:  Electrocardiogram Personally Reviewed:    COORDINATION OF CARE:  Care Discussed with Consultants/Other Providers [Y/N]:  Prior or Outpatient Records Reviewed [Y/N]:
Patient is a 70y old  Female who presents with a chief complaint of neck abscess (20 Aug 2020 05:15)      SUBJECTIVE / OVERNIGHT EVENTS: Pt states s/p BM today  ADDITIONAL REVIEW OF SYSTEMS: denies N/V    MEDICATIONS  (STANDING):  anastrozole 1 milliGRAM(s) Oral daily  atorvastatin 40 milliGRAM(s) Oral at bedtime  cefTRIAXone   IVPB 2000 milliGRAM(s) IV Intermittent every 24 hours  chlorhexidine 2% Cloths 1 Application(s) Topical daily  metoprolol tartrate 12.5 milliGRAM(s) Oral daily  pantoprazole    Tablet 40 milliGRAM(s) Oral before breakfast  senna 2 Tablet(s) Oral at bedtime  sodium chloride 0.9% lock flush 3 milliLiter(s) IV Push every 8 hours  sodium chloride 0.9% lock flush 3 milliLiter(s) IV Push every 8 hours    MEDICATIONS  (PRN):  acetaminophen   Tablet .. 650 milliGRAM(s) Oral every 6 hours PRN Temp greater or equal to 38C (100.4F), Mild Pain (1 - 3)  oxyCODONE    IR 5 milliGRAM(s) Oral every 6 hours PRN Moderate Pain (4 - 6)  oxyCODONE    IR 10 milliGRAM(s) Oral every 6 hours PRN Severe Pain (7 - 10)  polyethylene glycol 3350 17 Gram(s) Oral daily PRN Constipation      CAPILLARY BLOOD GLUCOSE        I&O's Summary    19 Aug 2020 07:01  -  20 Aug 2020 07:00  --------------------------------------------------------  IN: 0 mL / OUT: 1295 mL / NET: -1295 mL    20 Aug 2020 07:01  -  20 Aug 2020 13:51  --------------------------------------------------------  IN: 0 mL / OUT: 518 mL / NET: -518 mL        PHYSICAL EXAM:  Vital Signs Last 24 Hrs  T(C): 36.7 (20 Aug 2020 10:14), Max: 37.3 (19 Aug 2020 21:23)  T(F): 98.1 (20 Aug 2020 10:14), Max: 99.2 (19 Aug 2020 21:23)  HR: 91 (20 Aug 2020 10:14) (88 - 111)  BP: 125/79 (20 Aug 2020 10:14) (118/80 - 140/80)  BP(mean): --  RR: 18 (20 Aug 2020 10:14) (16 - 18)  SpO2: 94% (20 Aug 2020 10:14) (93% - 95%)    GENERAL:  on room air  HEENT: sclera clear  NECK: posterior neck incision with complete closure with 2 drains in place  RESPIRATORY: normal respiratory effort  CARDIOVASCULAR: s1/s2, regular, trace pitting edema of legs  GASTROINTESTINAL: Soft, Nontender, Nondistended; Bowel sounds present  EXTREMITIES:  WWP, trace pitting edema b/l lower legs +PICC   NERVOUS SYSTEM:  awake, alert, answers Qs appropriately, no gross deficits  PSYCH: calm cooperative  SKIN: No rashes or lesions; Incisions C/D/I    LABS:                      RADIOLOGY & ADDITIONAL TESTS:  Results Reviewed:   Imaging Personally Reviewed:  Electrocardiogram Personally Reviewed:    COORDINATION OF CARE:  Care Discussed with Consultants/Other Providers [Y/N]:  Prior or Outpatient Records Reviewed [Y/N]:
Pre-op Note:   HILARIA KUMAR70yFemale    HPI:  70y female w/ hx of 2018 ACDF that was subsequently revised/extended 4/2019 at [facility] to C3-C6 with posterior fusion of C2-T2 w/ right ilac crest graft done. Post op course complicated with afib, was sent to Rehab. Patient presents today to [facility] ED with complaints of neck swelling and drainage from wound. Patient with pmhx of Hodgkins (in remission) c/o nausea, dizziness, generalized weakness. (05 Aug 2020 13:22)    s/p wound washout 8/6, 8/8. preop for OR today with plastics for wound closure.       PAST MEDICAL & SURGICAL HISTORY:  Kidney mass  Anxiety  History of breast cancer: s/p right lumpectomy , RT  Hodgkin disease: 1981, underwent radiation therapy  HLD (hyperlipidemia)  HTN (hypertension)  Cervical disc herniation  Carpal tunnel syndrome: b/l  Cervical disc disease  History of carpal tunnel surgery: 10/2018 left hand  History of tonsillectomy  S/P cervical spinal fusion: 12/15/2018 C4-C6 ACDF, 4/19 - plates and screws  History of Mohs micrographic surgery for skin cancer  History of lumpectomy of right breast: 2013    penicillin (Rash)          T(C): 37.3 (08-12-20 @ 21:40), Max: 37.3 (08-12-20 @ 21:40)  HR: 108 (08-12-20 @ 21:46) (95 - 108)  BP: 139/77 (08-12-20 @ 21:40) (107/74 - 141/90)  RR: 18 (08-12-20 @ 21:40) (17 - 18)  SpO2: 95% (08-12-20 @ 21:40) (94% - 95%)  Wt(kg): --    08-12    140  |  99  |  9   ----------------------------<  99  3.7   |  29  |  0.53    Ca    9.3      12 Aug 2020 06:00  Phos  3.1     08-12  Mg     2.0     08-12      CBC Full  -  ( 12 Aug 2020 06:00 )  WBC Count : 10.94 K/uL  RBC Count : 2.94 M/uL  Hemoglobin : 8.8 g/dL  Hematocrit : 27.6 %  Platelet Count - Automated : 529 K/uL  Mean Cell Volume : 93.9 fL  Mean Cell Hemoglobin : 29.9 pg  Mean Cell Hemoglobin Concentration : 31.9 %  Auto Neutrophil # : 8.35 K/uL  Auto Lymphocyte # : 1.33 K/uL  Auto Monocyte # : 0.83 K/uL  Auto Eosinophil # : 0.21 K/uL  Auto Basophil # : 0.05 K/uL  Auto Neutrophil % : 76.2 %  Auto Lymphocyte % : 12.2 %  Auto Monocyte % : 7.6 %  Auto Eosinophil % : 1.9 %  Auto Basophil % : 0.5 %    PT/INR - ( 12 Aug 2020 06:00 )   PT: 13.1 SEC;   INR: 1.15            Type & Screen (in past 72hrs): rpt ordered  Anticoagulants: none  Consent: pending prior to OR  PE:    awake, A&Ox3, fc  DOZIER 5/5  SILT  wound clean, dry wound vacc    P:  - preop for OR today with plastics  - NPO midnight  - IV fluids  - f/u core lab for Or clts from 8/8  - Esophogram pending. EGD friday    d/w attending
S/P EGD yesterday  PICC scheduled for today  Vital Signs Last 24 Hrs  T(C): 36.9 (18 Aug 2020 02:00), Max: 37.2 (17 Aug 2020 02:38)  T(F): 98.4 (18 Aug 2020 02:00), Max: 99 (17 Aug 2020 02:38)  HR: 84 (18 Aug 2020 02:00) (84 - 98)  BP: 109/69 (18 Aug 2020 02:00) (107/74 - 152/73)  BP(mean): 100 (17 Aug 2020 11:45) (84 - 100)  RR: 17 (18 Aug 2020 02:00) (17 - 24)  SpO2: 94% (18 Aug 2020 02:00) (90% - 100%)    AAO X 3  PERRLA, EOMI  DOZIER strength 5/5  SILT    MASOOD 1: 10cc  MASOOD 2: 50cc  MASOOD 3: 75cc    MEDICATIONS  (STANDING):  anastrozole 1 milliGRAM(s) Oral daily  atorvastatin 40 milliGRAM(s) Oral at bedtime  cefTRIAXone   IVPB 2000 milliGRAM(s) IV Intermittent every 24 hours  metoprolol tartrate 12.5 milliGRAM(s) Oral daily  pantoprazole    Tablet 40 milliGRAM(s) Oral before breakfast  senna 2 Tablet(s) Oral at bedtime  sodium chloride 0.9% lock flush 3 milliLiter(s) IV Push every 8 hours  sodium chloride 0.9% lock flush 3 milliLiter(s) IV Push every 8 hours    MEDICATIONS  (PRN):  acetaminophen   Tablet .. 650 milliGRAM(s) Oral every 6 hours PRN Temp greater or equal to 38C (100.4F), Mild Pain (1 - 3)  oxyCODONE    IR 5 milliGRAM(s) Oral every 6 hours PRN Moderate Pain (4 - 6)  oxyCODONE    IR 10 milliGRAM(s) Oral every 6 hours PRN Severe Pain (7 - 10)  polyethylene glycol 3350 17 Gram(s) Oral daily PRN Constipation
S/P wound closure by plastics  Vital Signs Last 24 Hrs  T(C): 36.5 (16 Aug 2020 01:38), Max: 37.2 (15 Aug 2020 21:15)  T(F): 97.7 (16 Aug 2020 01:38), Max: 99 (15 Aug 2020 21:15)  HR: 90 (16 Aug 2020 01:38) (82 - 116)  BP: 105/68 (16 Aug 2020 01:38) (96/50 - 155/74)  BP(mean): 85 (15 Aug 2020 18:00) (71 - 94)  RR: 18 (16 Aug 2020 01:38) (16 - 27)  SpO2: 95% (16 Aug 2020 01:38) (93% - 98%)    AAO X 3  PERRLA, EOMI  DOZIER strength 5/5  SILT    MASOOD 1: 12.5cc  MASOOD 2: 22cc  MASOOD 3: 32cc    MEDICATIONS  (STANDING):  anastrozole 1 milliGRAM(s) Oral daily  atorvastatin 40 milliGRAM(s) Oral at bedtime  cefTRIAXone   IVPB 2000 milliGRAM(s) IV Intermittent every 24 hours  metoprolol tartrate 12.5 milliGRAM(s) Oral daily  senna 2 Tablet(s) Oral at bedtime  sodium chloride 0.9% lock flush 3 milliLiter(s) IV Push every 8 hours    MEDICATIONS  (PRN):  acetaminophen   Tablet .. 650 milliGRAM(s) Oral every 6 hours PRN Temp greater or equal to 38C (100.4F), Mild Pain (1 - 3)  oxyCODONE    IR 5 milliGRAM(s) Oral every 6 hours PRN Moderate Pain (4 - 6)  oxyCODONE    IR 10 milliGRAM(s) Oral every 6 hours PRN Severe Pain (7 - 10)  polyethylene glycol 3350 17 Gram(s) Oral daily PRN Constipation
S/P wound washout and placement of vac yesterday  Still having difficulty swallowing  Vital Signs Last 24 Hrs  T(C): 36.6 (14 Aug 2020 01:50), Max: 37.4 (13 Aug 2020 05:06)  T(F): 97.8 (14 Aug 2020 01:50), Max: 99.4 (13 Aug 2020 05:06)  HR: 103 (14 Aug 2020 01:50) (67 - 114)  BP: 123/71 (14 Aug 2020 01:50) (123/71 - 158/89)  BP(mean): 99 (13 Aug 2020 19:00) (89 - 106)  RR: 18 (14 Aug 2020 01:50) (12 - 20)  SpO2: 95% (14 Aug 2020 01:50) (92% - 99%)    AAO X 3  PERRLA, EOMI  DOZIER strength 5/5  SILT    MEDICATIONS  (STANDING):  anastrozole 1 milliGRAM(s) Oral daily  atorvastatin 40 milliGRAM(s) Oral at bedtime  cefTRIAXone   IVPB 2000 milliGRAM(s) IV Intermittent every 24 hours  metoprolol tartrate 12.5 milliGRAM(s) Oral daily  senna 2 Tablet(s) Oral at bedtime  sodium chloride 0.9% lock flush 3 milliLiter(s) IV Push every 8 hours  sodium chloride 0.9%. 1000 milliLiter(s) (75 mL/Hr) IV Continuous <Continuous>    MEDICATIONS  (PRN):  acetaminophen   Tablet .. 650 milliGRAM(s) Oral every 6 hours PRN Temp greater or equal to 38C (100.4F), Mild Pain (1 - 3)  diazepam    Tablet 5 milliGRAM(s) Oral three times a day PRN muscle spasms  oxyCODONE    IR 5 milliGRAM(s) Oral every 6 hours PRN Moderate Pain (4 - 6)  oxyCODONE    IR 10 milliGRAM(s) Oral every 6 hours PRN Severe Pain (7 - 10)  polyethylene glycol 3350 17 Gram(s) Oral daily PRN Constipation                          10.1   12.87 )-----------( 553      ( 13 Aug 2020 05:15 )             33.2     08-12    140  |  99  |  9   ----------------------------<  99  3.7   |  29  |  0.53    Ca    9.3      12 Aug 2020 06:00  Phos  3.1     08-12  Mg     2.0     08-12
S/P wound washout and placement of vac yesterday  Still having difficulty swallowing  Vital Signs Last 24 Hrs  T(C): 36.6 (14 Aug 2020 01:50), Max: 37.4 (13 Aug 2020 05:06)  T(F): 97.8 (14 Aug 2020 01:50), Max: 99.4 (13 Aug 2020 05:06)  HR: 103 (14 Aug 2020 01:50) (67 - 114)  BP: 123/71 (14 Aug 2020 01:50) (123/71 - 158/89)  BP(mean): 99 (13 Aug 2020 19:00) (89 - 106)  RR: 18 (14 Aug 2020 01:50) (12 - 20)  SpO2: 95% (14 Aug 2020 01:50) (92% - 99%)    AAO X 3  PERRLA, EOMI  DOZIER strength 5/5  SILT    MEDICATIONS  (STANDING):  anastrozole 1 milliGRAM(s) Oral daily  atorvastatin 40 milliGRAM(s) Oral at bedtime  cefTRIAXone   IVPB 2000 milliGRAM(s) IV Intermittent every 24 hours  metoprolol tartrate 12.5 milliGRAM(s) Oral daily  senna 2 Tablet(s) Oral at bedtime  sodium chloride 0.9% lock flush 3 milliLiter(s) IV Push every 8 hours  sodium chloride 0.9%. 1000 milliLiter(s) (75 mL/Hr) IV Continuous <Continuous>    MEDICATIONS  (PRN):  acetaminophen   Tablet .. 650 milliGRAM(s) Oral every 6 hours PRN Temp greater or equal to 38C (100.4F), Mild Pain (1 - 3)  diazepam    Tablet 5 milliGRAM(s) Oral three times a day PRN muscle spasms  oxyCODONE    IR 5 milliGRAM(s) Oral every 6 hours PRN Moderate Pain (4 - 6)  oxyCODONE    IR 10 milliGRAM(s) Oral every 6 hours PRN Severe Pain (7 - 10)  polyethylene glycol 3350 17 Gram(s) Oral daily PRN Constipation                          10.1   12.87 )-----------( 553      ( 13 Aug 2020 05:15 )             33.2     08-12    140  |  99  |  9   ----------------------------<  99  3.7   |  29  |  0.53    Ca    9.3      12 Aug 2020 06:00  Phos  3.1     08-12  Mg     2.0     08-12
SUBJECTIVE EVENTS: no issues o/n reported.     Vital Signs Last 24 Hrs  T(C): 36.8 (19 Aug 2020 02:03), Max: 37.1 (18 Aug 2020 21:43)  T(F): 98.3 (19 Aug 2020 02:03), Max: 98.8 (18 Aug 2020 21:43)  HR: 93 (19 Aug 2020 02:03) (88 - 113)  BP: 91/52 (19 Aug 2020 02:03) (91/52 - 116/70)  BP(mean): --  RR: 16 (19 Aug 2020 02:03) (16 - 18)  SpO2: 95% (19 Aug 2020 02:03) (93% - 95%)      PHYSICAL EXAM:  Awake Alert, fc  PERRL, EOMI  Normal Tone 5/5 strength equally      INCISION: clean, dry, no dc noted  Drains per plastics    DIET: dysphagia 2      MEDICATIONS  (STANDING):  anastrozole 1 milliGRAM(s) Oral daily  atorvastatin 40 milliGRAM(s) Oral at bedtime  cefTRIAXone   IVPB 2000 milliGRAM(s) IV Intermittent every 24 hours  chlorhexidine 2% Cloths 1 Application(s) Topical daily  metoprolol tartrate 12.5 milliGRAM(s) Oral daily  pantoprazole    Tablet 40 milliGRAM(s) Oral before breakfast  senna 2 Tablet(s) Oral at bedtime  sodium chloride 0.9% lock flush 3 milliLiter(s) IV Push every 8 hours  sodium chloride 0.9% lock flush 3 milliLiter(s) IV Push every 8 hours    MEDICATIONS  (PRN):  acetaminophen   Tablet .. 650 milliGRAM(s) Oral every 6 hours PRN Temp greater or equal to 38C (100.4F), Mild Pain (1 - 3)  oxyCODONE    IR 5 milliGRAM(s) Oral every 6 hours PRN Moderate Pain (4 - 6)  oxyCODONE    IR 10 milliGRAM(s) Oral every 6 hours PRN Severe Pain (7 - 10)  polyethylene glycol 3350 17 Gram(s) Oral daily PRN Constipation
SUBJECTIVE EVENTS: no issues o/n, doing well.     Vital Signs Last 24 Hrs  T(C): 36.8 (20 Aug 2020 02:16), Max: 37.3 (19 Aug 2020 21:23)  T(F): 98.3 (20 Aug 2020 02:16), Max: 99.2 (19 Aug 2020 21:23)  HR: 88 (20 Aug 2020 02:16) (85 - 111)  BP: 124/74 (20 Aug 2020 02:16) (104/58 - 124/74)  BP(mean): --  RR: 16 (20 Aug 2020 02:16) (16 - 18)  SpO2: 95% (20 Aug 2020 02:16) (93% - 95%)      PHYSICAL EXAM:  Awake Alert fc  PERRL, EOMI  DOZIER antigravity    INCISION: clean, dry, no dc noted  drain per plastics      DIET: regular      MEDICATIONS  (STANDING):  anastrozole 1 milliGRAM(s) Oral daily  atorvastatin 40 milliGRAM(s) Oral at bedtime  cefTRIAXone   IVPB 2000 milliGRAM(s) IV Intermittent every 24 hours  chlorhexidine 2% Cloths 1 Application(s) Topical daily  metoprolol tartrate 12.5 milliGRAM(s) Oral daily  pantoprazole    Tablet 40 milliGRAM(s) Oral before breakfast  senna 2 Tablet(s) Oral at bedtime  sodium chloride 0.9% lock flush 3 milliLiter(s) IV Push every 8 hours  sodium chloride 0.9% lock flush 3 milliLiter(s) IV Push every 8 hours    MEDICATIONS  (PRN):  acetaminophen   Tablet .. 650 milliGRAM(s) Oral every 6 hours PRN Temp greater or equal to 38C (100.4F), Mild Pain (1 - 3)  oxyCODONE    IR 5 milliGRAM(s) Oral every 6 hours PRN Moderate Pain (4 - 6)  oxyCODONE    IR 10 milliGRAM(s) Oral every 6 hours PRN Severe Pain (7 - 10)  polyethylene glycol 3350 17 Gram(s) Oral daily PRN Constipation
SUBJECTIVE EVENTS: no issues o/n. preop for OR today with plastics for possible wound closure.     Vital Signs Last 24 Hrs  T(C): 37.1 (12 Aug 2020 05:22), Max: 37.2 (11 Aug 2020 10:10)  T(F): 98.7 (12 Aug 2020 05:22), Max: 98.9 (11 Aug 2020 10:10)  HR: 97 (12 Aug 2020 05:22) (94 - 106)  BP: 127/85 (12 Aug 2020 05:22) (101/58 - 140/79)  BP(mean): --  RR: 17 (12 Aug 2020 05:22) (16 - 18)  SpO2: 94% (12 Aug 2020 05:22) (94% - 95%)      PHYSICAL EXAM:  Awake A&Ox3, fc  PERRL, EOMI, No facial droop, Tongue midline  Normal Tone 5/5 strength equally      INCISION: wound vacc, clean, dry dressing      DIET: NPO      MEDICATIONS  (STANDING):  anastrozole 1 milliGRAM(s) Oral daily  atorvastatin 40 milliGRAM(s) Oral at bedtime  cefTRIAXone   IVPB 2000 milliGRAM(s) IV Intermittent every 24 hours  metoprolol tartrate 12.5 milliGRAM(s) Oral daily  senna 2 Tablet(s) Oral at bedtime  sodium chloride 0.9% lock flush 3 milliLiter(s) IV Push every 8 hours    MEDICATIONS  (PRN):  acetaminophen   Tablet .. 650 milliGRAM(s) Oral every 6 hours PRN Temp greater or equal to 38C (100.4F), Mild Pain (1 - 3)  diazepam    Tablet 5 milliGRAM(s) Oral three times a day PRN muscle spasms  oxyCODONE    IR 5 milliGRAM(s) Oral every 6 hours PRN Moderate Pain (4 - 6)  oxyCODONE    IR 10 milliGRAM(s) Oral every 6 hours PRN Severe Pain (7 - 10)  polyethylene glycol 3350 17 Gram(s) Oral daily PRN Constipation              EKG: sinus tachycardia
Subjective/recent events:  Got picc line yesterday. doing well. still feels tight, but pain improved. deep drain with low output. no growth in cultures still.	    Objective:   Aox3, sitting up in bed  breathing comfortably on room air  no extremity edema or abdominal distention  lavern drain holding suction, s/s  back incision intact, soft; dressing c/d/i, dressing changed and incision appears healthy
Subjective/recent events:  excited to go home soon, just very sore still, but in good spirits. I apologized for not being able to make it yesterday to round on her given some urgent patient care issues that I had to deal with at another hospital.     Objective:   Aox3, sitting up in bed  breathing comfortably on room air  no extremity edema or abdominal distention  lavern drain holding suction, s/s  back incision intact, soft; dressing c/d/i, dressing changed and incision appears healthy
Subjective/recent events:  feeling well postop, still having a decent amount of pain.    Objective:   Aox3, sitting up in bed  breathing comfortably on room air  no extremity edema or abdominal distention  lavern drain holding suction, s/s  back incision intact, soft; dressing c/d/i
Subjective/recent events:  now POd1 from 3rd washout, repeat cultures sent. feels well.    Objective:   Aox3, sitting up in bed  breathing comfortably on room air  no extremity edema or abdominal distention  VAC attached to cervical and thoracic spine wound, suction and seal intact. back is soft, no hematoma.
Subjective/recent events:  now postop from 3rd washout of cervical spine wound and vac change. cultures again resent. doing well postop, no nausea this time around. pain controlled.     Objective:   Aox3, sitting up in bed  breathing comfortably on room air  no extremity edema or abdominal distention  VAC attached to cervical and thoracic spine wound, suction and seal intact. back is soft, no hematoma.
Subjective/recent events: cultures still negative. ultrasound of thyroid with nodules, so primary team recommending FNA within next month. cultures post-wash still negative.     Objective:   Aox3, sitting up in bed  breathing comfortably on room air  no extremity edema or abdominal distention  VAC attached to cervical and thoracic spine wound, suction and seal intact. back is soft, cap refill 1.5 sec surrounding the incision. no hematoma.
Subjective/recent events: feeling ok this morning, in good spirits. one of the prewash cultures from 8/6 now growing strep intermedius, sensitivities pending. all post-wash cultures remain negative so far.     Objective:   Aox3, sitting up in bed  breathing comfortably on room air  no extremity edema or abdominal distention  VAC attached to cervical and thoracic spine wound, suction and seal intact. back is soft, cap refill 1.5 sec surrounding the incision. no hematoma.
Subjective/recent events: feeling ok this morning, in good spirits. one of the prewash cultures from 8/6 still growing strep intermedius, sensitivities pending. all post-wash cultures remain negative so far. she also had a thyroid u/s today to evaluate the abnormalities seen on imaging.     Objective:   Aox3, sitting up in bed  breathing comfortably on room air  no extremity edema or abdominal distention  VAC attached to cervical and thoracic spine wound, suction and seal intact. back is soft, cap refill 1.5 sec surrounding the incision. no hematoma.
Subjective/recent events: now POD0 from final washout, bilateral paraspinous flaps and R trapezius flap    Objective:   Aox3, sitting up in bed  breathing comfortably on room air  no extremity edema or abdominal distention  lavern drain holding suction  dressing c/d/i on back, soft
Subjective: Doing much better today, both before her surgery and now postoperatively. She says her pain is much better this time around than after the first procedure.     Objective:   Aox3, sitting up in bed  breathing comfortably on room air  no extremity edema or abdominal distention  VAC attached to cervical and thoracic spine wound, suction and seal intact. back is soft, cap refill 1.5 sec surrounding the incision. no hematoma.
Subjective: Feeling much better this morning, pain now well controlled and in a good mood. understands that she'll need to undergo more surgery.     Objective:   Aox3, sitting up in bed  breathing comfortably on room air  in some pain still  no extremity edema or abdominal distention  VAC attached to cervical and thoracic spine wound, suction and seal intact. back is soft, cap refill 1.5 sec surrounding the incision. no hematoma.
Subjective: Ms. Shelton is now postop from her irrigation and debridement of her cervical and thoracic wound. She is stable postoperatively but still having a lot of pain. she was just given dilaudid with some effect, but we'll have to follow up pain control. otherwise no nausea, vomiting, etc.     Objective:   Aox3, sitting up in bed  breathing comfortably on room air  in some pain still  no extremity edema or abdominal distention  VAC attached to cervical and thoracic spine wound, suction and seal intact. back is soft, cap refill 1.5 sec surrounding the incision. no hematoma.
INTERVAL HPI/OVERNIGHT EVENTS:    (+) bm this morning, brown   "im eating just fine, i want to go home"    MEDICATIONS  (STANDING):  anastrozole 1 milliGRAM(s) Oral daily  atorvastatin 40 milliGRAM(s) Oral at bedtime  cefTRIAXone   IVPB 2000 milliGRAM(s) IV Intermittent every 24 hours  chlorhexidine 2% Cloths 1 Application(s) Topical daily  metoprolol tartrate 12.5 milliGRAM(s) Oral daily  pantoprazole    Tablet 40 milliGRAM(s) Oral before breakfast  senna 2 Tablet(s) Oral at bedtime  sodium chloride 0.9% lock flush 3 milliLiter(s) IV Push every 8 hours  sodium chloride 0.9% lock flush 3 milliLiter(s) IV Push every 8 hours    MEDICATIONS  (PRN):  acetaminophen   Tablet .. 650 milliGRAM(s) Oral every 6 hours PRN Temp greater or equal to 38C (100.4F), Mild Pain (1 - 3)  oxyCODONE    IR 5 milliGRAM(s) Oral every 6 hours PRN Moderate Pain (4 - 6)  oxyCODONE    IR 10 milliGRAM(s) Oral every 6 hours PRN Severe Pain (7 - 10)  polyethylene glycol 3350 17 Gram(s) Oral daily PRN Constipation      Allergies    penicillin (Rash)    Intolerances        Review of Systems:    General:  No wt loss, fevers, chills, night sweats, fatigue   Eyes:  Good vision, no reported pain  ENT:  No sore throat, pain, runny nose, dysphagia  CV:  No pain, palpitations, hypo/hypertension  Resp:  No dyspnea, cough, tachypnea, wheezing  GI:  No pain, No nausea, No vomiting, No diarrhea, No constipation, No weight loss, No fever, No pruritis, No rectal bleeding, No melena, No dysphagia  :  No pain, bleeding, incontinence, nocturia  Muscle:  No pain, weakness  Neuro:  No weakness, tingling, memory problems  Psych:  No fatigue, insomnia, mood problems, depression  Endocrine:  No polyuria, polydypsia, cold/heat intolerance  Heme:  No petechiae, ecchymosis, easy bruisability  Skin:  No rash, tattoos, scars, edema      Vital Signs Last 24 Hrs  T(C): 37.1 (20 Aug 2020 14:40), Max: 37.3 (19 Aug 2020 21:23)  T(F): 98.7 (20 Aug 2020 14:40), Max: 99.2 (19 Aug 2020 21:23)  HR: 88 (20 Aug 2020 14:47) (88 - 111)  BP: 107/63 (20 Aug 2020 14:40) (107/63 - 140/80)  BP(mean): --  RR: 17 (20 Aug 2020 14:40) (16 - 18)  SpO2: 96% (20 Aug 2020 14:40) (93% - 96%)    PHYSICAL EXAM:    Constitutional: NAD  HEENT: EOMI, throat clear  Neck: No LAD, supple  Respiratory: CTA and P  Cardiovascular: S1 and S2, RRR, no M  Gastrointestinal: BS+, soft, NT/ND, neg HSM,  Extremities: No peripheral edema, neg clubbing, cyanosis  Vascular: 2+ peripheral pulses  Neurological: A/O x 3, no focal deficits  Psychiatric: Normal mood, normal affect  Skin: No rashes      LABS:                RADIOLOGY & ADDITIONAL TESTS:
Subjective/recent events:  today the post-wash cultures finally turned positive, also with strep intermedius as the pre-wash cultures. The cultures sent on 8/8 were lost in somewhere in the process and unfortunately there is no data for that day at this point. patient feels well in good spirits.     Objective:   Aox3, sitting up in bed  breathing comfortably on room air  no extremity edema or abdominal distention  VAC attached to cervical and thoracic spine wound, suction and seal intact. back is soft, cap refill 1.5 sec surrounding the incision. no hematoma.
INTERVAL HPI/OVERNIGHT EVENTS:    reports tolerating diet better, food didnt feel as if getting stuck  no vomiting       MEDICATIONS  (STANDING):  anastrozole 1 milliGRAM(s) Oral daily  atorvastatin 40 milliGRAM(s) Oral at bedtime  cefTRIAXone   IVPB 2000 milliGRAM(s) IV Intermittent every 24 hours  metoprolol tartrate 12.5 milliGRAM(s) Oral daily  pantoprazole    Tablet 40 milliGRAM(s) Oral before breakfast  senna 2 Tablet(s) Oral at bedtime  sodium chloride 0.9% lock flush 3 milliLiter(s) IV Push every 8 hours  sodium chloride 0.9% lock flush 3 milliLiter(s) IV Push every 8 hours    MEDICATIONS  (PRN):  acetaminophen   Tablet .. 650 milliGRAM(s) Oral every 6 hours PRN Temp greater or equal to 38C (100.4F), Mild Pain (1 - 3)  oxyCODONE    IR 5 milliGRAM(s) Oral every 6 hours PRN Moderate Pain (4 - 6)  oxyCODONE    IR 10 milliGRAM(s) Oral every 6 hours PRN Severe Pain (7 - 10)  polyethylene glycol 3350 17 Gram(s) Oral daily PRN Constipation      Allergies    penicillin (Rash)    Intolerances        Review of Systems:    General:  No wt loss, fevers, chills, night sweats, fatigue   Eyes:  Good vision, no reported pain  ENT:  No sore throat, pain, runny nose, dysphagia  CV:  No pain, palpitations, hypo/hypertension  Resp:  No dyspnea, cough, tachypnea, wheezing  GI:  No pain, No nausea, No vomiting, No diarrhea, No constipation, No weight loss, No fever, No pruritis, No rectal bleeding, No melena, No dysphagia  :  No pain, bleeding, incontinence, nocturia  Muscle:  No pain, weakness  Neuro:  No weakness, tingling, memory problems  Psych:  No fatigue, insomnia, mood problems, depression  Endocrine:  No polyuria, polydypsia, cold/heat intolerance  Heme:  No petechiae, ecchymosis, easy bruisability  Skin:  No rash, tattoos, scars, edema      Vital Signs Last 24 Hrs  T(C): 36.7 (18 Aug 2020 09:23), Max: 37.2 (17 Aug 2020 10:15)  T(F): 98.1 (18 Aug 2020 09:23), Max: 99 (17 Aug 2020 10:15)  HR: 92 (18 Aug 2020 09:23) (84 - 98)  BP: 109/64 (18 Aug 2020 09:23) (102/66 - 152/73)  BP(mean): 100 (17 Aug 2020 11:45) (84 - 100)  RR: 17 (18 Aug 2020 09:23) (17 - 24)  SpO2: 93% (18 Aug 2020 09:23) (91% - 100%)    PHYSICAL EXAM:    Constitutional: NAD  HEENT: EOMI, throat clear  Neck: No LAD, supple  Respiratory: CTA and P  Cardiovascular: S1 and S2, RRR, no M  Gastrointestinal: BS+, soft, NT/ND, neg HSM,  Extremities: No peripheral edema, neg clubbing, cyanosis  Vascular: 2+ peripheral pulses  Neurological: A/O x 3, no focal deficits  Psychiatric: Normal mood, normal affect  Skin: No rashes      LABS:                RADIOLOGY & ADDITIONAL TESTS:    < from: Upper Endoscopy (08.17.20 @ 10:46) >    [facility]  _______________________________________________________________________________  Patient Name: Jonna Shelton         Procedure Date: 8/17/2020 10:46 AM  MRN: [id number]                     Account Number: [id number]  YOB: 1950               Admit Type: Inpatient  Room: [place] [address]                         Gender: Female  Attending MD: ERIC MARES DO     _______________________________________________________________________________     Procedure:           Upper GI endoscopy  Indications:         Dysphagia  Providers:           ERIC MARES DO  Medicines:           Sedation Required Anesthesia Staff Assistance  Complications:       No immediate complications.  Procedure:           Pre-Anesthesia Assessment:                       - Prior to the procedure, a History and Physical was                        performed, and patient medications and allergies were                        reviewed. The patient is competent. The risks and                        benefits of the procedure and the sedation options and                        risks were discussed with the patient. All questions                        were answered and informed consent was obtained. Patient       identification and proposed procedure were verified by                        the physician, the nurse and the anesthetist in the                        pre-procedure area in the procedure room. Mental Status                        Examination: alert and oriented. Airway Examination:                        normal oropharyngeal airway and neck mobility.                        Respiratory Examination: clear to auscultation. CV                        Examination: normal. Prophylactic Antibiotics: The                        patient does not require prophylactic antibiotics. Prior                        Anticoagulants: The patient has taken no previous                        anticoagulant or antiplatelet agents. ASA Grade    Assessment: III - A patient with severe systemic                        disease. After reviewing the risks and benefits, the                        patient was deemed in satisfactory condition to undergo                        the procedure. The anesthesia plan was to use minimal                        sedation / analgesia (anxiolysis). Immediately prior to                        administration of medications, the patient was                        re-assessed for adequacy to receive sedatives. The heart                        rate, respiratory rate, oxygen saturations, blood                        pressure, adequacy of pulmonary ventilation, and                        response to care were monitored throughout the                        procedure. The physical status of the patient was                        re-assessed after the procedure.                       After obtaining informed consent, the endoscope was                        passed under direct vision. Throughout the procedure,                        the patient's blood pressure, pulse, and oxygen                        saturations were monitored continuously. The Endoscope                        was introduced through the mouth, and advanced to the   third part of duodenum. The upper GI endoscopy was                        accomplished without difficulty. The patient tolerated                        the procedure well.     Findings:       One moderate extrinsic stenosis was found 18 cm from the incisors. This        measured 1 cm (in length) and was traversed. A TTS dilator was passed        through the scope. Dilation with an 18-19-20 mm balloon (to a maximum    balloon size of 18 mm) and an 18-19-20 mm balloon (to a maximum balloon        size of 20 mm) dilator was performed. The dilation site was examined        following endoscope reinsertion and showed complete resolution of        luminal narrowing. Estimated blood loss was minimal.       Localized mild inflammation characterized by erythema was found in the        stomach. Verification of patient identification for the specimen was        done by the physician and nurse using the patient's name, birth date and        medical record number. Estimated blood loss was minimal.       The first part of the duodenum, 2nd part of the duodenum and 3rd part of        the duodenum were normal. Biopsies were taken with a cold forceps for        histology.                                                                                   Impression:          - Extrinsic narrowing of the esophagus. Dilated.                       - Gastritis.                       - Normal first part of the duodenum, 2nd part of the                        duodenum and 3rd part of the duodenum. Biopsied.  Recommendation:      - Await pathology results.                       - Repeat the upper endoscopy in 3 months for retreatment.                                             Attending Participation:       I personally performed the entire procedure.                                                                                     ____________________  ERIC MARES DO  8/17/2020 11:26:02 AM  This report has been signed electronically.  Number of Addenda: 0    Note Initiated On: 8/17/2020 10:46 AM    < end of copied text >

## 2020-08-20 NOTE — PROGRESS NOTE ADULT - PROBLEM SELECTOR PROBLEM 2
Abscess of neck
Abnormal MRI
Abscess of neck
Dysphagia, unspecified type
Abscess of neck

## 2020-08-20 NOTE — PROGRESS NOTE ADULT - PROBLEM SELECTOR PLAN 4
Advanced care planning was discussed with patient and family.  Advanced care planning forms were reviewed and discussed.  Risks, benefits and alternatives of gastroenterologic procedures were discussed in detail and all questions were answered.  30 minutes spent.

## 2020-09-05 LAB
CULTURE RESULTS: SIGNIFICANT CHANGE UP
SPECIMEN SOURCE: SIGNIFICANT CHANGE UP

## 2020-09-15 ENCOUNTER — APPOINTMENT (OUTPATIENT)
Dept: SURGERY | Facility: CLINIC | Age: 70
End: 2020-09-15
Payer: MEDICARE

## 2020-09-15 ENCOUNTER — APPOINTMENT (OUTPATIENT)
Dept: SURGERY | Facility: CLINIC | Age: 70
End: 2020-09-15

## 2020-09-15 PROCEDURE — 99203 OFFICE O/P NEW LOW 30 MIN: CPT

## 2020-09-20 NOTE — CONSULT LETTER
[Dear  ___] : Dear  [unfilled], [Consult Letter:] : I had the pleasure of evaluating your patient, [unfilled]. [Please see my note below.] : Please see my note below. [Consult Closing:] : Thank you very much for allowing me to participate in the care of this patient.  If you have any questions, please do not hesitate to contact me. [Sincerely,] : Sincerely, [FreeTextEntry2] : Dr. Radu Rhodes [FreeTextEntry3] : Marco A Jacobsen MD, FACS\par System Director, Endocrine Surgery\par [facility]\par Assistant Clinical Professor of Surgery\par [facility] [employer] [facility]

## 2020-09-20 NOTE — HISTORY OF PRESENT ILLNESS
[de-identified] : Pt c/o Thyroid nodules .  denies dysphagia, hoarseness, SOB \par sonogram:   2 peripheral calcified  nodules 6 mm each right, left 1.2 cm thyroid nodule\par History of RT for Hodgkin's disease and breast cancer\par TSH 4.59,   T3   74.9,   T4   5.51

## 2020-09-20 NOTE — PHYSICAL EXAM
[de-identified] : sutures in place posterior neck [de-identified] : no palpable thyroid nodules [Laryngoscopy Performed] : laryngoscopy was performed, see procedure section for findings [Midline] : located in midline position [Normal] : orientation to person, place, and time: normal [de-identified] : indirect  laryngoscopy shows normal vocal cord mobility bilaterally with no lesions noted

## 2020-09-20 NOTE — REASON FOR VISIT
[Initial Consultation] : an initial consultation for [Spouse] : spouse [FreeTextEntry2] : Thyroid nodules

## 2020-09-20 NOTE — ASSESSMENT
[FreeTextEntry1] : will observe. discussed that size of nodule is below the threshold for which fine needle aspiration cytology is generally recommended in the absence of any suspicious sonographic or clinical findings.  for sonogram and blood tests next visit. to return earlier if any change\par

## 2020-10-08 ENCOUNTER — APPOINTMENT (OUTPATIENT)
Dept: INFECTIOUS DISEASE | Facility: CLINIC | Age: 70
End: 2020-10-08
Payer: MEDICARE

## 2020-10-08 VITALS
HEART RATE: 106 BPM | TEMPERATURE: 97.2 F | OXYGEN SATURATION: 99 % | HEIGHT: 67 IN | SYSTOLIC BLOOD PRESSURE: 168 MMHG | DIASTOLIC BLOOD PRESSURE: 107 MMHG

## 2020-10-08 VITALS — SYSTOLIC BLOOD PRESSURE: 138 MMHG | DIASTOLIC BLOOD PRESSURE: 84 MMHG

## 2020-10-08 DIAGNOSIS — T14.8XXA OTHER INJURY OF UNSPECIFIED BODY REGION, INITIAL ENCOUNTER: ICD-10-CM

## 2020-10-08 DIAGNOSIS — L08.9 OTHER INJURY OF UNSPECIFIED BODY REGION, INITIAL ENCOUNTER: ICD-10-CM

## 2020-10-08 PROCEDURE — 99214 OFFICE O/P EST MOD 30 MIN: CPT

## 2020-10-15 PROBLEM — T14.8XXA WOUND INFECTION: Status: ACTIVE | Noted: 2020-10-06

## 2020-10-15 NOTE — ASSESSMENT
[FreeTextEntry1] : 69 yo F with Hodgkins Lymphoma, Breast CA, history of cervical stenosis 2018 s/p C4-C6 ACDF, presented to [facility] with episode of leakage from wound.\par Wound infection at C-spine s/p OR washout growing strep intermedius\par Hardware was retained\par Concern for possible residual infection despite reassuring clinical status\par For now, convert to PO antibiotic for suppression, unclear endpoint\par Risk/benefits suppression discussed with patient\par Overall, Strep, wound infection, abscess\par - Keflex 500mg q 24 (suppression, unclear endpoint)\par - Patient to self monitor for any signs infection\par - Will discuss in future optimal time to DC antibiotic\par - RTC 3 months [Treatment Education] : treatment education [Disclosure of Diagnosis] : disclosure of diagnosis

## 2020-10-15 NOTE — PHYSICAL EXAM
[General Appearance - Alert] : alert [General Appearance - In No Acute Distress] : in no acute distress [General Appearance - Well-Appearing] : healthy appearing [General Appearance - Well Nourished] : well nourished [Neck Appearance] : the appearance of the neck was normal [Auscultation Breath Sounds / Voice Sounds] : lungs were clear to auscultation bilaterally [Respiration, Rhythm And Depth] : normal respiratory rhythm and effort [Heart Rate And Rhythm] : heart rate was normal and rhythm regular [Bowel Sounds] : normal bowel sounds [Abdomen Soft] : soft [Heart Sounds] : normal S1 and S2 [Abdomen Tenderness] : non-tender [Musculoskeletal - Swelling] : no joint swelling [No Palpable Adenopathy] : no palpable adenopathy [Motor Exam] : the motor exam was normal [Sensation] : the sensory exam was normal to light touch and pinprick [] : no rash [Oriented To Time, Place, And Person] : oriented to person, place, and time [Affect] : the affect was normal

## 2020-10-15 NOTE — HISTORY OF PRESENT ILLNESS
[FreeTextEntry1] : 69 yo F with Hodgkins Lymphoma, Breast CA, history of cervical stenosis 2018 s/p C4-C6 ACDF, presented to [facility] with episode of leakage from wound. There, patient was found to have suspected abscess with suspicion for possible hardware infection. Patient had washout in OR, hardware retained, had improvement, and is presently completing IV abx. Cultures from OR with strep intermedius. Today patient doing well. PICC still in place but to be removed by IV company soon. No fevers, no chills. Good healing of wound site, no concerns regarding antibiotic administration.

## 2020-11-05 ENCOUNTER — APPOINTMENT (OUTPATIENT)
Dept: INFECTIOUS DISEASE | Facility: CLINIC | Age: 70
End: 2020-11-05
Payer: MEDICARE

## 2020-11-05 VITALS
TEMPERATURE: 98.6 F | OXYGEN SATURATION: 96 % | DIASTOLIC BLOOD PRESSURE: 105 MMHG | BODY MASS INDEX: 22.76 KG/M2 | HEIGHT: 67 IN | HEART RATE: 110 BPM | WEIGHT: 145 LBS | SYSTOLIC BLOOD PRESSURE: 157 MMHG

## 2020-11-05 PROCEDURE — 99213 OFFICE O/P EST LOW 20 MIN: CPT

## 2020-11-10 NOTE — ASSESSMENT
[Treatment Education] : treatment education [Disclosure of Diagnosis] : disclosure of diagnosis [FreeTextEntry1] : 69 yo F with Hodgkins Lymphoma, Breast CA, history of cervical stenosis 2018 s/p C4-C6 ACDF, presented to [facility] with episode of leakage from wound\par Wound infection at C-spine s/p OR washout growing strep intermedius\par Hardware was retained\par Concern for possible residual infection despite reassuring clinical status\par For now, continue PO antibiotic for suppression, unclear endpoint\par Risk/benefits suppression discussed with patient\par Overall, Strep, wound infection, abscess\par - Keflex 500mg q 24 (suppression, unclear endpoint)\par - Patient to self monitor for any signs infection\par - Will discuss in future optimal time to DC antibiotic\par - RTC 3 months

## 2020-11-10 NOTE — PHYSICAL EXAM
[General Appearance - Alert] : alert [General Appearance - In No Acute Distress] : in no acute distress [General Appearance - Well Nourished] : well nourished [General Appearance - Well-Appearing] : healthy appearing [Neck Appearance] : the appearance of the neck was normal [Respiration, Rhythm And Depth] : normal respiratory rhythm and effort [Auscultation Breath Sounds / Voice Sounds] : lungs were clear to auscultation bilaterally [Heart Rate And Rhythm] : heart rate was normal and rhythm regular [Heart Sounds] : normal S1 and S2 [Bowel Sounds] : normal bowel sounds [Abdomen Soft] : soft [Abdomen Tenderness] : non-tender [No Palpable Adenopathy] : no palpable adenopathy [Musculoskeletal - Swelling] : no joint swelling [] : no rash [Sensation] : the sensory exam was normal to light touch and pinprick [Motor Exam] : the motor exam was normal [Oriented To Time, Place, And Person] : oriented to person, place, and time [Affect] : the affect was normal

## 2020-11-10 NOTE — HISTORY OF PRESENT ILLNESS
[FreeTextEntry1] : 71 yo F with Hodgkins Lymphoma, Breast CA, history of cervical stenosis 2018 s/p C4-C6 ACDF, presented to [facility] with episode of leakage from wound. There, patient was found to have suspected abscess with suspicion for possible hardware infection. Patient had washout in OR, hardware retained, had improvement, and is presently completing IV abx. Cultures from OR with strep intermedius. \par \par Patient doing well. Complete course IV abx, and now on PO suppression considering retained hardware. No new complaints. Stable without inflammatory signs or pain. Doing well. Has been compliant with PO abx.

## 2020-11-19 NOTE — ED ADULT TRIAGE NOTE - MODE OF ARRIVAL
Walk in Doxycycline Counseling:  Patient counseled regarding possible photosensitivity and increased risk for sunburn.  Patient instructed to avoid sunlight, if possible.  When exposed to sunlight, patients should wear protective clothing, sunglasses, and sunscreen.  The patient was instructed to call the office immediately if the following severe adverse effects occur:  hearing changes, easy bruising/bleeding, severe headache, or vision changes.  The patient verbalized understanding of the proper use and possible adverse effects of doxycycline.  All of the patient's questions and concerns were addressed.

## 2020-12-23 NOTE — CONSULT NOTE ADULT - NSPROBSELRECBLANK_7_GEN
Patient in for MMRV and HIB injections. Ambulating with steady gait. No complaints reported. Injection administered to R lateral thigh; injection tolerated well. Parent provided with updated immunization report.  Discharged home with steady gait.     MMRV  NDC: 8457-9317-48  LOT: [id number]   EXP: 01/23/2022    HIB  ND: 3285-4500-27  LOT: [id number]  EXP: 06/10/2022   DISPLAY PLAN FREE TEXT

## 2021-01-05 NOTE — H&P ADULT - NSICDXFAMILYHX_GEN_ALL_CORE_FT
[FreeTextEntry1] : GYN follow-up with Dr. Driscoll planned for heavy menses with annual exam 2020
FAMILY HISTORY:  Mother  Still living? Unknown  Family history of colon cancer in mother, Age at diagnosis: Age Unknown

## 2021-02-22 ENCOUNTER — APPOINTMENT (OUTPATIENT)
Dept: INFECTIOUS DISEASE | Facility: CLINIC | Age: 71
End: 2021-02-22
Payer: MEDICARE

## 2021-02-22 VITALS
TEMPERATURE: 98.1 F | HEART RATE: 92 BPM | HEIGHT: 67 IN | SYSTOLIC BLOOD PRESSURE: 143 MMHG | OXYGEN SATURATION: 96 % | WEIGHT: 150 LBS | DIASTOLIC BLOOD PRESSURE: 92 MMHG | BODY MASS INDEX: 23.54 KG/M2

## 2021-02-22 PROCEDURE — 99213 OFFICE O/P EST LOW 20 MIN: CPT

## 2021-02-22 NOTE — PHYSICAL EXAM
[General Appearance - Alert] : alert [General Appearance - In No Acute Distress] : in no acute distress [General Appearance - Well Nourished] : well nourished [General Appearance - Well-Appearing] : healthy appearing [Neck Appearance] : the appearance of the neck was normal [Respiration, Rhythm And Depth] : normal respiratory rhythm and effort [Auscultation Breath Sounds / Voice Sounds] : lungs were clear to auscultation bilaterally [Heart Rate And Rhythm] : heart rate was normal and rhythm regular [Heart Sounds] : normal S1 and S2 [Bowel Sounds] : normal bowel sounds [Abdomen Soft] : soft [Abdomen Tenderness] : non-tender [No Palpable Adenopathy] : no palpable adenopathy [Musculoskeletal - Swelling] : no joint swelling [Sensation] : the sensory exam was normal to light touch and pinprick [] : no rash [Motor Exam] : the motor exam was normal [Oriented To Time, Place, And Person] : oriented to person, place, and time [Affect] : the affect was normal [FreeTextEntry1] : Healed/scarred surgical site on posterior C spine, no signs wound

## 2021-02-22 NOTE — ASSESSMENT
[Treatment Education] : treatment education [Disclosure of Diagnosis] : disclosure of diagnosis [FreeTextEntry1] : 71 yo F with Hodgkins Lymphoma, Breast CA, history of cervical stenosis 2018 s/p C4-C6 ACDF, presented to [facility] with episode of leakage from wound\par Wound infection at C-spine s/p OR washout and IV abx growing strep intermedius\par Hardware was retained\par Concern for possible residual infection despite reassuring clinical status\par For now, continue PO antibiotic for suppression, unclear endpoint\par Risk/benefits suppression discussed with patient\par Overall, Strep, wound infection, abscess\par - Keflex 500mg q 24 (suppression, unclear endpoint)\par - Patient to self monitor for any signs infection\par - Will discuss in future optimal time to DC antibiotic\par - RTC 6 months

## 2021-02-22 NOTE — HISTORY OF PRESENT ILLNESS
[FreeTextEntry1] : 69 yo F with Hodgkins Lymphoma, Breast CA, history of cervical stenosis 2018 s/p C4-C6 ACDF, presented to [facility] with episode of leakage from wound, had washout, and given abx for suspected hardware infection.\par \par Patient doing well. On PO suppression with no signs of complications or A/E. Has been compliant with antibiotic. No fevers, no chills. No other events.

## 2021-05-03 ENCOUNTER — OUTPATIENT (OUTPATIENT)
Dept: OUTPATIENT SERVICES | Facility: HOSPITAL | Age: 71
LOS: 1 days | End: 2021-05-03
Payer: MEDICARE

## 2021-05-03 ENCOUNTER — APPOINTMENT (OUTPATIENT)
Dept: ULTRASOUND IMAGING | Facility: CLINIC | Age: 71
End: 2021-05-03
Payer: MEDICARE

## 2021-05-03 DIAGNOSIS — Z98.1 ARTHRODESIS STATUS: Chronic | ICD-10-CM

## 2021-05-03 DIAGNOSIS — Z90.89 ACQUIRED ABSENCE OF OTHER ORGANS: Chronic | ICD-10-CM

## 2021-05-03 DIAGNOSIS — S46.012A STRAIN OF MUSCLE(S) AND TENDON(S) OF THE ROTATOR CUFF OF LEFT SHOULDER, INITIAL ENCOUNTER: ICD-10-CM

## 2021-05-03 DIAGNOSIS — Z98.890 OTHER SPECIFIED POSTPROCEDURAL STATES: Chronic | ICD-10-CM

## 2021-05-03 DIAGNOSIS — Z85.828 PERSONAL HISTORY OF OTHER MALIGNANT NEOPLASM OF SKIN: Chronic | ICD-10-CM

## 2021-05-03 PROCEDURE — 76536 US EXAM OF HEAD AND NECK: CPT

## 2021-05-03 PROCEDURE — 76536 US EXAM OF HEAD AND NECK: CPT | Mod: 26

## 2021-05-25 ENCOUNTER — APPOINTMENT (OUTPATIENT)
Dept: SURGERY | Facility: CLINIC | Age: 71
End: 2021-05-25
Payer: MEDICARE

## 2021-05-25 PROCEDURE — 36415 COLL VENOUS BLD VENIPUNCTURE: CPT

## 2021-05-25 PROCEDURE — 99214 OFFICE O/P EST MOD 30 MIN: CPT | Mod: 25

## 2021-05-25 NOTE — HISTORY OF PRESENT ILLNESS
[de-identified] : prior evaluation of thyroid nodules. below threshold for biopsy. denies dysphagia, hoarseness or new lesions. no changes medically since last visit. recent sonogram stable. recent passing of  following spine surgery. I have reviewed all old and new data and available images.

## 2021-05-25 NOTE — PHYSICAL EXAM
[de-identified] : no palpable thyroid nodules [Laryngoscopy Performed] : laryngoscopy was performed, see procedure section for findings [Midline] : located in midline position [Normal] : orientation to person, place, and time: normal

## 2021-05-25 NOTE — ASSESSMENT
[FreeTextEntry1] : will observe. bloods drawn. to call next week for results. sonogram next visit. to return earlier if any change. patient has been given the opportunity to ask questions, and all of the patient's questions have been answered to their satisfaction\par

## 2021-05-27 LAB
T3 SERPL-MCNC: 94 NG/DL
T4 FREE SERPL-MCNC: 1 NG/DL
THYROGLOB AB SERPL-ACNC: 201 IU/ML
THYROPEROXIDASE AB SERPL IA-ACNC: <10 IU/ML
TSH SERPL-ACNC: 4.29 UIU/ML

## 2021-05-28 ENCOUNTER — NON-APPOINTMENT (OUTPATIENT)
Age: 71
End: 2021-05-28

## 2021-06-21 NOTE — PROVIDER CONTACT NOTE (OTHER) - ACTION/TREATMENT ORDERED:
Letter by Gabino Bach MD at      Author: Gabino Bach MD Service: -- Author Type: --    Filed:  Encounter Date: 11/8/2020 Status: (Other)           Bernadette Yu  [address] [place] [address]          Bernadette Yu,      Your red blood cell count remains very good at this time.  There are no major issues related to this.     Your A1C is higher at this time.  You should be more attentive to getting her insulin in at this time.     You and Donovan have been scheduled 3/4/21 at 12:35 with Dr. Bach.       Gabino Bach MD  [facility]     ______________________________________________________________________           Recent Results (from the past 240 hour(s))   Glycosylated Hemoglobin A1c   Result Value Ref Range     Hemoglobin A1c 11.9 (H) <=5.6 %   HM2(CBC w/o Differential)   Result Value Ref Range     WBC 5.7 4.0 - 11.0 thou/uL     RBC 4.20 3.80 - 5.40 mill/uL     Hemoglobin 13.0 12.0 - 16.0 g/dL     Hematocrit 37.7 35.0 - 47.0 %     MCV 90 80 - 100 fL     MCH 30.9 27.0 - 34.0 pg     MCHC 34.5 32.0 - 36.0 g/dL     RDW 13.3 11.0 - 14.5 %     Platelets 104 (L) 140 - 440 thou/uL     MPV 9.5 7.0 - 10.0 fL   INR   Result Value Ref Range     INR 2.60 (H) 0.90 - 1.10             
2.5 L normal saline bolus administered. Lopressor 2.5 given 2x. Digoxin pushed by provider. Tylenol IV given for pain controlled. Pt still tachycardic to the 160s but BP is now stable

## 2021-07-21 ENCOUNTER — RESULT REVIEW (OUTPATIENT)
Age: 71
End: 2021-07-21

## 2021-08-05 ENCOUNTER — APPOINTMENT (OUTPATIENT)
Dept: ULTRASOUND IMAGING | Facility: CLINIC | Age: 71
End: 2021-08-05
Payer: MEDICARE

## 2021-08-05 ENCOUNTER — APPOINTMENT (OUTPATIENT)
Dept: MAMMOGRAPHY | Facility: CLINIC | Age: 71
End: 2021-08-05
Payer: MEDICARE

## 2021-08-05 ENCOUNTER — OUTPATIENT (OUTPATIENT)
Dept: OUTPATIENT SERVICES | Facility: HOSPITAL | Age: 71
LOS: 1 days | End: 2021-08-05
Payer: MEDICARE

## 2021-08-05 ENCOUNTER — RESULT REVIEW (OUTPATIENT)
Age: 71
End: 2021-08-05

## 2021-08-05 DIAGNOSIS — Z85.828 PERSONAL HISTORY OF OTHER MALIGNANT NEOPLASM OF SKIN: Chronic | ICD-10-CM

## 2021-08-05 DIAGNOSIS — Z98.890 OTHER SPECIFIED POSTPROCEDURAL STATES: Chronic | ICD-10-CM

## 2021-08-05 DIAGNOSIS — Z90.89 ACQUIRED ABSENCE OF OTHER ORGANS: Chronic | ICD-10-CM

## 2021-08-05 DIAGNOSIS — C50.919 MALIGNANT NEOPLASM OF UNSPECIFIED SITE OF UNSPECIFIED FEMALE BREAST: ICD-10-CM

## 2021-08-05 DIAGNOSIS — Z98.1 ARTHRODESIS STATUS: Chronic | ICD-10-CM

## 2021-08-05 PROCEDURE — 76641 ULTRASOUND BREAST COMPLETE: CPT

## 2021-08-05 PROCEDURE — 77063 BREAST TOMOSYNTHESIS BI: CPT | Mod: 26

## 2021-08-05 PROCEDURE — 76641 ULTRASOUND BREAST COMPLETE: CPT | Mod: 26,50

## 2021-08-05 PROCEDURE — 77067 SCR MAMMO BI INCL CAD: CPT | Mod: 26

## 2021-08-05 PROCEDURE — 77063 BREAST TOMOSYNTHESIS BI: CPT

## 2021-08-05 PROCEDURE — 77067 SCR MAMMO BI INCL CAD: CPT

## 2021-08-10 ENCOUNTER — OUTPATIENT (OUTPATIENT)
Dept: OUTPATIENT SERVICES | Facility: HOSPITAL | Age: 71
LOS: 1 days | End: 2021-08-10
Payer: MEDICARE

## 2021-08-10 ENCOUNTER — APPOINTMENT (OUTPATIENT)
Dept: RADIOLOGY | Facility: HOSPITAL | Age: 71
End: 2021-08-10
Payer: MEDICARE

## 2021-08-10 DIAGNOSIS — Z98.890 OTHER SPECIFIED POSTPROCEDURAL STATES: Chronic | ICD-10-CM

## 2021-08-10 DIAGNOSIS — K21.9 GASTRO-ESOPHAGEAL REFLUX DISEASE WITHOUT ESOPHAGITIS: ICD-10-CM

## 2021-08-10 DIAGNOSIS — Z85.828 PERSONAL HISTORY OF OTHER MALIGNANT NEOPLASM OF SKIN: Chronic | ICD-10-CM

## 2021-08-10 DIAGNOSIS — Z98.1 ARTHRODESIS STATUS: Chronic | ICD-10-CM

## 2021-08-10 DIAGNOSIS — Z90.89 ACQUIRED ABSENCE OF OTHER ORGANS: Chronic | ICD-10-CM

## 2021-08-10 PROCEDURE — 74221 X-RAY XM ESOPHAGUS 2CNTRST: CPT | Mod: 26

## 2021-08-10 PROCEDURE — 74221 X-RAY XM ESOPHAGUS 2CNTRST: CPT

## 2021-08-17 PROBLEM — Z08 ENCOUNTER FOR FOLLOW-UP SURVEILLANCE OF BREAST CANCER: Status: ACTIVE | Noted: 2017-08-16

## 2021-08-17 LAB
ALBUMIN SERPL ELPH-MCNC: 4.8 G/DL
ALP BLD-CCNC: 84 U/L
ALT SERPL-CCNC: 16 U/L
AST SERPL-CCNC: 28 U/L
BILIRUB DIRECT SERPL-MCNC: 0.3 MG/DL
BILIRUB INDIRECT SERPL-MCNC: 1.2 MG/DL
BILIRUB SERPL-MCNC: 1.5 MG/DL
CANCER AG27-29 SERPL-ACNC: 16.7 U/ML
CEA SERPL-MCNC: 4.7 NG/ML
PROT SERPL-MCNC: 7.5 G/DL

## 2021-08-19 ENCOUNTER — RESULT REVIEW (OUTPATIENT)
Age: 71
End: 2021-08-19

## 2021-08-19 ENCOUNTER — APPOINTMENT (OUTPATIENT)
Dept: SURGICAL ONCOLOGY | Facility: CLINIC | Age: 71
End: 2021-08-19
Payer: MEDICARE

## 2021-08-19 VITALS
SYSTOLIC BLOOD PRESSURE: 148 MMHG | HEART RATE: 110 BPM | WEIGHT: 150 LBS | BODY MASS INDEX: 23.82 KG/M2 | HEIGHT: 66.5 IN | DIASTOLIC BLOOD PRESSURE: 100 MMHG | OXYGEN SATURATION: 95 % | RESPIRATION RATE: 16 BRPM

## 2021-08-19 DIAGNOSIS — Z85.3 ENCOUNTER FOR FOLLOW-UP EXAMINATION AFTER COMPLETED TREATMENT FOR MALIGNANT NEOPLASM: ICD-10-CM

## 2021-08-19 DIAGNOSIS — Z08 ENCOUNTER FOR FOLLOW-UP EXAMINATION AFTER COMPLETED TREATMENT FOR MALIGNANT NEOPLASM: ICD-10-CM

## 2021-08-19 PROCEDURE — 99214 OFFICE O/P EST MOD 30 MIN: CPT

## 2021-08-19 NOTE — HISTORY OF PRESENT ILLNESS
[de-identified] : Jonna Shelton is a 71 year old female who presents for her follow up breast exam. \par \par She is s/p right lumpectomy and right axillary sentinel lymph node biopsy for T1 N0 right breast cancer on 10/16/2013.  Final pathology revealed invasive moderately differentiated ductal carcinoma measuring 0.5 cm, ER/NM(+) HER2 (-), one lymph node was negative for carcinoma, negative margins. She is currently on Anastrozole 1 mg daily.  She received adjuvant XRT under the supervision of Dr. Botello.  She is also s/p benign left breast stereotactic biopsy in 2014.\par \par B/L mammogram performed in Aug 2018 revealed no evidence of malignancy, BIRADS 2 findings. \par \par Bloodwork 5/3/18:\par CEA =4.9 (elevated)\par CA 27.29 = 20.3\par LFTs WNL\par No recent repeat BW.\par \par Normal colonoscopy 2 years ago.\par \par She underwent a bone scan on 11/2/18 which showed abnormal radiotracer accumulation within the pelvis and the Left scapula.  CT and possible biopsy recommended for further evaluation.\par \par She is s/p left partial nephrectomy for renal cell carcinoma on 6/11/19.  S/P cervical fusion Dec 2018 and April 2019.\par \par 1.7 cm nodule in the lateral aspect of the right breast noted on CT abd/pelvis 4/17/19.  Today she is without any complaints. Denies palpable breast masses, nipple discharge, skin changes, inversion or breast pain. \par \par CEA 4.7 ng/mL 8/17/21\par Ca 27.29 16.7 U/mL 8/17/21\par LFT's WNL 8/17/21\par \par Screening mammo and US performed on 8/5/21 revealed no evidence of malignancy, BIRADS 2. \par

## 2021-08-19 NOTE — ASSESSMENT
[FreeTextEntry1] : IMP:\par MARIA ESTHER- hx of invasive ductal carcinoma\par On Anastrozole 1 mg daily\par \par \par PLAN:\par RTO yearly with BW\par B/L mammogram/sonogram  8/2022

## 2021-08-19 NOTE — PHYSICAL EXAM
[Normal] : supple, no neck mass and thyroid not enlarged [Normal Neck Lymph Nodes] : normal neck lymph nodes  [Normal Supraclavicular Lymph Nodes] : normal supraclavicular lymph nodes [Normal Axillary Lymph Nodes] : normal axillary lymph nodes [Normal] : oriented to person, place and time, with appropriate affect [de-identified] : mild fibrocystic changes but no masses

## 2021-08-23 ENCOUNTER — APPOINTMENT (OUTPATIENT)
Dept: INFECTIOUS DISEASE | Facility: CLINIC | Age: 71
End: 2021-08-23
Payer: MEDICARE

## 2021-08-23 DIAGNOSIS — L02.91 CUTANEOUS ABSCESS, UNSPECIFIED: ICD-10-CM

## 2021-08-23 PROCEDURE — 99213 OFFICE O/P EST LOW 20 MIN: CPT

## 2021-08-24 NOTE — ASSESSMENT
[Treatment Education] : treatment education [Disclosure of Diagnosis] : disclosure of diagnosis [FreeTextEntry1] : 70 yo F with Hodgkins Lymphoma, Breast CA, history of cervical stenosis 2018 s/p C4-C6 ACDF, presented to [facility] with episode of leakage from wound\par Wound infection at C-spine s/p OR washout and IV abx growing strep intermedius\par Hardware was retained\par Concern for possible residual infection despite reassuring clinical status\par For now, continue PO antibiotic for suppression, no A/E--uncertain endpoint (risks/complications from recurrent infection > than risks of antibiotic presently)\par Risk/benefits suppression discussed with patient\par Overall, Strep, wound infection, abscess\par - Keflex 500mg q 24 (suppression, unclear endpoint)\par - Patient to self monitor for any signs infection\par - Will discuss in future optimal time to DC antibiotic\par - RTC 6 months

## 2021-08-24 NOTE — HISTORY OF PRESENT ILLNESS
[FreeTextEntry1] : 70 yo F with Hodgkins Lymphoma, Breast CA, history of cervical stenosis 2018 s/p C4-C6 ACDF, [facility] with episode of leakage from wound, had washout, and given abx for suspected hardware infection.\par \par Patient doing well. On PO suppression with no signs of complications or A/E. Has been compliant with antibiotic. No fevers, no chills. No other events. Otherwise remains well today.

## 2021-08-24 NOTE — PHYSICAL EXAM
[General Appearance - Alert] : alert [General Appearance - In No Acute Distress] : in no acute distress [General Appearance - Well Nourished] : well nourished [General Appearance - Well-Appearing] : healthy appearing [Neck Appearance] : the appearance of the neck was normal [Respiration, Rhythm And Depth] : normal respiratory rhythm and effort [Heart Rate And Rhythm] : heart rate was normal and rhythm regular [Auscultation Breath Sounds / Voice Sounds] : lungs were clear to auscultation bilaterally [Heart Sounds] : normal S1 and S2 [Bowel Sounds] : normal bowel sounds [Abdomen Soft] : soft [Abdomen Tenderness] : non-tender [No Palpable Adenopathy] : no palpable adenopathy [Musculoskeletal - Swelling] : no joint swelling [] : no rash [Sensation] : the sensory exam was normal to light touch and pinprick [Motor Exam] : the motor exam was normal [Oriented To Time, Place, And Person] : oriented to person, place, and time [Affect] : the affect was normal [FreeTextEntry1] : Healed/scarred surgical site on posterior C spine, no signs wound

## 2021-08-24 NOTE — DATA REVIEWED
[FreeTextEntry1] : 8/6 OR culture Strep intermedius\par \par 4/2021\par  6.8 > 13.4 / 41.4 < 304\par \par 140/4.5  101/24  16/0.68 < 93\par \par ALP 85\par AST 27\par ALT 31

## 2021-10-06 NOTE — CONSULT NOTE ADULT - SUBJECTIVE AND OBJECTIVE BOX
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 Her family notes that she has had issues dropping things and gait instability for years, but they believe it is getting worse. Patient fell in 6/18 and sustained a L clavicle fx that was treated non-operatively by Dr. Walker. She has an incidentally found R acetabular lesion that she has been planning to get a CT guided biopsy of, also had a bone scan that showed increased uptake in her R acetabulum and L clavicle/scapula. She has been seen in the office by Dr. Alvarez for her C-spine issues including C4-6 HNP, he has offered her surgery but only after she gets her biopsy and hip lesion figured out. Patient has been unable to get the biopsy done as an outpatient and says she is miserable with her neck/arm pain, so she came to the ED. Denies pain in the RLE. She had EMGs done on her LUE that showed significant pathology and she underwent L carpal tunnel release and ulnar nerve transposition with Dr. Reddy approximately 1 month ago, these operations however did not help with her LUE symptoms. Denies pain/injury elsewhere. Says that she has decreased sensation throughout her LUE, and also notes that she has numbness on the bottom of her feet bilaterally. Denies bowel/bladder incontinence. Denies fevers/chills. No other complaints at this time.    Allergies  penicillin (Rash)    PAST MEDICAL & SURGICAL HISTORY:  Hodgkin disease  Breast cancer: right side  History of lumpectomy  S/P appendectomy  L clavicle fracture (6/18)  L carpal tunnel release and ulnar nerve transposition (10/18)                        12.1   9.66  )-----------( 325      ( 18 Nov 2018 21:50 )             37.6     18 Nov 2018 21:50    139    |  103    |  18     ----------------------------<  105    4.1     |  21     |  0.53     Ca    9.3        18 Nov 2018 21:50    TPro  7.1    /  Alb  4.3    /  TBili  0.8    /  DBili  x      /  AST  21     /  ALT  13     /  AlkPhos  84     18 Nov 2018 21:50    PT/INR - ( 18 Nov 2018 21:50 )   PT: 10.6 SEC;   INR: 0.96       PTT - ( 18 Nov 2018 21:50 )  PTT:26.5 SEC    Vital Signs Last 24 Hrs  T(C): 36.7 (11-18-18 @ 21:50), Max: 36.7 (11-18-18 @ 21:50)  T(F): 98.1 (11-18-18 @ 21:50), Max: 98.1 (11-18-18 @ 21:50)  HR: 96 (11-18-18 @ 21:50) (96 - 110)  BP: 142/90 (11-18-18 @ 21:50) (142/90 - 148/95)  RR: 16 (11-18-18 @ 21:50) (16 - 18)  SpO2: 97% (11-18-18 @ 21:50) (97% - 97%)    Gen: NAD  Spine PE:  Skin intact  No gross deformity  No midline TTP C/T/L/S spine  No bony step offs  Negative clonus  Negative babinski  Negative jolly BUE  No saddle anesthesia  Able to SLR bilaterally, negative heel tap, log roll RLE, no pain in hip    Motor:                   C5                C6              C7               C8           T1   R            5/5                5/5            5/5             5/5          5/5  L             3/5               5/5             5/5             5/5          3/5                L2             L3             L4               L5            S1  R         5/5           5/5          5/5             5/5           5/5  L          5/5          5/5           5/5             5/5           5/5    Sensory:            C5         C6         C7      C8       T1        (0=absent, 1=impaired, 2=normal, NT=not testable)  R         1            1           1        1         1  L          2            2           2        2         2               L2          L3         L4      L5       S1         (0=absent, 1=impaired, 2=normal, NT=not testable)  R         2            2            2        2        1  L          2            2           2        2         1    Imaging:   XR of the pelvis with no noted lesions  XR of the C spine with loss of cervical lordosis and degenerative changes  MRI of the C-spine uploaded from outpatient imaging shows C4/5 and C5/6 HNP with compression of the thecal sac, worse on the L side    A/P: 68y Female with C4-6 HNP with associated LUE>RUE radiculopathy, decreased LUE sensation, and years of frequent falls and dropping objects. Patient known to Dr. Alvarez, most recently seen in office 11/16/18 where surgery was tentatively offered pending completion of workup for R acetabular mass found incidentally on imaging for a prior fall.    Pain control  WBAT with assistive devices as needed  FU workup of R pelvic lesion, IR biopsy to be scheduled by primary team  Recommend course of spine dosed steroids, muscle relaxers, NSAIDs  PT/OT/OOB  DVT ppx per primary team  No urgent orthopaedic intervention at this time  Will discuss with Dr. 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 Her family notes that she has had issues dropping things and gait instability for years, but they believe it is getting worse. Patient fell in 6/18 and sustained a L clavicle fx that was treated non-operatively by Dr. Walker. She has an incidentally found R acetabular lesion that she has been planning to get a CT guided biopsy of, also had a bone scan that showed increased uptake in her R acetabulum and L clavicle/scapula. She has been seen in the office by Dr. Alvarez for her C-spine issues including C4-6 HNP, he has offered her surgery but only after she gets her biopsy and hip lesion figured out. Patient has been unable to get the biopsy done as an outpatient and says she is miserable with her neck/arm pain, so she came to the ED. Denies pain in the RLE. She had EMGs done on her LUE that showed significant pathology and she underwent L carpal tunnel release and ulnar nerve transposition with Dr. Reddy approximately 1 month ago, these operations however did not help with her LUE symptoms. Denies pain/injury elsewhere. Says that she has decreased sensation throughout her LUE, and also notes that she has numbness on the bottom of her feet bilaterally. Denies bowel/bladder incontinence. Denies fevers/chills. No other complaints at this time.    Allergies  penicillin (Rash)    PAST MEDICAL & SURGICAL HISTORY:  Hodgkin disease  Breast cancer: right side  History of lumpectomy  S/P appendectomy  L clavicle fracture (6/18)  L carpal tunnel release and ulnar nerve transposition (10/18)                        12.1   9.66  )-----------( 325      ( 18 Nov 2018 21:50 )             37.6     18 Nov 2018 21:50    139    |  103    |  18     ----------------------------<  105    4.1     |  21     |  0.53     Ca    9.3        18 Nov 2018 21:50    TPro  7.1    /  Alb  4.3    /  TBili  0.8    /  DBili  x      /  AST  21     /  ALT  13     /  AlkPhos  84     18 Nov 2018 21:50    PT/INR - ( 18 Nov 2018 21:50 )   PT: 10.6 SEC;   INR: 0.96       PTT - ( 18 Nov 2018 21:50 )  PTT:26.5 SEC    Vital Signs Last 24 Hrs  T(C): 36.7 (11-18-18 @ 21:50), Max: 36.7 (11-18-18 @ 21:50)  T(F): 98.1 (11-18-18 @ 21:50), Max: 98.1 (11-18-18 @ 21:50)  HR: 96 (11-18-18 @ 21:50) (96 - 110)  BP: 142/90 (11-18-18 @ 21:50) (142/90 - 148/95)  RR: 16 (11-18-18 @ 21:50) (16 - 18)  SpO2: 97% (11-18-18 @ 21:50) (97% - 97%)    Gen: NAD  Spine PE:  Skin intact  No gross deformity  No midline TTP C/T/L/S spine  No bony step offs  Negative clonus  Negative babinski  Negative jolly BUE  No saddle anesthesia  Able to SLR bilaterally, negative heel tap, log roll RLE, no pain in hip    Motor:                   C5                C6              C7               C8           T1   R            5/5                5/5            5/5             5/5          5/5  L             3/5               5/5             5/5             5/5          3/5                L2             L3             L4               L5            S1  R         5/5           5/5          5/5             5/5           5/5  L          5/5          5/5           5/5             5/5           5/5    Sensory:            C5         C6         C7      C8       T1        (0=absent, 1=impaired, 2=normal, NT=not testable)  R         1            1           1        1         1  L          2            2           2        2         2               L2          L3         L4      L5       S1         (0=absent, 1=impaired, 2=normal, NT=not testable)  R         2            2            2        2        1  L          2            2           2        2         1    Imaging:   XR of the pelvis with no noted lesions  XR of the C spine with loss of cervical lordosis and degenerative changes  MRI of the C-spine uploaded from outpatient imaging shows C4/5 and C5/6 HNP with compression of the thecal sac, worse on the L side    A/P: 68y Female with C4-6 HNP with associated LUE>RUE radiculopathy, decreased LUE sensation, and years of frequent falls and dropping objects. Patient known to Dr. Alvarez, most recently seen in office 11/16/18 where surgery was tentatively offered pending completion of workup for R acetabular mass found incidentally on imaging for a prior fall.    Pain control  WBAT with assistive devices as needed  FU workup of R pelvic lesion, IR biopsy to be scheduled by primary team  Recommend course of spine dosed steroids, muscle relaxers, NSAIDs  PT/OT/OOB  DVT ppx per primary team  No urgent orthopaedic intervention at this time  Will discuss with Dr. Alvarez in AM to establish further course of treatment    German Esqueda MD  I agree and I discussed with the above, I saw the patient in the office Friday, November 16th. They were supposed to follow up with her oncologist and go for a biopsy but came to [facility] further workup. We have discussed cervical spine surgery in the office but both the patient and  we'll hold off on spine surgery until they figure out what is going on with her bony lesions and I agree with that since her exam has been the same according to the patient. She was seen by a neurologist as well and her upper extremity symptoms have been static. Dr. Braulio Alvarez. A1c

## 2021-11-05 ENCOUNTER — NON-APPOINTMENT (OUTPATIENT)
Age: 71
End: 2021-11-05

## 2021-11-08 ENCOUNTER — RX RENEWAL (OUTPATIENT)
Age: 71
End: 2021-11-08

## 2021-11-22 NOTE — PRE-OP CHECKLIST - VERIFY SURGICAL SITE/SIDE WITH PATIENT
Please schedule for colonoscopy and belching next available  Screening, belching  Split golytely       done

## 2021-11-29 ENCOUNTER — RX RENEWAL (OUTPATIENT)
Age: 71
End: 2021-11-29

## 2022-03-15 ENCOUNTER — NON-APPOINTMENT (OUTPATIENT)
Age: 72
End: 2022-03-15

## 2022-03-24 ENCOUNTER — APPOINTMENT (OUTPATIENT)
Dept: INFECTIOUS DISEASE | Facility: CLINIC | Age: 72
End: 2022-03-24
Payer: MEDICARE

## 2022-03-24 VITALS
TEMPERATURE: 98 F | OXYGEN SATURATION: 96 % | HEIGHT: 66.5 IN | SYSTOLIC BLOOD PRESSURE: 142 MMHG | DIASTOLIC BLOOD PRESSURE: 91 MMHG | RESPIRATION RATE: 16 BRPM | HEART RATE: 98 BPM

## 2022-03-24 PROCEDURE — 99213 OFFICE O/P EST LOW 20 MIN: CPT

## 2022-03-29 LAB
ALBUMIN SERPL ELPH-MCNC: 4.9 G/DL
ALP BLD-CCNC: 81 U/L
ALT SERPL-CCNC: 25 U/L
ANION GAP SERPL CALC-SCNC: 12 MMOL/L
AST SERPL-CCNC: 29 U/L
BASOPHILS # BLD AUTO: 0.07 K/UL
BASOPHILS NFR BLD AUTO: 0.9 %
BILIRUB SERPL-MCNC: 1.2 MG/DL
BUN SERPL-MCNC: 24 MG/DL
CALCIUM SERPL-MCNC: 10.1 MG/DL
CHLORIDE SERPL-SCNC: 104 MMOL/L
CO2 SERPL-SCNC: 25 MMOL/L
CREAT SERPL-MCNC: 0.66 MG/DL
EGFR: 93 ML/MIN/1.73M2
EOSINOPHIL # BLD AUTO: 0.16 K/UL
EOSINOPHIL NFR BLD AUTO: 1.9 %
GLUCOSE SERPL-MCNC: 96 MG/DL
HCT VFR BLD CALC: 40.9 %
HGB BLD-MCNC: 12.7 G/DL
IMM GRANULOCYTES NFR BLD AUTO: 0.4 %
LYMPHOCYTES # BLD AUTO: 1.56 K/UL
LYMPHOCYTES NFR BLD AUTO: 19 %
MAN DIFF?: NORMAL
MCHC RBC-ENTMCNC: 30.7 PG
MCHC RBC-ENTMCNC: 31.1 GM/DL
MCV RBC AUTO: 98.8 FL
MONOCYTES # BLD AUTO: 0.78 K/UL
MONOCYTES NFR BLD AUTO: 9.5 %
NEUTROPHILS # BLD AUTO: 5.63 K/UL
NEUTROPHILS NFR BLD AUTO: 68.3 %
PLATELET # BLD AUTO: 264 K/UL
POTASSIUM SERPL-SCNC: 4.4 MMOL/L
PROT SERPL-MCNC: 7.3 G/DL
RBC # BLD: 4.14 M/UL
RBC # FLD: 14 %
SODIUM SERPL-SCNC: 141 MMOL/L
WBC # FLD AUTO: 8.23 K/UL

## 2022-03-30 NOTE — PHYSICAL EXAM
[General Appearance - Alert] : alert [General Appearance - In No Acute Distress] : in no acute distress [General Appearance - Well Nourished] : well nourished [General Appearance - Well-Appearing] : healthy appearing [Neck Appearance] : the appearance of the neck was normal [Respiration, Rhythm And Depth] : normal respiratory rhythm and effort [Auscultation Breath Sounds / Voice Sounds] : lungs were clear to auscultation bilaterally [Heart Rate And Rhythm] : heart rate was normal and rhythm regular [Heart Sounds] : normal S1 and S2 [Bowel Sounds] : normal bowel sounds [Abdomen Soft] : soft [Abdomen Tenderness] : non-tender [No Palpable Adenopathy] : no palpable adenopathy [Musculoskeletal - Swelling] : no joint swelling [] : no rash [Sensation] : the sensory exam was normal to light touch and pinprick [Motor Exam] : the motor exam was normal [Oriented To Time, Place, And Person] : oriented to person, place, and time [Affect] : the affect was normal [FreeTextEntry1] : Healed/scarred surgical site on posterior C spine, no signs wound

## 2022-03-30 NOTE — HISTORY OF PRESENT ILLNESS
[FreeTextEntry1] : 70 yo F with Hodgkins Lymphoma, Breast CA, history of cervical stenosis 2018 s/p C4-C6 ACDF, presented to [facility] with episode of leakage from wound, had washout, and given abx for suspected hardware infection\par Remains well, no other new complaints\par Taking medication with no issues\par No side effects, no new complaints

## 2022-03-30 NOTE — ASSESSMENT
[Treatment Education] : treatment education [Disclosure of Diagnosis] : disclosure of diagnosis [FreeTextEntry1] : 72 yo F with Hodgkins Lymphoma, Breast CA, history of cervical stenosis 2018 s/p C4-C6 ACDF, presented to [facility] with episode of leakage from wound\par Wound infection at C-spine s/p OR washout and IV abx growing strep intermedius\par Hardware was retained\par Concern for possible residual infection despite reassuring clinical status\par For now, continue PO antibiotic for suppression, no A/E--uncertain endpoint (risks/complications from recurrent infection > than risks of antibiotic presently)\par Risk/benefits suppression discussed with patient\par Overall, 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

## 2022-05-06 ENCOUNTER — OUTPATIENT (OUTPATIENT)
Dept: OUTPATIENT SERVICES | Facility: HOSPITAL | Age: 72
LOS: 1 days | End: 2022-05-06
Payer: MEDICARE

## 2022-05-06 ENCOUNTER — APPOINTMENT (OUTPATIENT)
Dept: ULTRASOUND IMAGING | Facility: CLINIC | Age: 72
End: 2022-05-06
Payer: MEDICARE

## 2022-05-06 ENCOUNTER — APPOINTMENT (OUTPATIENT)
Dept: CT IMAGING | Facility: CLINIC | Age: 72
End: 2022-05-06
Payer: MEDICARE

## 2022-05-06 DIAGNOSIS — Z00.00 ENCOUNTER FOR GENERAL ADULT MEDICAL EXAMINATION WITHOUT ABNORMAL FINDINGS: ICD-10-CM

## 2022-05-06 DIAGNOSIS — Z85.828 PERSONAL HISTORY OF OTHER MALIGNANT NEOPLASM OF SKIN: Chronic | ICD-10-CM

## 2022-05-06 DIAGNOSIS — Z98.890 OTHER SPECIFIED POSTPROCEDURAL STATES: Chronic | ICD-10-CM

## 2022-05-06 DIAGNOSIS — Z90.89 ACQUIRED ABSENCE OF OTHER ORGANS: Chronic | ICD-10-CM

## 2022-05-06 DIAGNOSIS — Z98.1 ARTHRODESIS STATUS: Chronic | ICD-10-CM

## 2022-05-06 PROCEDURE — G1004: CPT

## 2022-05-06 PROCEDURE — 74170 CT ABD WO CNTRST FLWD CNTRST: CPT | Mod: MG

## 2022-05-06 PROCEDURE — 76536 US EXAM OF HEAD AND NECK: CPT | Mod: 26

## 2022-05-06 PROCEDURE — 76536 US EXAM OF HEAD AND NECK: CPT

## 2022-05-06 PROCEDURE — 74170 CT ABD WO CNTRST FLWD CNTRST: CPT | Mod: 26,MG

## 2022-05-16 PROBLEM — Z85.528 HISTORY OF PRIMARY CANCER OF LEFT KIDNEY PARENCHYMA: Status: RESOLVED | Noted: 2019-05-09 | Resolved: 2022-05-16

## 2022-05-17 ENCOUNTER — APPOINTMENT (OUTPATIENT)
Dept: UROLOGY | Facility: CLINIC | Age: 72
End: 2022-05-17
Payer: MEDICARE

## 2022-05-17 DIAGNOSIS — Z85.528 PERSONAL HISTORY OF OTHER MALIGNANT NEOPLASM OF KIDNEY: ICD-10-CM

## 2022-05-17 PROCEDURE — 99213 OFFICE O/P EST LOW 20 MIN: CPT

## 2022-05-17 RX ORDER — VITAMIN A 3000 MCG
1000 CAPSULE ORAL
Qty: 30 | Refills: 0 | Status: COMPLETED | COMMUNITY
Start: 2019-05-09 | End: 2022-05-17

## 2022-05-17 RX ORDER — OXYCODONE AND ACETAMINOPHEN 5; 325 MG/1; MG/1
5-325 TABLET ORAL EVERY 4 HOURS
Qty: 60 | Refills: 0 | Status: COMPLETED | COMMUNITY
Start: 2018-11-18 | End: 2022-05-17

## 2022-05-17 RX ORDER — APIXABAN 5 MG/1
5 TABLET, FILM COATED ORAL
Qty: 180 | Refills: 0 | Status: COMPLETED | COMMUNITY
Start: 2019-06-19 | End: 2022-05-17

## 2022-05-17 RX ORDER — SENNOSIDES 8.6 MG
8.6 TABLET ORAL
Qty: 60 | Refills: 0 | Status: COMPLETED | COMMUNITY
Start: 2019-05-09 | End: 2022-05-17

## 2022-05-17 RX ORDER — MINOCYCLINE HYDROCHLORIDE 1 MG/1
1 POWDER ORAL
Qty: 40 | Refills: 0 | Status: COMPLETED | COMMUNITY
Start: 2017-04-24 | End: 2022-05-17

## 2022-05-17 RX ORDER — THIAMINE HCL 100 MG
100 TABLET ORAL
Qty: 30 | Refills: 0 | Status: COMPLETED | COMMUNITY
Start: 2019-05-09 | End: 2022-05-17

## 2022-05-17 RX ORDER — AMIODARONE HYDROCHLORIDE 200 MG/1
200 TABLET ORAL
Qty: 30 | Refills: 0 | Status: COMPLETED | COMMUNITY
Start: 2019-05-09 | End: 2022-05-17

## 2022-05-17 RX ORDER — METOPROLOL TARTRATE 25 MG/1
25 TABLET, FILM COATED ORAL
Qty: 30 | Refills: 0 | Status: COMPLETED | COMMUNITY
Start: 2019-05-09 | End: 2022-05-17

## 2022-05-17 RX ORDER — DIAZEPAM 5 MG/1
5 TABLET ORAL
Qty: 6 | Refills: 0 | Status: COMPLETED | COMMUNITY
Start: 2018-09-12 | End: 2022-05-17

## 2022-05-17 RX ORDER — GLUCOSAMINE SULFATE 500 MG
3-2 TABLET ORAL
Qty: 30 | Refills: 0 | Status: COMPLETED | COMMUNITY
Start: 2019-05-09 | End: 2022-05-17

## 2022-05-17 RX ORDER — PREGABALIN 100 MG/1
100 CAPSULE ORAL
Qty: 90 | Refills: 0 | Status: COMPLETED | COMMUNITY
Start: 2019-03-24 | End: 2022-05-17

## 2022-05-17 RX ORDER — MULTIVIT-MIN/FOLIC/VIT K/LYCOP 400-300MCG
10 MCG TABLET ORAL
Qty: 60 | Refills: 0 | Status: COMPLETED | COMMUNITY
Start: 2019-05-09 | End: 2022-05-17

## 2022-05-17 RX ORDER — IBUPROFEN 800 MG/1
800 TABLET, FILM COATED ORAL 4 TIMES DAILY
Qty: 90 | Refills: 0 | Status: COMPLETED | COMMUNITY
Start: 2018-07-09 | End: 2022-05-17

## 2022-05-17 RX ORDER — ATORVASTATIN CALCIUM 20 MG/1
20 TABLET, FILM COATED ORAL
Qty: 30 | Refills: 0 | Status: COMPLETED | COMMUNITY
Start: 2019-05-09 | End: 2022-05-17

## 2022-05-17 RX ORDER — GABAPENTIN 600 MG/1
600 TABLET, COATED ORAL
Qty: 56 | Refills: 0 | Status: COMPLETED | COMMUNITY
Start: 2019-04-12 | End: 2022-05-17

## 2022-05-17 RX ORDER — FAMOTIDINE 20 MG/1
20 TABLET, FILM COATED ORAL
Qty: 30 | Refills: 0 | Status: COMPLETED | COMMUNITY
Start: 2019-05-09 | End: 2022-05-17

## 2022-05-26 ENCOUNTER — APPOINTMENT (OUTPATIENT)
Dept: SURGERY | Facility: CLINIC | Age: 72
End: 2022-05-26
Payer: MEDICARE

## 2022-05-26 DIAGNOSIS — E04.1 NONTOXIC SINGLE THYROID NODULE: ICD-10-CM

## 2022-05-26 PROCEDURE — 99214 OFFICE O/P EST MOD 30 MIN: CPT

## 2022-05-26 NOTE — ASSESSMENT
[FreeTextEntry1] : will observe.  will defer treatment of TSH in view of normal T3 and T4.   sonogram next visit. to return earlier if any change. patient has been given the opportunity to ask questions, and all of the patient's questions have been answered to their satisfaction\par

## 2022-05-26 NOTE — HISTORY OF PRESENT ILLNESS
[de-identified] : prior evaluation of thyroid nodules. below threshold for biopsy. denies dysphagia, hoarseness or new lesions. no changes medically since last visit. recent sonogram stable. recent TFTs show slightly elevated TSH with normal T3 and T4. . I have reviewed all old and new data and available images.

## 2022-05-26 NOTE — PHYSICAL EXAM
[de-identified] : no palpable thyroid nodules [Laryngoscopy Performed] : laryngoscopy was performed, see procedure section for findings [Midline] : located in midline position [Normal] : orientation to person, place, and time: normal

## 2022-08-23 NOTE — PROGRESS NOTE ADULT - ASSESSMENT
69yo F with PMHx of non-hodgkins lymphoma (radiation therapy in 1981), breast CA (2000s), HTN, HLD, osteoporosis, carpal tunnel release 10/2018, s/p emergent 3 level anterior cervical discectomy and fusion C4-C6 who was admitted on 4/5 for elective ACDF C3-C4 extension to C6, PSF C2-T2, iliac crest bone graft POD #4. Course complicated by hoarse voice evaluated by ENT with NTD, dysphagia for which speech and swallow is following, PNA being tx'd by abx and afib with RVR for which she is currently in NSR on amio gtt with plan to transition to PO tomorrow. She was transferred to [facility] on 4/9 for further management of afib with RVR. 99

## 2022-08-24 NOTE — HISTORY OF PRESENT ILLNESS
[FreeTextEntry1] : Jonna Shelton returns to the office today. She is a 69-year-old woman with history of kidney cancer status post left partial nephrectomy performed in June of 2019. She is now back today for postoperative followup after undergoing CT imaging earlier today. She is feeling very well. She says that she has made marked improvements overall since the kidney surgery. Her urine has been very difficult requiring multiple surgeries for other unrelated reasons including a surgery on her cervical spine and temporary need for feeding tube due to difficulty swallowing. 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 There was extension of the tumor into the perinephric fat. [id number]. \par \par The patient denies any bother from lower urinary tract symptoms at this time including urinary urgency, frequency, or dysuria. There is no gross hematuria.\par \par  Minocycline Counseling: Patient advised regarding possible photosensitivity and discoloration of the teeth, skin, lips, tongue and gums.  Patient instructed to avoid sunlight, if possible.  When exposed to sunlight, patients should wear protective clothing, sunglasses, and sunscreen.  The patient was instructed to call the office immediately if the following severe adverse effects occur:  hearing changes, easy bruising/bleeding, severe headache, or vision changes.  The patient verbalized understanding of the proper use and possible adverse effects of minocycline.  All of the patient's questions and concerns were addressed. Erythromycin Pregnancy And Lactation Text: This medication is Pregnancy Category B and is considered safe during pregnancy. It is also excreted in breast milk. Tetracycline Pregnancy And Lactation Text: This medication is Pregnancy Category D and not consider safe during pregnancy. It is also excreted in breast milk. Dapsone Pregnancy And Lactation Text: This medication is Pregnancy Category C and is not considered safe during pregnancy or breast feeding. Topical Retinoid counseling:  Patient advised to apply a pea-sized amount only at bedtime and wait 30 minutes after washing their face before applying.  If too drying, patient may add a non-comedogenic moisturizer. The patient verbalized understanding of the proper use and possible adverse effects of retinoids.  All of the patient's questions and concerns were addressed. Sarecycline Counseling: Patient advised regarding possible photosensitivity and discoloration of the teeth, skin, lips, tongue and gums.  Patient instructed to avoid sunlight, if possible.  When exposed to sunlight, patients should wear protective clothing, sunglasses, and sunscreen.  The patient was instructed to call the office immediately if the following severe adverse effects occur:  hearing changes, easy bruising/bleeding, severe headache, or vision changes.  The patient verbalized understanding of the proper use and possible adverse effects of sarecycline.  All of the patient's questions and concerns were addressed. High Dose Vitamin A Pregnancy And Lactation Text: High dose vitamin A therapy is contraindicated during pregnancy and breast feeding. Tetracycline Counseling: Patient counseled regarding possible photosensitivity and increased risk for sunburn.  Patient instructed to avoid sunlight, if possible.  When exposed to sunlight, patients should wear protective clothing, sunglasses, and sunscreen.  The patient was instructed to call the office immediately if the following severe adverse effects occur:  hearing changes, easy bruising/bleeding, severe headache, or vision changes.  The patient verbalized understanding of the proper use and possible adverse effects of tetracycline.  All of the patient's questions and concerns were addressed. Patient understands to avoid pregnancy while on therapy due to potential birth defects. Aklief Pregnancy And Lactation Text: It is unknown if this medication is safe to use during pregnancy.  It is unknown if this medication is excreted in breast milk.  Breastfeeding women should use the topical cream on the smallest area of the skin for the shortest time needed while breastfeeding.  Do not apply to nipple and areola. Tazorac Counseling:  Patient advised that medication is irritating and drying.  Patient may need to apply sparingly and wash off after an hour before eventually leaving it on overnight.  The patient verbalized understanding of the proper use and possible adverse effects of tazorac.  All of the patient's questions and concerns were addressed. Doxycycline Counseling:  Patient counseled regarding possible photosensitivity and increased risk for sunburn.  Patient instructed to avoid sunlight, if possible.  When exposed to sunlight, patients should wear protective clothing, sunglasses, and sunscreen.  The patient was instructed to call the office immediately if the following severe adverse effects occur:  hearing changes, easy bruising/bleeding, severe headache, or vision changes.  The patient verbalized understanding of the proper use and possible adverse effects of doxycycline.  All of the patient's questions and concerns were addressed. Winlevi Pregnancy And Lactation Text: This medication is considered safe during pregnancy and breastfeeding. Azithromycin Pregnancy And Lactation Text: This medication is considered safe during pregnancy and is also secreted in breast milk. Spironolactone Counseling: Patient advised regarding risks of diarrhea, abdominal pain, hyperkalemia, birth defects (for female patients), liver toxicity and renal toxicity. The patient may need blood work to monitor liver and kidney function and potassium levels while on therapy. The patient verbalized understanding of the proper use and possible adverse effects of spironolactone.  All of the patient's questions and concerns were addressed. Topical Clindamycin Counseling: Patient counseled that this medication may cause skin irritation or allergic reactions.  In the event of skin irritation, the patient was advised to reduce the amount of the drug applied or use it less frequently.   The patient verbalized understanding of the proper use and possible adverse effects of clindamycin.  All of the patient's questions and concerns were addressed. Aklief counseling:  Patient advised to apply a pea-sized amount only at bedtime and wait 30 minutes after washing their face before applying.  If too drying, patient may add a non-comedogenic moisturizer.  The most commonly reported side effects including irritation, redness, scaling, dryness, stinging, burning, itching, and increased risk of sunburn.  The patient verbalized understanding of the proper use and possible adverse effects of retinoids.  All of the patient's questions and concerns were addressed. Erythromycin Counseling:  I discussed with the patient the risks of erythromycin including but not limited to GI upset, allergic reaction, drug rash, diarrhea, increase in liver enzymes, and yeast infections. Azelaic Acid Pregnancy And Lactation Text: This medication is considered safe during pregnancy and breast feeding. Birth Control Pills Pregnancy And Lactation Text: This medication should be avoided if pregnant and for the first 30 days post-partum. Bactrim Pregnancy And Lactation Text: This medication is Pregnancy Category D and is known to cause fetal risk.  It is also excreted in breast milk. Winlevi Counseling:  I discussed with the patient the risks of topical clascoterone including but not limited to erythema, scaling, itching, and stinging. Patient voiced their understanding. Topical Clindamycin Pregnancy And Lactation Text: This medication is Pregnancy Category B and is considered safe during pregnancy. It is unknown if it is excreted in breast milk. Topical Retinoid Pregnancy And Lactation Text: This medication is Pregnancy Category C. It is unknown if this medication is excreted in breast milk. Detail Level: Detailed Azelaic Acid Counseling: Patient counseled that medicine may cause skin irritation and to avoid applying near the eyes.  In the event of skin irritation, the patient was advised to reduce the amount of the drug applied or use it less frequently.   The patient verbalized understanding of the proper use and possible adverse effects of azelaic acid.  All of the patient's questions and concerns were addressed. Include Pregnancy/Lactation Warning?: No Birth Control Pills Counseling: Birth Control Pill Counseling: I discussed with the patient the potential side effects of OCPs including but not limited to increased risk of stroke, heart attack, thrombophlebitis, deep venous thrombosis, hepatic adenomas, breast changes, GI upset, headaches, and depression.  The patient verbalized understanding of the proper use and possible adverse effects of OCPs. All of the patient's questions and concerns were addressed. High Dose Vitamin A Counseling: Side effects reviewed, pt to contact office should one occur. Spironolactone Pregnancy And Lactation Text: This medication can cause feminization of the male fetus and should be avoided during pregnancy. The active metabolite is also found in breast milk. Isotretinoin Counseling: Patient should get monthly blood tests, not donate blood, not drive at night if vision affected, not share medication, and not undergo elective surgery for 6 months after tx completed. Side effects reviewed, pt to contact office should one occur. Benzoyl Peroxide Counseling: Patient counseled that medicine may cause skin irritation and bleach clothing.  In the event of skin irritation, the patient was advised to reduce the amount of the drug applied or use it less frequently.   The patient verbalized understanding of the proper use and possible adverse effects of benzoyl peroxide.  All of the patient's questions and concerns were addressed. Topical Sulfur Applications Counseling: Topical Sulfur Counseling: Patient counseled that this medication may cause skin irritation or allergic reactions.  In the event of skin irritation, the patient was advised to reduce the amount of the drug applied or use it less frequently.   The patient verbalized understanding of the proper use and possible adverse effects of topical sulfur application.  All of the patient's questions and concerns were addressed. Benzoyl Peroxide Pregnancy And Lactation Text: This medication is Pregnancy Category C. It is unknown if benzoyl peroxide is excreted in breast milk. Azithromycin Counseling:  I discussed with the patient the risks of azithromycin including but not limited to GI upset, allergic reaction, drug rash, diarrhea, and yeast infections. Doxycycline Pregnancy And Lactation Text: This medication is Pregnancy Category D and not consider safe during pregnancy. It is also excreted in breast milk but is considered safe for shorter treatment courses. Isotretinoin Pregnancy And Lactation Text: This medication is Pregnancy Category X and is considered extremely dangerous during pregnancy. It is unknown if it is excreted in breast milk. Bactrim Counseling:  I discussed with the patient the risks of sulfa antibiotics including but not limited to GI upset, allergic reaction, drug rash, diarrhea, dizziness, photosensitivity, and yeast infections.  Rarely, more serious reactions can occur including but not limited to aplastic anemia, agranulocytosis, methemoglobinemia, blood dyscrasias, liver or kidney failure, lung infiltrates or desquamative/blistering drug rashes. Topical Sulfur Applications Pregnancy And Lactation Text: This medication is Pregnancy Category C and has an unknown safety profile during pregnancy. It is unknown if this topical medication is excreted in breast milk. Tazorac Pregnancy And Lactation Text: This medication is not safe during pregnancy. It is unknown if this medication is excreted in breast milk. Dapsone Counseling: I discussed with the patient the risks of dapsone including but not limited to hemolytic anemia, agranulocytosis, rashes, methemoglobinemia, kidney failure, peripheral neuropathy, headaches, GI upset, and liver toxicity.  Patients who start dapsone require monitoring including baseline LFTs and weekly CBCs for the first month, then every month thereafter.  The patient verbalized understanding of the proper use and possible adverse effects of dapsone.  All of the patient's questions and concerns were addressed.

## 2022-09-15 ENCOUNTER — NON-APPOINTMENT (OUTPATIENT)
Age: 72
End: 2022-09-15

## 2022-09-20 ENCOUNTER — OUTPATIENT (OUTPATIENT)
Dept: OUTPATIENT SERVICES | Facility: HOSPITAL | Age: 72
LOS: 1 days | End: 2022-09-20
Payer: MEDICARE

## 2022-09-20 ENCOUNTER — RESULT REVIEW (OUTPATIENT)
Age: 72
End: 2022-09-20

## 2022-09-20 ENCOUNTER — LABORATORY RESULT (OUTPATIENT)
Age: 72
End: 2022-09-20

## 2022-09-20 ENCOUNTER — APPOINTMENT (OUTPATIENT)
Dept: MAMMOGRAPHY | Facility: CLINIC | Age: 72
End: 2022-09-20

## 2022-09-20 ENCOUNTER — APPOINTMENT (OUTPATIENT)
Dept: ULTRASOUND IMAGING | Facility: CLINIC | Age: 72
End: 2022-09-20

## 2022-09-20 DIAGNOSIS — Z98.890 OTHER SPECIFIED POSTPROCEDURAL STATES: Chronic | ICD-10-CM

## 2022-09-20 DIAGNOSIS — C50.919 MALIGNANT NEOPLASM OF UNSPECIFIED SITE OF UNSPECIFIED FEMALE BREAST: ICD-10-CM

## 2022-09-20 DIAGNOSIS — Z85.828 PERSONAL HISTORY OF OTHER MALIGNANT NEOPLASM OF SKIN: Chronic | ICD-10-CM

## 2022-09-20 DIAGNOSIS — Z90.89 ACQUIRED ABSENCE OF OTHER ORGANS: Chronic | ICD-10-CM

## 2022-09-20 DIAGNOSIS — Z98.1 ARTHRODESIS STATUS: Chronic | ICD-10-CM

## 2022-09-20 PROCEDURE — 77067 SCR MAMMO BI INCL CAD: CPT

## 2022-09-20 PROCEDURE — 77063 BREAST TOMOSYNTHESIS BI: CPT

## 2022-09-20 PROCEDURE — 77067 SCR MAMMO BI INCL CAD: CPT | Mod: 26

## 2022-09-20 PROCEDURE — 76641 ULTRASOUND BREAST COMPLETE: CPT | Mod: 26,50

## 2022-09-20 PROCEDURE — 77063 BREAST TOMOSYNTHESIS BI: CPT | Mod: 26

## 2022-09-20 PROCEDURE — 76641 ULTRASOUND BREAST COMPLETE: CPT

## 2022-09-21 LAB — CEA SERPL-MCNC: 5.3 NG/ML

## 2022-09-22 ENCOUNTER — APPOINTMENT (OUTPATIENT)
Dept: SURGICAL ONCOLOGY | Facility: CLINIC | Age: 72
End: 2022-09-22

## 2022-09-22 VITALS
DIASTOLIC BLOOD PRESSURE: 94 MMHG | HEART RATE: 95 BPM | WEIGHT: 150 LBS | SYSTOLIC BLOOD PRESSURE: 153 MMHG | TEMPERATURE: 97.8 F | RESPIRATION RATE: 17 BRPM | HEIGHT: 66.5 IN | BODY MASS INDEX: 23.82 KG/M2 | OXYGEN SATURATION: 95 %

## 2022-09-22 DIAGNOSIS — C50.919 MALIGNANT NEOPLASM OF UNSPECIFIED SITE OF UNSPECIFIED FEMALE BREAST: ICD-10-CM

## 2022-09-22 LAB — CANCER AG27-29 SERPL-ACNC: 14 U/ML

## 2022-09-22 PROCEDURE — 99214 OFFICE O/P EST MOD 30 MIN: CPT

## 2022-09-22 NOTE — ASSESSMENT
[FreeTextEntry1] : IMP:\par MARIA ESTHER- hx of invasive ductal carcinoma s/p right lumpectomy & SLNB 10/2013\par On Anastrozole 1 mg daily\par \par \par Labs 9/2022- CA 27.29 WNL, CEA 5.3 (H, was 4.7 last year) LFTs were ordered but not drawn (LFTs 2021 WNL)\par B/L mammo/sono 9/2022- BI-RADS 2\par \par PLAN:\par Colonoscopy now\par RTO yearly with BW\par B/L mammogram/sonogram  9/2023

## 2022-09-22 NOTE — PHYSICAL EXAM
[Normal] : supple, no neck mass and thyroid not enlarged [Normal Neck Lymph Nodes] : normal neck lymph nodes  [Normal Supraclavicular Lymph Nodes] : normal supraclavicular lymph nodes [Normal Axillary Lymph Nodes] : normal axillary lymph nodes [Normal] : oriented to person, place and time, with appropriate affect [de-identified] : mild fibrocystic changes but no masses

## 2022-09-22 NOTE — HISTORY OF PRESENT ILLNESS
[de-identified] : Jonna Shelton is a 72 year old female who presents for her follow up breast exam. \par \par She is s/p right lumpectomy and right axillary sentinel lymph node biopsy for T1 N0 right breast cancer on 10/16/2013.  Final pathology revealed invasive moderately differentiated ductal carcinoma measuring 0.5 cm, ER/VT(+) HER2 (-), one lymph node was negative for carcinoma, negative margins. She is currently on Anastrozole 1 mg daily.  She received adjuvant XRT under the supervision of Dr. Botello.  She is also s/p benign left breast stereotactic biopsy in 2014.\par \par She underwent a bone scan on 11/2/18 which showed abnormal radiotracer accumulation within the pelvis and the Left scapula.  CT and possible biopsy recommended for further evaluation.\par She is s/p left partial nephrectomy for renal cell carcinoma on 6/11/19.  S/P cervical fusion Dec 2018 and April 2019.\par \par Labs 9/2022- CA 27.29 WNL, CEA 5.3 (H, was 4.7 last year) LFTs were ordered but not drawn? \par B/L mammo/sono 9/2022- BI-RADS 2\par

## 2022-10-20 ENCOUNTER — APPOINTMENT (OUTPATIENT)
Dept: INFECTIOUS DISEASE | Facility: CLINIC | Age: 72
End: 2022-10-20

## 2022-10-20 VITALS
HEIGHT: 66.5 IN | DIASTOLIC BLOOD PRESSURE: 92 MMHG | OXYGEN SATURATION: 94 % | HEART RATE: 106 BPM | TEMPERATURE: 97.6 F | SYSTOLIC BLOOD PRESSURE: 144 MMHG

## 2022-10-20 DIAGNOSIS — A49.1 STREPTOCOCCAL INFECTION, UNSPECIFIED SITE: ICD-10-CM

## 2022-10-20 PROCEDURE — 99213 OFFICE O/P EST LOW 20 MIN: CPT

## 2022-10-26 PROBLEM — A49.1 STREPTOCOCCAL INFECTION: Status: ACTIVE | Noted: 2020-10-15

## 2022-10-26 NOTE — HISTORY OF PRESENT ILLNESS
[FreeTextEntry1] : 72 yo F with Hodgkins Lymphoma, Breast CA, history of cervical stenosis 2018 s/p C4-C6 ACDF, presented to [facility] with episode of leakage from wound, had washout, and given abx for suspected hardware infection\par Remains well, no other new complaints\par Taking medication with no issues\par No side effects, no new complaints

## 2022-10-26 NOTE — ASSESSMENT
[FreeTextEntry1] : 71 yo F with Hodgkins Lymphoma, Breast CA, history of cervical stenosis 2018 s/p C4-C6 ACDF, presented to 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

## 2022-10-26 NOTE — PHYSICAL EXAM
[General Appearance - Alert] : alert [General Appearance - In No Acute Distress] : in no acute distress [General Appearance - Well Nourished] : well nourished [General Appearance - Well-Appearing] : healthy appearing [Neck Appearance] : the appearance of the neck was normal [FreeTextEntry1] : Healed/scarred surgical site on posterior C spine, no signs wound [Respiration, Rhythm And Depth] : normal respiratory rhythm and effort [Auscultation Breath Sounds / Voice Sounds] : lungs were clear to auscultation bilaterally [Heart Rate And Rhythm] : heart rate was normal and rhythm regular [Heart Sounds] : normal S1 and S2 [Bowel Sounds] : normal bowel sounds [Abdomen Soft] : soft [Abdomen Tenderness] : non-tender [No Palpable Adenopathy] : no palpable adenopathy [Musculoskeletal - Swelling] : no joint swelling [] : no rash [Sensation] : the sensory exam was normal to light touch and pinprick [Motor Exam] : the motor exam was normal [Oriented To Time, Place, And Person] : oriented to person, place, and time [Affect] : the affect was normal

## 2022-11-28 ENCOUNTER — RX RENEWAL (OUTPATIENT)
Age: 72
End: 2022-11-28

## 2023-04-27 ENCOUNTER — APPOINTMENT (OUTPATIENT)
Dept: INFECTIOUS DISEASE | Facility: CLINIC | Age: 73
End: 2023-04-27
Payer: MEDICARE

## 2023-04-27 VITALS
DIASTOLIC BLOOD PRESSURE: 74 MMHG | HEIGHT: 66.5 IN | HEART RATE: 104 BPM | SYSTOLIC BLOOD PRESSURE: 112 MMHG | TEMPERATURE: 97.8 F | OXYGEN SATURATION: 94 %

## 2023-04-27 PROCEDURE — 99213 OFFICE O/P EST LOW 20 MIN: CPT

## 2023-04-27 RX ORDER — CEPHALEXIN 500 MG/1
500 CAPSULE ORAL
Qty: 90 | Refills: 3 | Status: ACTIVE | COMMUNITY
Start: 2020-10-06 | End: 1900-01-01

## 2023-05-27 NOTE — ASSESSMENT
[Treatment Education] : treatment education [Disclosure of Diagnosis] : disclosure of diagnosis [FreeTextEntry1] : 72 yo F with Hodgkins Lymphoma, Breast CA, history of cervical stenosis 2018 s/p C4-C6 ACDF, presented to [facility] with episode of leakage from wound\par Wound infection at C-spine s/p OR washout and IV abx growing strep intermedius\par Hardware was retained\par Concern for possible residual infection despite reassuring clinical status\par For now, continue PO antibiotic for suppression, no A/E--uncertain endpoint (risks/complications from recurrent infection > than risks of antibiotic presently)\par Risk/benefits suppression discussed with patient\par Overall, 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

## 2023-05-27 NOTE — HISTORY OF PRESENT ILLNESS
[FreeTextEntry1] : 72 yo F with Hodgkins Lymphoma, Breast CA, history of cervical stenosis 2018 s/p C4-C6 ACDF, presented to [facility] with episode of leakage from wound, had washout, and given abx for suspected hardware infection\par Remains well, no other new complaints\par Taking medication with no issues\par No side effects, no new complaints

## 2023-08-18 NOTE — PATIENT PROFILE ADULT - NSPROCHRONICPAIN_GEN_A_NUR
Returned pt's call. No answer. LVM.    ----- Message from Diana Washington sent at 8/18/2023 10:24 AM CDT -----  Contact: Pt  Type:  Patient Returning Call    Who Called:Pt  Who Left Message for Patient:Ginger  Does the patient know what this is regarding?:yes  Would the patient rather a call back or a response via [facility]? call  Best Call Back Number:[phone]  Additional Information: Pt is returning Ginger's call. Please call pt back to advise.          no

## 2023-09-23 NOTE — PATIENT PROFILE ADULT - NSPROGENBLOODRESTRICT_GEN_A_NUR
Pt continues to c/o 10/10 CP. Pt was sleeping when RN entered room. VS obtained.       Ivania Castaneda RN  09/23/23 2133 none

## 2023-10-30 NOTE — PROGRESS NOTE ADULT - ASSESSMENT
Called pt and verbalized results below. Patient states since decreasing sodium tablets, she has had an increase in headaches and questions if this is of concern for her?   Edna please advise on next steps for patient.    ENT Progress Note    HPI: 68F s/p ACDF repair with Dr. Daley, approach done by Dr. Orozco. Patient did well overnight, no issues from ENT perspective        PAST MEDICAL & SURGICAL HISTORY:  Anxiety  History of breast cancer: s/p right lumpectomy , RT  Hodgkin disease: 1981, underwent radiation therapy  HLD (hyperlipidemia)  HTN (hypertension)  Cervical disc herniation  Carpal tunnel syndrome: b/l  Cervical disc disease  History of carpal tunnel surgery: 10/2018 left hand  History of tonsillectomy  S/P cervical spinal fusion: 12/15/2018 C4-C6 ACDF  History of Mohs micrographic surgery for skin cancer  History of lumpectomy of right breast: 2013    Allergies    penicillin (Rash)    Intolerances      MEDICATIONS  (STANDING):  anastrozole 1 milliGRAM(s) Oral daily  atorvastatin 40 milliGRAM(s) Oral at bedtime  BUpivacaine liposome 1.3% Injectable (no eMAR) 20 milliLiter(s) Local Injection once  ceFAZolin   IVPB 1000 milliGRAM(s) IV Intermittent every 8 hours  cholecalciferol 400 Unit(s) Oral daily  dexamethasone  IVPB 6 milliGRAM(s) IV Intermittent every 6 hours  dextrose 50% Injectable 12.5 Gram(s) IV Push once  dextrose 50% Injectable 25 Gram(s) IV Push once  dextrose 50% Injectable 25 Gram(s) IV Push once  docusate sodium 100 milliGRAM(s) Oral three times a day  famotidine    Tablet 20 milliGRAM(s) Oral daily  gabapentin 600 milliGRAM(s) Oral at bedtime  insulin lispro (HumaLOG) corrective regimen sliding scale   SubCutaneous Before meals and at bedtime  metoprolol succinate ER 25 milliGRAM(s) Oral daily  pregabalin 100 milliGRAM(s) Oral two times a day  senna 2 Tablet(s) Oral at bedtime  sodium chloride 0.9%. 1000 milliLiter(s) (75 mL/Hr) IV Continuous <Continuous>    MEDICATIONS  (PRN):  acetaminophen   Tablet .. 650 milliGRAM(s) Oral every 6 hours PRN Temp greater or equal to 38C (100.4F), Mild Pain (1 - 3)  benzocaine 15 mG/menthol 3.6 mG (Sugar-Free) Lozenge 1 Lozenge Oral every 2 hours PRN Sore Throat  benzocaine 15 mG/menthol 3.6 mG Lozenge 1 Lozenge Oral every 1 hour PRN Sore Throat  dextrose 40% Gel 15 Gram(s) Oral once PRN Blood Glucose LESS THAN 70 milliGRAM(s)/deciliter  diazepam    Tablet 5 milliGRAM(s) Oral every 8 hours PRN muscle spasm  glucagon  Injectable 1 milliGRAM(s) IntraMuscular once PRN Glucose LESS THAN 70 milligrams/deciliter  HYDROmorphone  Injectable 0.5 milliGRAM(s) SubCutaneous every 3 hours PRN breakthrough pain  magnesium hydroxide Suspension 30 milliLiter(s) Oral every 12 hours PRN Constipation  ondansetron Injectable 4 milliGRAM(s) IV Push every 6 hours PRN Nausea  traMADol 25 milliGRAM(s) Oral every 4 hours PRN Moderate Pain (4 - 6)  traMADol 50 milliGRAM(s) Oral every 4 hours PRN Severe Pain (7 - 10)        Vital Signs Last 24 Hrs  T(C): 36.5 (06 Apr 2019 09:13), Max: 36.6 (05 Apr 2019 20:35)  T(F): 97.7 (06 Apr 2019 09:13), Max: 97.9 (06 Apr 2019 05:51)  HR: 82 (06 Apr 2019 09:13) (82 - 106)  BP: 110/57 (06 Apr 2019 09:13) (103/54 - 150/84)  BP(mean): 75 (05 Apr 2019 21:05) (75 - 107)  RR: 16 (06 Apr 2019 09:13) (14 - 20)  SpO2: 94% (06 Apr 2019 09:13) (92% - 99%)    Physical Exam:  Alert, NAD  Neck brace on, neck incision c/d/i, drains putting out SS fluid  CN exam II-XII intact  Nonlabored Respirations NAEEM DOZIER      04-05-19 @ 07:01  -  04-06-19 @ 07:00  --------------------------------------------------------  IN: 1625 mL / OUT: 1635 mL / NET: -10 mL    04-06-19 @ 07:01  -  04-06-19 @ 11:33  --------------------------------------------------------  IN: 0 mL / OUT: 627 mL / NET: -627 mL                              10.8   13.08 )-----------( 245      ( 06 Apr 2019 07:32 )             33.1    04-06    137  |  103  |  10  ----------------------------<  140<H>  4.2   |  24  |  0.43<L>    Ca    9.3      06 Apr 2019 07:32  Phos  4.3     04-06  Mg     2.2     04-06     PT/INR - ( 05 Apr 2019 07:14 )   PT: 11.5 sec;   INR: 1.02          PTT - ( 05 Apr 2019 07:14 )  PTT:31.0 sec      A/P: 68yFemale s/p ACDF repair, doing well  - Care per NSGY team  - Please page ENT with questions/concerns  - SCDs  - d/w attending

## 2023-11-09 ENCOUNTER — APPOINTMENT (OUTPATIENT)
Dept: INFECTIOUS DISEASE | Facility: CLINIC | Age: 73
End: 2023-11-09
Payer: MEDICARE

## 2023-11-09 VITALS
OXYGEN SATURATION: 95 % | DIASTOLIC BLOOD PRESSURE: 76 MMHG | SYSTOLIC BLOOD PRESSURE: 110 MMHG | HEIGHT: 66.5 IN | HEART RATE: 97 BPM | TEMPERATURE: 97.6 F

## 2023-11-09 PROCEDURE — 99213 OFFICE O/P EST LOW 20 MIN: CPT

## 2023-11-29 ENCOUNTER — RX RENEWAL (OUTPATIENT)
Age: 73
End: 2023-11-29

## 2023-12-08 NOTE — PHYSICAL THERAPY INITIAL EVALUATION ADULT - ORIENTATION, REHAB EVAL
Just an FYI - patient has not responded to recall letters to set up follow up colonoscopy. Message sent to PCP.   oriented to person, place, time and situation

## 2023-12-18 ENCOUNTER — NON-APPOINTMENT (OUTPATIENT)
Age: 73
End: 2023-12-18

## 2023-12-20 ENCOUNTER — APPOINTMENT (OUTPATIENT)
Dept: ORTHOPEDIC SURGERY | Facility: CLINIC | Age: 73
End: 2023-12-20
Payer: MEDICARE

## 2023-12-20 VITALS
SYSTOLIC BLOOD PRESSURE: 159 MMHG | BODY MASS INDEX: 24.62 KG/M2 | WEIGHT: 155 LBS | OXYGEN SATURATION: 97 % | DIASTOLIC BLOOD PRESSURE: 104 MMHG | HEART RATE: 112 BPM | HEIGHT: 66.5 IN

## 2023-12-20 PROCEDURE — 73030 X-RAY EXAM OF SHOULDER: CPT | Mod: RT

## 2023-12-20 PROCEDURE — 99204 OFFICE O/P NEW MOD 45 MIN: CPT

## 2023-12-27 ENCOUNTER — RESULT REVIEW (OUTPATIENT)
Age: 73
End: 2023-12-27

## 2023-12-27 ENCOUNTER — OUTPATIENT (OUTPATIENT)
Dept: OUTPATIENT SERVICES | Facility: HOSPITAL | Age: 73
LOS: 1 days | End: 2023-12-27
Payer: MEDICARE

## 2023-12-27 ENCOUNTER — APPOINTMENT (OUTPATIENT)
Dept: CT IMAGING | Facility: CLINIC | Age: 73
End: 2023-12-27
Payer: MEDICARE

## 2023-12-27 DIAGNOSIS — Z90.89 ACQUIRED ABSENCE OF OTHER ORGANS: Chronic | ICD-10-CM

## 2023-12-27 DIAGNOSIS — Z98.1 ARTHRODESIS STATUS: Chronic | ICD-10-CM

## 2023-12-27 DIAGNOSIS — S49.91XA UNSPECIFIED INJURY OF RIGHT SHOULDER AND UPPER ARM, INITIAL ENCOUNTER: ICD-10-CM

## 2023-12-27 DIAGNOSIS — Z85.828 PERSONAL HISTORY OF OTHER MALIGNANT NEOPLASM OF SKIN: Chronic | ICD-10-CM

## 2023-12-27 DIAGNOSIS — Z98.890 OTHER SPECIFIED POSTPROCEDURAL STATES: Chronic | ICD-10-CM

## 2023-12-27 PROCEDURE — 73200 CT UPPER EXTREMITY W/O DYE: CPT | Mod: 26,RT,MH

## 2023-12-27 PROCEDURE — 73200 CT UPPER EXTREMITY W/O DYE: CPT

## 2023-12-27 PROCEDURE — 76376 3D RENDER W/INTRP POSTPROCES: CPT

## 2023-12-27 PROCEDURE — 76376 3D RENDER W/INTRP POSTPROCES: CPT | Mod: 26

## 2024-01-02 ENCOUNTER — NON-APPOINTMENT (OUTPATIENT)
Age: 74
End: 2024-01-02

## 2024-01-05 ENCOUNTER — NON-APPOINTMENT (OUTPATIENT)
Age: 74
End: 2024-01-05

## 2024-01-08 ENCOUNTER — NON-APPOINTMENT (OUTPATIENT)
Age: 74
End: 2024-01-08

## 2024-01-16 ENCOUNTER — APPOINTMENT (OUTPATIENT)
Dept: ORTHOPEDIC SURGERY | Facility: CLINIC | Age: 74
End: 2024-01-16
Payer: MEDICARE

## 2024-01-16 VITALS — DIASTOLIC BLOOD PRESSURE: 88 MMHG | HEART RATE: 83 BPM | SYSTOLIC BLOOD PRESSURE: 130 MMHG

## 2024-01-16 PROCEDURE — 99212 OFFICE O/P EST SF 10 MIN: CPT

## 2024-01-16 PROCEDURE — 73030 X-RAY EXAM OF SHOULDER: CPT | Mod: RT

## 2024-01-24 ENCOUNTER — APPOINTMENT (OUTPATIENT)
Dept: ORTHOPEDIC SURGERY | Facility: CLINIC | Age: 74
End: 2024-01-24
Payer: MEDICARE

## 2024-01-24 VITALS — HEIGHT: 66 IN | WEIGHT: 150 LBS | BODY MASS INDEX: 24.11 KG/M2

## 2024-01-24 DIAGNOSIS — M54.50 LOW BACK PAIN, UNSPECIFIED: ICD-10-CM

## 2024-01-24 DIAGNOSIS — G89.29 LOW BACK PAIN, UNSPECIFIED: ICD-10-CM

## 2024-01-24 DIAGNOSIS — M62.9 DISORDER OF MUSCLE, UNSPECIFIED: ICD-10-CM

## 2024-01-24 DIAGNOSIS — M62.89 OTHER SPECIFIED DISORDERS OF MUSCLE: ICD-10-CM

## 2024-01-24 DIAGNOSIS — S42.032A DISPLACED FRACTURE OF LATERAL END OF LEFT CLAVICLE, INITIAL ENCOUNTER FOR CLOSED FRACTURE: ICD-10-CM

## 2024-01-24 PROCEDURE — 99204 OFFICE O/P NEW MOD 45 MIN: CPT

## 2024-02-01 ENCOUNTER — APPOINTMENT (OUTPATIENT)
Dept: MAMMOGRAPHY | Facility: CLINIC | Age: 74
End: 2024-02-01
Payer: MEDICARE

## 2024-02-01 ENCOUNTER — RESULT REVIEW (OUTPATIENT)
Age: 74
End: 2024-02-01

## 2024-02-01 ENCOUNTER — OUTPATIENT (OUTPATIENT)
Dept: OUTPATIENT SERVICES | Facility: HOSPITAL | Age: 74
LOS: 1 days | End: 2024-02-01
Payer: MEDICARE

## 2024-02-01 ENCOUNTER — APPOINTMENT (OUTPATIENT)
Dept: ULTRASOUND IMAGING | Facility: CLINIC | Age: 74
End: 2024-02-01

## 2024-02-01 DIAGNOSIS — Z90.89 ACQUIRED ABSENCE OF OTHER ORGANS: Chronic | ICD-10-CM

## 2024-02-01 DIAGNOSIS — Z98.890 OTHER SPECIFIED POSTPROCEDURAL STATES: Chronic | ICD-10-CM

## 2024-02-01 DIAGNOSIS — Z12.31 ENCOUNTER FOR SCREENING MAMMOGRAM FOR MALIGNANT NEOPLASM OF BREAST: ICD-10-CM

## 2024-02-01 DIAGNOSIS — Z85.828 PERSONAL HISTORY OF OTHER MALIGNANT NEOPLASM OF SKIN: Chronic | ICD-10-CM

## 2024-02-01 PROCEDURE — 76641 ULTRASOUND BREAST COMPLETE: CPT

## 2024-02-01 PROCEDURE — 76641 ULTRASOUND BREAST COMPLETE: CPT | Mod: 26,50,GY

## 2024-02-01 PROCEDURE — 77063 BREAST TOMOSYNTHESIS BI: CPT

## 2024-02-01 PROCEDURE — 77067 SCR MAMMO BI INCL CAD: CPT

## 2024-02-01 PROCEDURE — 77063 BREAST TOMOSYNTHESIS BI: CPT | Mod: 26

## 2024-02-01 PROCEDURE — 77067 SCR MAMMO BI INCL CAD: CPT | Mod: 26

## 2024-02-26 ENCOUNTER — APPOINTMENT (OUTPATIENT)
Dept: ORTHOPEDIC SURGERY | Facility: CLINIC | Age: 74
End: 2024-02-26
Payer: MEDICARE

## 2024-02-26 VITALS
SYSTOLIC BLOOD PRESSURE: 127 MMHG | BODY MASS INDEX: 24.11 KG/M2 | WEIGHT: 150 LBS | HEART RATE: 90 BPM | HEIGHT: 66 IN | DIASTOLIC BLOOD PRESSURE: 83 MMHG

## 2024-02-26 PROCEDURE — 99213 OFFICE O/P EST LOW 20 MIN: CPT

## 2024-02-26 PROCEDURE — 73562 X-RAY EXAM OF KNEE 3: CPT | Mod: RT

## 2024-04-08 ENCOUNTER — APPOINTMENT (OUTPATIENT)
Dept: ORTHOPEDIC SURGERY | Facility: CLINIC | Age: 74
End: 2024-04-08
Payer: MEDICARE

## 2024-04-08 VITALS
OXYGEN SATURATION: 92 % | SYSTOLIC BLOOD PRESSURE: 126 MMHG | WEIGHT: 150 LBS | HEART RATE: 92 BPM | DIASTOLIC BLOOD PRESSURE: 87 MMHG | BODY MASS INDEX: 24.11 KG/M2 | HEIGHT: 66 IN

## 2024-04-08 DIAGNOSIS — S49.91XA UNSPECIFIED INJURY OF RIGHT SHOULDER AND UPPER ARM, INITIAL ENCOUNTER: ICD-10-CM

## 2024-04-08 PROCEDURE — 99213 OFFICE O/P EST LOW 20 MIN: CPT

## 2024-04-09 PROBLEM — S49.91XA RIGHT SHOULDER INJURY: Status: ACTIVE | Noted: 2023-12-20

## 2024-04-10 NOTE — PROGRESS NOTE ADULT - SUBJECTIVE AND OBJECTIVE BOX
Addended by: ISMAEL SANDERS on: 4/10/2024 12:18 PM     Modules accepted: Orders     Pt seen and examined. No acute events overnight.   Pain controlled.     Exam:  Posterior neck/upper back with Pravena VAC in place.   No evidence of drainage or collection.   Drains serosanguinous 50mL/each.

## 2024-05-04 ENCOUNTER — NON-APPOINTMENT (OUTPATIENT)
Age: 74
End: 2024-05-04

## 2024-05-09 ENCOUNTER — APPOINTMENT (OUTPATIENT)
Dept: INFECTIOUS DISEASE | Facility: CLINIC | Age: 74
End: 2024-05-09
Payer: COMMERCIAL

## 2024-05-09 VITALS
HEIGHT: 66 IN | HEART RATE: 96 BPM | OXYGEN SATURATION: 94 % | SYSTOLIC BLOOD PRESSURE: 118 MMHG | DIASTOLIC BLOOD PRESSURE: 79 MMHG | BODY MASS INDEX: 24.11 KG/M2 | WEIGHT: 150 LBS

## 2024-05-09 DIAGNOSIS — T84.7XXA INFECTION AND INFLAMMATORY REACTION DUE TO OTHER INTERNAL ORTHOPEDIC PROSTHETIC DEVICES, IMPLANTS AND GRAFTS, INITIAL ENCOUNTER: ICD-10-CM

## 2024-05-09 DIAGNOSIS — Z98.1 ARTHRODESIS STATUS: ICD-10-CM

## 2024-05-09 PROCEDURE — 99213 OFFICE O/P EST LOW 20 MIN: CPT

## 2024-05-13 NOTE — DIETITIAN INITIAL EVALUATION ADULT. - DOB: +DATEOFBIRTH
-  UPT before MRI
Recommendations: -  Labs today: CMP -  Reschedule with new order placed on 5/13/24, for EOVIST MRI abd w wo EOVIST contrast using liver mass protocol if appropriate. Please schedule at [facility]. -  Avoid alcohol, smoking, sedatives and meds with codeine. -  Avoid high intake of Tylenol (more than 4 extra-strength pills in one day) -  Call us if any bleeding, fevers, confusion, disorientation occur -  Transplant option discussed, will evaluate if lesion malignant. -  Education provided: liver disease, cirrhosis, HCC, monitoring and follow-up.  -  Return in 1 month. 
Statement Selected

## 2024-06-04 NOTE — HISTORY OF PRESENT ILLNESS
[FreeTextEntry1] : 75 yo F with Hodgkins Lymphoma, Breast CA, history of cervical stenosis 2018 s/p C4-C6 ACDF, presented to [facility] with episode of leakage from wound, had washout, and given abx for suspected hardware infection Remains well, no other new complaints Taking medication with no issues No side effects, no new complaints  Doing well, no new complaints Had a fall in [place]--not major injuries Now following with PT Doing well

## 2024-06-04 NOTE — ASSESSMENT
[Treatment Education] : treatment education [Disclosure of Diagnosis] : disclosure of diagnosis [FreeTextEntry1] : 72 yo F with Hodgkins Lymphoma, Breast CA, history of cervical stenosis 2018 s/p C4-C6 ACDF, presented to [facility] with episode of leakage from wound Wound infection at [facility] s/p OR washout and IV abx growing strep intermedius Hardware was retained Concern for possible residual infection despite reassuring clinical status For now, continue PO antibiotic for suppression, no A/E--uncertain endpoint (risks/complications from recurrent infection > than risks of antibiotic presently) Risk/benefits suppression discussed with patient Overall, 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

## 2024-06-07 NOTE — H&P ADULT - ATTENDING COMMENTS
"Urology Clinic     HPI  Shon Sargent is a 61 year old male with history of prostate cancer sp RALP, here for follow-up .      The patient denies any significant urinary issue for here.  His bladder is very sensitive to any caffeinated beverages and he gets the urgency right away he is still not having much success with erections.    He has been having more recurring gallbladder related abdominal pain and is scheduled for a laparoscopic cholecystectomy the following Friday.  He was concerned that this could be related to his prostate cancer and potentially spread from prostate cancer to the gallbladder.      PHYSICAL EXAM  BP (!) 131/92   Pulse 75   Temp 97.9  F (36.6  C) (Oral)   Resp 16   Ht 1.803 m (5' 10.98\")   Wt 112.9 kg (249 lb)   SpO2 95%   BMI 34.74 kg/m     Constitutional: AO, pleasant, NAD  Resp: Non-labored breathing on room air  Abd: Soft, NT, ND  MARIAH: Deferred   Labs  Lab Results   Component Value Date    WBC 10.3 04/21/2024    WBC 7.8 09/02/2019     Lab Results   Component Value Date    RBC 5.29 04/21/2024    RBC 4.76 09/02/2019     Lab Results   Component Value Date    HGB 14.8 04/21/2024    HGB 13.8 09/02/2019     Lab Results   Component Value Date    HCT 45.0 04/21/2024    HCT 42.8 09/02/2019     No components found for: \"MCT\"  Lab Results   Component Value Date    MCV 85 04/21/2024    MCV 90 09/02/2019     Lab Results   Component Value Date    MCH 28.0 04/21/2024    MCH 29.0 09/02/2019     Lab Results   Component Value Date    MCHC 32.9 04/21/2024    MCHC 32.2 09/02/2019     Lab Results   Component Value Date    RDW 13.2 04/21/2024    RDW 12.8 09/02/2019     Lab Results   Component Value Date     04/21/2024     09/02/2019        Last Comprehensive Metabolic Panel:  Sodium   Date Value Ref Range Status   04/21/2024 138 135 - 145 mmol/L Final     Comment:     Reference intervals for this test were updated on 09/26/2023 to more accurately reflect our healthy population. There " may be differences in the flagging of prior results with similar values performed with this method. Interpretation of those prior results can be made in the context of the updated reference intervals.    09/02/2019 142 133 - 144 mmol/L Final     Potassium   Date Value Ref Range Status   04/21/2024 3.8 3.4 - 5.3 mmol/L Final   06/20/2022 3.9 3.4 - 5.3 mmol/L Final   09/02/2019 4.5 3.4 - 5.3 mmol/L Final     Chloride   Date Value Ref Range Status   04/21/2024 103 98 - 107 mmol/L Final   06/20/2022 108 94 - 109 mmol/L Final   09/02/2019 112 (H) 94 - 109 mmol/L Final     Carbon Dioxide   Date Value Ref Range Status   09/02/2019 23 20 - 32 mmol/L Final     Carbon Dioxide (CO2)   Date Value Ref Range Status   04/21/2024 21 (L) 22 - 29 mmol/L Final   06/20/2022 24 20 - 32 mmol/L Final     Anion Gap   Date Value Ref Range Status   04/21/2024 14 7 - 15 mmol/L Final   06/20/2022 6 3 - 14 mmol/L Final   09/02/2019 7 3 - 14 mmol/L Final     Glucose   Date Value Ref Range Status   04/21/2024 122 (H) 70 - 99 mg/dL Final   06/20/2022 96 70 - 99 mg/dL Final   09/02/2019 109 (H) 70 - 99 mg/dL Final     Urea Nitrogen   Date Value Ref Range Status   04/21/2024 12.4 8.0 - 23.0 mg/dL Final   06/20/2022 16 7 - 30 mg/dL Final   09/02/2019 33 (H) 7 - 30 mg/dL Final     Creatinine   Date Value Ref Range Status   04/21/2024 0.89 0.67 - 1.17 mg/dL Final   09/02/2019 0.85 0.66 - 1.25 mg/dL Final     GFR Estimate   Date Value Ref Range Status   04/21/2024 >90 >60 mL/min/1.73m2 Final   09/02/2019 >90 >60 mL/min/[1.73_m2] Final     Comment:     Non  GFR Calc  Starting 12/18/2018, serum creatinine based estimated GFR (eGFR) will be   calculated using the Chronic Kidney Disease Epidemiology Collaboration   (CKD-EPI) equation.       Calcium   Date Value Ref Range Status   04/21/2024 9.3 8.8 - 10.2 mg/dL Final   09/02/2019 7.9 (L) 8.5 - 10.1 mg/dL Final     Bilirubin Total   Date Value Ref Range Status   04/21/2024 0.5 <=1.2 mg/dL  Final   09/02/2019 0.5 0.2 - 1.3 mg/dL Final     Alkaline Phosphatase   Date Value Ref Range Status   04/21/2024 114 40 - 150 U/L Final     Comment:     Reference intervals for this test were updated on 11/14/2023 to more accurately reflect our healthy population. There may be differences in the flagging of prior results with similar values performed with this method. Interpretation of those prior results can be made in the context of the updated reference intervals.   09/02/2019 76 40 - 150 U/L Final     ALT   Date Value Ref Range Status   04/21/2024 23 0 - 70 U/L Final     Comment:     Reference intervals for this test were updated on 6/12/2023 to more accurately reflect our healthy population. There may be differences in the flagging of prior results with similar values performed with this method. Interpretation of those prior results can be made in the context of the updated reference intervals.     09/02/2019 25 0 - 70 U/L Final     AST   Date Value Ref Range Status   04/21/2024 24 0 - 45 U/L Final     Comment:     Reference intervals for this test were updated on 6/12/2023 to more accurately reflect our healthy population. There may be differences in the flagging of prior results with similar values performed with this method. Interpretation of those prior results can be made in the context of the updated reference intervals.   09/02/2019 16 0 - 45 U/L Final       PSA   Date Value Ref Range Status   12/27/2019 4.76 (H) 0 - 4 ug/L Final     Comment:     Assay Method:  Chemiluminescence using Siemens Vista analyzer     Prostate Specific Antigen Screen   Date Value Ref Range Status   06/29/2023 5.11 (H) 0.00 - 4.50 ng/mL Final   06/20/2022 4.58 (H) 0.00 - 4.00 ug/L Final     PSA Tumor Marker   Date Value Ref Range Status   06/04/2024 <0.01 0.00 - 4.50 ng/mL Final   10/10/2023 5.27 (H) 0.00 - 4.50 ng/mL Final          ASSESSMENT AND PLAN  61 year old male for followup of pT3a prostatic adenocarcinoma status post  robotic radical prostatectomy on 3/6/2024 doing well ELLIOTT      I assured him that the risk of spreading his prostate cancer to gallbladder is extremely small.  His PSA is undetectable so there is no evidence of any cancer recurrence at this point.  I reassured him that this is merely an unfortunate coincidence.    Plan   Follow-up in 4 months with PSA prior    30 total minutes spent on the date of the encounter including direct interaction with the patient, performing chart review, documentation and further activities as noted above    Elizabeth Diop MD   Department of Urology   [facility]                  Agree with resident H&P and plan as edited above. [employer] ortho and surg onc notes reviewed. Appreciate ortho c/s, spoke with resident, confirmed steroid dosing; placed on GI ppx while on high dose steroids.

## 2024-08-29 NOTE — REVIEW OF SYSTEMS
LM informing Millicent Pierre that the rx was sent to her pharmacy   No further action required [Negative] : Heme/Lymph

## 2024-09-05 ENCOUNTER — APPOINTMENT (OUTPATIENT)
Dept: SURGICAL ONCOLOGY | Facility: CLINIC | Age: 74
End: 2024-09-05
Payer: MEDICARE

## 2024-09-05 VITALS
DIASTOLIC BLOOD PRESSURE: 84 MMHG | HEIGHT: 66 IN | OXYGEN SATURATION: 95 % | BODY MASS INDEX: 24.11 KG/M2 | WEIGHT: 150 LBS | SYSTOLIC BLOOD PRESSURE: 137 MMHG | HEART RATE: 99 BPM

## 2024-09-05 DIAGNOSIS — C50.919 MALIGNANT NEOPLASM OF UNSPECIFIED SITE OF UNSPECIFIED FEMALE BREAST: ICD-10-CM

## 2024-09-05 PROCEDURE — 99214 OFFICE O/P EST MOD 30 MIN: CPT

## 2024-09-05 NOTE — PHYSICAL EXAM
[Normal] : supple, no neck mass and thyroid not enlarged [Normal Neck Lymph Nodes] : normal neck lymph nodes  [Normal Supraclavicular Lymph Nodes] : normal supraclavicular lymph nodes [Normal Axillary Lymph Nodes] : normal axillary lymph nodes [Normal] : oriented to person, place and time, with appropriate affect [de-identified] : mild fibrocystic changes but no masses

## 2024-09-05 NOTE — PHYSICAL EXAM
[Normal] : supple, no neck mass and thyroid not enlarged [Normal Neck Lymph Nodes] : normal neck lymph nodes  [Normal Supraclavicular Lymph Nodes] : normal supraclavicular lymph nodes [Normal Axillary Lymph Nodes] : normal axillary lymph nodes [Normal] : oriented to person, place and time, with appropriate affect [de-identified] : mild fibrocystic changes but no masses

## 2024-09-05 NOTE — PHYSICAL EXAM
[Normal] : supple, no neck mass and thyroid not enlarged [Normal Neck Lymph Nodes] : normal neck lymph nodes  [Normal Supraclavicular Lymph Nodes] : normal supraclavicular lymph nodes [Normal Axillary Lymph Nodes] : normal axillary lymph nodes [Normal] : oriented to person, place and time, with appropriate affect [de-identified] : mild fibrocystic changes but no masses

## 2024-09-05 NOTE — HISTORY OF PRESENT ILLNESS
[de-identified] : Ms. HILARIA KUMAR is a 74-year-old woman here for a follow-up visit, last seen in 2022.   She is s/p right lumpectomy and right axillary sentinel lymph node biopsy for T1 N0 right breast cancer on 10/16/2013.  Final pathology revealed invasive moderately differentiated ductal carcinoma measuring 0.5 cm, ER/OK(+) HER2 (-), one lymph node was negative for carcinoma, negative margins. She is currently on Anastrozole 1 mg daily.  She received adjuvant XRT under the supervision of Dr. Botello.  She is also s/p benign left breast stereotactic biopsy in 2014.  She underwent a bone scan on 11/2/18 which showed abnormal radiotracer accumulation within the pelvis and the Left scapula.  CT and possible biopsy recommended for further evaluation. She is s/p left partial nephrectomy for renal cell carcinoma on 6/11/19.  S/P cervical fusion Dec 2018 and April 2019.   B/L mammo/sono 2/2024- BI-RADS 2  Labs 8/31/24: JOSE ANGEL

## 2024-09-05 NOTE — HISTORY OF PRESENT ILLNESS
[de-identified] : Ms. HILARIA KUMAR is a 74-year-old woman here for a follow-up visit, last seen in 2022.   She is s/p right lumpectomy and right axillary sentinel lymph node biopsy for T1 N0 right breast cancer on 10/16/2013.  Final pathology revealed invasive moderately differentiated ductal carcinoma measuring 0.5 cm, ER/IA(+) HER2 (-), one lymph node was negative for carcinoma, negative margins. She is currently on Anastrozole 1 mg daily.  She received adjuvant XRT under the supervision of Dr. Botello.  She is also s/p benign left breast stereotactic biopsy in 2014.  She underwent a bone scan on 11/2/18 which showed abnormal radiotracer accumulation within the pelvis and the Left scapula.  CT and possible biopsy recommended for further evaluation. She is s/p left partial nephrectomy for renal cell carcinoma on 6/11/19.  S/P cervical fusion Dec 2018 and April 2019.   B/L mammo/sono 2/2024- BI-RADS 2  Labs 8/31/24: JOSE ANGEL

## 2024-09-05 NOTE — HISTORY OF PRESENT ILLNESS
[de-identified] : Ms. HILARIA KUMAR is a 74-year-old woman here for a follow-up visit, last seen in 2022.   She is s/p right lumpectomy and right axillary sentinel lymph node biopsy for T1 N0 right breast cancer on 10/16/2013.  Final pathology revealed invasive moderately differentiated ductal carcinoma measuring 0.5 cm, ER/[place](+) HER2 (-), one lymph node was negative for carcinoma, negative margins. She is currently on Anastrozole 1 mg daily.  She received adjuvant XRT under the supervision of Dr. Botello.  She is also s/p benign left breast stereotactic biopsy in 2014.  She underwent a bone scan on 11/2/18 which showed abnormal radiotracer accumulation within the pelvis and the Left scapula.  CT and possible biopsy recommended for further evaluation. She is s/p left partial nephrectomy for renal cell carcinoma on 6/11/19.  S/P cervical fusion Dec 2018 and April 2019.   B/L mammo/sono 2/2024- BI-RADS 2  Labs 8/31/24: JOSE ANGEL

## 2024-09-05 NOTE — ASSESSMENT
[FreeTextEntry1] : IMP: MARIA ESTHER- hx of invasive ductal carcinoma s/p right lumpectomy & SLNB 10/2013 On Anastrozole 1 mg daily  B/L mammo/sono 2/2024- BI-RADS 2 Labs 8/31/24: WNL  PLAN: Colonoscopy now B/L mammo/sono 2/2025 RTO yearly with BW

## 2024-10-04 ENCOUNTER — RX RENEWAL (OUTPATIENT)
Age: 74
End: 2024-10-04

## 2024-10-10 ENCOUNTER — RX RENEWAL (OUTPATIENT)
Age: 74
End: 2024-10-10

## 2024-11-04 ENCOUNTER — APPOINTMENT (OUTPATIENT)
Dept: INFECTIOUS DISEASE | Facility: CLINIC | Age: 74
End: 2024-11-04
Payer: MEDICARE

## 2024-11-04 ENCOUNTER — NON-APPOINTMENT (OUTPATIENT)
Age: 74
End: 2024-11-04

## 2024-11-04 VITALS
HEART RATE: 100 BPM | TEMPERATURE: 97.6 F | OXYGEN SATURATION: 94 % | DIASTOLIC BLOOD PRESSURE: 84 MMHG | SYSTOLIC BLOOD PRESSURE: 136 MMHG | HEIGHT: 66 IN | RESPIRATION RATE: 17 BRPM

## 2024-11-04 DIAGNOSIS — T84.7XXA INFECTION AND INFLAMMATORY REACTION DUE TO OTHER INTERNAL ORTHOPEDIC PROSTHETIC DEVICES, IMPLANTS AND GRAFTS, INITIAL ENCOUNTER: ICD-10-CM

## 2024-11-04 PROCEDURE — 99213 OFFICE O/P EST LOW 20 MIN: CPT

## 2025-03-10 NOTE — PATIENT PROFILE ADULT - PATIENT REPRESENTATIVE: ( YOU CAN CHOOSE ANY PERSON THAT CAN ASSIST YOU WITH YOUR HEALTH CARE PREFERENCES, DOES NOT HAVE TO BE A SPOUSE, IMMEDIATE FAMILY OR SIGNIFICANT OTHER/PARTNER)
Rx Refill Note  Requested Prescriptions     Pending Prescriptions Disp Refills    omeprazole (priLOSEC) 20 MG capsule [Pharmacy Med Name: OMEPRAZOLE DR CAPS 20MG] 90 capsule 3     Sig: TAKE 1 CAPSULE DAILY      Last office visit with prescribing clinician: 1/28/2025   Last telemedicine visit with prescribing clinician: Visit date not found   Next office visit with prescribing clinician: 4/28/2025                         Would you like a call back once the refill request has been completed: [] Yes [] No    If the office needs to give you a call back, can they leave a voicemail: [] Yes [] No    Sakshi Kemp MA  03/10/25, 07:43 EDT  
yes

## 2025-03-26 NOTE — H&P ADULT - NSHPPOASURGSITEINCISION_GEN_ALL_CORE
yes GENERAL: NAD  HEAD:  Atraumatic, Normocephalic  EYES: conjunctiva and sclera clear  NECK: Supple, No JVD  CHEST/LUNG: Clear to auscultation bilaterally; No wheeze  HEART: Regular rate and rhythm; No murmurs, rubs, or gallops  ABDOMEN: Soft, Nontender, Nondistended; Bowel sounds present  EXTREMITIES:  No clubbing, cyanosis, or edema  PSYCH: AAOx3  NEUROLOGY: non-focal  SKIN: No rashes or lesions

## 2025-05-28 NOTE — ASU PREOP CHECKLIST - ISOLATION PRECAUTIONS
[place] ambulatory encounter[facility]  [facility] [address]  [id number] [facility]   [address]  [place] [address]  [phone]    Assessment/PLAN:    1. Essential hypertension    2. Mixed hyperlipidemia    3. Gastroesophageal reflux disease without esophagitis    4. Stage 3a chronic kidney disease  (CMD)    5. Class 1 obesity due to excess calories with serious comorbidity and body mass index (BMI) of 30.0 to 30.9 in adult        No orders of the defined types were placed in this encounter.   see Smartchart activity for details.    Above chronic issues are stable.  Initially blood pressure was elevated however recheck revealed it to come down into the normal range.  Will monitor at home.  Continue low-salt and low-fat low-cholesterol diet.  Continue weight reduction.  Patient commended on weight loss.  Return in 6 months for Medicare wellness exam.  I did review patient's lipid panel done on May 19 which showed LDL at 49 with HDL at 45.  Total cholesterol 124.  Patient will continue statin medication.    Return for 6 mos mwv +, already scheduled.    Patient Instructions   Continue current medications and current plan for chronic medical issues  Monitor for symptom changes  Follow-up as scheduled below for wellness visit and multiple medical issues   Labs if needed:  If indicated and ordered otherwise we will order at office visit   Monitor blood pressures at home and contact office if less than 100/60 or if greater than 140/90 or if you have dizziness or lightheadedness.      Return for 6 mos mwv +, already scheduled.    Next appointment with me:  12/9/2025       Please arrive 15 minutes prior to future follow-up appointments and 30 minutes prior to your wellness exams       Diagnosis and plan discussed with the patient.  The patient indicates understanding these issues and agrees with the plan.           SUBJECTIVE:  Chief Complaint  none   Patient presents with    Follow-up     6 month f/u chronic issues     He is a 74 year old male who presented requesting evaluation for follow-up of multiple medical issues:    1. Essential hypertension    2. Mixed hyperlipidemia    3. Gastroesophageal reflux disease without esophagitis    4. Stage 3a chronic kidney disease  (CMD)    5. Class 1 obesity due to excess calories with serious comorbidity and body mass index (BMI) of 30.0 to 30.9 in adult        Patient states he is doing well.  Does not really monitor his blood pressures at home.  States he had couple cups of coffee this morning.  Had a couple of alcoholic drinks yesterday as well.  Denies any fevers, chills, diarrhea, nausea, vomiting, cough, chest pain or shortness of breath.  Continues to try to follow a low-fat low-cholesterol diet.    Creatinine (mg/dL)   Date Value   12/24/2024 1.35 (H)     Glomerular Filtration Rate (no units)   Date Value   12/24/2024 55 (L)           PAST HISTORIES:  I have reviewed the past medical history, medications, allergies and social history listed in the medical record as well as the nursing notes for this encounter.    MEDICATIONS:  Current Outpatient Medications   Medication Sig Dispense Refill    amoxicillin-clavulanate (AUGMENTIN) 875-125 MG per tablet Take 1 tablet by mouth in the morning and 1 tablet in the evening. Take with food (Patient not taking: Reported on 6/2/2025) 20 tablet 0    rosuvastatin (CRESTOR) 10 MG tablet Take 1 tablet by mouth daily. 90 tablet 3    losartan (COZAAR) 50 MG tablet Take 1 tablet by mouth daily. 90 tablet 3    NON FORMULARY Take 4 capsules by mouth daily. Omega XL      DISPENSE Take 1 capsule by mouth 2 times daily. Super beta prostate P3 advanced      Probiotic Product ([employer]) Cap Take 1 capsule by mouth daily.      famotidine (PEPCID) 20 MG tablet Take 20 mg by mouth daily.      Omega-3 Fatty Acids (FISH OIL) 306 MG CAPS Take 2 capsules by mouth daily.        Multiple Vitamins-Minerals (MULTIVITAL-M PO) Take 1 tablet by mouth daily.        No current facility-administered medications for this visit.       ALLERGIES:  Allergies as of 06/02/2025    (No Known Allergies)       REVIEW OF SYSTEMS:  As noted above.    OBJECTIVE:    PHYSICAL EXAM:  Blood pressure 134/86, pulse 76, temperature 98 °F (36.7 °C), temperature source Tympanic, height 5' 8\" (1.727 m), weight 89.8 kg (198 lb).    CONSTITUTIONAL:  The patient is a well-developed well-nourished male in no obvious distress.  There is no jaundice, anemia, cyanosis or respiratory distress.  HEENT:  TMs (Tympanic membranes) are clear bilaterally.  External ears without deformities.  Hearing is grossly normal.  Nose without deformities. There is moist nasal mucosa.  Throat is without erythema.  Moist mucous membranes.  Lips without lesions.  NECK:  Without lymphadenopathy.  There is full range of motion.  There is no tenderness noted.  CHEST:  Without any deformities.  Movement of the left side equals that of the right, which is within normal limits.  LUNGS:  Clear to auscultation bilaterally.  There are no wheezes or rhonchi noted.  CARDIOVASCULAR:  Regular rate and rhythm with normal S1 plus S2.  There are no murmurs auscultated.  ABDOMEN:  soft and non tender.  NEUROLOGIC:  Alert and oriented x 3.  PSYCHIATRIC:  Speech and behavior appropriate.  Mood and affect are normal.

## 2025-05-31 ENCOUNTER — NON-APPOINTMENT (OUTPATIENT)
Age: 75
End: 2025-05-31

## 2025-06-02 ENCOUNTER — APPOINTMENT (OUTPATIENT)
Dept: INFECTIOUS DISEASE | Facility: CLINIC | Age: 75
End: 2025-06-02
Payer: MEDICARE

## 2025-06-02 VITALS
BODY MASS INDEX: 24.43 KG/M2 | HEIGHT: 66 IN | TEMPERATURE: 98.2 F | HEART RATE: 73 BPM | WEIGHT: 152 LBS | OXYGEN SATURATION: 95 % | DIASTOLIC BLOOD PRESSURE: 70 MMHG | SYSTOLIC BLOOD PRESSURE: 125 MMHG

## 2025-06-02 DIAGNOSIS — L08.9 OTHER INJURY OF UNSPECIFIED BODY REGION, INITIAL ENCOUNTER: ICD-10-CM

## 2025-06-02 DIAGNOSIS — T14.8XXA OTHER INJURY OF UNSPECIFIED BODY REGION, INITIAL ENCOUNTER: ICD-10-CM

## 2025-06-02 PROCEDURE — 99213 OFFICE O/P EST LOW 20 MIN: CPT

## 2025-06-18 NOTE — PACU DISCHARGE NOTE - HYDRATION STATUS:
Satisfactory pt ALVARO from [place], was walking [place] onto [place] when they collapsed & she fell. c/o L arm pain

## 2025-06-28 NOTE — PHYSICAL THERAPY INITIAL EVALUATION ADULT - WEIGHT-BEARING RESTRICTIONS: GAIT, REHAB EVAL
Discharge Instructions  Splint Care    You had a splint put on today to help protect your injury and help it heal.  Splints are used to treat things like strains, sprains, large cuts, and fractures (broken bones). Splints are temporary and are designed to allow for swelling.    Be sure your splint is not too tight!  If you splint is too tight, it may cause loss of blood supply.  Signs of your splint being too tight include: your arm or leg hurting a lot more; your fingers or toes getting numb, cold, pale or blue; or your child is crying, fussing or seeming restless.    Generally, every Emergency Department visit should have a follow-up clinic visit with either a primary or a specialty clinic/provider. Please follow-up as instructed by your emergency provider today.  Return to the Emergency Department right away if:  You have increased pain or pressure around the injury.  You have numbness, tingling, or cool, pale, or blue toes or fingers past the injury.  Your child is more fussy than normal, crying a lot, or restless.  Your splint becomes soft, breaks, or is wet.  Your splint begins to smell bad.  Your splint is cutting into your skin.    Home care:  Keep the injured area above the level of your heart while laying or sitting down.  This will help decrease the swelling and the pain.  Keep the splint dry.  Do not put objects down or inside the splint.  If there is an elastic bandage (Ace  wrap) holding the splint on this may be loosened slightly to relieve pressure or pain.  If pain continues return to the Emergency Department right away.  Do not remove your splint by yourself unless told to by your provider.    Follow-up:  Sometimes the splint put on in the Emergency Department needs to be changed once the swelling has gone down and a more permanent cast needs to be placed.  This is usually done by a bone specialist provider (Orthopedist).  Follow the instructions given to you by your provider today.    If you were  given a prescription for medicine here today, be sure to read all of the information (including the package insert) that comes with your prescription.  This will include important information about the medicine, its side effects, and any warnings that you need to know about.  The pharmacist who fills the prescription can provide more information and answer questions you may have about the medicine.  If you have questions or concerns that the pharmacist cannot address, please call or return to the Emergency Department.     Remember that you can always come back to the Emergency Department if you are not able to see your regular provider in the amount of time listed above, if you get any new symptoms, or if there is anything that worries you.  Opioid Medication Information    You have been given a prescription for an opioid (narcotic) pain medicine and/or have received a pain medicine while here in the Emergency Department. These medicines can make you drowsy or impaired. You must not drive, operate dangerous equipment, or engage in any other dangerous activities while taking these medications. If you drive while taking these medications, you could be arrested for driving under the influence (DUI). Do not drink any alcohol while you are taking these medications.     Opioid pain medications can cause addiction. If you have a history of chemical dependency of any type, you are at a higher risk of becoming addicted to pain medications.  Only take these prescribed medications to treat your pain when all other options have been tried. Take it for as short a time and as few doses as possible. Store your pain pills in a secure place, as they are frequently stolen and provide a dangerous opportunity for children or visitors in your house to start abusing these powerful medications. We will not replace any lost or stolen medicine.    If you do not finish your medication, it is a good idea to get rid of it but please do not  flush it down the toilet. Please dispose of the remaining medication at a local pharmacy or law enforcement facility. The [place] [employer] has additional information on medication disposal: https://www.pca.[facility].[email or web address]/living-green/managing-unwanted-medications.      Many prescription pain medications contain Tylenol  (acetaminophen), including Vicodin , Tylenol #3 , Norco , Lortab , and Percocet .  You should not take any extra pills of Tylenol  if you are using these prescription medications or you can get very sick.  Do not ever take more than 3000 mg of acetaminophen in any 24 hour period.    All opioids tend to cause constipation. Drink plenty of water and eat foods that have a lot of fiber, such as fruits, vegetables, prune juice, apple juice and high fiber cereal.  Take a laxative if you don t move your bowels at least every other day. Miralax , Milk of Magnesia, Colace , or Senna  can be used to keep you regular.       full weight-bearing

## 2025-09-05 ENCOUNTER — APPOINTMENT (OUTPATIENT)
Dept: ORTHOPEDIC SURGERY | Facility: CLINIC | Age: 75
End: 2025-09-05
Payer: MEDICARE

## 2025-09-05 DIAGNOSIS — S42.031K: ICD-10-CM

## 2025-09-05 DIAGNOSIS — S42.031G: ICD-10-CM

## 2025-09-05 DIAGNOSIS — M19.012 PRIMARY OSTEOARTHRITIS, LEFT SHOULDER: ICD-10-CM

## 2025-09-05 DIAGNOSIS — S42.032K DISPLACED FRACTURE OF LATERAL END OF LEFT CLAVICLE, SUBSEQUENT ENCOUNTER FOR FRACTURE WITH NONUNION: ICD-10-CM

## 2025-09-05 PROCEDURE — 73030 X-RAY EXAM OF SHOULDER: CPT | Mod: 50

## 2025-09-05 PROCEDURE — 20610 DRAIN/INJ JOINT/BURSA W/O US: CPT | Mod: 50

## 2025-09-05 PROCEDURE — 99214 OFFICE O/P EST MOD 30 MIN: CPT | Mod: 25
